# Patient Record
Sex: FEMALE | Race: WHITE | NOT HISPANIC OR LATINO | Employment: UNEMPLOYED | URBAN - METROPOLITAN AREA
[De-identification: names, ages, dates, MRNs, and addresses within clinical notes are randomized per-mention and may not be internally consistent; named-entity substitution may affect disease eponyms.]

---

## 2018-06-11 ENCOUNTER — HOSPITAL ENCOUNTER (INPATIENT)
Facility: MEDICAL CENTER | Age: 58
LOS: 22 days | DRG: 853 | End: 2018-07-03
Attending: EMERGENCY MEDICINE | Admitting: INTERNAL MEDICINE

## 2018-06-11 ENCOUNTER — HOSPITAL ENCOUNTER (OUTPATIENT)
Dept: RADIOLOGY | Facility: MEDICAL CENTER | Age: 58
End: 2018-06-11

## 2018-06-11 DIAGNOSIS — G47.34 NOCTURNAL HYPOXIA: ICD-10-CM

## 2018-06-11 DIAGNOSIS — R73.9 HYPERGLYCEMIA: ICD-10-CM

## 2018-06-11 DIAGNOSIS — M72.6 NECROTIZING FASCIITIS (HCC): ICD-10-CM

## 2018-06-11 PROBLEM — A41.9 SEPSIS (HCC): Status: ACTIVE | Noted: 2018-06-11

## 2018-06-11 LAB
ANION GAP SERPL CALC-SCNC: 12 MMOL/L (ref 0–11.9)
ANISOCYTOSIS BLD QL SMEAR: ABNORMAL
APTT PPP: 31.7 SEC (ref 24.7–36)
BASOPHILS # BLD AUTO: 0.9 % (ref 0–1.8)
BASOPHILS # BLD: 0.1 K/UL (ref 0–0.12)
BUN SERPL-MCNC: 12 MG/DL (ref 8–22)
CALCIUM SERPL-MCNC: 8.1 MG/DL (ref 8.5–10.5)
CHLORIDE SERPL-SCNC: 105 MMOL/L (ref 96–112)
CK SERPL-CCNC: 27 U/L (ref 0–154)
CO2 SERPL-SCNC: 18 MMOL/L (ref 20–33)
CREAT SERPL-MCNC: 0.63 MG/DL (ref 0.5–1.4)
EOSINOPHIL # BLD AUTO: 0 K/UL (ref 0–0.51)
EOSINOPHIL NFR BLD: 0 % (ref 0–6.9)
ERYTHROCYTE [DISTWIDTH] IN BLOOD BY AUTOMATED COUNT: 41.6 FL (ref 35.9–50)
GLUCOSE SERPL-MCNC: 324 MG/DL (ref 65–99)
HCT VFR BLD AUTO: 35.3 % (ref 37–47)
HGB BLD-MCNC: 11.9 G/DL (ref 12–16)
INR PPP: 1.11 (ref 0.87–1.13)
LACTATE BLD-SCNC: 1.1 MMOL/L (ref 0.5–2)
LYMPHOCYTES # BLD AUTO: 1.77 K/UL (ref 1–4.8)
LYMPHOCYTES NFR BLD: 15.8 % (ref 22–41)
MAGNESIUM SERPL-MCNC: 1.6 MG/DL (ref 1.5–2.5)
MANUAL DIFF BLD: NORMAL
MCH RBC QN AUTO: 31.3 PG (ref 27–33)
MCHC RBC AUTO-ENTMCNC: 33.7 G/DL (ref 33.6–35)
MCV RBC AUTO: 92.9 FL (ref 81.4–97.8)
MICROCYTES BLD QL SMEAR: ABNORMAL
MONOCYTES # BLD AUTO: 0.78 K/UL (ref 0–0.85)
MONOCYTES NFR BLD AUTO: 7 % (ref 0–13.4)
MORPHOLOGY BLD-IMP: NORMAL
NEUTROPHILS # BLD AUTO: 8.55 K/UL (ref 2–7.15)
NEUTROPHILS NFR BLD: 71.9 % (ref 44–72)
NEUTS BAND NFR BLD MANUAL: 4.4 % (ref 0–10)
NRBC # BLD AUTO: 0 K/UL
NRBC BLD-RTO: 0 /100 WBC
PLATELET # BLD AUTO: 181 K/UL (ref 164–446)
PLATELET BLD QL SMEAR: NORMAL
PMV BLD AUTO: 10.4 FL (ref 9–12.9)
POTASSIUM SERPL-SCNC: 3.3 MMOL/L (ref 3.6–5.5)
PROTHROMBIN TIME: 14 SEC (ref 12–14.6)
RBC # BLD AUTO: 3.8 M/UL (ref 4.2–5.4)
RBC BLD AUTO: PRESENT
SODIUM SERPL-SCNC: 135 MMOL/L (ref 135–145)
WBC # BLD AUTO: 11.2 K/UL (ref 4.8–10.8)

## 2018-06-11 PROCEDURE — 160002 HCHG RECOVERY MINUTES (STAT): Performed by: ORTHOPAEDIC SURGERY

## 2018-06-11 PROCEDURE — 85730 THROMBOPLASTIN TIME PARTIAL: CPT

## 2018-06-11 PROCEDURE — 700102 HCHG RX REV CODE 250 W/ 637 OVERRIDE(OP): Performed by: INTERNAL MEDICINE

## 2018-06-11 PROCEDURE — 700101 HCHG RX REV CODE 250: Performed by: INTERNAL MEDICINE

## 2018-06-11 PROCEDURE — A9270 NON-COVERED ITEM OR SERVICE: HCPCS

## 2018-06-11 PROCEDURE — 99291 CRITICAL CARE FIRST HOUR: CPT

## 2018-06-11 PROCEDURE — 87040 BLOOD CULTURE FOR BACTERIA: CPT | Mod: 91

## 2018-06-11 PROCEDURE — 160038 HCHG SURGERY MINUTES - EA ADDL 1 MIN LEVEL 2: Performed by: ORTHOPAEDIC SURGERY

## 2018-06-11 PROCEDURE — A9270 NON-COVERED ITEM OR SERVICE: HCPCS | Performed by: ORTHOPAEDIC SURGERY

## 2018-06-11 PROCEDURE — 500452 HCHG DRESSING, WOUND VAC MED.: Performed by: ORTHOPAEDIC SURGERY

## 2018-06-11 PROCEDURE — 160048 HCHG OR STATISTICAL LEVEL 1-5: Performed by: ORTHOPAEDIC SURGERY

## 2018-06-11 PROCEDURE — 85007 BL SMEAR W/DIFF WBC COUNT: CPT

## 2018-06-11 PROCEDURE — 700102 HCHG RX REV CODE 250 W/ 637 OVERRIDE(OP): Performed by: ORTHOPAEDIC SURGERY

## 2018-06-11 PROCEDURE — 700105 HCHG RX REV CODE 258

## 2018-06-11 PROCEDURE — 700105 HCHG RX REV CODE 258: Performed by: INTERNAL MEDICINE

## 2018-06-11 PROCEDURE — 99291 CRITICAL CARE FIRST HOUR: CPT | Performed by: INTERNAL MEDICINE

## 2018-06-11 PROCEDURE — 83036 HEMOGLOBIN GLYCOSYLATED A1C: CPT

## 2018-06-11 PROCEDURE — 700111 HCHG RX REV CODE 636 W/ 250 OVERRIDE (IP): Performed by: EMERGENCY MEDICINE

## 2018-06-11 PROCEDURE — 700102 HCHG RX REV CODE 250 W/ 637 OVERRIDE(OP)

## 2018-06-11 PROCEDURE — 85610 PROTHROMBIN TIME: CPT

## 2018-06-11 PROCEDURE — 80048 BASIC METABOLIC PNL TOTAL CA: CPT

## 2018-06-11 PROCEDURE — 96365 THER/PROPH/DIAG IV INF INIT: CPT

## 2018-06-11 PROCEDURE — 160027 HCHG SURGERY MINUTES - 1ST 30 MINS LEVEL 2: Performed by: ORTHOPAEDIC SURGERY

## 2018-06-11 PROCEDURE — 700111 HCHG RX REV CODE 636 W/ 250 OVERRIDE (IP): Performed by: ORTHOPAEDIC SURGERY

## 2018-06-11 PROCEDURE — 700111 HCHG RX REV CODE 636 W/ 250 OVERRIDE (IP)

## 2018-06-11 PROCEDURE — 87070 CULTURE OTHR SPECIMN AEROBIC: CPT

## 2018-06-11 PROCEDURE — 83735 ASSAY OF MAGNESIUM: CPT

## 2018-06-11 PROCEDURE — 82962 GLUCOSE BLOOD TEST: CPT

## 2018-06-11 PROCEDURE — 160009 HCHG ANES TIME/MIN: Performed by: ORTHOPAEDIC SURGERY

## 2018-06-11 PROCEDURE — 700111 HCHG RX REV CODE 636 W/ 250 OVERRIDE (IP): Performed by: INTERNAL MEDICINE

## 2018-06-11 PROCEDURE — 87205 SMEAR GRAM STAIN: CPT

## 2018-06-11 PROCEDURE — 96375 TX/PRO/DX INJ NEW DRUG ADDON: CPT

## 2018-06-11 PROCEDURE — 83605 ASSAY OF LACTIC ACID: CPT

## 2018-06-11 PROCEDURE — 87077 CULTURE AEROBIC IDENTIFY: CPT

## 2018-06-11 PROCEDURE — 500891 HCHG PACK, ORTHO MAJOR: Performed by: ORTHOPAEDIC SURGERY

## 2018-06-11 PROCEDURE — 700101 HCHG RX REV CODE 250: Performed by: EMERGENCY MEDICINE

## 2018-06-11 PROCEDURE — 87076 CULTURE ANAEROBE IDENT EACH: CPT

## 2018-06-11 PROCEDURE — 700101 HCHG RX REV CODE 250

## 2018-06-11 PROCEDURE — 160035 HCHG PACU - 1ST 60 MINS PHASE I: Performed by: ORTHOPAEDIC SURGERY

## 2018-06-11 PROCEDURE — 82550 ASSAY OF CK (CPK): CPT

## 2018-06-11 PROCEDURE — 87075 CULTR BACTERIA EXCEPT BLOOD: CPT

## 2018-06-11 PROCEDURE — 770022 HCHG ROOM/CARE - ICU (200)

## 2018-06-11 PROCEDURE — 0KBQ0ZZ EXCISION OF RIGHT UPPER LEG MUSCLE, OPEN APPROACH: ICD-10-PCS | Performed by: ORTHOPAEDIC SURGERY

## 2018-06-11 PROCEDURE — 85027 COMPLETE CBC AUTOMATED: CPT

## 2018-06-11 RX ORDER — SODIUM CHLORIDE AND POTASSIUM CHLORIDE 150; 900 MG/100ML; MG/100ML
INJECTION, SOLUTION INTRAVENOUS CONTINUOUS
Status: DISCONTINUED | OUTPATIENT
Start: 2018-06-11 | End: 2018-06-13

## 2018-06-11 RX ORDER — SODIUM CHLORIDE 9 MG/ML
30 INJECTION, SOLUTION INTRAVENOUS
Status: COMPLETED | OUTPATIENT
Start: 2018-06-11 | End: 2018-06-11

## 2018-06-11 RX ORDER — AMOXICILLIN 250 MG
2 CAPSULE ORAL 2 TIMES DAILY
Status: DISCONTINUED | OUTPATIENT
Start: 2018-06-11 | End: 2018-06-30

## 2018-06-11 RX ORDER — BISACODYL 10 MG
10 SUPPOSITORY, RECTAL RECTAL
Status: DISCONTINUED | OUTPATIENT
Start: 2018-06-11 | End: 2018-06-30

## 2018-06-11 RX ORDER — POLYETHYLENE GLYCOL 3350 17 G/17G
1 POWDER, FOR SOLUTION ORAL
Status: DISCONTINUED | OUTPATIENT
Start: 2018-06-11 | End: 2018-06-30

## 2018-06-11 RX ORDER — ENEMA 19; 7 G/133ML; G/133ML
1 ENEMA RECTAL
Status: DISCONTINUED | OUTPATIENT
Start: 2018-06-11 | End: 2018-06-30

## 2018-06-11 RX ORDER — HALOPERIDOL 5 MG/ML
1 INJECTION INTRAMUSCULAR EVERY 6 HOURS PRN
Status: DISCONTINUED | OUTPATIENT
Start: 2018-06-11 | End: 2018-06-18

## 2018-06-11 RX ORDER — MORPHINE SULFATE 4 MG/ML
4 INJECTION, SOLUTION INTRAMUSCULAR; INTRAVENOUS ONCE
Status: COMPLETED | OUTPATIENT
Start: 2018-06-11 | End: 2018-06-11

## 2018-06-11 RX ORDER — ACETAMINOPHEN 325 MG/1
650 TABLET ORAL EVERY 6 HOURS PRN
Status: DISCONTINUED | OUTPATIENT
Start: 2018-06-11 | End: 2018-07-03 | Stop reason: HOSPADM

## 2018-06-11 RX ORDER — DIPHENHYDRAMINE HYDROCHLORIDE 50 MG/ML
25 INJECTION INTRAMUSCULAR; INTRAVENOUS EVERY 6 HOURS PRN
Status: DISCONTINUED | OUTPATIENT
Start: 2018-06-11 | End: 2018-06-18

## 2018-06-11 RX ORDER — KETOROLAC TROMETHAMINE 30 MG/ML
30 INJECTION, SOLUTION INTRAMUSCULAR; INTRAVENOUS EVERY 6 HOURS
Status: COMPLETED | OUTPATIENT
Start: 2018-06-11 | End: 2018-06-14

## 2018-06-11 RX ORDER — OXYCODONE HYDROCHLORIDE 5 MG/1
5 TABLET ORAL
Status: DISCONTINUED | OUTPATIENT
Start: 2018-06-11 | End: 2018-06-28

## 2018-06-11 RX ORDER — ONDANSETRON 2 MG/ML
4 INJECTION INTRAMUSCULAR; INTRAVENOUS ONCE
Status: COMPLETED | OUTPATIENT
Start: 2018-06-11 | End: 2018-06-11

## 2018-06-11 RX ORDER — AMOXICILLIN 250 MG
1 CAPSULE ORAL
Status: DISCONTINUED | OUTPATIENT
Start: 2018-06-11 | End: 2018-06-30

## 2018-06-11 RX ORDER — POLYETHYLENE GLYCOL 3350 17 G/17G
1 POWDER, FOR SOLUTION ORAL 2 TIMES DAILY PRN
Status: DISCONTINUED | OUTPATIENT
Start: 2018-06-11 | End: 2018-06-11

## 2018-06-11 RX ORDER — HYDROMORPHONE HYDROCHLORIDE 2 MG/ML
0.5 INJECTION, SOLUTION INTRAMUSCULAR; INTRAVENOUS; SUBCUTANEOUS
Status: DISCONTINUED | OUTPATIENT
Start: 2018-06-11 | End: 2018-06-11

## 2018-06-11 RX ORDER — SODIUM CHLORIDE 9 MG/ML
INJECTION, SOLUTION INTRAVENOUS CONTINUOUS
Status: DISCONTINUED | OUTPATIENT
Start: 2018-06-11 | End: 2018-06-11

## 2018-06-11 RX ORDER — INSULIN GLARGINE 100 [IU]/ML
10 INJECTION, SOLUTION SUBCUTANEOUS EVERY EVENING
Status: DISCONTINUED | OUTPATIENT
Start: 2018-06-11 | End: 2018-06-13

## 2018-06-11 RX ORDER — ONDANSETRON 2 MG/ML
4 INJECTION INTRAMUSCULAR; INTRAVENOUS EVERY 4 HOURS PRN
Status: DISCONTINUED | OUTPATIENT
Start: 2018-06-11 | End: 2018-07-03 | Stop reason: HOSPADM

## 2018-06-11 RX ORDER — DOCUSATE SODIUM 100 MG/1
100 CAPSULE, LIQUID FILLED ORAL 2 TIMES DAILY
Status: DISCONTINUED | OUTPATIENT
Start: 2018-06-11 | End: 2018-06-11

## 2018-06-11 RX ORDER — DEXAMETHASONE SODIUM PHOSPHATE 4 MG/ML
4 INJECTION, SOLUTION INTRA-ARTICULAR; INTRALESIONAL; INTRAMUSCULAR; INTRAVENOUS; SOFT TISSUE
Status: DISCONTINUED | OUTPATIENT
Start: 2018-06-11 | End: 2018-06-18

## 2018-06-11 RX ORDER — AMOXICILLIN 250 MG
1 CAPSULE ORAL NIGHTLY
Status: DISCONTINUED | OUTPATIENT
Start: 2018-06-11 | End: 2018-06-11

## 2018-06-11 RX ORDER — OXYCODONE HCL 5 MG/5 ML
SOLUTION, ORAL ORAL
Status: COMPLETED
Start: 2018-06-11 | End: 2018-06-11

## 2018-06-11 RX ORDER — OXYCODONE HYDROCHLORIDE 10 MG/1
10 TABLET ORAL
Status: DISCONTINUED | OUTPATIENT
Start: 2018-06-11 | End: 2018-06-28

## 2018-06-11 RX ORDER — DEXTROSE MONOHYDRATE 25 G/50ML
25 INJECTION, SOLUTION INTRAVENOUS
Status: DISCONTINUED | OUTPATIENT
Start: 2018-06-11 | End: 2018-06-11

## 2018-06-11 RX ORDER — CLINDAMYCIN PHOSPHATE 900 MG/50ML
900 INJECTION, SOLUTION INTRAVENOUS EVERY 8 HOURS
Status: DISCONTINUED | OUTPATIENT
Start: 2018-06-12 | End: 2018-06-12

## 2018-06-11 RX ORDER — MAGNESIUM HYDROXIDE 1200 MG/15ML
LIQUID ORAL
Status: COMPLETED | OUTPATIENT
Start: 2018-06-11 | End: 2018-06-11

## 2018-06-11 RX ORDER — ACETAMINOPHEN 325 MG/1
650 TABLET ORAL EVERY 6 HOURS
Status: DISPENSED | OUTPATIENT
Start: 2018-06-11 | End: 2018-06-16

## 2018-06-11 RX ORDER — CLINDAMYCIN PHOSPHATE 900 MG/50ML
900 INJECTION, SOLUTION INTRAVENOUS ONCE
Status: COMPLETED | OUTPATIENT
Start: 2018-06-11 | End: 2018-06-11

## 2018-06-11 RX ORDER — DEXTROSE MONOHYDRATE 25 G/50ML
25 INJECTION, SOLUTION INTRAVENOUS
Status: DISCONTINUED | OUTPATIENT
Start: 2018-06-11 | End: 2018-06-12

## 2018-06-11 RX ORDER — INSULIN GLARGINE 100 [IU]/ML
0.2 INJECTION, SOLUTION SUBCUTANEOUS EVERY EVENING
Status: DISCONTINUED | OUTPATIENT
Start: 2018-06-11 | End: 2018-06-11

## 2018-06-11 RX ORDER — SCOLOPAMINE TRANSDERMAL SYSTEM 1 MG/1
1 PATCH, EXTENDED RELEASE TRANSDERMAL
Status: DISCONTINUED | OUTPATIENT
Start: 2018-06-11 | End: 2018-06-18

## 2018-06-11 RX ORDER — SODIUM CHLORIDE 9 MG/ML
INJECTION, SOLUTION INTRAVENOUS
Status: COMPLETED
Start: 2018-06-11 | End: 2018-06-11

## 2018-06-11 RX ORDER — SODIUM CHLORIDE 9 MG/ML
1000 INJECTION, SOLUTION INTRAVENOUS
Status: COMPLETED | OUTPATIENT
Start: 2018-06-11 | End: 2018-06-12

## 2018-06-11 RX ADMIN — ONDANSETRON 4 MG: 2 INJECTION INTRAMUSCULAR; INTRAVENOUS at 18:23

## 2018-06-11 RX ADMIN — MORPHINE SULFATE 4 MG: 4 INJECTION INTRAVENOUS at 18:23

## 2018-06-11 RX ADMIN — SODIUM CHLORIDE: 9 INJECTION, SOLUTION INTRAVENOUS at 23:45

## 2018-06-11 RX ADMIN — CLINDAMYCIN IN 5 PERCENT DEXTROSE 900 MG: 18 INJECTION, SOLUTION INTRAVENOUS at 18:23

## 2018-06-11 RX ADMIN — FENTANYL CITRATE 50 MCG: 50 INJECTION, SOLUTION INTRAMUSCULAR; INTRAVENOUS at 20:35

## 2018-06-11 RX ADMIN — SODIUM CHLORIDE 1905 ML: 9 INJECTION, SOLUTION INTRAVENOUS at 22:11

## 2018-06-11 RX ADMIN — PIPERACILLIN SODIUM AND TAZOBACTAM SODIUM 3.38 G: 3; .375 INJECTION, POWDER, FOR SOLUTION INTRAVENOUS at 19:20

## 2018-06-11 RX ADMIN — KETOROLAC TROMETHAMINE 30 MG: 30 INJECTION, SOLUTION INTRAMUSCULAR; INTRAVENOUS at 21:39

## 2018-06-11 RX ADMIN — ONDANSETRON 4 MG: 2 INJECTION INTRAMUSCULAR; INTRAVENOUS at 22:04

## 2018-06-11 RX ADMIN — ACETAMINOPHEN 650 MG: 325 TABLET, FILM COATED ORAL at 21:43

## 2018-06-11 RX ADMIN — INSULIN HUMAN 6 UNITS: 100 INJECTION, SOLUTION PARENTERAL at 22:58

## 2018-06-11 RX ADMIN — POTASSIUM CHLORIDE AND SODIUM CHLORIDE: 900; 150 INJECTION, SOLUTION INTRAVENOUS at 21:39

## 2018-06-11 RX ADMIN — OXYCODONE HYDROCHLORIDE 10 MG: 5 SOLUTION ORAL at 20:38

## 2018-06-11 RX ADMIN — INSULIN GLARGINE 10 UNITS: 100 INJECTION, SOLUTION SUBCUTANEOUS at 23:47

## 2018-06-11 RX ADMIN — VANCOMYCIN HYDROCHLORIDE 1000 MG: 100 INJECTION, POWDER, LYOPHILIZED, FOR SOLUTION INTRAVENOUS at 22:57

## 2018-06-11 ASSESSMENT — ENCOUNTER SYMPTOMS
DEPRESSION: 0
SEIZURES: 0
VOMITING: 1
SENSORY CHANGE: 0
CONSTIPATION: 0
COUGH: 0
FEVER: 1
EYE DISCHARGE: 0
FOCAL WEAKNESS: 0
SPUTUM PRODUCTION: 0
CLAUDICATION: 0
MYALGIAS: 0
WEAKNESS: 1
POLYDIPSIA: 0
LOSS OF CONSCIOUSNESS: 0
PHOTOPHOBIA: 0
HEADACHES: 0
BLOOD IN STOOL: 0
EYES NEGATIVE: 1
BRUISES/BLEEDS EASILY: 0
ROS SKIN COMMENTS: PER HPI
DIARRHEA: 0
HALLUCINATIONS: 0
PALPITATIONS: 0
CHILLS: 1
NECK PAIN: 0
POLYDIPSIA: 1
STRIDOR: 0
SPEECH CHANGE: 0
BACK PAIN: 0
DIAPHORESIS: 1
HEMOPTYSIS: 0
SHORTNESS OF BREATH: 0
DOUBLE VISION: 0
BLURRED VISION: 0
ORTHOPNEA: 0
EYE PAIN: 0
SINUS PAIN: 0
HEARTBURN: 0
NERVOUS/ANXIOUS: 0
NAUSEA: 1
ABDOMINAL PAIN: 0

## 2018-06-11 ASSESSMENT — PAIN SCALES - GENERAL
PAINLEVEL_OUTOF10: 4
PAINLEVEL_OUTOF10: 3
PAINLEVEL_OUTOF10: 2
PAINLEVEL_OUTOF10: 0
PAINLEVEL_OUTOF10: 7

## 2018-06-11 ASSESSMENT — LIFESTYLE VARIABLES
DO YOU DRINK ALCOHOL: NO
EVER_SMOKED: YES
SUBSTANCE_ABUSE: 0

## 2018-06-12 PROBLEM — E87.6 HYPOKALEMIA: Status: ACTIVE | Noted: 2018-06-12

## 2018-06-12 PROBLEM — E87.20 METABOLIC ACIDOSIS: Status: ACTIVE | Noted: 2018-06-12

## 2018-06-12 PROBLEM — E83.51 HYPOCALCEMIA: Status: ACTIVE | Noted: 2018-06-12

## 2018-06-12 PROBLEM — D64.9 ANEMIA: Status: ACTIVE | Noted: 2018-06-12

## 2018-06-12 PROBLEM — D61.818 PANCYTOPENIA (HCC): Status: ACTIVE | Noted: 2018-06-12

## 2018-06-12 PROBLEM — E11.9 TYPE 2 DIABETES MELLITUS (HCC): Status: ACTIVE | Noted: 2018-06-12

## 2018-06-12 LAB
ALBUMIN SERPL BCP-MCNC: 2.2 G/DL (ref 3.2–4.9)
ALBUMIN/GLOB SERPL: 1 G/DL
ALP SERPL-CCNC: 106 U/L (ref 30–99)
ALT SERPL-CCNC: 23 U/L (ref 2–50)
ANION GAP SERPL CALC-SCNC: 7 MMOL/L (ref 0–11.9)
APPEARANCE UR: ABNORMAL
AST SERPL-CCNC: 30 U/L (ref 12–45)
BACTERIA #/AREA URNS HPF: NEGATIVE /HPF
BASOPHILS # BLD AUTO: 0 % (ref 0–1.8)
BASOPHILS # BLD: 0 K/UL (ref 0–0.12)
BILIRUB SERPL-MCNC: 0.7 MG/DL (ref 0.1–1.5)
BILIRUB UR QL STRIP.AUTO: NEGATIVE
BUN SERPL-MCNC: 11 MG/DL (ref 8–22)
CALCIUM SERPL-MCNC: 6.9 MG/DL (ref 8.5–10.5)
CHLORIDE SERPL-SCNC: 107 MMOL/L (ref 96–112)
CK SERPL-CCNC: 21 U/L (ref 0–154)
CO2 SERPL-SCNC: 18 MMOL/L (ref 20–33)
COLOR UR: ABNORMAL
CREAT SERPL-MCNC: 0.43 MG/DL (ref 0.5–1.4)
CRP SERPL HS-MCNC: 36.86 MG/DL (ref 0–0.75)
EOSINOPHIL # BLD AUTO: 0 K/UL (ref 0–0.51)
EOSINOPHIL NFR BLD: 0 % (ref 0–6.9)
EPI CELLS #/AREA URNS HPF: ABNORMAL /HPF
ERYTHROCYTE [DISTWIDTH] IN BLOOD BY AUTOMATED COUNT: 43.5 FL (ref 35.9–50)
EST. AVERAGE GLUCOSE BLD GHB EST-MCNC: 321 MG/DL
GLOBULIN SER CALC-MCNC: 2.3 G/DL (ref 1.9–3.5)
GLUCOSE BLD-MCNC: 308 MG/DL (ref 65–99)
GLUCOSE BLD-MCNC: 309 MG/DL (ref 65–99)
GLUCOSE BLD-MCNC: 349 MG/DL (ref 65–99)
GLUCOSE SERPL-MCNC: 272 MG/DL (ref 65–99)
GLUCOSE UR STRIP.AUTO-MCNC: >=1000 MG/DL
GRAM STN SPEC: NORMAL
HBA1C MFR BLD: 12.8 % (ref 0–5.6)
HCT VFR BLD AUTO: 30.4 % (ref 37–47)
HGB BLD-MCNC: 10.1 G/DL (ref 12–16)
HIV 1+2 AB+HIV1 P24 AG SERPL QL IA: NON REACTIVE
HYALINE CASTS #/AREA URNS LPF: ABNORMAL /LPF
KETONES UR STRIP.AUTO-MCNC: 15 MG/DL
LACTATE BLD-SCNC: 1.4 MMOL/L (ref 0.5–2)
LACTATE BLD-SCNC: 3.6 MMOL/L (ref 0.5–2)
LEUKOCYTE ESTERASE UR QL STRIP.AUTO: NEGATIVE
LYMPHOCYTES # BLD AUTO: 2.24 K/UL (ref 1–4.8)
LYMPHOCYTES NFR BLD: 19.5 % (ref 22–41)
MAGNESIUM SERPL-MCNC: 1.4 MG/DL (ref 1.5–2.5)
MANUAL DIFF BLD: NORMAL
MCH RBC QN AUTO: 31.8 PG (ref 27–33)
MCHC RBC AUTO-ENTMCNC: 33.2 G/DL (ref 33.6–35)
MCV RBC AUTO: 95.6 FL (ref 81.4–97.8)
METAMYELOCYTES NFR BLD MANUAL: 0.9 %
MICRO URNS: ABNORMAL
MONOCYTES # BLD AUTO: 0.64 K/UL (ref 0–0.85)
MONOCYTES NFR BLD AUTO: 5.6 % (ref 0–13.4)
MORPHOLOGY BLD-IMP: NORMAL
MYELOCYTES NFR BLD MANUAL: 0.9 %
NEUTROPHILS # BLD AUTO: 8.41 K/UL (ref 2–7.15)
NEUTROPHILS NFR BLD: 64.8 % (ref 44–72)
NEUTS BAND NFR BLD MANUAL: 8.3 % (ref 0–10)
NITRITE UR QL STRIP.AUTO: NEGATIVE
NRBC # BLD AUTO: 0 K/UL
NRBC BLD-RTO: 0 /100 WBC
PH UR STRIP.AUTO: 5 [PH]
PLATELET # BLD AUTO: 152 K/UL (ref 164–446)
PLATELET BLD QL SMEAR: NORMAL
PMV BLD AUTO: 10.5 FL (ref 9–12.9)
POTASSIUM SERPL-SCNC: 3.8 MMOL/L (ref 3.6–5.5)
PROT SERPL-MCNC: 4.5 G/DL (ref 6–8.2)
PROT UR QL STRIP: 30 MG/DL
RBC # BLD AUTO: 3.18 M/UL (ref 4.2–5.4)
RBC # URNS HPF: ABNORMAL /HPF
RBC BLD AUTO: NORMAL
RBC UR QL AUTO: ABNORMAL
SIGNIFICANT IND 70042: NORMAL
SITE SITE: NORMAL
SODIUM SERPL-SCNC: 132 MMOL/L (ref 135–145)
SOURCE SOURCE: NORMAL
SP GR UR REFRACTOMETRY: >1.045
UROBILINOGEN UR STRIP.AUTO-MCNC: 1 MG/DL
WBC # BLD AUTO: 11.5 K/UL (ref 4.8–10.8)
WBC #/AREA URNS HPF: ABNORMAL /HPF
YEAST BUDDING URNS QL: PRESENT /HPF
YEAST HYPHAE #/AREA URNS HPF: PRESENT /HPF

## 2018-06-12 PROCEDURE — 700101 HCHG RX REV CODE 250: Performed by: INTERNAL MEDICINE

## 2018-06-12 PROCEDURE — 770022 HCHG ROOM/CARE - ICU (200)

## 2018-06-12 PROCEDURE — 700105 HCHG RX REV CODE 258: Performed by: INTERNAL MEDICINE

## 2018-06-12 PROCEDURE — 700102 HCHG RX REV CODE 250 W/ 637 OVERRIDE(OP): Performed by: ORTHOPAEDIC SURGERY

## 2018-06-12 PROCEDURE — A9270 NON-COVERED ITEM OR SERVICE: HCPCS | Performed by: ORTHOPAEDIC SURGERY

## 2018-06-12 PROCEDURE — 82962 GLUCOSE BLOOD TEST: CPT | Mod: 91

## 2018-06-12 PROCEDURE — 99255 IP/OBS CONSLTJ NEW/EST HI 80: CPT | Performed by: INTERNAL MEDICINE

## 2018-06-12 PROCEDURE — 83735 ASSAY OF MAGNESIUM: CPT

## 2018-06-12 PROCEDURE — G8978 MOBILITY CURRENT STATUS: HCPCS | Mod: CK

## 2018-06-12 PROCEDURE — 85027 COMPLETE CBC AUTOMATED: CPT

## 2018-06-12 PROCEDURE — 700111 HCHG RX REV CODE 636 W/ 250 OVERRIDE (IP): Performed by: INTERNAL MEDICINE

## 2018-06-12 PROCEDURE — 99291 CRITICAL CARE FIRST HOUR: CPT | Performed by: INTERNAL MEDICINE

## 2018-06-12 PROCEDURE — 97161 PT EVAL LOW COMPLEX 20 MIN: CPT

## 2018-06-12 PROCEDURE — A9270 NON-COVERED ITEM OR SERVICE: HCPCS | Performed by: INTERNAL MEDICINE

## 2018-06-12 PROCEDURE — 87106 FUNGI IDENTIFICATION YEAST: CPT

## 2018-06-12 PROCEDURE — 700105 HCHG RX REV CODE 258

## 2018-06-12 PROCEDURE — 87086 URINE CULTURE/COLONY COUNT: CPT

## 2018-06-12 PROCEDURE — 81001 URINALYSIS AUTO W/SCOPE: CPT

## 2018-06-12 PROCEDURE — 83605 ASSAY OF LACTIC ACID: CPT | Mod: 91

## 2018-06-12 PROCEDURE — 85007 BL SMEAR W/DIFF WBC COUNT: CPT

## 2018-06-12 PROCEDURE — 700111 HCHG RX REV CODE 636 W/ 250 OVERRIDE (IP): Performed by: ORTHOPAEDIC SURGERY

## 2018-06-12 PROCEDURE — 97166 OT EVAL MOD COMPLEX 45 MIN: CPT

## 2018-06-12 PROCEDURE — 51798 US URINE CAPACITY MEASURE: CPT

## 2018-06-12 PROCEDURE — 700105 HCHG RX REV CODE 258: Performed by: STUDENT IN AN ORGANIZED HEALTH CARE EDUCATION/TRAINING PROGRAM

## 2018-06-12 PROCEDURE — G8987 SELF CARE CURRENT STATUS: HCPCS | Mod: CL

## 2018-06-12 PROCEDURE — G8988 SELF CARE GOAL STATUS: HCPCS | Mod: CK

## 2018-06-12 PROCEDURE — G8979 MOBILITY GOAL STATUS: HCPCS | Mod: CI

## 2018-06-12 PROCEDURE — 700102 HCHG RX REV CODE 250 W/ 637 OVERRIDE(OP): Performed by: INTERNAL MEDICINE

## 2018-06-12 PROCEDURE — 82550 ASSAY OF CK (CPK): CPT

## 2018-06-12 PROCEDURE — 87389 HIV-1 AG W/HIV-1&-2 AB AG IA: CPT

## 2018-06-12 PROCEDURE — 80053 COMPREHEN METABOLIC PANEL: CPT

## 2018-06-12 PROCEDURE — 86140 C-REACTIVE PROTEIN: CPT

## 2018-06-12 RX ORDER — SODIUM CHLORIDE, SODIUM LACTATE, POTASSIUM CHLORIDE, CALCIUM CHLORIDE 600; 310; 30; 20 MG/100ML; MG/100ML; MG/100ML; MG/100ML
1000 INJECTION, SOLUTION INTRAVENOUS ONCE
Status: COMPLETED | OUTPATIENT
Start: 2018-06-12 | End: 2018-06-12

## 2018-06-12 RX ORDER — SODIUM CHLORIDE 9 MG/ML
INJECTION, SOLUTION INTRAVENOUS
Status: COMPLETED
Start: 2018-06-12 | End: 2018-06-12

## 2018-06-12 RX ORDER — CALCIUM CHLORIDE 100 MG/ML
1 INJECTION INTRAVENOUS; INTRAVENTRICULAR ONCE
Status: DISCONTINUED | OUTPATIENT
Start: 2018-06-12 | End: 2018-06-12

## 2018-06-12 RX ORDER — SODIUM CHLORIDE, SODIUM LACTATE, POTASSIUM CHLORIDE, CALCIUM CHLORIDE 600; 310; 30; 20 MG/100ML; MG/100ML; MG/100ML; MG/100ML
INJECTION, SOLUTION INTRAVENOUS
Status: ACTIVE
Start: 2018-06-12 | End: 2018-06-12

## 2018-06-12 RX ORDER — DEXTROSE MONOHYDRATE 25 G/50ML
25 INJECTION, SOLUTION INTRAVENOUS
Status: DISCONTINUED | OUTPATIENT
Start: 2018-06-12 | End: 2018-07-03 | Stop reason: HOSPADM

## 2018-06-12 RX ORDER — POTASSIUM CHLORIDE 20 MEQ/1
40 TABLET, EXTENDED RELEASE ORAL ONCE
Status: COMPLETED | OUTPATIENT
Start: 2018-06-12 | End: 2018-06-12

## 2018-06-12 RX ORDER — SODIUM CHLORIDE, SODIUM LACTATE, POTASSIUM CHLORIDE, CALCIUM CHLORIDE 600; 310; 30; 20 MG/100ML; MG/100ML; MG/100ML; MG/100ML
INJECTION, SOLUTION INTRAVENOUS CONTINUOUS
Status: DISCONTINUED | OUTPATIENT
Start: 2018-06-12 | End: 2018-06-12

## 2018-06-12 RX ORDER — MAGNESIUM SULFATE HEPTAHYDRATE 40 MG/ML
4 INJECTION, SOLUTION INTRAVENOUS ONCE
Status: COMPLETED | OUTPATIENT
Start: 2018-06-12 | End: 2018-06-12

## 2018-06-12 RX ADMIN — INSULIN GLARGINE 10 UNITS: 100 INJECTION, SOLUTION SUBCUTANEOUS at 20:21

## 2018-06-12 RX ADMIN — POTASSIUM CHLORIDE 40 MEQ: 1500 TABLET, EXTENDED RELEASE ORAL at 09:39

## 2018-06-12 RX ADMIN — SODIUM CHLORIDE 1000 ML: 9 INJECTION, SOLUTION INTRAVENOUS at 02:37

## 2018-06-12 RX ADMIN — MAGNESIUM SULFATE IN WATER 4 G: 40 INJECTION, SOLUTION INTRAVENOUS at 05:56

## 2018-06-12 RX ADMIN — POTASSIUM CHLORIDE AND SODIUM CHLORIDE: 900; 150 INJECTION, SOLUTION INTRAVENOUS at 16:14

## 2018-06-12 RX ADMIN — SODIUM CHLORIDE, POTASSIUM CHLORIDE, SODIUM LACTATE AND CALCIUM CHLORIDE: 600; 310; 30; 20 INJECTION, SOLUTION INTRAVENOUS at 04:00

## 2018-06-12 RX ADMIN — VANCOMYCIN HYDROCHLORIDE 1000 MG: 100 INJECTION, POWDER, LYOPHILIZED, FOR SOLUTION INTRAVENOUS at 11:01

## 2018-06-12 RX ADMIN — ONDANSETRON 4 MG: 2 INJECTION INTRAMUSCULAR; INTRAVENOUS at 08:20

## 2018-06-12 RX ADMIN — POTASSIUM CHLORIDE AND SODIUM CHLORIDE: 900; 150 INJECTION, SOLUTION INTRAVENOUS at 23:15

## 2018-06-12 RX ADMIN — KETOROLAC TROMETHAMINE 30 MG: 30 INJECTION, SOLUTION INTRAMUSCULAR; INTRAVENOUS at 17:10

## 2018-06-12 RX ADMIN — CLINDAMYCIN IN 5 PERCENT DEXTROSE 900 MG: 18 INJECTION, SOLUTION INTRAVENOUS at 09:40

## 2018-06-12 RX ADMIN — POTASSIUM CHLORIDE AND SODIUM CHLORIDE: 900; 150 INJECTION, SOLUTION INTRAVENOUS at 05:02

## 2018-06-12 RX ADMIN — SODIUM CHLORIDE, POTASSIUM CHLORIDE, SODIUM LACTATE AND CALCIUM CHLORIDE 1000 ML: 600; 310; 30; 20 INJECTION, SOLUTION INTRAVENOUS at 05:38

## 2018-06-12 RX ADMIN — PIPERACILLIN AND TAZOBACTAM 4.5 G: 4; .5 INJECTION, POWDER, LYOPHILIZED, FOR SOLUTION INTRAVENOUS; PARENTERAL at 05:00

## 2018-06-12 RX ADMIN — SODIUM CHLORIDE, POTASSIUM CHLORIDE, SODIUM LACTATE AND CALCIUM CHLORIDE 1000 ML: 600; 310; 30; 20 INJECTION, SOLUTION INTRAVENOUS at 14:19

## 2018-06-12 RX ADMIN — PIPERACILLIN AND TAZOBACTAM 4.5 G: 4; .5 INJECTION, POWDER, LYOPHILIZED, FOR SOLUTION INTRAVENOUS; PARENTERAL at 20:26

## 2018-06-12 RX ADMIN — CLINDAMYCIN IN 5 PERCENT DEXTROSE 900 MG: 18 INJECTION, SOLUTION INTRAVENOUS at 17:11

## 2018-06-12 RX ADMIN — ACETAMINOPHEN 650 MG: 325 TABLET, FILM COATED ORAL at 05:02

## 2018-06-12 RX ADMIN — KETOROLAC TROMETHAMINE 30 MG: 30 INJECTION, SOLUTION INTRAMUSCULAR; INTRAVENOUS at 02:00

## 2018-06-12 RX ADMIN — ACETAMINOPHEN 650 MG: 325 TABLET, FILM COATED ORAL at 23:12

## 2018-06-12 RX ADMIN — CALCIUM CHLORIDE 1 G: 100 INJECTION, SOLUTION INTRAVENOUS at 06:20

## 2018-06-12 RX ADMIN — KETOROLAC TROMETHAMINE 30 MG: 30 INJECTION, SOLUTION INTRAMUSCULAR; INTRAVENOUS at 05:02

## 2018-06-12 RX ADMIN — KETOROLAC TROMETHAMINE 30 MG: 30 INJECTION, SOLUTION INTRAMUSCULAR; INTRAVENOUS at 23:11

## 2018-06-12 RX ADMIN — ACETAMINOPHEN 650 MG: 325 TABLET, FILM COATED ORAL at 17:10

## 2018-06-12 RX ADMIN — KETOROLAC TROMETHAMINE 30 MG: 30 INJECTION, SOLUTION INTRAMUSCULAR; INTRAVENOUS at 11:00

## 2018-06-12 RX ADMIN — PIPERACILLIN AND TAZOBACTAM 4.5 G: 4; .5 INJECTION, POWDER, LYOPHILIZED, FOR SOLUTION INTRAVENOUS; PARENTERAL at 14:54

## 2018-06-12 RX ADMIN — ONDANSETRON 4 MG: 2 INJECTION INTRAMUSCULAR; INTRAVENOUS at 14:27

## 2018-06-12 RX ADMIN — ACETAMINOPHEN 650 MG: 325 TABLET, FILM COATED ORAL at 10:59

## 2018-06-12 RX ADMIN — PIPERACILLIN AND TAZOBACTAM 4.5 G: 4; .5 INJECTION, POWDER, LYOPHILIZED, FOR SOLUTION INTRAVENOUS; PARENTERAL at 00:11

## 2018-06-12 RX ADMIN — CLINDAMYCIN IN 5 PERCENT DEXTROSE 900 MG: 18 INJECTION, SOLUTION INTRAVENOUS at 01:58

## 2018-06-12 RX ADMIN — INSULIN HUMAN 5 UNITS: 100 INJECTION, SOLUTION PARENTERAL at 05:35

## 2018-06-12 RX ADMIN — SODIUM CHLORIDE: 9 INJECTION, SOLUTION INTRAVENOUS at 03:15

## 2018-06-12 RX ADMIN — SODIUM CHLORIDE, POTASSIUM CHLORIDE, SODIUM LACTATE AND CALCIUM CHLORIDE 1000 ML: 600; 310; 30; 20 INJECTION, SOLUTION INTRAVENOUS at 09:39

## 2018-06-12 ASSESSMENT — PAIN SCALES - GENERAL
PAINLEVEL_OUTOF10: 0

## 2018-06-12 ASSESSMENT — ENCOUNTER SYMPTOMS
BLURRED VISION: 0
DOUBLE VISION: 0
SHORTNESS OF BREATH: 0
PSYCHIATRIC NEGATIVE: 1
ABDOMINAL PAIN: 0
MUSCULOSKELETAL NEGATIVE: 1
SPUTUM PRODUCTION: 0
SORE THROAT: 0
CHILLS: 0
HEADACHES: 0
PALPITATIONS: 0
NAUSEA: 0
VOMITING: 0
DIZZINESS: 0
FEVER: 0

## 2018-06-12 ASSESSMENT — PATIENT HEALTH QUESTIONNAIRE - PHQ9
SUM OF ALL RESPONSES TO PHQ9 QUESTIONS 1 AND 2: 0
2. FEELING DOWN, DEPRESSED, IRRITABLE, OR HOPELESS: NOT AT ALL
1. LITTLE INTEREST OR PLEASURE IN DOING THINGS: NOT AT ALL

## 2018-06-12 ASSESSMENT — COGNITIVE AND FUNCTIONAL STATUS - GENERAL
MOBILITY SCORE: 17
TURNING FROM BACK TO SIDE WHILE IN FLAT BAD: UNABLE
EATING MEALS: A LITTLE
MOVING TO AND FROM BED TO CHAIR: UNABLE
SUGGESTED CMS G CODE MODIFIER MOBILITY: CK
DAILY ACTIVITIY SCORE: 16
SUGGESTED CMS G CODE MODIFIER DAILY ACTIVITY: CK
PERSONAL GROOMING: A LITTLE
DRESSING REGULAR UPPER BODY CLOTHING: A LITTLE
DRESSING REGULAR LOWER BODY CLOTHING: A LOT
TOILETING: A LITTLE
CLIMB 3 TO 5 STEPS WITH RAILING: A LITTLE
HELP NEEDED FOR BATHING: A LOT

## 2018-06-12 ASSESSMENT — GAIT ASSESSMENTS
DISTANCE (FEET): 3
ASSISTIVE DEVICE: FRONT WHEEL WALKER
GAIT LEVEL OF ASSIST: SUPERVISED

## 2018-06-12 ASSESSMENT — ACTIVITIES OF DAILY LIVING (ADL): TOILETING: INDEPENDENT

## 2018-06-12 NOTE — OP REPORT
DATE OF SERVICE:  06/11/2018    PREOPERATIVE DIAGNOSIS:  Right medial thigh necrotizing fasciitis.    POSTOPERATIVE DIAGNOSIS:  Right medial thigh necrotizing fasciitis.    PROCEDURE:  1.  Irrigation and debridement of right thigh necrotizing fasciitis, infection   of skin, subcutaneous tissue, and underlying muscle.  2.  Wound VAC placement, right thigh.    SURGEON:  Omega Chew MD    ASSISTANT:  None.    ESTIMATED BLOOD LOSS:  None.    INDICATIONS:  This is a 58-year-old female who has had a 4-history right leg   necrotizing infection.  A CT scan showed subcutaneous fluid and she had a   rising white count and was consented for urgent irrigation and debridement   ____.  Risks and benefits were discussed which include, but not limited to   bleeding, infection, neurovascular damage, pain, stiffness, DVT, PE, MI,   stroke, and death, and need for further surgery.  They understand all these   risks and wished to proceed.    DESCRIPTION OF PROCEDURE:  Patient was sedated with LMA anesthesia and   administered preoperative antibiotics.  Right thigh was prepped and draped in   usual sterile fashion.  A 10-cm incision was made over the thigh of the area   of maximal swelling and a large amount of dishwater pus poured out of her   subcutaneous tissues.  The fascial layer underneath this was completely black   and necrotic and this was removed with a knife and rongeur in an excisional   fashion down to a healthy bleeding tissue.  The wound was then irrigated with   6 liters of Kantrex solution and given its foul nature, smell, and disease, a   wound VAC was placed under sterile conditions.  Patient tolerated the   procedure well.    POSTOPERATIVE PLAN:  This patient to be admitted by the medicine service for   IV antibiotics and continued wound VAC therapy.  She will need most likely   several irrigation and debridements procedures prior to closure.       ____________________________________     OMEGA CHEW,  MD LOPEZ / SEVERIANO    DD:  06/11/2018 19:56:28  DT:  06/11/2018 20:21:30    D#:  [id number]  Job#:  [id number]

## 2018-06-12 NOTE — ED TRIAGE NOTES
Transferred by flight from [facility] for cellulitis vs necrotizing fascitis in R groin first noted by pt 4 days ago. A&O x 3. NAD. Reports pain controlled at this time.

## 2018-06-12 NOTE — CONSULTS
"6/11/2018    Emmanuelle Martin is a 58 y.o. female who presents with a 4 day history of right thigh infection and is here for operative management. Patient denies numbness, parasthesias, loss of concousness or other trauma    Past Medical History:   Diagnosis Date   • Cystocele 3/31/2015   • Rectocele 3/31/2015   • Tobacco abuse        History reviewed. No pertinent surgical history.    Medications  No current facility-administered medications on file prior to encounter.      No current outpatient prescriptions on file prior to encounter.       Allergies  Patient has no known allergies.    ROS  Right leg pain. All other systems were reviewed and found to be negative    Family History   Problem Relation Age of Onset   • Diabetes Father        Social History     Social History   • Marital status: Single     Spouse name: N/A   • Number of children: N/A   • Years of education: N/A     Social History Main Topics   • Smoking status: Current Every Day Smoker     Packs/day: 1.00     Years: 20.00     Types: Cigarettes   • Smokeless tobacco: Never Used   • Alcohol use No   • Drug use: No   • Sexual activity: Not on file     Other Topics Concern   • Not on file     Social History Narrative   • No narrative on file       Physical Exam  Vitals  Blood pressure 104/63, pulse 89, temperature 37.4 °C (99.3 °F), resp. rate (!) 33, height 1.499 m (4' 11\"), weight 63.5 kg (140 lb), SpO2 97 %.  General: Well Developed, Well Nourished, no acute distress  HEENT: Normocephalic, atraumatic  Eyes: Anicteric, PERRLA, EOMI  Neck: Supple, nontender, no masses  Lungs: CTA, no wheezes or crackles  Heart: RRR, no murmurs, rubs or gallops  Abdomen: Soft, NT, ND  Pelvis: Stable to AP and Lateral Compression  Skin: Intact, no open wounds  Extremities: Right medial thigh erythema and pain  Neuro: NVI  Vascular: 2+Rad/Uln, Capillary refill <2 seconds    Radiographs:  CT shows necrotizing fascitis right medial thigh  OUTSIDE IMAGES-CT ABDOMEN /PELVIS "   Final Result      OUTSIDE IMAGES-DX CHEST   Final Result          Laboratory Values  WBC 21      No results for input(s): SODIUM, POTASSIUM, CHLORIDE, CO2, GLUCOSE, BUN, CPKTOTAL in the last 72 hours.          Impression: Right thigh necrotizing fascitis    Plan:Operative intervention. Risks and benefits of surgery were discussed which include but are not limited to bleeding, infection, neurovascular damage, malunion, nonunion, instability, limb length discrepancy, DVT, PE, MI, Stroke and death. They understand these risks and wish to proceed.

## 2018-06-12 NOTE — PROGRESS NOTES
"Pharmacy Kinetics 58 y.o. female on vancomycin day # 2 2018    Currently on Vancomycin 1000 mg iv q12hr    Indication for Treatment: Necrotizing fascitis    Pertinent history per medical record: Admitted on 2018 for worsening leg wound.  Patient presented with a 4 day history of cellulitis on her R thigh with associated fever, chills, nausea and vomiting. She was found to have subcutaneous gas on her CT from outlying facility, indicative of necrotizing fascitis. Patient is now s/p I&D and wound VAC placement on . Empiric antibiotics initiated.    Other antibiotics: Clindamycin 900 mg IV q8h, Zosyn 4.5 g IV q8h    Allergies: Patient has no known allergies.     List concerns for renal function: BUN/SCr >20:1, SCr 1.1 at outside hospital, low albumin, BMI 31 kg/m2, hypotensive, uncontrolled DM    Pertinent cultures to date:   18 PBCx2 - NGTD  18 Right groin abscess - NGTD  18 Sputum - to be collected  18 Urine - to be collected    Recent Labs      18   2156  18   0450   WBC  11.2*  11.5*   NEUTSPOLYS  71.90  64.80   BANDSSTABS  4.40  8.30     Recent Labs      186  18   0450   BUN  12  11   CREATININE  0.63  0.43*   ALBUMIN   --   2.2*     Last data filed at 18 0600   Gross per 24 hour   Intake          7456.67 ml   Output              360 ml   Net          7096.67 ml      Blood pressure (!) 90/53, pulse 65, temperature 36.1 °C (97 °F), resp. rate 18, height 1.499 m (4' 11\"), weight 70.3 kg (154 lb 15.7 oz), SpO2 98 %. Temp (24hrs), Av.6 °C (97.9 °F), Min:35.8 °C (96.5 °F), Max:37.4 °C (99.3 °F)      A/P   1. Vancomycin dose change: continue vancomycin 1000 mg IV q12h (~15mg/kg)  2. Next vancomycin level:  @1030  3. Goal trough: 12-16 mcg/mL  4. Comments: Patient afebrile with leukocytosis. Plan for another debridement on 6/15. Blood cultures and abscess cultures in process with no growth to date. Will check vancomycin trough before morning " dose tomorrow, prior to the 5th dose (one dose received at outside hospital). Concerns for accumulation as mentioned above. Pharmacy will continue to monitor for dosing and de-escalation.    Nida Nelson, CrissyD

## 2018-06-12 NOTE — THERAPY
"Physical Therapy Evaluation completed.   Bed Mobility:  Supine to Sit: Minimal Assist  Transfers: Sit to Stand: Supervised  Gait: Level Of Assist: Supervised with Front-Wheel Walker       Plan of Care: Will benefit from Physical Therapy 3 times per week  Discharge Recommendations: Equipment: Will Continue to Assess for Equipment Needs.       See \"Rehab Therapy-Acute\" Patient Summary Report for complete documentation.     "

## 2018-06-12 NOTE — ASSESSMENT & PLAN NOTE
Resolved  Secondary to NS fluid resuscitation causing hyperchloremic acidosis and starvation ketosis

## 2018-06-12 NOTE — PROGRESS NOTES
Skin check completed upon admission to ICU. No issues besides right groin surgical site that's covered with wound vac.

## 2018-06-12 NOTE — PROGRESS NOTES
"Patient seen and examined  Still awaiting culture results from last night surgery    Blood pressure (!) 90/53, pulse 68, temperature 35.9 °C (96.6 °F), resp. rate 13, height 1.499 m (4' 11\"), weight 70.3 kg (154 lb 15.7 oz), SpO2 99 %.    Recent Labs      06/11/18   2156  06/12/18   0450   WBC  11.2*  11.5*   RBC  3.80*  3.18*   HEMOGLOBIN  11.9*  10.1*   HEMATOCRIT  35.3*  30.4*   MCV  92.9  95.6   MCH  31.3  31.8   MCHC  33.7  33.2*   RDW  41.6  43.5   PLATELETCT  181  152*   MPV  10.4  10.5       No acute distress  Dressing clean dry and intact  Neurovascularly intact    POD#1    Plan:  DVT Prophylaxis- TEDS/SCDs  Weight Bearing Status-WBAT  Wound Vac  Antibiotics: per ID  Repeat I&D Friday unless clinical condition worsens and requires surgery sooner          "

## 2018-06-12 NOTE — H&P
ICU Admitting History and Physical    Name Emmanuelle Martin     1960   Age/Sex 58 y.o. female   MRN [id number]   Code Status Full       Chief Complaint:  Thigh pain and redness    HPI:  58-year-old female with a history of rectocele and cystocele stress incontinence was transferred from [facility] for necrotizing fasciitis.   Patient was evaluated right before she was taken to the OR.  Patient developed right thigh pain and redness 4 days ago.  Her pain and erythema acutely worsened over the last 4 days.   She noted purulent drainage as well as bloody drainage and reports fever and fatigue.  She denies using any injections to the area.  She initially presented to [facility] today where was found to be septic with WBC 15 and .   CT pelvis revealed necrotizing fasciitis of the right medial thigh without involvement of the abdominal cavity or external genitalia.   Her BP was stable and no lactic acidosis.  She was received 2L of Crystalloid fluid and 1 dose of Vancomycin and Zosyn at [place].  She was also found to have hyperglycemia with .  Patient states that she has no history of diabetes.  However she reports that she has had increasing thirst lately.          Outside Record summarized as below:   /75. , O2 sat 97%.  RR, 18, Temp 37.7  WBC 15, Neutrophil 85% , hemoglobin 14.9 platelets 199 INR 1.1  Chemistry panel glucose 562 creatinine 1.1 BUN 15 GFR 51 sodium 131 potassium 3.6 chloride 97 CO2 22, anion gap 12  AST 8, ALT 17, , lactic acid 2.   CT pelvis: Extensive subcutaneous and deep subcutaneous emphysema in the medial right thigh below the region of the greater trochanter.  These does not extend up to or into the abdominal cavity are no involvement of the external genitalia.   no focal abscess  Chest x-ray no evidence of acute cardiopulmonary disease  Received 14 units of subQ regular insulin 1 L of lactated Ringer's 1 L of normal saline  Received 1 dose of  "vancomycin and Zosyn      Review of Systems   Constitutional: Positive for chills, fever and malaise/fatigue.   HENT: Negative for congestion.    Eyes: Negative.    Respiratory: Negative for cough, shortness of breath and stridor.    Cardiovascular: Negative for chest pain and palpitations.   Gastrointestinal: Negative for abdominal pain, diarrhea and heartburn.   Genitourinary: Negative for dysuria and urgency.   Musculoskeletal:        Per HPI   Skin:        Per HPI   Neurological: Negative for headaches.        Lightheaded   Endo/Heme/Allergies: Positive for polydipsia.             Past Medical History:   Past Medical History:   Diagnosis Date   • Cystocele 3/31/2015   • Rectocele 3/31/2015   • Tobacco abuse        Past Surgical History:   Colporrhaphy    Current Outpatient Medications:  Home Medications     Reviewed by Alysha Tijerina (Pharmacy Tech) on 06/11/18 at 1830  Med List Status: Complete   Medication Last Dose Status        Patient Barber Taking any Medications                       Medication Allergy/Sensitivities:  No Known Allergies      Family History:  Family History   Problem Relation Age of Onset   • Diabetes Father        Social History:  Social History     Social History   • Marital status: Single     Spouse name: N/A   • Number of children: N/A   • Years of education: N/A     Occupational History   • Not on file.     Social History Main Topics   • Smoking status: Current Every Day Smoker     Packs/day: 1.00     Years: 20.00     Types: Cigarettes   • Smokeless tobacco: Never Used   • Alcohol use No   • Drug use: No   • Sexual activity: Not on file     Other Topics Concern   • Not on file     Social History Narrative   • No narrative on file       Physical Exam     Vitals:    06/11/18 1758 06/11/18 1800 06/11/18 1830   BP:  104/63    Pulse:  (!) 106 89   Resp:  18 (!) 33   Temp:  37.4 °C (99.3 °F)    SpO2:  96% 97%   Weight: 63.5 kg (140 lb)     Height: 1.499 m (4' 11\")       Body mass index is " "28.28 kg/m².  /63   Pulse 89   Temp 37.4 °C (99.3 °F)   Resp (!) 33   Ht 1.499 m (4' 11\")   Wt 63.5 kg (140 lb)   SpO2 97%   BMI 28.28 kg/m²   O2 therapy: Pulse Oximetry: 97 %, O2 (LPM): 3, O2 Delivery: Nasal Cannula    Physical Exam   Constitutional: She is oriented to person, place, and time. She appears distressed.   HENT:   Head: Normocephalic and atraumatic.   Mouth/Throat: No oropharyngeal exudate.   Eyes: Conjunctivae and EOM are normal. Right eye exhibits no discharge. Left eye exhibits no discharge. No scleral icterus.   Neck: No tracheal deviation present.   Cardiovascular: Regular rhythm.    No murmur heard.  Tachycardia    Pulmonary/Chest: Effort normal and breath sounds normal. No stridor. No respiratory distress. She has no wheezes. She has no rales.   Abdominal: Soft. Bowel sounds are normal. She exhibits no distension. There is no tenderness. There is no rebound.   Musculoskeletal: Normal range of motion. She exhibits no edema, tenderness or deformity.   Neurological: She is alert and oriented to person, place, and time. No cranial nerve deficit. GCS score is 15.   Skin: She is not diaphoretic.   Right LE: Large erthematous area about 15 x 10 cm involving anteromedial thigh and groin.  There is indurated area in the upper thigh that is exquisitely tender to light touch.  +Warmth. + Malodorous Drainage.  No fluctuance.  No bullae or necrosis.     Psychiatric: Affect and judgment normal.         Data Review     Lab Data Review:  No results found for this or any previous visit (from the past 24 hour(s)).    Imaging/Procedures Review:    ndependant Imaging Review:  OUTSIDE IMAGES-CT ABDOMEN /PELVIS   Final Result      OUTSIDE IMAGES-DX CHEST   Final Result                 Assessment/Plan     Sepsis   Necrotizing Fasciitis  - Rapidly worsening R thigh pain and erythema and fever over 4 days  - Presented septic at [facility].   - CT evidence of Necrotizing fasciitis of the RLE without involvement " of abdominal wall or external genitalia  - Resuscitated with 2L of Crystalliod solution at OSH and treated with Vancomycin and Zosyn at [place]  - In ER, received Zosyn and Clindamycin  - Surgery consulted and patient was taken to OR for debridement and surgical exploration  Plan:   - Stat Blood Culture x 2  - Sepsis protocol.  IVF per sepsis protocol  - Empiric Abx with Vancomycin and Zosyn.  Clindamycin as antitoxin   - De-escalate abx pending intra-op microbio report  - No elevation of AST.  Will check CPK   - Likely need wound vac post op.  Defer to surgery    New onset of Diabetes  - No known history of DM   - 's at [place]  - No evidence of DKA  - Likely HHS.  - Received 2L Crystalloid Solution and 14 u regular insulin at OSH  Plan  - Will repeat BG  - Accu-check, Hypoglycemia protocol  - FLORENTINO, Long acting insulin.   - IVF bolus for HHS   - Serum OSM (though it may not be accurate as patient already rec'd IVF  - A1C  - Hypoglycemia protocol.       Anticipated Hospital stay:  >2 midnights        Quality Measures  Quality-Core Measures   Reviewed items::  Labs reviewed, Medications reviewed and Radiology images reviewed  Gaston catheter::  No Gaston  DVT: No pharmacological DVT prophylaxis due to surgery.  DVT prophylaxis - mechanical:  SCDs  Ulcer Prophylaxis::  No

## 2018-06-12 NOTE — CARE PLAN
Problem: Skin Integrity  Goal: Risk for impaired skin integrity will decrease    Intervention: Assess risk factors for impaired skin integrity and/or pressure ulcers  Pressure points protected. Continued skin assessments.

## 2018-06-12 NOTE — THERAPY
"Occupational Therapy Evaluation completed.   Functional Status:  Max A LB ADLs and Min A mobility with FWW, especially to low surfaces.  Plan of Care: Will benefit from Occupational Therapy 3 times per week  Discharge Recommendations:  Equipment: Will Continue to Assess for Equipment Needs. Post-acute therapy Discharge to home with outpatient or home health for additional skilled therapy services.    Patient presents from home with necrotizing fascitis complicated by sepsis. Patient with possible continued debridement sx 6/15 and now has wound vac near groin line on R thigh. Patient report I with ADLs, IADLs, and mobility, including driving PTA. Patient, upon eval, presents with decreased LB strength, balance, endurance, tolerance, ADLs, and mobility necessitating Max A ADLs, especially LB, and Min A mobility with FWW, especially to low surfaces. Patient would benefit from skilled OT in this setting followed by likely home with assist.     Patient motivated and eager to return to work. Consider lower body dressing kit needs next session.     See \"Rehab Therapy-Acute\" Patient Summary Report for complete documentation.    "

## 2018-06-12 NOTE — WOUND TEAM
Wound vac to R thigh placed in OR 6/11/18. Per wound consult pt vac changes are being followed by surgeon. Per surgical note pt will go back to surgery on Friday, 6/15/2018.  Wound consult completed. Wound team will continue to monitor for orders for wound team to change vac dressings.

## 2018-06-12 NOTE — ED NOTES
Assumed care of pt from EMS - see triage note by this RN for details. Changed to gown, hooked to monitor- VSS. Fall precautions in place. Call bell in reach. Ongoing monitoring.

## 2018-06-12 NOTE — CONSULTS
ADULT INFECTIOUS DISEASE CONSULT     Date of Service: 6/12/2028    Consult Requested By: José Antonio Frazier M.D.    Reason for Consultation: Necrotizing fasciitis    History of Present Illness:   Emmanuelle Martin is a 58 y.o. who has history of tobacco abuse, rectocele repair past presented to [facility] for right thigh pain and redness. She noted lump on her right thigh which subsequently started draining pus.  She  also had fevers and chills.  Outside hospital WBC count was 15.  She was tachycardic.  A CT scan showed necrotizing fasciitis hence she was transferred to [facility] for higher level of care.  She has been here she was taken to the OR 6/11/2018 and underwent I&D down to the muscle.  She has also been newly diagnosed with diabetes mellitus and her hemoglobin A1c is 12.8.  Or culture is showing group  B strep.  In view of all these issues infectious disease consult has been called    Review Of Systems:  Gen.-Complains of fevers chills  HEENT- denies any sore throat, headache or vision changes  Pulmonary- denies any cough, shortness of breath  Cardiovascular- denies any chest pain, leg swelling.    GI-complains of nausea.  Denies any vomiting or diarrhea  Musculoskeletal-pains of pain in the right thigh swelling of the right thigh  Neuro- denies any weakness or sensory change  Psych- denies any depression or suicidal ideation  Genitourinary- denies any frequency or dysuria        PMH:   Past Medical History:   Diagnosis Date   • Cystocele 3/31/2015   • Rectocele 3/31/2015   • Tobacco abuse          PSH:  Past Surgical History:   Procedure Laterality Date   • IRRIGATION & DEBRIDEMENT ORTHO Right 6/11/2018    Procedure: IRRIGATION & DEBRIDEMENT ORTHO;  Surgeon: Omega Martinez M.D.;  Location: SURGERY [facility];  Service: Orthopedics       FAMILY HX:  Family History   Problem Relation Age of Onset   • Diabetes Father        SOCIAL HX:  Social History     Social History   •  Marital status: Single     Spouse name: N/A   • Number of children: N/A   • Years of education: N/A     Occupational History   • Not on file.     Social History Main Topics   • Smoking status: Current Every Day Smoker     Packs/day: 1.00     Years: 20.00     Types: Cigarettes   • Smokeless tobacco: Never Used   • Alcohol use No   • Drug use: No   • Sexual activity: Not on file     Other Topics Concern   • Not on file     Social History Narrative   • No narrative on file     History   Smoking Status   • Current Every Day Smoker   • Packs/day: 1.00   • Years: 20.00   • Types: Cigarettes   Smokeless Tobacco   • Never Used     History   Alcohol Use No       Allergies/Intolerances:  No Known Allergies    History reviewed with the patient    Other Current Medications:    Current Facility-Administered Medications:   •  insulin regular (HUMULIN R) injection 3-14 Units, 3-14 Units, Subcutaneous, 4X/DAY ACHS, 10 Units at 06/12/18 1630 **AND** Accu-Chek ACHS, , , Q AC AND BEDTIME(S) **AND** NOTIFY MD and PharmD, , , Once **AND** glucose 4 g chewable tablet 16 g, 16 g, Oral, Q15 MIN PRN **AND** dextrose 50% (D50W) injection 25 mL, 25 mL, Intravenous, Q15 MIN PRN, José Antonio Frazier M.D.  •  senna-docusate (PERICOLACE or SENOKOT S) 8.6-50 MG per tablet 2 Tab, 2 Tab, Oral, BID, Stopped at 06/11/18 2100 **AND** polyethylene glycol/lytes (MIRALAX) PACKET 1 Packet, 1 Packet, Oral, QDAY PRN **AND** magnesium hydroxide (MILK OF MAGNESIA) suspension 30 mL, 30 mL, Oral, QDAY PRN **AND** bisacodyl (DULCOLAX) suppository 10 mg, 10 mg, Rectal, QDAY PRN, Jorge Sales M.D.  •  acetaminophen (TYLENOL) tablet 650 mg, 650 mg, Oral, Q6HRS PRN, Jorge Sales M.D.  •  MD ALERT... vancomycin per pharmacy protocol 1 Each, 1 Each, Other, pharmacy to dose, Jorge Sales M.D.  •  clindamycin (CLEOCIN) IVPB premix 900 mg, 900 mg, Intravenous, Q8HRS, Jorge Sales M.D., Stopped at 06/12/18 1040  •  Pharmacy Consult Request  ...Pain Management Review 1 Each, 1 Each, Other, PRN, Omega Martinez M.D.  •  ondansetron (ZOFRAN) syringe/vial injection 4 mg, 4 mg, Intravenous, Q4HRS PRN, Omega Martinez M.D., 4 mg at 06/12/18 1427  •  dexamethasone (DECADRON) injection 4 mg, 4 mg, Intravenous, Once PRN, Omega Martinez M.D.  •  diphenhydrAMINE (BENADRYL) injection 25 mg, 25 mg, Intravenous, Q6HRS PRN, Omega Martinez M.D.  •  haloperidol lactate (HALDOL) injection 1 mg, 1 mg, Intravenous, Q6HRS PRN, Omega Martinez M.D.  •  scopolamine (TRANSDERM-SCOP) patch 1 Patch, 1 Patch, Transdermal, Q72HRS PRN, Omega Martinez M.D.  •  senna-docusate (PERICOLACE or SENOKOT S) 8.6-50 MG per tablet 1 Tab, 1 Tab, Oral, Q24HRS PRN, Omega Martinez M.D.  •  bisacodyl (DULCOLAX) suppository 10 mg, 10 mg, Rectal, Q24HRS PRN, Omega Martinez M.D.  •  fleet enema 133 mL, 1 Each, Rectal, Once PRN, Omega Martinez M.D.  •  acetaminophen (TYLENOL) tablet 650 mg, 650 mg, Oral, Q6HRS, Omega Martinez M.D., 650 mg at 06/12/18 1059  •  ketorolac (TORADOL) injection 30 mg, 30 mg, Intravenous, Q6HRS, Omega Martinez M.D., 30 mg at 06/12/18 1100  •  oxyCODONE immediate release (ROXICODONE) tablet 5 mg, 5 mg, Oral, Q3HRS PRN, Omega Martinez M.D.  •  oxyCODONE immediate release (ROXICODONE) tablet 10 mg, 10 mg, Oral, Q3HRS PRN, Omega Martinez M.D.  •  [DISCONTINUED] piperacillin-tazobactam (ZOSYN) 4.5 g in  mL IVPB, 4.5 g, Intravenous, Once **AND** piperacillin-tazobactam (ZOSYN) 4.5 g in  mL IVPB, 4.5 g, Intravenous, Q8HRS, Jorge Sales M.D., Last Rate: 25 mL/hr at 06/12/18 1454, 4.5 g at 06/12/18 1454  •  0.9 % NaCl with KCl 20 mEq infusion, , Intravenous, Continuous, Rickey Tan M.D., Last Rate: 125 mL/hr at 06/12/18 1614  •  vancomycin 1,000 mg in  mL IVPB, 15 mg/kg, Intravenous, Q12HR, Rickey Tan M.D., Stopped at 06/12/18 1301  •  HYDROmorphone (DILAUDID) injection 0.5 mg, 0.5 mg,  "Intravenous, Q3HRS PRN, Omega Martinez M.D.  •  insulin glargine (LANTUS) injection 10 Units, 10 Units, Subcutaneous, Q EVENING, Jorge Sales M.D., 10 Units at 18 2347  [unfilled]    Most Recent Vital Signs:  BP (!) 90/53   Pulse 91   Temp 36.7 °C (98 °F)   Resp (!) 24   Ht 1.499 m (4' 11\")   Wt 70.3 kg (154 lb 15.7 oz)   SpO2 98%   Breastfeeding? No   BMI 31.30 kg/m²   Temp  Av.6 °C (97.9 °F)  Min: 35.8 °C (96.5 °F)  Max: 37.4 °C (99.3 °F)    Physical Exam:  General: looks Ill  HEENT: sclera anicteric, PERRL, EOMI, MMM, no oral lesions  Neck: supple, no lymphadenopathy  Chest: CTAB, no r/r/w, normal work of breathing.  Cardiac: Regular, no murmurs no gallops heard  Abdomen: + bowel sounds, soft, non-tender, non-distended, no HSM  Extremities: Right thigh erythema and induration and wound VAC present  Skin: Erythema present  Neuro: Alert and oriented times 3, non-focal exam    Pertinent Lab Results:  Recent Labs      18   0450   WBC  11.2*  11.5*      Recent Labs      18   0450   HEMOGLOBIN  11.9*  10.1*   HEMATOCRIT  35.3*  30.4*   MCV  92.9  95.6   MCH  31.3  31.8   ANISOCYTOSIS  1+   --    PLATELETCT  181  152*         Recent Labs      18   0450   SODIUM  135  132*   POTASSIUM  3.3*  3.8   CHLORIDE  105  107   CO2  18*  18*   CREATININE  0.63  0.43*        Recent Labs      18   0450   ALBUMIN  2.2*        Pertinent Micro:  Results     Procedure Component Value Units Date/Time    Urinalysis [[id number]]  (Abnormal) Collected:  18 0725    Order Status:  Completed Specimen:  Urine from Urine, Clean Catch Updated:  18 1433     Micro Urine Req Microscopic     Color DK Yellow     Character Cloudy (A)     Ph 5.0     Glucose >=1000 (A) mg/dL      Ketones 15 (A) mg/dL      Protein 30 (A) mg/dL      Bilirubin Negative     Urobilinogen, Urine 1.0     Nitrite Negative     Leukocyte Esterase Negative " "    Occult Blood Moderate (A)    Narrative:       Collected By:[id number] MARY BETH TREJO  If not done within the last 24 hours    CULTURE WOUND W/ GRAM STAIN [[id number]]  (Abnormal) Collected:  06/11/18 1945    Order Status:  Completed Specimen:  Wound Updated:  06/12/18 1338     Significant Indicator POS (POS)     Source WND     Site Right Groin Abscess     Culture Result Wound -- (A)     Gram Stain Result Many WBCs.  Many mixed bacteria, no predominant organism seen.       Culture Result Wound Streptococcus agalactiae (Group B)  Light growth   (A)    ANAEROBIC CULTURE [[id number]] Collected:  06/11/18 1945    Order Status:  Completed Specimen:  Wound Updated:  06/12/18 1338     Significant Indicator NEG     Source WND     Site Right Groin Abscess     Anaerobic Culture, Culture Res Culture in progress.    GRAM STAIN [[id number]] Collected:  06/11/18 1945    Order Status:  Completed Specimen:  Wound Updated:  06/12/18 0940     Significant Indicator .     Source WND     Site Right Groin Abscess     Gram Stain Result Many WBCs.  Many mixed bacteria, no predominant organism seen.      BLOOD CULTURE [[id number]] Collected:  06/11/18 2156    Order Status:  Completed Specimen:  Blood from Peripheral Updated:  06/12/18 0810     Significant Indicator NEG     Source BLD     Site PERIPHERAL     Blood Culture No Growth    Note: Blood cultures are incubated for 5 days and  are monitored continuously.Positive blood cultures  are called to the RN and reported as soon as  they are identified.      Narrative:       Per Hospital Policy: Only change Specimen Src: to \"Line\" if  specified by physician order.    BLOOD CULTURE [[id number]] Collected:  06/11/18 2156    Order Status:  Completed Specimen:  Blood from Peripheral Updated:  06/12/18 0810     Significant Indicator NEG     Source BLD     Site PERIPHERAL     Blood Culture No Growth    Note: Blood cultures are incubated for 5 days and  are monitored continuously.Positive blood " "cultures  are called to the RN and reported as soon as  they are identified.      Narrative:       Per Hospital Policy: Only change Specimen Src: to \"Line\" if  specified by physician order.    Urine Culture [[id number]] Collected:  06/12/18 0725    Order Status:  Completed Specimen:  Urine from Urine, Clean Catch Updated:  06/12/18 0730    Narrative:       Indication for culture:->Suspected Sepsis    Culture Respiratory W/ GRM STN [[id number]]     Order Status:  Completed Specimen:  Respirate from Sputum     BLOOD CULTURE [[id number]]     Order Status:  Canceled Specimen:  Blood from Peripheral     BLOOD CULTURE [[id number]]     Order Status:  Canceled Specimen:  Blood from Peripheral         Blood Culture   Date Value Ref Range Status   06/11/2018   Preliminary    No Growth    Note: Blood cultures are incubated for 5 days and  are monitored continuously.Positive blood cultures  are called to the RN and reported as soon as  they are identified.     06/11/2018   Preliminary    No Growth    Note: Blood cultures are incubated for 5 days and  are monitored continuously.Positive blood cultures  are called to the RN and reported as soon as  they are identified.          Studies:  No results found.  IMPRESSION:     Necrotizing fasciitis  Newly diagnosed diabetes mellitus  Triglyceridemia  Tobacco abuse      PLAN:   Emmanuelle Martin is a 58 y.o. with history of tobacco abuse admitted with uncontrolled diabetes previously undiagnosed and necrotizing fasciitis.  Underwent surgery on 6/11/2018  Discontinue  Vanco and clindamycin.  Continue Zosyn.  May need more surgery.  Control the blood sugars.  I have reviewed all the records      Discussed with IM. Will continue to follow    Brianna Mario M.D.   "

## 2018-06-12 NOTE — PROGRESS NOTES
"Pharmacy Kinetics 58 y.o. female on vancomycin day # 1 2018    Currently on Vancomycin new start    Indication for Treatment: necrotizing fascitis     Pertinent history per medical record: Admitted on 2018 for worsening leg wound.  Patient presented with a 4 day history of cellulitis on her R thigh with associated fever, chills, nausea and vomiting. She was found to have subcutaneous gas on her CT from [facility], indicative of necrotizing fascitis. Patient is now s/p I&D on . Empiric antibiotics initiated.     Other antibiotics: Zosyn 4.5 g IV q8h, clindamycin 900 mg IV q8h    Allergies: Patient has no known allergies.     List concerns for renal function: SCr 1.10 at [facility], SIRS, hypotension    Pertinent cultures to date:    wound: in process   PBC x 2: in process    Blood pressure (!) 90/53, pulse 85, temperature 37.2 °C (99 °F), resp. rate 16, height 1.499 m (4' 11\"), weight 63.5 kg (140 lb), SpO2 92 %. Temp (24hrs), Av.2 °C (99 °F), Min:37 °C (98.6 °F), Max:37.4 °C (99.3 °F)      A/P   1. Vancomycin dose change: initiate 15 mg/kg q12h  2. Next vancomycin level: prior to 4th total dose (not yet ordered)  3. Goal trough: 12-16 mcg/mL  4. Comments: new start vancomycin for severe SSTI. Patient is now s/p I&D, per op report wound was positive for black and necrotic tissue as well as dishwater purulence. Patient received 1 g (~15 mg/kg) of vancomycin at 1500 at [facility] facility. Will initiate maintenance dosing at ~8 hours from previous dose and plan for a level when closer to steady state. Wound and blood cultures in process. Will continue to follow.     Lety Masters, PharmD    "

## 2018-06-12 NOTE — ASSESSMENT & PLAN NOTE
- s/p I&D on 6/11, 6/15, 6/18, 6/20, 6/22  - Wound cx + for prevotella and group B Strep   -Unasyn started 6/13/18 , last dose 7/1/18 then started ertapenem 7/2/18 to be continued till 7/6/18 per ID recs  - wound looks clean and not cellulitic   - pt will return for daily IV daily ertapenem to continue till 7/6/18    Plan  - continue IV antibiotics per ID, till 7/6/18  - wound care per ortho -- pt will return for dressing changes and wound check  - continue ambulate

## 2018-06-12 NOTE — ASSESSMENT & PLAN NOTE
- A1c 12.8% on admission   - on Lantus 35 units pm, Lispro 5 units TID and SS  - good blood sugar control  - Diabetes education provided  - low carbohydrate diet  - Echo 6/26 : good EF 60% with normal LV and RV systolic function  Plan  - continue Lantus 35 and Lispro to 5 units TID  -pt has met with diabetes educator who is assisting pt with obtaining her diabetic supplies,  Orders faxed

## 2018-06-12 NOTE — CONSULTS
PULMONARY AND CRITICAL CARE MEDICINE CONSULTATION    Date of Consultation:  6/11/2018    Requesting Physician:   Daniel Cowart MD    Consulting Physician:  Rickey Tan MD    Reason for Consultation:  Critical care management in lady with sepsis, necrotizing fasciitis, hyperglycemia    Chief Complaint:  Right thigh pain    History of Present Illness:    I was kindly asked to see and evaluate Emmanuelle Martin, a 58 y.o. female for evaluation and management of the above problem.    This lady presented to an outside hospital earlier today with 4 days of right thigh pain.  She does not recall any injury.  She thinks that she may have suffered a spider bite.  She has had associated fevers and chills as well as nausea and vomiting.  She received vancomycin and Zosyn at the outside facility.  She denies chest pain, shortness of breath, cough or sputum production.  She denies abdominal pain.  She has been seen by Dr. Omega Martinez and he has taken her to the operating theater and performed irrigation and debridement of right thigh necrotizing fasciitis.  Additionally she has been found to have hyperglycemia.  She denies any history of diabetes.    Medications Prior to Admission:    No current facility-administered medications on file prior to encounter.      No current outpatient prescriptions on file prior to encounter.       Current Medications:      Current Facility-Administered Medications:   •  senna-docusate (PERICOLACE or SENOKOT S) 8.6-50 MG per tablet 2 Tab, 2 Tab, Oral, BID **AND** polyethylene glycol/lytes (MIRALAX) PACKET 1 Packet, 1 Packet, Oral, QDAY PRN **AND** magnesium hydroxide (MILK OF MAGNESIA) suspension 30 mL, 30 mL, Oral, QDAY PRN **AND** bisacodyl (DULCOLAX) suppository 10 mg, 10 mg, Rectal, QDAY PRN, Jorge Sales M.D.  •  acetaminophen (TYLENOL) tablet 650 mg, 650 mg, Oral, Q6HRS PRN, Jorge Sales M.D.  •  NS infusion 1,905 mL, 30 mL/kg, Intravenous, Once PRN,  Jorge Sales M.D.  •  NS (BOLUS) infusion 1,000 mL, 1,000 mL, Intravenous, Once PRN, Jorge Sales M.D.  •  MD ALERT... vancomycin per pharmacy protocol 1 Each, 1 Each, Other, pharmacy to dose, Jorge Sales M.D.  •  [START ON 6/12/2018] clindamycin (CLEOCIN) IVPB premix 900 mg, 900 mg, Intravenous, Q8HRS, Jorge Sales M.D.  •  Pharmacy Consult Request ...Pain Management Review 1 Each, 1 Each, Other, PRN, Omega Martinez M.D.  •  ondansetron (ZOFRAN) syringe/vial injection 4 mg, 4 mg, Intravenous, Q4HRS PRN, Omega Martinez M.D.  •  dexamethasone (DECADRON) injection 4 mg, 4 mg, Intravenous, Once PRN, Omega Martinez M.D.  •  diphenhydrAMINE (BENADRYL) injection 25 mg, 25 mg, Intravenous, Q6HRS PRN, Omega Martinez M.D.  •  haloperidol lactate (HALDOL) injection 1 mg, 1 mg, Intravenous, Q6HRS PRN, Omega Martinez M.D.  •  scopolamine (TRANSDERM-SCOP) patch 1 Patch, 1 Patch, Transdermal, Q72HRS PRN, Omega Martinez M.D.  •  senna-docusate (PERICOLACE or SENOKOT S) 8.6-50 MG per tablet 1 Tab, 1 Tab, Oral, Q24HRS PRN, Omega Martinez M.D.  •  bisacodyl (DULCOLAX) suppository 10 mg, 10 mg, Rectal, Q24HRS PRN, Omega Martinez M.D.  •  fleet enema 133 mL, 1 Each, Rectal, Once PRN, Omega Martinez M.D.  •  acetaminophen (TYLENOL) tablet 650 mg, 650 mg, Oral, Q6HRS, Omega Martinez M.D.  •  ketorolac (TORADOL) injection 30 mg, 30 mg, Intravenous, Q6HRS, Omega Martinez M.D.  •  oxyCODONE immediate release (ROXICODONE) tablet 5 mg, 5 mg, Oral, Q3HRS PRN, Omega Martinez M.D.  •  oxyCODONE immediate release (ROXICODONE) tablet 10 mg, 10 mg, Oral, Q3HRS PRN, Omega Martinez M.D.  •  HYDROmorphone (DILAUDID) injection 0.5 mg, 0.5 mg, Intravenous, Q3HRS PRN, Omega Martinez M.D.  •  [DISCONTINUED] piperacillin-tazobactam (ZOSYN) 4.5 g in  mL IVPB, 4.5 g, Intravenous, Once **AND** [START ON 6/12/2018] piperacillin-tazobactam (ZOSYN) 4.5 g in   mL IVPB, 4.5 g, Intravenous, Q8HRS, Jorge Sales M.D.  •  0.9 % NaCl with KCl 20 mEq infusion, , Intravenous, Continuous, Rickey Tan M.D.  •  insulin regular (HUMULIN R) injection 2-9 Units, 2-9 Units, Subcutaneous, 4X/DAY ACHS **AND** Accu-Chek ACHS, , , Q AC AND BEDTIME(S) **AND** NOTIFY MD and PharmD, , , Once **AND** glucose 4 g chewable tablet 16 g, 16 g, Oral, Q15 MIN PRN **AND** dextrose 50% (D50W) injection 25 mL, 25 mL, Intravenous, Q15 MIN PRN, Rickey Tan M.D.  •  sodium chloride for irrigation 0.9 % irrigant, , , Intra-Op Continuous, Omega Martinez M.D., 3,000 mL at 06/11/18 1946    Allergies:    Patient has no known allergies.    Past Surgical History:    History reviewed. No pertinent surgical history.    Past Medical History:    Past Medical History:   Diagnosis Date   • Cystocele 3/31/2015   • Rectocele 3/31/2015   • Tobacco abuse        Social History:    Social History     Social History   • Marital status: Single     Spouse name: N/A   • Number of children: N/A   • Years of education: N/A     Occupational History   • Not on file.     Social History Main Topics   • Smoking status: Current Every Day Smoker     Packs/day: 1.00     Years: 20.00     Types: Cigarettes   • Smokeless tobacco: Never Used   • Alcohol use No   • Drug use: No   • Sexual activity: Not on file     Other Topics Concern   • Not on file     Social History Narrative   • No narrative on file       Family History:    Family History   Problem Relation Age of Onset   • Diabetes Father        Review of System:    Review of Systems   Constitutional: Positive for chills, diaphoresis and fever.   HENT: Negative for congestion, ear discharge, ear pain, hearing loss, nosebleeds and sinus pain.    Eyes: Negative for blurred vision, double vision, photophobia, pain and discharge.   Respiratory: Negative for cough, hemoptysis, sputum production, shortness of breath and stridor.    Cardiovascular: Negative for  "chest pain, palpitations, orthopnea, claudication and leg swelling.   Gastrointestinal: Positive for nausea and vomiting. Negative for abdominal pain, blood in stool, constipation and diarrhea.   Genitourinary: Negative for dysuria, frequency, hematuria and urgency.   Musculoskeletal: Negative for back pain, joint pain, myalgias and neck pain.   Skin:        Right thigh redness and swelling with increased warmth   Neurological: Positive for weakness. Negative for sensory change, speech change, focal weakness, seizures, loss of consciousness and headaches.   Endo/Heme/Allergies: Negative for environmental allergies and polydipsia. Does not bruise/bleed easily.   Psychiatric/Behavioral: Negative for depression, hallucinations, substance abuse and suicidal ideas. The patient is not nervous/anxious.        Physical Examination:    BP (!) 90/53   Pulse 87   Temp 37.2 °C (99 °F)   Resp 17   Ht 1.499 m (4' 11\")   Wt 63.5 kg (140 lb)   SpO2 95%   BMI 28.28 kg/m²   Physical Exam   Constitutional: She is oriented to person, place, and time.   HENT:   Head: Normocephalic and atraumatic.   Right Ear: External ear normal.   Left Ear: External ear normal.   Nose: Nose normal.   Mouth/Throat: Oropharynx is clear and moist. No oropharyngeal exudate.   Eyes: Conjunctivae and EOM are normal. Pupils are equal, round, and reactive to light. Right eye exhibits no discharge. Left eye exhibits no discharge. No scleral icterus.   Neck: Normal range of motion. Neck supple. No JVD present. No tracheal deviation present.   Cardiovascular: Normal heart sounds and intact distal pulses.  Exam reveals no gallop and no friction rub.    No murmur heard.  Sinus rhythm   Pulmonary/Chest: No stridor. She has no wheezes. She has no rales. She exhibits no tenderness.   Abdominal: Soft. Bowel sounds are normal. She exhibits no distension. There is no tenderness. There is no rebound and no guarding.   Musculoskeletal: She exhibits no edema.   Right " medial thigh with erythema, increased warmth, edema and tenderness.  A wound VAC is in place following surgery.  There is no clubbing or cyanosis.  There is no peripheral edema.   Neurological: She is alert and oriented to person, place, and time. No cranial nerve deficit. Coordination normal. GCS score is 15.   No focal weakness.   Skin: She is not diaphoretic. There is erythema.   See above.       Laboratory Data:          Invalid input(s): [id number]                        Imaging:    I personally viewed the CXR and CT scan images as well as reviewed the radiology interpretation reports.    OUTSIDE IMAGES-CT ABDOMEN /PELVIS   Final Result      OUTSIDE IMAGES-DX CHEST   Final Result          Assessment and Plan:    Sepsis without associated acute organ failure   -Skin and soft tissue source   -Resuscitate with IV fluids   -Trend lactic acid   -Empiric antibiotics    Right thigh necrotizing fasciitis   -Status post irrigation and debridement on 6/11   -Wound VAC in place   -Start Zosyn, vancomycin and clindamycin    Hyperglycemia   -No prior history of diabetes mellitus   -Check hemoglobin A1c   -Start sliding scale insulin    Hypokalemia   -Replete potassium      I have assessed and reassessed her respiratory status, blood pressure, hemodynamics and cardiovascular status.  She presents with sepsis due to necrotizing fasciitis of the right thigh.  She will be admitted to the ICU.  She is critically ill.  She is at increased risk for worsening respiratory and cardiovascular system dysfunction.    High risk of deterioration and worsening vital organ dysfunction and death without the above critical care interventions.    Thank you for allowing me to participate in the care of this lady.  I will continue to follow her with great interest.    Critical Care Time:  33 minutes  [id number]  No time overlap  Time excludes procedures  Discussed with RN, residents    Rickey Tan MD  Pulmonary and Critical Care  Medicine

## 2018-06-12 NOTE — PROGRESS NOTES
[facility] Progress Note      Admit Date: 6/11/2018  ICU Day: 1      Resident(s): Ellen Fleming  Attending: KAYLA LAM/ Dr. Frazier    Date & Time:   6/12/2018   7:01 AM       Patient ID:    Name:             Emmanuelle Martin   YOB: 1960  Age:                 58 y.o.  female   MRN:               [id number]    HPI:  Emmanuelle Martin is a 58 year old female who presents with sepsis with multi organ failure secondary to necrotizing fasciitis of right thigh in the setting of hyperglycemia. She had surgical debridement on 6/10 and a wound vac was placed with plans to continue debridement on 6/15 unless clinically decompensates. There are no signs of ischemic limb at this time. She is on Zosy, Vancomycin and Clindamycin pending intraoperative cultures. Will work on controlling her blood glucose with lantus and sliding scale insulin.     Consultants:  General Surgery   PMA: Dr. Frazier     Procedures:  Surgical debridement 6/11  Surgical debridement 6/15    Imaging:  CT-FOREIGN FILM CAT SCAN   Final Result      OUTSIDE IMAGES-DX CHEST   Final Result      OUTSIDE IMAGES-CT ABDOMEN /PELVIS   Final Result      OUTSIDE IMAGES-DX CHEST   Final Result          Interval Events:  S/P surgical debridement on 6/11 with cultures pending.   Surgical debridement planned on 6/12.   Glucose improving, will increase insulin today with some permissive hyperglycemia.  Remains hypotensive but clinically not fluid resuscitated.   Anticipate need for central line and pressors.      Review of Systems   Constitutional: Negative for chills and fever.   HENT: Negative for congestion and sore throat.    Eyes: Negative for blurred vision and double vision.   Respiratory: Negative for sputum production and shortness of breath.    Cardiovascular: Negative for chest pain and palpitations.   Gastrointestinal: Negative for abdominal pain, nausea and vomiting.   Genitourinary: Negative for dysuria and urgency.   Musculoskeletal: Negative.     Skin: Negative.    Neurological: Negative for dizziness and headaches.   Endo/Heme/Allergies: Negative.    Psychiatric/Behavioral: Negative.        PHYSICAL EXAM  Gen: NAD  HEENT: NC/AT, no scleral icterus, no conjunctival injection, mucous membranes dry.  Neck: Supple, no lymphadenopathy.  Cardiac: S1S2, tachycardic, no JVD.  Respiratory: Normal effort, symmetrical, CTA b/l.  Abdomen: BS+, soft, NT/ND, no rebound/guarding, no palpable organomegaly.  Ext: No edema, 2+ DP pulses b/l.  Skin: Warm, dry. No rashes or erythema.   Neuro: AAOx4, CN II-XII grossly normal, no focal sensory or motor deficits.   Psych: Affect, mood, judgement normal.    Respiratory:   Respiration: 13, Pulse Oximetry: 99 %    Chest Tube Drains:  None    HemoDynamics:  Pulse: 68, Heart Rate (Monitored): 68 Blood Pressure: (!) 90/53, NIBP: (!) 80/46      Neuro:  None    Fluids:  Continue IVF maintenance + LR bolus      Intake/Output Summary (Last 24 hours) at 18 07  Last data filed at 18 0600   Gross per 24 hour   Intake          7256.67 ml   Output               60 ml   Net          7196.67 ml       Weight: 70.3 kg (154 lb 15.7 oz)  Body mass index is 31.3 kg/m².    Recent Labs      18   SODIUM  135  132*   POTASSIUM  3.3*  3.8   CHLORIDE  105  107   CO2  18*  18*   BUN  12  11   CREATININE  0.63  0.43*   MAGNESIUM  1.6  1.4*   CALCIUM  8.1*  6.9*       GI/Nutrition:  Recent Labs      180   ALTSGPT   --   23   ASTSGOT   --   30   ALKPHOSPHAT   --   106*   TBILIRUBIN   --   0.7   GLUCOSE  324*  272*       Heme:  Recent Labs      18   RBC  3.80*  3.18*   HEMOGLOBIN  11.9*  10.1*   HEMATOCRIT  35.3*  30.4*   PLATELETCT  181  152*   PROTHROMBTM  14.0   --    APTT  31.7   --    INR  1.11   --        Infectious Disease:  Temp  Av.7 °C (98 °F)  Min: 35.8 °C (96.5 °F)  Max: 37.4 °C (99.3 °F)  Recent Labs      18   2156  18   0450    WBC  11.2*  11.5*   NEUTSPOLYS  71.90  64.80   LYMPHOCYTES  15.80*  19.50*   MONOCYTES  7.00  5.60   EOSINOPHILS  0.00  0.00   BASOPHILS  0.90  0.00   ASTSGOT   --   30   ALTSGPT   --   23   ALKPHOSPHAT   --   106*   TBILIRUBIN   --   0.7       Meds:  • LR   999 mL/hr at 06/12/18 0400   • LR   Stopped (06/12/18 0400)   • magnesium sulfate  4 g 4 g (06/12/18 0556)   • senna-docusate  2 Tab      And   • polyethylene glycol/lytes  1 Packet      And   • magnesium hydroxide  30 mL      And   • bisacodyl  10 mg     • acetaminophen  650 mg     • MD ALERT... vancomycin  1 Each     • clindamycin (CLEOCIN) IV  900 mg Stopped (06/12/18 0258)   • Pharmacy Consult Request  1 Each     • ondansetron  4 mg     • dexamethasone  4 mg     • diphenhydrAMINE  25 mg     • haloperidol lactate  1 mg     • scopolamine  1 Patch     • senna-docusate  1 Tab     • bisacodyl  10 mg     • fleet  1 Each     • acetaminophen  650 mg     • ketorolac  30 mg     • oxyCODONE immediate-release  5 mg     • oxyCODONE immediate release  10 mg     • piperacillin-tazobactam  4.5 g 4.5 g (06/12/18 0500)   • 0.9 % NaCl with KCl 20 mEq 1,000 mL   125 mL/hr at 06/12/18 0502   • insulin regular  2-9 Units      And   • glucose 4 g  16 g      And   • dextrose 50%  25 mL     • vancomycin  15 mg/kg Stopped (06/12/18 0057)   • HYDROmorphone  0.5 mg     • insulin glargine  10 Units            Problem and Plan:    Sepsis with acute organ failure secondary to necrotizing fasciitis of R thigh in the setting of hyperglycemia   Persistently hypotensive   IVF maintenance with LR bolus as needed, appears hypovolemic  Anticipate Central Line placement for pressor support  Lactic acidosis resolved  Vancomycin, Zosyn, Clindamycin for necrotizing fasciitis pending intraoperative cultures   Wound vac placed 6/11  S/P surgical debridement on 6/11 and 6/15 unless clinically decompensates   Glucose improving with lantus/SSI, hemoglobin A1c pending   Replete electrolytes as  needed  Consider ID consult with cultures  Blood cultures NGTD       Non anion gap metabolic acidosis  Likely secondary to fluids  Consider addition of bicarbonate if Co2 <18      Type 2 diabetes mellitus (HCC)  A1c 12.8%   SSI, Lantus       Anemia/Thrombocytopenia  In the setting of sepsis  No bleeding at this time    Hypocalcemia  Ionized calcium  Replete calcium    Hypokalemia  Secondary to Hypomagnesemia   Replete daily         Quality Measures:  Lines: PIV, Wound Vac      Gaston Catheter: None     DVT Prophylaxis: SCDs    Ulcer Prophylaxis: None     Antibiotics: Zosyn, Clindamycin, Vancomycin        CODE STATUS: Full   DISPO: ICU

## 2018-06-13 PROBLEM — D64.9 ANEMIA: Status: RESOLVED | Noted: 2018-06-12 | Resolved: 2018-06-13

## 2018-06-13 PROBLEM — E83.51 HYPOCALCEMIA: Status: RESOLVED | Noted: 2018-06-12 | Resolved: 2018-06-13

## 2018-06-13 LAB
ALBUMIN SERPL BCP-MCNC: 2.6 G/DL (ref 3.2–4.9)
ALBUMIN/GLOB SERPL: 0.9 G/DL
ALP SERPL-CCNC: 169 U/L (ref 30–99)
ALT SERPL-CCNC: 29 U/L (ref 2–50)
ANION GAP SERPL CALC-SCNC: 8 MMOL/L (ref 0–11.9)
AST SERPL-CCNC: 18 U/L (ref 12–45)
B-OH-BUTYR SERPL-MCNC: 0.4 MMOL/L (ref 0.02–0.27)
BACTERIA WND AEROBE CULT: ABNORMAL
BACTERIA WND AEROBE CULT: ABNORMAL
BASOPHILS # BLD AUTO: 0.4 % (ref 0–1.8)
BASOPHILS # BLD: 0.05 K/UL (ref 0–0.12)
BILIRUB SERPL-MCNC: 0.5 MG/DL (ref 0.1–1.5)
BUN SERPL-MCNC: 11 MG/DL (ref 8–22)
CALCIUM SERPL-MCNC: 8.1 MG/DL (ref 8.5–10.5)
CHLORIDE SERPL-SCNC: 115 MMOL/L (ref 96–112)
CO2 SERPL-SCNC: 17 MMOL/L (ref 20–33)
CREAT SERPL-MCNC: 0.5 MG/DL (ref 0.5–1.4)
EOSINOPHIL # BLD AUTO: 0.12 K/UL (ref 0–0.51)
EOSINOPHIL NFR BLD: 1 % (ref 0–6.9)
ERYTHROCYTE [DISTWIDTH] IN BLOOD BY AUTOMATED COUNT: 45.7 FL (ref 35.9–50)
GLOBULIN SER CALC-MCNC: 2.9 G/DL (ref 1.9–3.5)
GLUCOSE BLD-MCNC: 148 MG/DL (ref 65–99)
GLUCOSE BLD-MCNC: 152 MG/DL (ref 65–99)
GLUCOSE BLD-MCNC: 165 MG/DL (ref 65–99)
GLUCOSE BLD-MCNC: 181 MG/DL (ref 65–99)
GLUCOSE BLD-MCNC: 317 MG/DL (ref 65–99)
GLUCOSE SERPL-MCNC: 192 MG/DL (ref 65–99)
GRAM STN SPEC: ABNORMAL
HCT VFR BLD AUTO: 35.9 % (ref 37–47)
HGB BLD-MCNC: 12.1 G/DL (ref 12–16)
IMM GRANULOCYTES # BLD AUTO: 0.13 K/UL (ref 0–0.11)
IMM GRANULOCYTES NFR BLD AUTO: 1.1 % (ref 0–0.9)
LYMPHOCYTES # BLD AUTO: 1.41 K/UL (ref 1–4.8)
LYMPHOCYTES NFR BLD: 11.9 % (ref 22–41)
MAGNESIUM SERPL-MCNC: 1.8 MG/DL (ref 1.5–2.5)
MCH RBC QN AUTO: 32.2 PG (ref 27–33)
MCHC RBC AUTO-ENTMCNC: 33.7 G/DL (ref 33.6–35)
MCV RBC AUTO: 95.5 FL (ref 81.4–97.8)
MONOCYTES # BLD AUTO: 0.74 K/UL (ref 0–0.85)
MONOCYTES NFR BLD AUTO: 6.2 % (ref 0–13.4)
NEUTROPHILS # BLD AUTO: 9.43 K/UL (ref 2–7.15)
NEUTROPHILS NFR BLD: 79.4 % (ref 44–72)
NRBC # BLD AUTO: 0 K/UL
NRBC BLD-RTO: 0 /100 WBC
PLATELET # BLD AUTO: 205 K/UL (ref 164–446)
PMV BLD AUTO: 10.7 FL (ref 9–12.9)
POTASSIUM SERPL-SCNC: 4.2 MMOL/L (ref 3.6–5.5)
PROT SERPL-MCNC: 5.5 G/DL (ref 6–8.2)
RBC # BLD AUTO: 3.76 M/UL (ref 4.2–5.4)
SIGNIFICANT IND 70042: ABNORMAL
SITE SITE: ABNORMAL
SODIUM SERPL-SCNC: 140 MMOL/L (ref 135–145)
SOURCE SOURCE: ABNORMAL
WBC # BLD AUTO: 11.9 K/UL (ref 4.8–10.8)

## 2018-06-13 PROCEDURE — 770020 HCHG ROOM/CARE - TELE (206)

## 2018-06-13 PROCEDURE — 700111 HCHG RX REV CODE 636 W/ 250 OVERRIDE (IP): Performed by: INTERNAL MEDICINE

## 2018-06-13 PROCEDURE — 700105 HCHG RX REV CODE 258: Performed by: HOSPITALIST

## 2018-06-13 PROCEDURE — 85025 COMPLETE CBC W/AUTO DIFF WBC: CPT

## 2018-06-13 PROCEDURE — 80053 COMPREHEN METABOLIC PANEL: CPT

## 2018-06-13 PROCEDURE — 700101 HCHG RX REV CODE 250: Performed by: INTERNAL MEDICINE

## 2018-06-13 PROCEDURE — 700102 HCHG RX REV CODE 250 W/ 637 OVERRIDE(OP): Performed by: STUDENT IN AN ORGANIZED HEALTH CARE EDUCATION/TRAINING PROGRAM

## 2018-06-13 PROCEDURE — 700102 HCHG RX REV CODE 250 W/ 637 OVERRIDE(OP): Performed by: ORTHOPAEDIC SURGERY

## 2018-06-13 PROCEDURE — 99233 SBSQ HOSP IP/OBS HIGH 50: CPT | Performed by: INTERNAL MEDICINE

## 2018-06-13 PROCEDURE — 82010 KETONE BODYS QUAN: CPT

## 2018-06-13 PROCEDURE — 700111 HCHG RX REV CODE 636 W/ 250 OVERRIDE (IP): Performed by: HOSPITALIST

## 2018-06-13 PROCEDURE — 700111 HCHG RX REV CODE 636 W/ 250 OVERRIDE (IP): Performed by: ORTHOPAEDIC SURGERY

## 2018-06-13 PROCEDURE — 84681 ASSAY OF C-PEPTIDE: CPT

## 2018-06-13 PROCEDURE — 82962 GLUCOSE BLOOD TEST: CPT | Mod: 91

## 2018-06-13 PROCEDURE — 700105 HCHG RX REV CODE 258: Performed by: STUDENT IN AN ORGANIZED HEALTH CARE EDUCATION/TRAINING PROGRAM

## 2018-06-13 PROCEDURE — A9270 NON-COVERED ITEM OR SERVICE: HCPCS | Performed by: ORTHOPAEDIC SURGERY

## 2018-06-13 PROCEDURE — 700105 HCHG RX REV CODE 258: Performed by: INTERNAL MEDICINE

## 2018-06-13 PROCEDURE — 700111 HCHG RX REV CODE 636 W/ 250 OVERRIDE (IP): Performed by: STUDENT IN AN ORGANIZED HEALTH CARE EDUCATION/TRAINING PROGRAM

## 2018-06-13 PROCEDURE — A9270 NON-COVERED ITEM OR SERVICE: HCPCS | Performed by: INTERNAL MEDICINE

## 2018-06-13 PROCEDURE — 700102 HCHG RX REV CODE 250 W/ 637 OVERRIDE(OP): Performed by: INTERNAL MEDICINE

## 2018-06-13 PROCEDURE — 83735 ASSAY OF MAGNESIUM: CPT

## 2018-06-13 RX ORDER — INSULIN GLARGINE 100 [IU]/ML
20 INJECTION, SOLUTION SUBCUTANEOUS EVERY EVENING
Status: DISCONTINUED | OUTPATIENT
Start: 2018-06-13 | End: 2018-06-13

## 2018-06-13 RX ORDER — INSULIN GLARGINE 100 [IU]/ML
15 INJECTION, SOLUTION SUBCUTANEOUS EVERY EVENING
Status: DISCONTINUED | OUTPATIENT
Start: 2018-06-13 | End: 2018-06-13

## 2018-06-13 RX ORDER — SODIUM CHLORIDE AND POTASSIUM CHLORIDE 150; 450 MG/100ML; MG/100ML
INJECTION, SOLUTION INTRAVENOUS CONTINUOUS
Status: DISCONTINUED | OUTPATIENT
Start: 2018-06-13 | End: 2018-06-13

## 2018-06-13 RX ORDER — INSULIN GLARGINE 100 [IU]/ML
15 INJECTION, SOLUTION SUBCUTANEOUS
Status: DISCONTINUED | OUTPATIENT
Start: 2018-06-13 | End: 2018-06-15

## 2018-06-13 RX ADMIN — KETOROLAC TROMETHAMINE 30 MG: 30 INJECTION, SOLUTION INTRAMUSCULAR; INTRAVENOUS at 11:10

## 2018-06-13 RX ADMIN — POTASSIUM CHLORIDE AND SODIUM CHLORIDE: 900; 150 INJECTION, SOLUTION INTRAVENOUS at 08:13

## 2018-06-13 RX ADMIN — PIPERACILLIN AND TAZOBACTAM 4.5 G: 4; .5 INJECTION, POWDER, LYOPHILIZED, FOR SOLUTION INTRAVENOUS; PARENTERAL at 13:27

## 2018-06-13 RX ADMIN — POTASSIUM CHLORIDE: 2 INJECTION, SOLUTION, CONCENTRATE INTRAVENOUS at 11:09

## 2018-06-13 RX ADMIN — PIPERACILLIN AND TAZOBACTAM 4.5 G: 4; .5 INJECTION, POWDER, LYOPHILIZED, FOR SOLUTION INTRAVENOUS; PARENTERAL at 05:41

## 2018-06-13 RX ADMIN — ENOXAPARIN SODIUM 40 MG: 100 INJECTION SUBCUTANEOUS at 17:10

## 2018-06-13 RX ADMIN — OXYCODONE HYDROCHLORIDE 5 MG: 5 TABLET ORAL at 22:27

## 2018-06-13 RX ADMIN — ACETAMINOPHEN 650 MG: 325 TABLET, FILM COATED ORAL at 23:34

## 2018-06-13 RX ADMIN — MAGNESIUM SULFATE HEPTAHYDRATE 2 G: 500 INJECTION, SOLUTION INTRAMUSCULAR; INTRAVENOUS at 08:13

## 2018-06-13 RX ADMIN — AMPICILLIN SODIUM AND SULBACTAM SODIUM 3 G: 2; 1 INJECTION, POWDER, FOR SOLUTION INTRAMUSCULAR; INTRAVENOUS at 23:34

## 2018-06-13 RX ADMIN — ONDANSETRON 4 MG: 2 INJECTION INTRAMUSCULAR; INTRAVENOUS at 17:58

## 2018-06-13 RX ADMIN — ACETAMINOPHEN 650 MG: 325 TABLET, FILM COATED ORAL at 17:19

## 2018-06-13 RX ADMIN — KETOROLAC TROMETHAMINE 30 MG: 30 INJECTION, SOLUTION INTRAMUSCULAR; INTRAVENOUS at 05:44

## 2018-06-13 RX ADMIN — ONDANSETRON 4 MG: 2 INJECTION INTRAMUSCULAR; INTRAVENOUS at 22:27

## 2018-06-13 RX ADMIN — AMPICILLIN SODIUM AND SULBACTAM SODIUM 3 G: 2; 1 INJECTION, POWDER, FOR SOLUTION INTRAMUSCULAR; INTRAVENOUS at 17:19

## 2018-06-13 RX ADMIN — ACETAMINOPHEN 650 MG: 325 TABLET, FILM COATED ORAL at 05:44

## 2018-06-13 RX ADMIN — KETOROLAC TROMETHAMINE 30 MG: 30 INJECTION, SOLUTION INTRAMUSCULAR; INTRAVENOUS at 23:34

## 2018-06-13 RX ADMIN — ACETAMINOPHEN 650 MG: 325 TABLET, FILM COATED ORAL at 11:10

## 2018-06-13 RX ADMIN — KETOROLAC TROMETHAMINE 30 MG: 30 INJECTION, SOLUTION INTRAMUSCULAR; INTRAVENOUS at 17:19

## 2018-06-13 RX ADMIN — SENNOSIDES AND DOCUSATE SODIUM 2 TABLET: 8.6; 5 TABLET ORAL at 08:14

## 2018-06-13 ASSESSMENT — ENCOUNTER SYMPTOMS
SPUTUM PRODUCTION: 0
SPEECH CHANGE: 0
PALPITATIONS: 0
FEVER: 0
NAUSEA: 0
SHORTNESS OF BREATH: 0
ABDOMINAL PAIN: 0
DOUBLE VISION: 0
VOMITING: 0
COUGH: 0
HEADACHES: 0
DIZZINESS: 0
PSYCHIATRIC NEGATIVE: 1
CHILLS: 0
MUSCULOSKELETAL NEGATIVE: 1
MYALGIAS: 1
BLURRED VISION: 0
SORE THROAT: 0
SENSORY CHANGE: 0
NAUSEA: 1

## 2018-06-13 ASSESSMENT — PAIN SCALES - GENERAL
PAINLEVEL_OUTOF10: 3
PAINLEVEL_OUTOF10: 4
PAINLEVEL_OUTOF10: 3
PAINLEVEL_OUTOF10: 0
PAINLEVEL_OUTOF10: 2
PAINLEVEL_OUTOF10: 4
PAINLEVEL_OUTOF10: 2
PAINLEVEL_OUTOF10: 3
PAINLEVEL_OUTOF10: 2
PAINLEVEL_OUTOF10: 0
PAINLEVEL_OUTOF10: 6

## 2018-06-13 NOTE — DISCHARGE PLANNING
Medical SW    Sw attended AM IDT Rounds.    Per face sheet, pt is resident of [facility], single, 57yo female, admitted 6/11 for necrotizing fascitis, and carries pending [place] Medicaid       RN reports, pt A/O x4, no pain, ambulating w/ FWW to bathroom, slight edema, 2 LPM nasal canula, last BM 11th, wound vac in place, from outside facility     Plan: Sw to assist w/ d/c planning as needed.

## 2018-06-13 NOTE — PROGRESS NOTES
[facility] Progress Note      Admit Date: 6/11/2018  ICU Day: 2      Resident(s): Ellen Fleming  Attending: KAYLA LAM/ Dr. Frazier    Date & Time:   6/13/2018   9:53 AM       Patient ID:    Name:             Emmanuelle Martin   YOB: 1960  Age:                 58 y.o.  female   MRN:               [id number]    HPI:  Emmanuelle Martin is a 58 year old female who presents with sepsis with multi organ failure secondary to necrotizing fasciitis of right thigh in the setting of new onset insulin dependent uncontrolled type II diabetes. She had surgical debridement on 6/10 and a wound vac was placed with plans to continue debridement on 6/15 unless clinically decompensates. Itraoperative cultures grew GBS and she was continued on Zosyn only.     Plan: Will continue to work on controlling her blood glucose with lantus and sliding scale insulin. Change fluids to LR due to worsening NAGMA secondary to NS, possible starvation ketosis as well. C peptide pending given new onset type II diabetes. Clinically stable at this time and will consider transition to telemetry.     Consultants:  General Surgery   PMA: Dr. Frazier     Procedures:  Surgical debridement 6/11  Surgical debridement 6/15    Imaging:  CT-FOREIGN FILM CAT SCAN   Final Result      OUTSIDE IMAGES-DX CHEST   Final Result      OUTSIDE IMAGES-CT ABDOMEN /PELVIS   Final Result      OUTSIDE IMAGES-DX CHEST   Final Result          Interval Events:  GBS intraoperative cultures, Zosyn only.   Blood pressure has been stable.   Edematous with bibasilar crackles today due to fluids, will reduce.     Review of Systems   Constitutional: Negative for chills and fever.   HENT: Negative for congestion and sore throat.    Eyes: Negative for blurred vision and double vision.   Respiratory: Negative for sputum production and shortness of breath.    Cardiovascular: Negative for chest pain and palpitations.   Gastrointestinal: Negative for abdominal pain, nausea and  vomiting.   Genitourinary: Negative for dysuria and urgency.   Musculoskeletal: Negative.    Skin: Negative.    Neurological: Negative for dizziness and headaches.   Endo/Heme/Allergies: Negative.    Psychiatric/Behavioral: Negative.        PHYSICAL EXAM  Gen: NAD  HEENT: NC/AT, moist mucous membranes.  Neck: No tracheal deviation.   Cardiac: S1S2, no JVD.  Respiratory: Normal effort, bibasilar crackles.   Abdomen: BS+, soft, NT/ND, no rebound/guarding.   Ext: No edema, 2+ DP pulses b/l.  Skin: Warm, dry. Improving right inner thigh erythema with wound vac in place.    Neuro: AAOx4, CN II-XII grossly normal, no focal sensory or motor deficits.   Psych: Affect, mood, judgement normal.    Respiratory:   Respiration: (!) 32, Pulse Oximetry: 96 %    Chest Tube Drains:  None    HemoDynamics:  Pulse: 86, Heart Rate (Monitored): 86 NIBP: 107/57      Neuro:  None    Fluids:  LR maintenance      Intake/Output Summary (Last 24 hours) at 06/12/18 0701  Last data filed at 06/12/18 0600   Gross per 24 hour   Intake          7256.67 ml   Output               60 ml   Net          7196.67 ml       Weight: 75.6 kg (166 lb 10.7 oz)  Body mass index is 33.66 kg/m².    Recent Labs      06/11/18 2156 06/12/18 0450 06/13/18   0555   SODIUM  135  132*  140   POTASSIUM  3.3*  3.8  4.2   CHLORIDE  105  107  115*   CO2  18*  18*  17*   BUN  12  11  11   CREATININE  0.63  0.43*  0.50   MAGNESIUM  1.6  1.4*  1.8   CALCIUM  8.1*  6.9*  8.1*       GI/Nutrition:  Recent Labs      06/11/18 2156 06/12/18 0450  06/13/18   0555   ALTSGPT   --   23  29   ASTSGOT   --   30  18   ALKPHOSPHAT   --   106*  169*   TBILIRUBIN   --   0.7  0.5   GLUCOSE  324*  272*  192*       Heme:  Recent Labs      06/11/18 2156 06/12/18 0450  06/13/18   0555   RBC  3.80*  3.18*  3.76*   HEMOGLOBIN  11.9*  10.1*  12.1   HEMATOCRIT  35.3*  30.4*  35.9*   PLATELETCT  181  152*  205   PROTHROMBTM  14.0   --    --    APTT  31.7   --    --    INR  1.11   --    --         Infectious Disease:  Temp  Av.7 °C (98.1 °F)  Min: 36.4 °C (97.6 °F)  Max: 37.1 °C (98.7 °F)  Recent Labs      18   2156  18   0450  18   0555   WBC  11.2*  11.5*  11.9*   NEUTSPOLYS  71.90  64.80  79.40*   LYMPHOCYTES  15.80*  19.50*  11.90*   MONOCYTES  7.00  5.60  6.20   EOSINOPHILS  0.00  0.00  1.00   BASOPHILS  0.90  0.00  0.40   ASTSGOT   --   30  18   ALTSGPT   --   23  29   ALKPHOSPHAT   --   106*  169*   TBILIRUBIN   --   0.7  0.5       Meds:  • magnesium sulfate  2 g 2 g (18 0813)   • insulin glargine  15 Units     • lactated ringers with KCL infusion       • enoxaparin (LOVENOX) injection  40 mg     • insulin regular  3-14 Units      And   • glucose 4 g  16 g      And   • dextrose 50%  25 mL     • senna-docusate  2 Tab      And   • polyethylene glycol/lytes  1 Packet      And   • magnesium hydroxide  30 mL      And   • bisacodyl  10 mg     • acetaminophen  650 mg     • Pharmacy Consult Request  1 Each     • ondansetron  4 mg     • dexamethasone  4 mg     • diphenhydrAMINE  25 mg     • haloperidol lactate  1 mg     • scopolamine  1 Patch     • senna-docusate  1 Tab     • bisacodyl  10 mg     • fleet  1 Each     • acetaminophen  650 mg     • ketorolac  30 mg     • oxyCODONE immediate-release  5 mg     • oxyCODONE immediate release  10 mg     • piperacillin-tazobactam  4.5 g 4.5 g (18 0541)   • HYDROmorphone  0.5 mg            Problem and Plan:    Sepsis with acute organ failure secondary to necrotizing fasciitis of R thigh in the setting of hyperglycemia   Improving   Erythema improving without tenderness to palpation   Intraoperative cultures grew GBS and continued on Zosyn only pending susceptibility   Wound vac placed   S/P surgical debridement on  and 6/15 unless clinically decompensates   Control BGs with Lantus and SSI   Replete electrolytes as needed  Infectious Disease   Blood cultures NGTD       Non anion gap metabolic acidosis  Likely secondary to  NS  Urine analysis notable for ketones, consider starvation ketosis contributing   LR maintenance     New onset type 2 diabetes mellitus (HCC)  A1c 12.8% on admission   C peptide  SSI, Alfredtus   Diabetes educator       Anemia/Thrombocytopenia  Resolved  In the setting of sepsis      Hypocalcemia  Resolved   Secondary to hypoalbuminemia in the setting of sepsis     Hypokalemia  Resolved  Secondary to Hypomagnesemia   Mg >2, K >4      Quality Measures:  Lines: PIV, Wound Vac      Gaston Catheter: None     DVT Prophylaxis: Lovenox     Ulcer Prophylaxis: None     Antibiotics: Zosyn     CODE STATUS: Full   DISPO: ICU

## 2018-06-13 NOTE — PROGRESS NOTES
Infectious Disease Progress Note    Author: Brianna Mario M.D. Date & Time of service: 2018  4:14 PM    Chief Complaint:  Right thigh infection    Interval History:  2018 MAXIMUM TEMPERATURE 98.7. WBCs 11.9 and platelets 205 and cr 0.5  Labs Reviewed, Medications Reviewed, Radiology Reviewed and Wound Reviewed.    Review of Systems:  Review of Systems   Constitutional: Positive for malaise/fatigue. Negative for fever.   Respiratory: Negative for cough and shortness of breath.    Cardiovascular: Negative for chest pain and leg swelling.   Gastrointestinal: Positive for nausea. Negative for abdominal pain and vomiting.   Genitourinary: Negative for dysuria.   Musculoskeletal: Positive for myalgias.   Neurological: Negative for sensory change and speech change.       Hemodynamics:  Temp (24hrs), Av.7 °C (98.1 °F), Min:36.4 °C (97.6 °F), Max:37.1 °C (98.7 °F)  Temperature: 36.6 °C (97.8 °F)  Pulse  Av.3  Min: 65  Max: 106Heart Rate (Monitored): 83  NIBP: 111/62       Physical Exam:  Physical Exam   Constitutional: She is oriented to person, place, and time. No distress.   Tired but non toxic   Eyes: No scleral icterus.   Neck: Neck supple.   Cardiovascular: Regular rhythm.    No murmur heard.  Pulmonary/Chest: She has no wheezes. She has no rales.   Abdominal: Soft. There is no tenderness. There is no rebound.   Musculoskeletal: She exhibits edema.   Right thigh and groin wound VAC and edema and erythema. Along with induration present   Neurological: She is alert and oriented to person, place, and time.   Vitals reviewed.      Meds:    Current Facility-Administered Medications:   •  insulin glargine  •  lactated ringers with KCL infusion  •  enoxaparin (LOVENOX) injection  •  insulin regular **AND** Accu-Chek ACHS **AND** NOTIFY MD and PharmD **AND** glucose 4 g **AND** dextrose 50%  •  senna-docusate **AND** polyethylene glycol/lytes **AND** magnesium hydroxide **AND** bisacodyl  •   acetaminophen  •  Pharmacy Consult Request  •  ondansetron  •  dexamethasone  •  diphenhydrAMINE  •  haloperidol lactate  •  scopolamine  •  senna-docusate  •  bisacodyl  •  fleet  •  acetaminophen  •  ketorolac  •  oxyCODONE immediate-release  •  oxyCODONE immediate release  •  [DISCONTINUED] piperacillin-tazobactam **AND** piperacillin-tazobactam  •  HYDROmorphone    Labs:  Recent Labs      06/11/18 2156 06/12/18 0450 06/13/18   0555   WBC  11.2*  11.5*  11.9*   RBC  3.80*  3.18*  3.76*   HEMOGLOBIN  11.9*  10.1*  12.1   HEMATOCRIT  35.3*  30.4*  35.9*   MCV  92.9  95.6  95.5   MCH  31.3  31.8  32.2   RDW  41.6  43.5  45.7   PLATELETCT  181  152*  205   MPV  10.4  10.5  10.7   NEUTSPOLYS  71.90  64.80  79.40*   LYMPHOCYTES  15.80*  19.50*  11.90*   MONOCYTES  7.00  5.60  6.20   EOSINOPHILS  0.00  0.00  1.00   BASOPHILS  0.90  0.00  0.40   RBCMORPHOLO  Present  Normal   --      Recent Labs      06/11/18 2156 06/12/18 0450 06/12/18   0848  06/13/18   0555   SODIUM  135  132*   --   140   POTASSIUM  3.3*  3.8   --   4.2   CHLORIDE  105  107   --   115*   CO2  18*  18*   --   17*   GLUCOSE  324*  272*   --   192*   BUN  12  11   --   11   CPKTOTAL  27   --   21   --      Recent Labs      06/11/18 2156 06/12/18   0450  06/13/18   0555   ALBUMIN   --   2.2*  2.6*   TBILIRUBIN   --   0.7  0.5   ALKPHOSPHAT   --   106*  169*   TOTPROTEIN   --   4.5*  5.5*   ALTSGPT   --   23  29   ASTSGOT   --   30  18   CREATININE  0.63  0.43*  0.50       Imaging:  No results found.    Micro:  Results     Procedure Component Value Units Date/Time    Urine Culture [[id number]]  (Abnormal) Collected:  06/12/18 0725    Order Status:  Completed Specimen:  Urine from Urine, Clean Catch Updated:  06/13/18 5485     Significant Indicator POS (POS)     Source UR     Site URINE, CLEAN CATCH     Urine Culture -- (A)      Yeast  ,000 cfu/mL   (A)    Narrative:       Indication for culture:->Suspected Sepsis    CULTURE WOUND W/  GRAM STAIN [[id number]]  (Abnormal) Collected:  06/11/18 1945    Order Status:  Completed Specimen:  Wound Updated:  06/13/18 0959     Significant Indicator POS (POS)     Source WND     Site Right Groin Abscess     Culture Result Wound Light growth mixed skin franko. (A)     Gram Stain Result Many WBCs.  Many mixed bacteria, no predominant organism seen.       Culture Result Wound Streptococcus agalactiae (Group B)  Moderate growth   (A)    ANAEROBIC CULTURE [[id number]] Collected:  06/11/18 1945    Order Status:  Completed Specimen:  Wound Updated:  06/13/18 0959     Significant Indicator NEG     Source WND     Site Right Groin Abscess     Anaerobic Culture, Culture Res Culture in progress.    Urinalysis [[id number]]  (Abnormal) Collected:  06/12/18 0725    Order Status:  Completed Specimen:  Urine from Urine, Clean Catch Updated:  06/12/18 1433     Micro Urine Req Microscopic     Color DK Yellow     Character Cloudy (A)     Ph 5.0     Glucose >=1000 (A) mg/dL      Ketones 15 (A) mg/dL      Protein 30 (A) mg/dL      Bilirubin Negative     Urobilinogen, Urine 1.0     Nitrite Negative     Leukocyte Esterase Negative     Occult Blood Moderate (A)    Narrative:       Collected By:[id number] MARY BETH TREJO  If not done within the last 24 hours    GRAM STAIN [[id number]] Collected:  06/11/18 1945    Order Status:  Completed Specimen:  Wound Updated:  06/12/18 0940     Significant Indicator .     Source WND     Site Right Groin Abscess     Gram Stain Result Many WBCs.  Many mixed bacteria, no predominant organism seen.      BLOOD CULTURE [[id number]] Collected:  06/11/18 2156    Order Status:  Completed Specimen:  Blood from Peripheral Updated:  06/12/18 0810     Significant Indicator NEG     Source BLD     Site PERIPHERAL     Blood Culture No Growth    Note: Blood cultures are incubated for 5 days and  are monitored continuously.Positive blood cultures  are called to the RN and reported as soon as  they are identified.       "Narrative:       Per Hospital Policy: Only change Specimen Src: to \"Line\" if  specified by physician order.    BLOOD CULTURE [[id number]] Collected:  06/11/18 2156    Order Status:  Completed Specimen:  Blood from Peripheral Updated:  06/12/18 0810     Significant Indicator NEG     Source BLD     Site PERIPHERAL     Blood Culture No Growth    Note: Blood cultures are incubated for 5 days and  are monitored continuously.Positive blood cultures  are called to the RN and reported as soon as  they are identified.      Narrative:       Per Hospital Policy: Only change Specimen Src: to \"Line\" if  specified by physician order.    Culture Respiratory W/ GRM STN [[id number]]     Order Status:  Completed Specimen:  Respirate from Sputum     BLOOD CULTURE [[id number]]     Order Status:  Canceled Specimen:  Blood from Peripheral     BLOOD CULTURE [[id number]]     Order Status:  Canceled Specimen:  Blood from Peripheral           Assessment:  Active Hospital Problems    Diagnosis   • Sepsis with acute organ failure secondary to necrotizing fasciitis of R thigh in the setting of hyperglycemia  [A41.9]   • New onset type 2 diabetes mellitus (HCC) [E11.9]   • Hypokalemia [E87.6]   • Non anion gap metabolic acidosis [E87.2]       Plan:  Necrotizing fasciitis-additional work  Underwent I&D on 6/11/2018   OR cultures are showing group B strep  Change Zosyn to Unasyn  For another I&D on Friday  continue with a wound care    New onset diabetes mellitus  Keep the blood sugars under control for wound healing    Hypotension  Resolved    Tobacco abuse  Patient counseled    Discussed with internal medicine.  "

## 2018-06-13 NOTE — PROGRESS NOTES
Pulmonary Critical Care Progress Note        Date of Admission:  6/11/2018    Chief Complaint: Nausea, vomiting, wound infection    History of Present Illness: 58 y.o. female  presented to an outside hospital earlier today with 4 days of right thigh pain.  She does not recall any injury.  She thinks that she may have suffered a spider bite.  She has had associated fevers and chills as well as nausea and vomiting.  She received vancomycin and Zosyn at the outside facility.  She denies chest pain, shortness of breath, cough or sputum production.  She denies abdominal pain.  She has been seen by Dr. Omega Martinez and he has taken her to the operating theater and performed irrigation and debridement of right thigh necrotizing fasciitis.  Additionally she has been found to have hyperglycemia.  She denies any history of diabetes.    ROS:  Respiratory: negative, Cardiac: negative, GI: negative.  All other systems negative.    Interval Events:  24 hour interval history reviewed    - wound vac with bloody o/p   - afebrile   - 2 lpm oxygen   - up and ambulatin   - ID following - zosyn, stopped clinda/vanco   - cx's neg so far   - lantus increased today   - change IVF to LR   -   Yesterday   - remains hypotensive; rec'd 5 L    - A/O   - + nausea   - 3 lpm    - lantus, increase SSI   - zosyn/clinda/vanco   - vac in place    PFSH:  No change.    Respiratory:     Pulse Oximetry: 96 %  Chest Tube Drains:          Exam: unlabored respirations, no intercostal retractions or accessory muscle use  ImagingAvailable data reviewed         Invalid input(s): [id number]    HemoDynamics:  Pulse: 86, Heart Rate (Monitored): 86  NIBP: 107/57       Exam: regular rate and rhythm  Imaging: Available data reviewed  Recent Labs      06/11/18   2156  06/12/18   0848   CPKTOTAL  27  21       Neuro:  GCS         Exam: no focal deficits noted mental status intact  Imaging: Available data reviewed    Fluids:  Intake/Output       06/11/18 0700 -  06/12/18 0659 06/12/18 0700 - 06/13/18 0659 06/13/18 0700 - 06/14/18 0659      0023-5772 [id number] Total 0700-1859 1900-0659 Total [id number] 1900-0659 Total       Intake    P.O.  --  -- --  460  240 700  --  -- --    P.O. -- -- -- 460 240 700 -- -- --    I.V.  --  7456.7 7456.7  1000  1598.3 2598.3  --  -- --    Crystalloid Intake -- 500 500 -- -- -- -- -- --    Magnesium Sulfate Volume -- 1.7 1.7 -- 98.3 98.3 -- -- --    IV Piggyback Volume (IV Piggyback) -- 800 800 -- -- -- -- -- --    IV Volume (NS) -- 4905 4905 1000 -- 1000 -- -- --    IV Volume (NS+20meqk) -- 1250 1250 -- 1500 1500 -- -- --    Total Intake -- 7456.7 7456.7 1460 1838.3 3298.3 -- -- --       Output    Urine  --  300 300  300  500 800  --  -- --    Number of Times Voided -- -- -- -- 1 x 1 x -- -- --    Urine Void (mL) (non-catheter) -- 300 300 300 500 800 -- -- --    Drains  --  50 50  50  50 100  --  -- --    Output (ml) (Surgical Drain Group Right;Groin Other (Comments)) -- 50 50 50 50 100 -- -- --    Blood  --  10 10  --  -- --  --  -- --    Est. Blood Loss (mL) -- 10 10 -- -- -- -- -- --    Total Output -- 360 360 350 550 900 -- -- --       Net I/O     -- 7096.7 7096.7 1110 1288.3 2398.3 -- -- --        Weight: 75.6 kg (166 lb 10.7 oz)  Recent Labs      06/11/18   2156  06/12/18 0450  06/13/18   0555   SODIUM  135  132*  140   POTASSIUM  3.3*  3.8  4.2   CHLORIDE  105  107  115*   CO2  18*  18*  17*   BUN  12  11  11   CREATININE  0.63  0.43*  0.50   MAGNESIUM  1.6  1.4*  1.8   CALCIUM  8.1*  6.9*  8.1*       GI/Nutrition:  Exam: abdomen is soft and non-tender  Imaging: Available data reviewed  taking PO  Liver Function  Recent Labs      06/11/18 2156 06/12/18 0450  06/13/18   0555   ALTSGPT   --   23  29   ASTSGOT   --   30  18   ALKPHOSPHAT   --   106*  169*   TBILIRUBIN   --   0.7  0.5   GLUCOSE  324*  272*  192*       Heme:  Recent Labs      06/11/18 2156 06/12/18 0450  06/13/18   0555   RBC  3.80*  3.18*  3.76*   HEMOGLOBIN   11.9*  10.1*  12.1   HEMATOCRIT  35.3*  30.4*  35.9*   PLATELETCT  181  152*  205   PROTHROMBTM  14.0   --    --    APTT  31.7   --    --    INR  1.11   --    --        Infectious Disease:  Temp  Av.7 °C (98.1 °F)  Min: 36.4 °C (97.6 °F)  Max: 37.1 °C (98.7 °F)  Micro: reviewed  Recent Labs      18   2156  18   0450  18   0555   WBC  11.2*  11.5*  11.9*   NEUTSPOLYS  71.90  64.80  79.40*   LYMPHOCYTES  15.80*  19.50*  11.90*   MONOCYTES  7.00  5.60  6.20   EOSINOPHILS  0.00  0.00  1.00   BASOPHILS  0.90  0.00  0.40   ASTSGOT   --   30  18   ALTSGPT   --   23  29   ALKPHOSPHAT   --   106*  169*   TBILIRUBIN   --   0.7  0.5     Current Facility-Administered Medications   Medication Dose Frequency Provider Last Rate Last Dose   • magnesium sulfate 2 g in NS 50 mL IVPB  2 g Once Manolo Medina M.D. 25 mL/hr at 18 0813 2 g at 18 0813   • insulin glargine (LANTUS) injection 15 Units  15 Units QAM INSULIN Ellen Fleming M.D.       • 0.45% NaCl with KCl 20 mEq infusion   Continuous Ellen Fleming M.D.       • insulin regular (HUMULIN R) injection 3-14 Units  3-14 Units 4X/DAY SCOTTY Frazier M.D.   3 Units at 18 0538    And   • glucose 4 g chewable tablet 16 g  16 g Q15 MIN PRN José Antonio Frazier M.D.        And   • dextrose 50% (D50W) injection 25 mL  25 mL Q15 MIN PRN José Antonio Frazier M.D.       • senna-docusate (PERICOLACE or SENOKOT S) 8.6-50 MG per tablet 2 Tab  2 Tab BID Jorge Sales M.D.   2 Tab at 18 0814    And   • polyethylene glycol/lytes (MIRALAX) PACKET 1 Packet  1 Packet QDAY PRN Jorge Sales M.D.        And   • magnesium hydroxide (MILK OF MAGNESIA) suspension 30 mL  30 mL QDAY PRN Jorge Sales M.D.        And   • bisacodyl (DULCOLAX) suppository 10 mg  10 mg QDAY PRN Jorge Sales M.D.       • acetaminophen (TYLENOL) tablet 650 mg  650 mg Q6HRS PRN Jorge Sales M.D.       • Pharmacy Consult Request ...Pain Management  Review 1 Each  1 Each PRN Omega Martinez M.D.       • ondansetron (ZOFRAN) syringe/vial injection 4 mg  4 mg Q4HRS PRN Omega Martinez M.D.   4 mg at 06/12/18 1427   • dexamethasone (DECADRON) injection 4 mg  4 mg Once PRN Omega Martinez M.D.       • diphenhydrAMINE (BENADRYL) injection 25 mg  25 mg Q6HRS PRN Omega Martinez M.D.       • haloperidol lactate (HALDOL) injection 1 mg  1 mg Q6HRS PRN Omega Martinez M.D.       • scopolamine (TRANSDERM-SCOP) patch 1 Patch  1 Patch Q72HRS PRN Omega Martinez M.D.       • senna-docusate (PERICOLACE or SENOKOT S) 8.6-50 MG per tablet 1 Tab  1 Tab Q24HRS PRN Omega Martinez M.D.       • bisacodyl (DULCOLAX) suppository 10 mg  10 mg Q24HRS PRN Omega Martinez M.D.       • fleet enema 133 mL  1 Each Once PRN Omega Martinez M.D.       • acetaminophen (TYLENOL) tablet 650 mg  650 mg Q6HRS Omega Martinez M.D.   650 mg at 06/13/18 0544   • ketorolac (TORADOL) injection 30 mg  30 mg Q6HRS Omega Martinez M.D.   30 mg at 06/13/18 0544   • oxyCODONE immediate release (ROXICODONE) tablet 5 mg  5 mg Q3HRS PRN Omega Martinez M.D.       • oxyCODONE immediate release (ROXICODONE) tablet 10 mg  10 mg Q3HRS PRN Omega Martinez M.D.       • piperacillin-tazobactam (ZOSYN) 4.5 g in  mL IVPB  4.5 g Q8HRS Jorge Sales M.D. 25 mL/hr at 06/13/18 0541 4.5 g at 06/13/18 0541   • HYDROmorphone (DILAUDID) injection 0.5 mg  0.5 mg Q3HRS PRN Omega Martinez M.D.         Last reviewed on 6/11/2018  6:30 PM by Alysha Tijerina    Quality  Measures:  Core Measures & Quality Metrics    Assessment and Plan:     Sepsis without associated acute organ failure              -Skin and soft tissue source              -Status post appropriate sepsis volume resuscitation     Right thigh necrotizing fasciitis              -Status post irrigation and debridement on 6/11              -Wound VAC in place, wound care following              -Zosyn, vancomycin  and clindamycin D escalated to Unasyn, appreciate ID recommendations    -Wound cultures growing group B streptococcus  Hyperglycemia              -No prior history of diabetes mellitus              -Follow hemoglobin A1c              -Continue sliding scale insulin     Hypokalemia              -Replete potassium     Appropriate prophylaxis, monitor in ICU with necrotizing fasciitis, further wound debridement as per orthopedic surgery    Discussed patient condition and risk of morbidity and/or mortality with RN, RT and QA team.

## 2018-06-13 NOTE — PROGRESS NOTES
"Patient seen and examined  Feeling better overall    Blood pressure (!) 90/53, pulse 80, temperature 36.4 °C (97.6 °F), resp. rate (!) 30, height 1.499 m (4' 11\"), weight 75.6 kg (166 lb 10.7 oz), SpO2 98 %, not currently breastfeeding.    Recent Labs      06/11/18   2156  06/12/18   0450  06/13/18   0555   WBC  11.2*  11.5*  11.9*   RBC  3.80*  3.18*  3.76*   HEMOGLOBIN  11.9*  10.1*  12.1   HEMATOCRIT  35.3*  30.4*  35.9*   MCV  92.9  95.6  95.5   MCH  31.3  31.8  32.2   MCHC  33.7  33.2*  33.7   RDW  41.6  43.5  45.7   PLATELETCT  181  152*  205   MPV  10.4  10.5  10.7       No acute distress  Ambulating in room   Dressing clean dry and intact  Neurovascularly intact  Erythema RLE within marked margins    POD# 2 I&D RLE    Plan:  DVT Prophylaxis- TEDS/SCDs, chem ppx p[er primary  Weight Bearing Status- wbat  PT/OT  Antibiotics: per ID  Case Coordination          "

## 2018-06-14 ENCOUNTER — APPOINTMENT (OUTPATIENT)
Dept: RADIOLOGY | Facility: MEDICAL CENTER | Age: 58
DRG: 853 | End: 2018-06-14
Attending: HOSPITALIST

## 2018-06-14 PROBLEM — E87.6 HYPOKALEMIA: Status: RESOLVED | Noted: 2018-06-12 | Resolved: 2018-06-14

## 2018-06-14 LAB
ALBUMIN SERPL BCP-MCNC: 2.6 G/DL (ref 3.2–4.9)
ALBUMIN/GLOB SERPL: 0.8 G/DL
ALP SERPL-CCNC: 239 U/L (ref 30–99)
ALT SERPL-CCNC: 30 U/L (ref 2–50)
ANION GAP SERPL CALC-SCNC: 15 MMOL/L (ref 0–11.9)
AST SERPL-CCNC: 28 U/L (ref 12–45)
BASOPHILS # BLD AUTO: 0.6 % (ref 0–1.8)
BASOPHILS # BLD: 0.07 K/UL (ref 0–0.12)
BILIRUB SERPL-MCNC: 0.6 MG/DL (ref 0.1–1.5)
BUN SERPL-MCNC: 9 MG/DL (ref 8–22)
C PEPTIDE SERPL-MCNC: 1.6 NG/ML (ref 0.8–3.5)
CALCIUM SERPL-MCNC: 7.7 MG/DL (ref 8.5–10.5)
CHLORIDE SERPL-SCNC: 115 MMOL/L (ref 96–112)
CO2 SERPL-SCNC: 12 MMOL/L (ref 20–33)
CREAT SERPL-MCNC: 0.38 MG/DL (ref 0.5–1.4)
EOSINOPHIL # BLD AUTO: 0.09 K/UL (ref 0–0.51)
EOSINOPHIL NFR BLD: 0.7 % (ref 0–6.9)
ERYTHROCYTE [DISTWIDTH] IN BLOOD BY AUTOMATED COUNT: 48.1 FL (ref 35.9–50)
GLOBULIN SER CALC-MCNC: 3.3 G/DL (ref 1.9–3.5)
GLUCOSE BLD-MCNC: 137 MG/DL (ref 65–99)
GLUCOSE BLD-MCNC: 150 MG/DL (ref 65–99)
GLUCOSE BLD-MCNC: 165 MG/DL (ref 65–99)
GLUCOSE SERPL-MCNC: 148 MG/DL (ref 65–99)
HCT VFR BLD AUTO: 37.3 % (ref 37–47)
HGB BLD-MCNC: 12.3 G/DL (ref 12–16)
IMM GRANULOCYTES # BLD AUTO: 0.13 K/UL (ref 0–0.11)
IMM GRANULOCYTES NFR BLD AUTO: 1.1 % (ref 0–0.9)
LYMPHOCYTES # BLD AUTO: 2 K/UL (ref 1–4.8)
LYMPHOCYTES NFR BLD: 16.4 % (ref 22–41)
MAGNESIUM SERPL-MCNC: 2 MG/DL (ref 1.5–2.5)
MCH RBC QN AUTO: 31.9 PG (ref 27–33)
MCHC RBC AUTO-ENTMCNC: 33 G/DL (ref 33.6–35)
MCV RBC AUTO: 96.9 FL (ref 81.4–97.8)
MONOCYTES # BLD AUTO: 0.92 K/UL (ref 0–0.85)
MONOCYTES NFR BLD AUTO: 7.5 % (ref 0–13.4)
NEUTROPHILS # BLD AUTO: 9.01 K/UL (ref 2–7.15)
NEUTROPHILS NFR BLD: 73.7 % (ref 44–72)
NRBC # BLD AUTO: 0 K/UL
NRBC BLD-RTO: 0 /100 WBC
PLATELET # BLD AUTO: 205 K/UL (ref 164–446)
PMV BLD AUTO: 10.2 FL (ref 9–12.9)
POTASSIUM SERPL-SCNC: 4.1 MMOL/L (ref 3.6–5.5)
PROT SERPL-MCNC: 5.9 G/DL (ref 6–8.2)
RBC # BLD AUTO: 3.85 M/UL (ref 4.2–5.4)
SODIUM SERPL-SCNC: 142 MMOL/L (ref 135–145)
WBC # BLD AUTO: 12.2 K/UL (ref 4.8–10.8)

## 2018-06-14 PROCEDURE — 700111 HCHG RX REV CODE 636 W/ 250 OVERRIDE (IP): Performed by: INTERNAL MEDICINE

## 2018-06-14 PROCEDURE — 97535 SELF CARE MNGMENT TRAINING: CPT

## 2018-06-14 PROCEDURE — 700105 HCHG RX REV CODE 258: Performed by: STUDENT IN AN ORGANIZED HEALTH CARE EDUCATION/TRAINING PROGRAM

## 2018-06-14 PROCEDURE — 83735 ASSAY OF MAGNESIUM: CPT

## 2018-06-14 PROCEDURE — 85025 COMPLETE CBC W/AUTO DIFF WBC: CPT

## 2018-06-14 PROCEDURE — 99233 SBSQ HOSP IP/OBS HIGH 50: CPT | Performed by: INTERNAL MEDICINE

## 2018-06-14 PROCEDURE — 71045 X-RAY EXAM CHEST 1 VIEW: CPT

## 2018-06-14 PROCEDURE — A9270 NON-COVERED ITEM OR SERVICE: HCPCS | Performed by: ORTHOPAEDIC SURGERY

## 2018-06-14 PROCEDURE — 770020 HCHG ROOM/CARE - TELE (206)

## 2018-06-14 PROCEDURE — 700102 HCHG RX REV CODE 250 W/ 637 OVERRIDE(OP): Performed by: ORTHOPAEDIC SURGERY

## 2018-06-14 PROCEDURE — 99232 SBSQ HOSP IP/OBS MODERATE 35: CPT | Performed by: INTERNAL MEDICINE

## 2018-06-14 PROCEDURE — 80053 COMPREHEN METABOLIC PANEL: CPT

## 2018-06-14 PROCEDURE — 700111 HCHG RX REV CODE 636 W/ 250 OVERRIDE (IP): Performed by: STUDENT IN AN ORGANIZED HEALTH CARE EDUCATION/TRAINING PROGRAM

## 2018-06-14 PROCEDURE — 82962 GLUCOSE BLOOD TEST: CPT | Mod: 91

## 2018-06-14 PROCEDURE — 700111 HCHG RX REV CODE 636 W/ 250 OVERRIDE (IP): Performed by: ORTHOPAEDIC SURGERY

## 2018-06-14 PROCEDURE — 700105 HCHG RX REV CODE 258: Performed by: INTERNAL MEDICINE

## 2018-06-14 RX ADMIN — OXYCODONE HYDROCHLORIDE 5 MG: 5 TABLET ORAL at 20:09

## 2018-06-14 RX ADMIN — AMPICILLIN SODIUM AND SULBACTAM SODIUM 3 G: 2; 1 INJECTION, POWDER, FOR SOLUTION INTRAMUSCULAR; INTRAVENOUS at 05:55

## 2018-06-14 RX ADMIN — POTASSIUM CHLORIDE: 2 INJECTION, SOLUTION, CONCENTRATE INTRAVENOUS at 05:55

## 2018-06-14 RX ADMIN — OXYCODONE HYDROCHLORIDE 5 MG: 5 TABLET ORAL at 04:22

## 2018-06-14 RX ADMIN — AMPICILLIN SODIUM AND SULBACTAM SODIUM 3 G: 2; 1 INJECTION, POWDER, FOR SOLUTION INTRAMUSCULAR; INTRAVENOUS at 17:25

## 2018-06-14 RX ADMIN — KETOROLAC TROMETHAMINE 30 MG: 30 INJECTION, SOLUTION INTRAMUSCULAR; INTRAVENOUS at 05:55

## 2018-06-14 RX ADMIN — ACETAMINOPHEN 650 MG: 325 TABLET, FILM COATED ORAL at 12:11

## 2018-06-14 RX ADMIN — ACETAMINOPHEN 650 MG: 325 TABLET, FILM COATED ORAL at 17:23

## 2018-06-14 RX ADMIN — OXYCODONE HYDROCHLORIDE 5 MG: 5 TABLET ORAL at 11:07

## 2018-06-14 RX ADMIN — AMPICILLIN SODIUM AND SULBACTAM SODIUM 3 G: 2; 1 INJECTION, POWDER, FOR SOLUTION INTRAMUSCULAR; INTRAVENOUS at 12:10

## 2018-06-14 RX ADMIN — ACETAMINOPHEN 650 MG: 325 TABLET, FILM COATED ORAL at 05:55

## 2018-06-14 RX ADMIN — KETOROLAC TROMETHAMINE 30 MG: 30 INJECTION, SOLUTION INTRAMUSCULAR; INTRAVENOUS at 12:10

## 2018-06-14 RX ADMIN — OXYCODONE HYDROCHLORIDE 5 MG: 5 TABLET ORAL at 16:03

## 2018-06-14 ASSESSMENT — COGNITIVE AND FUNCTIONAL STATUS - GENERAL
HELP NEEDED FOR BATHING: A LOT
SUGGESTED CMS G CODE MODIFIER DAILY ACTIVITY: CK
TOILETING: A LITTLE
DRESSING REGULAR LOWER BODY CLOTHING: A LOT
DAILY ACTIVITIY SCORE: 19

## 2018-06-14 ASSESSMENT — ENCOUNTER SYMPTOMS
DOUBLE VISION: 0
ABDOMINAL PAIN: 0
COUGH: 0
SPEECH CHANGE: 0
SENSORY CHANGE: 0
HEADACHES: 0
BLURRED VISION: 0
SORE THROAT: 0
CHILLS: 0
FEVER: 0
DIZZINESS: 0
SPUTUM PRODUCTION: 0
PSYCHIATRIC NEGATIVE: 1
VOMITING: 0
PALPITATIONS: 0
SHORTNESS OF BREATH: 0
MUSCULOSKELETAL NEGATIVE: 1
NAUSEA: 0
MYALGIAS: 0
MYALGIAS: 1

## 2018-06-14 ASSESSMENT — PAIN SCALES - GENERAL
PAINLEVEL_OUTOF10: 4
PAINLEVEL_OUTOF10: 3
PAINLEVEL_OUTOF10: 3
PAINLEVEL_OUTOF10: 4
PAINLEVEL_OUTOF10: 5
PAINLEVEL_OUTOF10: 7
PAINLEVEL_OUTOF10: 6
PAINLEVEL_OUTOF10: 5
PAINLEVEL_OUTOF10: 5
PAINLEVEL_OUTOF10: 3
PAINLEVEL_OUTOF10: 4
PAINLEVEL_OUTOF10: 0
PAINLEVEL_OUTOF10: 8
PAINLEVEL_OUTOF10: 3
PAINLEVEL_OUTOF10: 1
PAINLEVEL_OUTOF10: 6
PAINLEVEL_OUTOF10: 3

## 2018-06-14 NOTE — PROGRESS NOTES
"   Orthopaedic Progress Note    Interval changes:  Vac in place with no leak  Return to OR Friday with Dr Martinez  Erythema demarcation improving in groin  Patient complaining of gross edema     ROS - Patient denies any new issues.  Pain well controlled.    Blood pressure (!) 90/53, pulse (!) 102, temperature 37.1 °C (98.7 °F), resp. rate (!) 32, height 1.499 m (4' 11\"), weight 75.6 kg (166 lb 10.7 oz), SpO2 94 %, not currently breastfeeding.      Patient seen and examined  No acute distress  Breathing non labored  RRR  Right groin vac in place with no leak, erythema on inner thigh with two separate demarcation tracings, improvement form most recent tracing noted.  Some questionable splotchy erythema on lateral leg. BLE DNVI, moves all toes, cap refill < 2 sec.    Recent Labs      06/11/18   2156  06/12/18   0450  06/13/18   0555   WBC  11.2*  11.5*  11.9*   RBC  3.80*  3.18*  3.76*   HEMOGLOBIN  11.9*  10.1*  12.1   HEMATOCRIT  35.3*  30.4*  35.9*   MCV  92.9  95.6  95.5   MCH  31.3  31.8  32.2   MCHC  33.7  33.2*  33.7   RDW  41.6  43.5  45.7   PLATELETCT  181  152*  205   MPV  10.4  10.5  10.7       Active Hospital Problems    Diagnosis   • Sepsis with acute organ failure secondary to necrotizing fasciitis of R thigh in the setting of hyperglycemia  [A41.9]     Priority: High   • New onset type 2 diabetes mellitus (HCC) [E11.9]     Priority: Medium   • Hypokalemia [E87.6]     Priority: Medium   • Non anion gap metabolic acidosis [E87.2]     Priority: Low       Assessment/Plan:  Return to OR Friday   NPO after midnight tomorrow  POD#2 S/P:  1.  Irrigation and debridement of right thigh necrotizing fasciitis, infection   of skin, subcutaneous tissue, and underlying muscle.  2.  Wound VAC placement, right thigh.  Wt bearing status - WBAT  Wound care/Drains - vac left in place  Future Procedures - friday  Lovenox: Start 6/13, Duration-until ambulatory > 150'  Sutures/Staples out- 10-14 days post " operatively  PT/OT-initiated  Antibiotics: unasyn 3g IV Q6  DVT Prophylaxis- TEDS/SCDs/Foot pumps  [place]-none  Case Coordination for Discharge Planning - Disposition home

## 2018-06-14 NOTE — PROGRESS NOTES
Dr. Jeremie Wilkinson at bedside to assess patient. Informed patient has not been febrile but erythema on right lateral leg has increased throughout previous shift. Verbal order received to make patient NPO at midnight for possible I&D tomorrow. Nursing staff to notify surgical orthopedic group if patient becomes febrile.

## 2018-06-14 NOTE — PROGRESS NOTES
"Patient seen and examined  Rn called me to evaluate patient as on shift change it was felt there was increased erythema (Ortho PA's note previously left documents overall decreased erythema); patient has remained afebrile all day.     Blood pressure (!) 90/53, pulse 94, temperature 37.1 °C (98.7 °F), resp. rate (!) 22, height 1.499 m (4' 11\"), weight 75.6 kg (166 lb 10.7 oz), SpO2 94 %, not currently breastfeeding.    Recent Labs      06/11/18   2156  06/12/18   0450  06/13/18   0555   WBC  11.2*  11.5*  11.9*   RBC  3.80*  3.18*  3.76*   HEMOGLOBIN  11.9*  10.1*  12.1   HEMATOCRIT  35.3*  30.4*  35.9*   MCV  92.9  95.6  95.5   MCH  31.3  31.8  32.2   MCHC  33.7  33.2*  33.7   RDW  41.6  43.5  45.7   PLATELETCT  181  152*  205   MPV  10.4  10.5  10.7       No acute distress  WOund vac sealed; this is my first time examining right groin, but overall looks fairly benign; mild cellulitis expanded just slightly by previously drawn lines; no fluctuance; minimal tenderness; no skin motling    POD#2 right groin necrotizing fasciitis I&D    Plan:  Continue abx per ID  Hospitalist to call ortho if patient becomes febrile or hemodynamically unstable  Npo after midnight for small chance of I&D tomorrow which would only be for evidence of worsening sepsis; if not then the patient may eat in the am after rounded on by Dr. Martinez or his cross-cover physician or PA. The patient understands this plan and wishes to proceed.             "

## 2018-06-14 NOTE — PROGRESS NOTES
Seen & examined  Had increased swelling and thigh pain overnight  Was seen by overnight on call ortho, made NPO p MN pending exam today    Afebrile past 24 hours  WBC 12.2    RLE:  Wound vac intact to suction  Erythema improved from previous demarcated lines  Mild swelling present anteromedial thigh  No definite fluctuance    POD#3 s/p I&D R thigh necrotizing fasciitis infection with wound vac placement    - no urgent need to return to the OR today.  Would plan for repeat debridement, wound vac change tomorrow  - NPO p MN  - Antibiotics per ID recommendations  - continue wound vac at current settings  - WBAT RLE

## 2018-06-14 NOTE — CARE PLAN
Problem: Safety  Goal: Will remain free from injury  Outcome: PROGRESSING AS EXPECTED    Goal: Will remain free from falls  Outcome: PROGRESSING AS EXPECTED  Use appropriate assistive device to enable mobilization    Problem: Infection  Goal: Will remain free from infection  Outcome: PROGRESSING AS EXPECTED  Monitor infection boarder and wound vac integrity    Problem: Pain Management  Goal: Pain level will decrease to patient's comfort goal  Outcome: PROGRESSING AS EXPECTED  Provide pain relief medication as needed

## 2018-06-14 NOTE — PROGRESS NOTES
Infectious Disease Progress Note    Author: Brianna Mario M.D. Date & Time of service: 2018  1:09 PM    Chief Complaint:  Right thigh infection    Interval History:  2018 MAXIMUM TEMPERATURE 98.7. WBCs 11.9 and platelets 205 and cr 0.5  2018 MAXIMUM TEMPERATURE 98.7 leg continues to hurt. Mucousy 12.2 platelets 205 and cr 0.38  Labs Reviewed, Medications Reviewed, Radiology Reviewed and Wound Reviewed.    Review of Systems:  Review of Systems   Constitutional: Positive for malaise/fatigue. Negative for fever.   Respiratory: Negative for cough and shortness of breath.    Cardiovascular: Negative for chest pain and leg swelling.   Gastrointestinal: Negative for abdominal pain, nausea and vomiting.   Genitourinary: Negative for dysuria.   Musculoskeletal: Positive for myalgias.   Neurological: Negative for sensory change and speech change.       Hemodynamics:  Temp (24hrs), Av.7 °C (98 °F), Min:36.4 °C (97.5 °F), Max:37.1 °C (98.7 °F)  Temperature: 36.7 °C (98 °F)  Pulse  Av.2  Min: 65  Max: 106Heart Rate (Monitored): 90  NIBP: 129/74       Physical Exam:  Physical Exam   Constitutional: She is oriented to person, place, and time. No distress.   Tired but non toxic   Eyes: No scleral icterus.   Neck: Neck supple.   Cardiovascular: Regular rhythm.    No murmur heard.  Pulmonary/Chest: She has no wheezes. She has no rales.   Abdominal: Soft. There is no tenderness. There is no rebound.   Musculoskeletal: She exhibits edema.   Right thigh and groin wound VAC and edema and erythema. Along with induration present   Neurological: She is alert and oriented to person, place, and time.   Vitals reviewed.      Meds:    Current Facility-Administered Medications:   •  insulin glargine  •  lactated ringers with KCL infusion  •  ampicillin-sulbactam (UNASYN) IV  •  insulin regular **AND** Accu-Chek ACHS **AND** NOTIFY MD and PharmD **AND** glucose 4 g **AND** dextrose 50%  •  senna-docusate **AND**  polyethylene glycol/lytes **AND** magnesium hydroxide **AND** bisacodyl  •  acetaminophen  •  Pharmacy Consult Request  •  ondansetron  •  dexamethasone  •  diphenhydrAMINE  •  haloperidol lactate  •  scopolamine  •  senna-docusate  •  bisacodyl  •  fleet  •  acetaminophen  •  oxyCODONE immediate-release  •  oxyCODONE immediate release  •  HYDROmorphone    Labs:  Recent Labs      06/11/18   2156  06/12/18   0450  06/13/18   0555  06/14/18   0439   WBC  11.2*  11.5*  11.9*  12.2*   RBC  3.80*  3.18*  3.76*  3.85*   HEMOGLOBIN  11.9*  10.1*  12.1  12.3   HEMATOCRIT  35.3*  30.4*  35.9*  37.3   MCV  92.9  95.6  95.5  96.9   MCH  31.3  31.8  32.2  31.9   RDW  41.6  43.5  45.7  48.1   PLATELETCT  181  152*  205  205   MPV  10.4  10.5  10.7  10.2   NEUTSPOLYS  71.90  64.80  79.40*  73.70*   LYMPHOCYTES  15.80*  19.50*  11.90*  16.40*   MONOCYTES  7.00  5.60  6.20  7.50   EOSINOPHILS  0.00  0.00  1.00  0.70   BASOPHILS  0.90  0.00  0.40  0.60   RBCMORPHOLO  Present  Normal   --    --      Recent Labs      06/11/18   2156  06/12/18 0450  06/12/18   0848  06/13/18   0555  06/14/18   0439   SODIUM  135  132*   --   140  142   POTASSIUM  3.3*  3.8   --   4.2  4.1   CHLORIDE  105  107   --   115*  115*   CO2  18*  18*   --   17*  12*   GLUCOSE  324*  272*   --   192*  148*   BUN  12  11   --   11  9   CPKTOTAL  27   --   21   --    --      Recent Labs      06/12/18   0450  06/13/18   0555  06/14/18   0439   ALBUMIN  2.2*  2.6*  2.6*   TBILIRUBIN  0.7  0.5  0.6   ALKPHOSPHAT  106*  169*  239*   TOTPROTEIN  4.5*  5.5*  5.9*   ALTSGPT  23  29  30   ASTSGOT  30  18  28   CREATININE  0.43*  0.50  0.38*       Imaging:  No results found.    Micro:  Results     Procedure Component Value Units Date/Time    Urine Culture [[id number]]  (Abnormal) Collected:  06/12/18 0725    Order Status:  Completed Specimen:  Urine from Urine, Clean Catch Updated:  06/13/18 1603     Significant Indicator POS (POS)     Source UR     Site URINE, CLEAN  CATCH     Urine Culture -- (A)      Yeast  ,000 cfu/mL   (A)    Narrative:       Indication for culture:->Suspected Sepsis    CULTURE WOUND W/ GRAM STAIN [[id number]]  (Abnormal) Collected:  06/11/18 1945    Order Status:  Completed Specimen:  Wound Updated:  06/13/18 0959     Significant Indicator POS (POS)     Source WND     Site Right Groin Abscess     Culture Result Wound Light growth mixed skin franko. (A)     Gram Stain Result Many WBCs.  Many mixed bacteria, no predominant organism seen.       Culture Result Wound Streptococcus agalactiae (Group B)  Moderate growth   (A)    ANAEROBIC CULTURE [[id number]] Collected:  06/11/18 1945    Order Status:  Completed Specimen:  Wound Updated:  06/13/18 0959     Significant Indicator NEG     Source WND     Site Right Groin Abscess     Anaerobic Culture, Culture Res Culture in progress.    Urinalysis [[id number]]  (Abnormal) Collected:  06/12/18 0725    Order Status:  Completed Specimen:  Urine from Urine, Clean Catch Updated:  06/12/18 1433     Micro Urine Req Microscopic     Color DK Yellow     Character Cloudy (A)     Ph 5.0     Glucose >=1000 (A) mg/dL      Ketones 15 (A) mg/dL      Protein 30 (A) mg/dL      Bilirubin Negative     Urobilinogen, Urine 1.0     Nitrite Negative     Leukocyte Esterase Negative     Occult Blood Moderate (A)    Narrative:       Collected By:[id number] MARY BETH TREJO  If not done within the last 24 hours    GRAM STAIN [[id number]] Collected:  06/11/18 1945    Order Status:  Completed Specimen:  Wound Updated:  06/12/18 0940     Significant Indicator .     Source WND     Site Right Groin Abscess     Gram Stain Result Many WBCs.  Many mixed bacteria, no predominant organism seen.      BLOOD CULTURE [[id number]] Collected:  06/11/18 2156    Order Status:  Completed Specimen:  Blood from Peripheral Updated:  06/12/18 0810     Significant Indicator NEG     Source BLD     Site PERIPHERAL     Blood Culture No Growth    Note: Blood cultures are  "incubated for 5 days and  are monitored continuously.Positive blood cultures  are called to the RN and reported as soon as  they are identified.      Narrative:       Per Hospital Policy: Only change Specimen Src: to \"Line\" if  specified by physician order.    BLOOD CULTURE [[id number]] Collected:  06/11/18 2156    Order Status:  Completed Specimen:  Blood from Peripheral Updated:  06/12/18 0810     Significant Indicator NEG     Source BLD     Site PERIPHERAL     Blood Culture No Growth    Note: Blood cultures are incubated for 5 days and  are monitored continuously.Positive blood cultures  are called to the RN and reported as soon as  they are identified.      Narrative:       Per Hospital Policy: Only change Specimen Src: to \"Line\" if  specified by physician order.    Culture Respiratory W/ GRM STN [[id number]]     Order Status:  Completed Specimen:  Respirate from Sputum     BLOOD CULTURE [[id number]]     Order Status:  Canceled Specimen:  Blood from Peripheral     BLOOD CULTURE [[id number]]     Order Status:  Canceled Specimen:  Blood from Peripheral           Assessment:  Active Hospital Problems    Diagnosis   • Sepsis with acute organ failure secondary to necrotizing fasciitis of R thigh in the setting of hyperglycemia  [A41.9]   • New onset type 2 diabetes mellitus (HCC) [E11.9]   • Hypokalemia [E87.6]   • Non anion gap metabolic acidosis [E87.2]       Plan:  Necrotizing fasciitis-additional work  Underwent I&D on 6/11/2018   OR cultures are showing group B strep  Continue Unasyn  For another I&D possibly today  continue with a wound care    New onset diabetes mellitus  Keep the blood sugars under control for wound healing    Asymptomatic fungiuria  Monitor    Hypotension  Resolved    Tobacco abuse  Patient counseled    Discussed with internal medicine.  "

## 2018-06-14 NOTE — PROGRESS NOTES
[facility] Progress Note      Admit Date: 6/11/2018  ICU Day: 3      Resident(s): Manolo Medina   Attending: KAYLA LAM/ Dr. Frzaier    Date & Time:   6/14/2018   3:08 PM       Patient ID:    Name:             Emmanuelle Martin   YOB: 1960  Age:                 58 y.o.  female   MRN:               [id number]    HPI:  Emmanuelle Martin is a 58 year old female who presents with sepsis with multi organ failure secondary to necrotizing fasciitis of right thigh in the setting of new onset insulin dependent uncontrolled type II diabetes. She had surgical debridement on 6/10 and a wound vac was placed with plans to continue debridement on 6/15 unless clinically decompensates. Itraoperative cultures grew Group B Streptococcus. She is currently on ampicillin-sulbactam.     Consultants:  General Surgery   PMA: Dr. Frazier     Procedures:  Surgical debridement 6/11  Planned surgical debridement 6/15    Imaging:  DX-CHEST-LIMITED (1 VIEW)   Final Result      Evaluation limited due to overlying soft tissues. Evaluation of the left lung base is particularly limited. Left basilar airspace opacities are not excluded. Further evaluation can be performed as PA and lateral plain films of the chest.         CT-FOREIGN FILM CAT SCAN   Final Result      OUTSIDE IMAGES-DX CHEST   Final Result      OUTSIDE IMAGES-CT ABDOMEN /PELVIS   Final Result      OUTSIDE IMAGES-DX CHEST   Final Result          Interval Events:  - GBS intraoperative cultures, ampicillin-sulbactam now. Overnight, she developed increased erythema and swelling of her right groin / right thigh such that she is not able to bear weight on that side. Orthopedic surgery following, no plan for urgent I&D since they do not note definite fluctuance. Repeat I&D planned tomorrow   Blood glucose control improved. Hold lantus tomorrow AM in anticipation of surgery.   - DVT ppx held this morning in anticipation of emergent I&D; ortho recommend no emergent I&D this  afternoon   - on 2 lpm nc likely due to pulmonary edema from aggressive fluid resuscitation in setting of necrotizing fasciitis   - Blood pressure has been stable.   - Afebrile although with increased leukocytosis   - Transfer to floor cancelled     Review of Systems   Constitutional: Negative for chills and fever.   HENT: Negative for congestion and sore throat.    Eyes: Negative for blurred vision and double vision.   Respiratory: Negative for sputum production and shortness of breath.    Cardiovascular: Negative for chest pain and palpitations.   Gastrointestinal: Negative for abdominal pain, nausea and vomiting.   Genitourinary: Negative for dysuria and urgency.   Musculoskeletal: Negative.  Negative for joint pain and myalgias.        Worsened soft tissue swelling of groin, right thigh    Skin: Negative.    Neurological: Negative for dizziness and headaches.   Endo/Heme/Allergies: Negative.    Psychiatric/Behavioral: Negative.        PHYSICAL EXAM  Gen: pleasant and conversant, in NAD   HEENT: NC/AT, moist mucous membranes.  Neck: No tracheal deviation. No stridor.   Cardiac: RRR without m/g/r. S1S2, no JVD.  Respiratory: Normal effort, no tachypnea. CTAB but decreased breath sounds over right lower lung fields   Abdomen: BS+, soft, NT/ND, no rebound/guarding.   Ext: No edema, 2+ DP pulses b/l.  Skin: Warm, dry. Erythema marked over right medial thigh. Wound vac to right groin. Limited ROM of right thigh due to pain   Neuro: AAOx4, CN II-XII grossly normal, no focal sensory or motor deficits.   Psych: Affect, mood, judgement normal.    Respiratory:   Respiration: 19, Pulse Oximetry: 92 %    Chest Tube Drains:  None    HemoDynamics:  Pulse: 89, Heart Rate (Monitored): 95 NIBP: 110/81      Neuro:  None    Fluids:  LR maintenance      Intake/Output Summary (Last 24 hours) at 06/12/18 0701  Last data filed at 06/12/18 0600   Gross per 24 hour   Intake          7256.67 ml   Output               60 ml   Net           7196.67 ml          Body mass index is 33.66 kg/m².    Recent Labs      18   0518   SODIUM  132*  140  142   POTASSIUM  3.8  4.2  4.1   CHLORIDE  107  115*  115*   CO2  18*  17*  12*   BUN  11  11  9   CREATININE  0.43*  0.50  0.38*   MAGNESIUM  1.4*  1.8  2.0   CALCIUM  6.9*  8.1*  7.7*       GI/Nutrition:  Recent Labs      18   0518   ALTSGPT  23  29  30   ASTSGOT  30    28   ALKPHOSPHAT  106*  169*  239*   TBILIRUBIN  0.7  0.5  0.6   GLUCOSE  272*  192*  148*       Heme:  Recent Labs      18   RBC  3.80*  3.18*  3.76*  3.85*   HEMOGLOBIN  11.9*  10.1*  12.1  12.3   HEMATOCRIT  35.3*  30.4*  35.9*  37.3   PLATELETCT  181  152*  205  205   PROTHROMBTM  14.0   --    --    --    APTT  31.7   --    --    --    INR  1.11   --    --    --        Infectious Disease:  Temp  Av.7 °C (98 °F)  Min: 36.4 °C (97.5 °F)  Max: 37.1 °C (98.7 °F)  Recent Labs      18   WBC  11.5*  11.9*  12.2*   NEUTSPOLYS  64.80  79.40*  73.70*   LYMPHOCYTES  19.50*  11.90*  16.40*   MONOCYTES  5.60  6.20  7.50   EOSINOPHILS  0.00  1.00  0.70   BASOPHILS  0.00  0.40  0.60   ASTSGOT  30    28   ALTSGPT  23  29  30   ALKPHOSPHAT  106*  169*  239*   TBILIRUBIN  0.7  0.5  0.6       Meds:  • insulin glargine  15 Units     • lactated ringers with KCL infusion   50 mL/hr at 18   • ampicillin-sulbactam (UNASYN) IV  3 g Stopped (18 1240)   • insulin regular  3-14 Units      And   • glucose 4 g  16 g      And   • dextrose 50%  25 mL     • senna-docusate  2 Tab      And   • polyethylene glycol/lytes  1 Packet      And   • magnesium hydroxide  30 mL      And   • bisacodyl  10 mg     • acetaminophen  650 mg     • Pharmacy Consult Request  1 Each     • ondansetron  4 mg     • dexamethasone  4 mg     • diphenhydrAMINE  25 mg     • haloperidol  lactate  1 mg     • scopolamine  1 Patch     • senna-docusate  1 Tab     • bisacodyl  10 mg     • fleet  1 Each     • acetaminophen  650 mg     • oxyCODONE immediate-release  5 mg     • oxyCODONE immediate release  10 mg     • HYDROmorphone  0.5 mg            Problem and Plan:    Sepsis due to necrotizing fasciitis of R thigh  Increased erythema and swelling of right medial thigh; ortho surgery following, no plan for I&D today. Plan for I&D on 6/15   Hemodynamically stable.   Wound cx +group B Strep now on ampicillin-sulbactam    Wound vac placed 6/11  S/P surgical debridement on 6/11 and 6/15 unless clinically decompensates   Control BGs with Lantus and SSI   Infectious Disease consulted   Blood cultures NGTD       Non anion gap metabolic acidosis  Likely secondary to NS fluid resuscitation and hyperchloremic acidosis       New onset type 2 diabetes mellitus (HCC)  A1c 12.8% on admission   C peptide  SSI, Lantus 15 u (hold tomorrow in setting of surgery)   Diabetes educator consult       Anemia/Thrombocytopenia  Resolved  In the setting of sepsis      Hypocalcemia  Resolved   Secondary to hypoalbuminemia in the setting of sepsis     Hypokalemia  Resolved  Secondary to Hypomagnesemia   Mg >2, K >4      Quality Measures:  Lines: PIV, Wound Vac      Gaston Catheter: None     DVT Prophylaxis: Lovenox     Ulcer Prophylaxis: None     Antibiotics: Unasyn      CODE STATUS: Full   DISPO: ICU pending further I&D

## 2018-06-14 NOTE — CARE PLAN
Problem: Communication  Goal: The ability to communicate needs accurately and effectively will improve  Outcome: PROGRESSING AS EXPECTED  Pt able to communicate effectively and make needs known. Pt able to verbalize understanding of instructions, medications, and education.    Problem: Bowel/Gastric:  Goal: Normal bowel function is maintained or improved  Outcome: PROGRESSING AS EXPECTED  Pt having regular BM. Refuses bowel protocol at this time.

## 2018-06-14 NOTE — THERAPY
"Occupational Therapy Treatment completed with focus on ADLs, ADL transfers and patient education.  Functional Status:  Mod A supine>sit; min A sit>supine; CGA for sit>stand and toilet transfer; SPV toileting; SPV standing grooming  Plan of Care: Will benefit from Occupational Therapy 3 times per week  Discharge Recommendations:  Equipment Will Continue to Assess for Equipment Needs. Post-acute therapy Discharge to home with outpatient or home health for additional therapy services.    See \"Rehab Therapy-Acute\" Patient Summary Report for complete documentation.     Pt participated in OT tx session. Pt reported increased swelling at RLE however cooperative and motivated for OOB mobilization. Pt progressing with functional mobility and standing activity tolerance however continues to be limited due to pain. Pt with possible I&D tomorrow. Will continue to follow for acute OT services. Pt will likely to d/c home with assist once medically cleared.   "

## 2018-06-14 NOTE — PROGRESS NOTES
Pulmonary Critical Care Progress Note        Date of Admission:  6/11/2018    Chief Complaint: Chills, nausea, vomiting    History of Present Illness: 58 y.o. female for  presented to an outside hospital earlier today with 4 days of right thigh pain.  She does not recall any injury.  She thinks that she may have suffered a spider bite.  She has had associated fevers and chills as well as nausea and vomiting.  She received vancomycin and Zosyn at the outside facility.  She denies chest pain, shortness of breath, cough or sputum production.  She denies abdominal pain.  She has been seen by Dr. Omega Martinez and he has taken her to the operating theater and performed irrigation and debridement of right thigh necrotizing fasciitis.  Additionally she has been found to have hyperglycemia.  She denies any history of diabetes.    ROS:  Respiratory: negative, Cardiac: negative, GI: negative.  All other systems negative.    Interval Events:  24 hour interval history reviewed     - more pain, erythema wound today              - toradol/oxy              - SR/PAC              - 2+ edema              - afebrile              - NPO for possible OR today              - LR K 50/hr              - 2 lpm               - ID following              - Unasyn day 2 (abx day 4), GBS strep + cx              - lytes OK              - Lantus 15, SSI, FSBS in range; keep in ICU, ? OR today  Yesterday   - wound vac with bloody o/p   - afebrile   - 2 lpm oxygen   - up and ambulatin   - ID following - zosyn, stopped clinda/vanco   - cx's neg so far   - lantus increased today   - change IVF to LR     PFSH:  No change.    Respiratory:     Pulse Oximetry: 94 %  Chest Tube Drains:          Exam: unlabored respirations, no intercostal retractions or accessory muscle use and diminished breath sounds mild  ImagingAvailable data reviewed         Invalid input(s): [id number]    HemoDynamics:  Pulse: 82, Heart Rate (Monitored): 81  NIBP: 119/65        Exam: regular rate and rhythm  Imaging: Available data reviewed  Recent Labs      06/11/18   2156  06/12/18   0848   CPKTOTAL  27  21       Neuro:  GCS         Exam: no focal deficits noted  Imaging: Available data reviewed    Fluids:  Intake/Output       06/12/18 0700 - 06/13/18 0659 06/13/18 0700 - 06/14/18 0659 06/14/18 0700 - 06/15/18 0659      6165-2060 0228-5439 Total 2992-0776 5451-2468 Total 4735-7943 3281-2040 Total       Intake    P.O.  460  240 700  500  180 680  --  -- --    P.O. 460 240 700 500 180 680 -- -- --    I.V.  1000  1598.3 2598.3  925  800 1725  120  -- 120    Magnesium Sulfate Volume -- 98.3 98.3 -- -- -- -- -- --    IV Piggyback Volume (IV Piggyback) -- -- -- -- 200 200 -- -- --    IV Volume (NS) 1000 -- 1000 -- -- -- 20 -- 20    IV Volume (NS+20meqk) -- 1500 1500 -- -- -- -- -- --    IV Volume (LR with 20K) -- -- --  100 -- 100    Total Intake 1460 1838.3 3298.3 5739 731 4457 120 -- 120       Output    Urine  300  500 800  200  250 450  --  -- --    Number of Times Voided -- 1 x 1 x 1 x 1 x 2 x -- -- --    Urine Void (mL) (non-catheter) 300 500 800 200 250 450 -- -- --    Drains  50  50 100  --  50 50  --  -- --    Output (ml) (Surgical Drain Group Right;Groin Other (Comments)) 50 50 100 -- 50 50 -- -- --    Stool  --  -- --  --  -- --  --  -- --    Number of Times Stooled -- -- -- 1 x 1 x 2 x -- -- --    Total Output 350 550 900 200 300 500 -- -- --       Net I/O     1110 1288.3 2398.3 4845 485 4227 120 -- 120           Recent Labs      06/12/18 0450  06/13/18   0555  06/14/18   0439   SODIUM  132*  140  142   POTASSIUM  3.8  4.2  4.1   CHLORIDE  107  115*  115*   CO2  18*  17*  12*   BUN  11  11  9   CREATININE  0.43*  0.50  0.38*   MAGNESIUM  1.4*  1.8  2.0   CALCIUM  6.9*  8.1*  7.7*       GI/Nutrition:  Exam: abdomen is soft and non-tender  Imaging: Available data reviewed  NPO  Liver Function  Recent Labs      06/12/18 0450  06/13/18   0555  06/14/18   0439    ALTSGPT  23  29  30   ASTSGOT  30  18  28   ALKPHOSPHAT  106*  169*  239*   TBILIRUBIN  0.7  0.5  0.6   GLUCOSE  272*  192*  148*       Heme:  Recent Labs      18   2156  180  18   0555  18   0439   RBC  3.80*  3.18*  3.76*  3.85*   HEMOGLOBIN  11.9*  10.1*  12.1  12.3   HEMATOCRIT  35.3*  30.4*  35.9*  37.3   PLATELETCT  181  152*  205  205   PROTHROMBTM  14.0   --    --    --    APTT  31.7   --    --    --    INR  1.11   --    --    --        Infectious Disease:  Temp  Av.7 °C (98 °F)  Min: 36.4 °C (97.5 °F)  Max: 37.1 °C (98.7 °F)  Micro: reviewed  Recent Labs      18   0555  18   043   WBC  11.5*  11.9*  12.2*   NEUTSPOLYS  64.80  79.40*  73.70*   LYMPHOCYTES  19.50*  11.90*  16.40*   MONOCYTES  5.60  6.20  7.50   EOSINOPHILS  0.00  1.00  0.70   BASOPHILS  0.00  0.40  0.60   ASTSGOT  30  18  28   ALTSGPT  23  29  30   ALKPHOSPHAT  106*  169*  239*   TBILIRUBIN  0.7  0.5  0.6     Current Facility-Administered Medications   Medication Dose Frequency Provider Last Rate Last Dose   • insulin glargine (LANTUS) injection 15 Units  15 Units QAM INSULIN Ellen Fleming M.D.   Stopped at 18 1110   • potassium chloride 20 mEq in LR 1,000 mL infusion   Continuous Ellen Fleming M.D. 50 mL/hr at 18 0555     • ampicillin/sulbactam (UNASYN) 3 g in  mL IVPB  3 g Q6HRS Brianna Mario M.D.   Stopped at 18 0625   • insulin regular (HUMULIN R) injection 3-14 Units  3-14 Units 4X/DAY SCOTTY Frazier M.D.   Stopped at 18 0700    And   • glucose 4 g chewable tablet 16 g  16 g Q15 MIN PRN José Antonio Frazier M.D.        And   • dextrose 50% (D50W) injection 25 mL  25 mL Q15 MIN PRN José Antonio Frazier M.D.       • senna-docusate (PERICOLACE or SENOKOT S) 8.6-50 MG per tablet 2 Tab  2 Tab BID Jorge Sales M.D.   2 Tab at 18 0814    And   • polyethylene glycol/lytes (MIRALAX) PACKET 1 Packet  1 Packet QDAY PRN Jorge Sales,  M.D.        And   • magnesium hydroxide (MILK OF MAGNESIA) suspension 30 mL  30 mL QDAY PRN Jorge Sales M.D.        And   • bisacodyl (DULCOLAX) suppository 10 mg  10 mg QDAY PRN Jorge Sales M.D.       • acetaminophen (TYLENOL) tablet 650 mg  650 mg Q6HRS PRN Jorge Sales M.D.       • Pharmacy Consult Request ...Pain Management Review 1 Each  1 Each PRN Omega Martinez M.D.       • ondansetron (ZOFRAN) syringe/vial injection 4 mg  4 mg Q4HRS PRN Omega Martinez M.D.   4 mg at 06/13/18 2227   • dexamethasone (DECADRON) injection 4 mg  4 mg Once PRN Omega Martinez M.D.       • diphenhydrAMINE (BENADRYL) injection 25 mg  25 mg Q6HRS PRN Omega Martinez M.D.       • haloperidol lactate (HALDOL) injection 1 mg  1 mg Q6HRS PRN Omega Martinez M.D.       • scopolamine (TRANSDERM-SCOP) patch 1 Patch  1 Patch Q72HRS PRN Omega Martinez M.D.       • senna-docusate (PERICOLACE or SENOKOT S) 8.6-50 MG per tablet 1 Tab  1 Tab Q24HRS PRN Omega Martinez M.D.       • bisacodyl (DULCOLAX) suppository 10 mg  10 mg Q24HRS PRN Omega Martinez M.D.       • fleet enema 133 mL  1 Each Once PRN Omega Martinez M.D.       • acetaminophen (TYLENOL) tablet 650 mg  650 mg Q6HRS Omega Martinez M.D.   650 mg at 06/14/18 0555   • ketorolac (TORADOL) injection 30 mg  30 mg Q6HRS Omega Martinez M.D.   30 mg at 06/14/18 0555   • oxyCODONE immediate release (ROXICODONE) tablet 5 mg  5 mg Q3HRS PRN Omega Martinez M.D.   5 mg at 06/14/18 0422   • oxyCODONE immediate release (ROXICODONE) tablet 10 mg  10 mg Q3HRS PRN Omega Martinez M.D.       • HYDROmorphone (DILAUDID) injection 0.5 mg  0.5 mg Q3HRS PRN Omega Martinez M.D.         Last reviewed on 6/11/2018  6:30 PM by Alysha Tijerina    Quality  Measures:  Radiology images reviewed, Medications reviewed and Labs reviewed                      Assessment and Plan:     Sepsis without associated acute organ failure               -Skin and soft tissue source              -Status post appropriate sepsis resuscitation   -Ongoing source control evaluation, orthopedic surgery follow     Right thigh necrotizing fasciitis              -Status post irrigation and debridement on 6/11              -Wound VAC in place              -Continue antibiotics per ID recommendations, group B strep identified from culture   -Ongoing wound care, n.p.o. for possible further debridement, orthopedics following     Hyperglycemia              -No prior history of diabetes mellitus              -Continue glucose control with insulin     Hypokalemia              -Repleted, continue to follow  Elevated alkaline phosphatase, abdomen benign, follow        I  Discussed patient condition and risk of morbidity and/or mortality with RN, RT and QA team.

## 2018-06-14 NOTE — DISCHARGE PLANNING
Medical SW    Sw attended AM IDT Rounds.    RN reports, pt possible surgery today, feels heaviness in leg and not able to get up to bathroom, A/O x4, BM last night, wound vac placed, 2 LPM O2, nasal canula     Plan: Sw to assist w/ d/c planning as needed.

## 2018-06-15 PROBLEM — R74.8 ELEVATED ALKALINE PHOSPHATASE LEVEL: Status: ACTIVE | Noted: 2018-06-15

## 2018-06-15 PROBLEM — E87.20 METABOLIC ACIDOSIS: Status: RESOLVED | Noted: 2018-06-12 | Resolved: 2018-06-15

## 2018-06-15 LAB
ALBUMIN SERPL BCP-MCNC: 2.4 G/DL (ref 3.2–4.9)
ALBUMIN/GLOB SERPL: 0.7 G/DL
ALP SERPL-CCNC: 271 U/L (ref 30–99)
ALT SERPL-CCNC: 26 U/L (ref 2–50)
ANION GAP SERPL CALC-SCNC: 10 MMOL/L (ref 0–11.9)
AST SERPL-CCNC: 12 U/L (ref 12–45)
BACTERIA SPEC ANAEROBE CULT: ABNORMAL
BACTERIA SPEC ANAEROBE CULT: ABNORMAL
BACTERIA UR CULT: ABNORMAL
BACTERIA UR CULT: ABNORMAL
BASOPHILS # BLD AUTO: 0 % (ref 0–1.8)
BASOPHILS # BLD: 0 K/UL (ref 0–0.12)
BILIRUB SERPL-MCNC: 0.4 MG/DL (ref 0.1–1.5)
BUN SERPL-MCNC: 8 MG/DL (ref 8–22)
CALCIUM SERPL-MCNC: 8.3 MG/DL (ref 8.5–10.5)
CHLORIDE SERPL-SCNC: 110 MMOL/L (ref 96–112)
CO2 SERPL-SCNC: 24 MMOL/L (ref 20–33)
CREAT SERPL-MCNC: 0.35 MG/DL (ref 0.5–1.4)
EOSINOPHIL # BLD AUTO: 0.08 K/UL (ref 0–0.51)
EOSINOPHIL NFR BLD: 0.9 % (ref 0–6.9)
ERYTHROCYTE [DISTWIDTH] IN BLOOD BY AUTOMATED COUNT: 48.6 FL (ref 35.9–50)
GLOBULIN SER CALC-MCNC: 3.5 G/DL (ref 1.9–3.5)
GLUCOSE BLD-MCNC: 116 MG/DL (ref 65–99)
GLUCOSE BLD-MCNC: 132 MG/DL (ref 65–99)
GLUCOSE BLD-MCNC: 134 MG/DL (ref 65–99)
GLUCOSE BLD-MCNC: 152 MG/DL (ref 65–99)
GLUCOSE BLD-MCNC: 159 MG/DL (ref 65–99)
GLUCOSE SERPL-MCNC: 129 MG/DL (ref 65–99)
HCT VFR BLD AUTO: 34 % (ref 37–47)
HGB BLD-MCNC: 11.2 G/DL (ref 12–16)
LYMPHOCYTES # BLD AUTO: 2.53 K/UL (ref 1–4.8)
LYMPHOCYTES NFR BLD: 27.2 % (ref 22–41)
MAGNESIUM SERPL-MCNC: 1.7 MG/DL (ref 1.5–2.5)
MANUAL DIFF BLD: NORMAL
MCH RBC QN AUTO: 31.5 PG (ref 27–33)
MCHC RBC AUTO-ENTMCNC: 32.9 G/DL (ref 33.6–35)
MCV RBC AUTO: 95.5 FL (ref 81.4–97.8)
METAMYELOCYTES NFR BLD MANUAL: 0.9 %
MONOCYTES # BLD AUTO: 0.57 K/UL (ref 0–0.85)
MONOCYTES NFR BLD AUTO: 6.1 % (ref 0–13.4)
MORPHOLOGY BLD-IMP: NORMAL
MYELOCYTES NFR BLD MANUAL: 1.7 %
NEUTROPHILS # BLD AUTO: 5.88 K/UL (ref 2–7.15)
NEUTROPHILS NFR BLD: 62.3 % (ref 44–72)
NEUTS BAND NFR BLD MANUAL: 0.9 % (ref 0–10)
NRBC # BLD AUTO: 0 K/UL
NRBC BLD-RTO: 0 /100 WBC
PLATELET # BLD AUTO: 250 K/UL (ref 164–446)
PLATELET BLD QL SMEAR: NORMAL
PMV BLD AUTO: 9.7 FL (ref 9–12.9)
POTASSIUM SERPL-SCNC: 3.4 MMOL/L (ref 3.6–5.5)
PROT SERPL-MCNC: 5.9 G/DL (ref 6–8.2)
RBC # BLD AUTO: 3.56 M/UL (ref 4.2–5.4)
RBC BLD AUTO: NORMAL
SIGNIFICANT IND 70042: ABNORMAL
SIGNIFICANT IND 70042: ABNORMAL
SITE SITE: ABNORMAL
SITE SITE: ABNORMAL
SODIUM SERPL-SCNC: 144 MMOL/L (ref 135–145)
SOURCE SOURCE: ABNORMAL
SOURCE SOURCE: ABNORMAL
WBC # BLD AUTO: 9.3 K/UL (ref 4.8–10.8)

## 2018-06-15 PROCEDURE — 700102 HCHG RX REV CODE 250 W/ 637 OVERRIDE(OP): Performed by: ORTHOPAEDIC SURGERY

## 2018-06-15 PROCEDURE — 700111 HCHG RX REV CODE 636 W/ 250 OVERRIDE (IP)

## 2018-06-15 PROCEDURE — 700111 HCHG RX REV CODE 636 W/ 250 OVERRIDE (IP): Performed by: STUDENT IN AN ORGANIZED HEALTH CARE EDUCATION/TRAINING PROGRAM

## 2018-06-15 PROCEDURE — 700105 HCHG RX REV CODE 258

## 2018-06-15 PROCEDURE — 700102 HCHG RX REV CODE 250 W/ 637 OVERRIDE(OP)

## 2018-06-15 PROCEDURE — 82962 GLUCOSE BLOOD TEST: CPT | Mod: 91

## 2018-06-15 PROCEDURE — 160002 HCHG RECOVERY MINUTES (STAT): Performed by: ORTHOPAEDIC SURGERY

## 2018-06-15 PROCEDURE — 770020 HCHG ROOM/CARE - TELE (206)

## 2018-06-15 PROCEDURE — 500891 HCHG PACK, ORTHO MAJOR: Performed by: ORTHOPAEDIC SURGERY

## 2018-06-15 PROCEDURE — 160048 HCHG OR STATISTICAL LEVEL 1-5: Performed by: ORTHOPAEDIC SURGERY

## 2018-06-15 PROCEDURE — 700111 HCHG RX REV CODE 636 W/ 250 OVERRIDE (IP): Performed by: INTERNAL MEDICINE

## 2018-06-15 PROCEDURE — 700101 HCHG RX REV CODE 250

## 2018-06-15 PROCEDURE — 700105 HCHG RX REV CODE 258: Performed by: INTERNAL MEDICINE

## 2018-06-15 PROCEDURE — 83735 ASSAY OF MAGNESIUM: CPT

## 2018-06-15 PROCEDURE — 99233 SBSQ HOSP IP/OBS HIGH 50: CPT | Performed by: INTERNAL MEDICINE

## 2018-06-15 PROCEDURE — 80053 COMPREHEN METABOLIC PANEL: CPT

## 2018-06-15 PROCEDURE — 160036 HCHG PACU - EA ADDL 30 MINS PHASE I: Performed by: ORTHOPAEDIC SURGERY

## 2018-06-15 PROCEDURE — 700105 HCHG RX REV CODE 258: Performed by: STUDENT IN AN ORGANIZED HEALTH CARE EDUCATION/TRAINING PROGRAM

## 2018-06-15 PROCEDURE — A6550 NEG PRES WOUND THER DRSG SET: HCPCS | Performed by: ORTHOPAEDIC SURGERY

## 2018-06-15 PROCEDURE — 85007 BL SMEAR W/DIFF WBC COUNT: CPT

## 2018-06-15 PROCEDURE — 160035 HCHG PACU - 1ST 60 MINS PHASE I: Performed by: ORTHOPAEDIC SURGERY

## 2018-06-15 PROCEDURE — A9270 NON-COVERED ITEM OR SERVICE: HCPCS | Performed by: ORTHOPAEDIC SURGERY

## 2018-06-15 PROCEDURE — 306263 VAC CANNISTER W/GEL 500ML: Performed by: ORTHOPAEDIC SURGERY

## 2018-06-15 PROCEDURE — 85027 COMPLETE CBC AUTOMATED: CPT

## 2018-06-15 PROCEDURE — 700111 HCHG RX REV CODE 636 W/ 250 OVERRIDE (IP): Performed by: ORTHOPAEDIC SURGERY

## 2018-06-15 PROCEDURE — 99232 SBSQ HOSP IP/OBS MODERATE 35: CPT | Performed by: INTERNAL MEDICINE

## 2018-06-15 PROCEDURE — 160009 HCHG ANES TIME/MIN: Performed by: ORTHOPAEDIC SURGERY

## 2018-06-15 PROCEDURE — 0KBQ0ZZ EXCISION OF RIGHT UPPER LEG MUSCLE, OPEN APPROACH: ICD-10-PCS | Performed by: ORTHOPAEDIC SURGERY

## 2018-06-15 PROCEDURE — 160027 HCHG SURGERY MINUTES - 1ST 30 MINS LEVEL 2: Performed by: ORTHOPAEDIC SURGERY

## 2018-06-15 PROCEDURE — 501445 HCHG STAPLER, SKIN DISP: Performed by: ORTHOPAEDIC SURGERY

## 2018-06-15 PROCEDURE — A9270 NON-COVERED ITEM OR SERVICE: HCPCS

## 2018-06-15 RX ORDER — INSULIN GLARGINE 100 [IU]/ML
10 INJECTION, SOLUTION SUBCUTANEOUS
Status: DISCONTINUED | OUTPATIENT
Start: 2018-06-16 | End: 2018-06-17

## 2018-06-15 RX ORDER — POTASSIUM CHLORIDE 7.45 MG/ML
10 INJECTION INTRAVENOUS
Status: COMPLETED | OUTPATIENT
Start: 2018-06-15 | End: 2018-06-15

## 2018-06-15 RX ORDER — OXYCODONE HCL 5 MG/5 ML
SOLUTION, ORAL ORAL
Status: COMPLETED
Start: 2018-06-15 | End: 2018-06-15

## 2018-06-15 RX ORDER — SODIUM CHLORIDE 9 MG/ML
INJECTION, SOLUTION INTRAVENOUS
Status: COMPLETED
Start: 2018-06-15 | End: 2018-06-15

## 2018-06-15 RX ADMIN — HYDROMORPHONE HYDROCHLORIDE 0.25 MG: 10 INJECTION, SOLUTION INTRAMUSCULAR; INTRAVENOUS; SUBCUTANEOUS at 11:40

## 2018-06-15 RX ADMIN — OXYCODONE HYDROCHLORIDE 10 MG: 5 SOLUTION ORAL at 11:15

## 2018-06-15 RX ADMIN — POTASSIUM CHLORIDE 10 MEQ: 10 INJECTION, SOLUTION INTRAVENOUS at 13:42

## 2018-06-15 RX ADMIN — ALBUTEROL SULFATE 2.5 MG: 2.5 SOLUTION RESPIRATORY (INHALATION) at 11:05

## 2018-06-15 RX ADMIN — HYDROMORPHONE HYDROCHLORIDE 0.25 MG: 10 INJECTION, SOLUTION INTRAMUSCULAR; INTRAVENOUS; SUBCUTANEOUS at 11:20

## 2018-06-15 RX ADMIN — AMPICILLIN SODIUM AND SULBACTAM SODIUM 3 G: 2; 1 INJECTION, POWDER, FOR SOLUTION INTRAMUSCULAR; INTRAVENOUS at 12:42

## 2018-06-15 RX ADMIN — POTASSIUM CHLORIDE: 2 INJECTION, SOLUTION, CONCENTRATE INTRAVENOUS at 00:11

## 2018-06-15 RX ADMIN — AMPICILLIN SODIUM AND SULBACTAM SODIUM 3 G: 2; 1 INJECTION, POWDER, FOR SOLUTION INTRAMUSCULAR; INTRAVENOUS at 17:31

## 2018-06-15 RX ADMIN — OXYCODONE HYDROCHLORIDE 10 MG: 10 TABLET ORAL at 15:04

## 2018-06-15 RX ADMIN — POTASSIUM CHLORIDE: 2 INJECTION, SOLUTION, CONCENTRATE INTRAVENOUS at 15:00

## 2018-06-15 RX ADMIN — AMPICILLIN SODIUM AND SULBACTAM SODIUM 3 G: 2; 1 INJECTION, POWDER, FOR SOLUTION INTRAMUSCULAR; INTRAVENOUS at 05:16

## 2018-06-15 RX ADMIN — AMPICILLIN SODIUM AND SULBACTAM SODIUM 3 G: 2; 1 INJECTION, POWDER, FOR SOLUTION INTRAMUSCULAR; INTRAVENOUS at 00:11

## 2018-06-15 RX ADMIN — HYDROMORPHONE HYDROCHLORIDE 0.5 MG: 10 INJECTION, SOLUTION INTRAMUSCULAR; INTRAVENOUS; SUBCUTANEOUS at 16:17

## 2018-06-15 RX ADMIN — MAGNESIUM SULFATE HEPTAHYDRATE 2 G: 500 INJECTION, SOLUTION INTRAMUSCULAR; INTRAVENOUS at 12:42

## 2018-06-15 RX ADMIN — POTASSIUM CHLORIDE 10 MEQ: 10 INJECTION, SOLUTION INTRAVENOUS at 09:29

## 2018-06-15 RX ADMIN — SODIUM CHLORIDE 500 ML: 9 INJECTION, SOLUTION INTRAVENOUS at 12:43

## 2018-06-15 RX ADMIN — OXYCODONE HYDROCHLORIDE 10 MG: 10 TABLET ORAL at 05:16

## 2018-06-15 RX ADMIN — FENTANYL CITRATE 50 MCG: 50 INJECTION, SOLUTION INTRAMUSCULAR; INTRAVENOUS at 11:06

## 2018-06-15 RX ADMIN — ACETAMINOPHEN 650 MG: 325 TABLET, FILM COATED ORAL at 17:32

## 2018-06-15 RX ADMIN — HYDROMORPHONE HYDROCHLORIDE 0.5 MG: 10 INJECTION, SOLUTION INTRAMUSCULAR; INTRAVENOUS; SUBCUTANEOUS at 00:11

## 2018-06-15 RX ADMIN — OXYCODONE HYDROCHLORIDE 10 MG: 10 TABLET ORAL at 21:25

## 2018-06-15 RX ADMIN — ACETAMINOPHEN 650 MG: 325 TABLET, FILM COATED ORAL at 12:41

## 2018-06-15 RX ADMIN — ACETAMINOPHEN 650 MG: 325 TABLET, FILM COATED ORAL at 00:11

## 2018-06-15 RX ADMIN — ACETAMINOPHEN 650 MG: 325 TABLET, FILM COATED ORAL at 05:15

## 2018-06-15 RX ADMIN — OXYCODONE HYDROCHLORIDE 10 MG: 10 TABLET ORAL at 00:11

## 2018-06-15 RX ADMIN — FENTANYL CITRATE 50 MCG: 50 INJECTION, SOLUTION INTRAMUSCULAR; INTRAVENOUS at 11:25

## 2018-06-15 ASSESSMENT — ENCOUNTER SYMPTOMS
PALPITATIONS: 0
SPUTUM PRODUCTION: 0
COUGH: 0
BLURRED VISION: 0
HEADACHES: 0
SORE THROAT: 0
VOMITING: 0
ABDOMINAL PAIN: 0
PSYCHIATRIC NEGATIVE: 1
MUSCULOSKELETAL NEGATIVE: 1
CHILLS: 0
MYALGIAS: 0
FEVER: 0
SHORTNESS OF BREATH: 0
MYALGIAS: 1
SENSORY CHANGE: 0
DIZZINESS: 0
DOUBLE VISION: 0
SPEECH CHANGE: 0
NAUSEA: 0

## 2018-06-15 ASSESSMENT — PAIN SCALES - GENERAL
PAINLEVEL_OUTOF10: 3
PAINLEVEL_OUTOF10: 7
PAINLEVEL_OUTOF10: 4
PAINLEVEL_OUTOF10: 5
PAINLEVEL_OUTOF10: 0
PAINLEVEL_OUTOF10: 8
PAINLEVEL_OUTOF10: 3
PAINLEVEL_OUTOF10: 0
PAINLEVEL_OUTOF10: 6
PAINLEVEL_OUTOF10: 5
PAINLEVEL_OUTOF10: 4
PAINLEVEL_OUTOF10: 5
PAINLEVEL_OUTOF10: 6
PAINLEVEL_OUTOF10: 8
PAINLEVEL_OUTOF10: 7
PAINLEVEL_OUTOF10: 6
PAINLEVEL_OUTOF10: 6
PAINLEVEL_OUTOF10: 7
PAINLEVEL_OUTOF10: 8

## 2018-06-15 ASSESSMENT — PAIN SCALES - WONG BAKER
WONGBAKER_NUMERICALRESPONSE: HURTS A LITTLE MORE
WONGBAKER_NUMERICALRESPONSE: HURTS EVEN MORE

## 2018-06-15 NOTE — PROGRESS NOTES
[facility] ICU Progress Note      Admit Date: 6/11/2018  ICU Day: 4      Resident(s): Manolo Medina   Attending: KAYLA LAM/ Dr. Frazier    Date & Time:   6/15/2018   10:00 AM       Patient ID:    Name:             Emmnauelle Martin   YOB: 1960  Age:                 58 y.o.  female   MRN:               [id number]    HPI:  Emmanuelle Martin is a 58 year old female who presents with sepsis with multi organ failure secondary to necrotizing fasciitis of right thigh in the setting of new onset insulin dependent uncontrolled type II diabetes. She had surgical debridement on 6/10 and a wound vac was placed with plans for I&D on 6/15. Itraoperative cultures grew Group B Streptococcus. She is currently on ampicillin-sulbactam.     Consultants:  General Surgery   PMA: Dr. Frazier     Procedures:  Surgical debridement 6/11, 6/15    Imaging:  DX-CHEST-LIMITED (1 VIEW)   Final Result      Evaluation limited due to overlying soft tissues. Evaluation of the left lung base is particularly limited. Left basilar airspace opacities are not excluded. Further evaluation can be performed as PA and lateral plain films of the chest.         CT-FOREIGN FILM CAT SCAN   Final Result      OUTSIDE IMAGES-DX CHEST   Final Result      OUTSIDE IMAGES-CT ABDOMEN /PELVIS   Final Result      OUTSIDE IMAGES-DX CHEST   Final Result          Interval Events:  - Repeat I&D today   - DVT ppx held this morning in anticipation of emergent I&D  - 2 lpm nc likely due to pulmonary edema from aggressive fluid resuscitation in setting of necrotizing fasciitis; will dc fluids after S/P surgery when diet is resumed  - Wound vac residuals 50-100cc  - Elevated alkaline phosphatase, consider RUQ US/GGT    Review of Systems   Constitutional: Negative for chills and fever.   HENT: Negative for congestion and sore throat.    Eyes: Negative for blurred vision and double vision.   Respiratory: Negative for sputum production and shortness of breath.     Cardiovascular: Negative for chest pain and palpitations.   Gastrointestinal: Negative for abdominal pain, nausea and vomiting.   Genitourinary: Negative for dysuria and urgency.   Musculoskeletal: Negative.  Negative for joint pain and myalgias.        Improved pain over right groin    Skin: Negative.    Neurological: Negative for dizziness and headaches.   Endo/Heme/Allergies: Negative.    Psychiatric/Behavioral: Negative.        PHYSICAL EXAM  Gen: pleasant and conversant, in NAD.    HEENT: NC/AT, moist mucous membranes.  Neck: No tracheal deviation. No stridor.   Cardiac: RRR without m/g/r. S1S2, no JVD.  Respiratory: Normal effort, no tachypnea. CTAB but decreased breath sounds over bilateral lateral lobes.   Abdomen: BS+, soft, NT/ND, no rebound/guarding.   Ext: No edema, 2+ DP pulses b/l.  Skin: Warm, dry. erythema improving over right medial thigh but has ulcer with mild fluctuance. Wound vac to right groin. Limited ROM of right thigh due to pain   Neuro: AAOx4, CN II-XII grossly normal, no focal sensory or motor deficits.   Psych: Affect, mood, judgement normal.    Respiratory:   Respiration: 17, Pulse Oximetry: 96 %    Chest Tube Drains:  None    HemoDynamics:  Pulse: 79, Heart Rate (Monitored): 79 Blood Pressure: 116/66, NIBP: 109/61      Neuro:  None    Fluids:  LR maintenance      Intake/Output Summary (Last 24 hours) at 06/12/18 0701  Last data filed at 06/12/18 0600   Gross per 24 hour   Intake          7256.67 ml   Output               60 ml   Net          7196.67 ml       Weight: 82.7 kg (182 lb 5.1 oz)  Body mass index is 36.8 kg/m².    Recent Labs      06/13/18   0555  06/14/18   0439  06/15/18   0544   SODIUM  140  142  144   POTASSIUM  4.2  4.1  3.4*   CHLORIDE  115*  115*  110   CO2  17*  12*  24   BUN  11  9  8   CREATININE  0.50  0.38*  0.35*   MAGNESIUM  1.8  2.0  1.7   CALCIUM  8.1*  7.7*  8.3*       GI/Nutrition:  Recent Labs      06/13/18   0555  06/14/18   0439  06/15/18   0544    ALTSGPT  29  30  26   ASTSGOT    12   ALKPHOSPHAT  169*  239*  271*   TBILIRUBIN  0.5  0.6  0.4   GLUCOSE  192*  148*  129*       Heme:  Recent Labs      18   0555  06/14/18   0439  06/15/18   0544   RBC  3.76*  3.85*  3.56*   HEMOGLOBIN  12.1  12.3  11.2*   HEMATOCRIT  35.9*  37.3  34.0*   PLATELETCT  205  205  250       Infectious Disease:  Temp  Av.6 °C (97.9 °F)  Min: 36.4 °C (97.5 °F)  Max: 36.9 °C (98.5 °F)  Recent Labs      18   0555  06/14/18   0439  06/15/18   05   WBC  11.9*  12.2*  9.3   NEUTSPOLYS  79.40*  73.70*  62.30   LYMPHOCYTES  11.90*  16.40*  27.20   MONOCYTES  6.20  7.50  6.10   EOSINOPHILS  1.00  0.70  0.90   BASOPHILS  0.40  0.60  0.00   ASTSGOT  18    12   ALTSGPT  29  30  26   ALKPHOSPHAT  169*  239*  271*   TBILIRUBIN  0.5  0.6  0.4       Meds:  • PEDS potassium chloride (KCL-PERIPHERAL) IV  10 mEq 10 mEq (06/15/18 0929)   • magnesium sulfate  2 g     • insulin glargine  15 Units     • lactated ringers with KCL infusion   50 mL/hr at 06/15/18 0011   • ampicillin-sulbactam (UNASYN) IV  3 g Stopped (06/15/18 0546)   • insulin regular  3-14 Units      And   • glucose 4 g  16 g      And   • dextrose 50%  25 mL     • senna-docusate  2 Tab      And   • polyethylene glycol/lytes  1 Packet      And   • magnesium hydroxide  30 mL      And   • bisacodyl  10 mg     • acetaminophen  650 mg     • Pharmacy Consult Request  1 Each     • ondansetron  4 mg     • dexamethasone  4 mg     • diphenhydrAMINE  25 mg     • haloperidol lactate  1 mg     • scopolamine  1 Patch     • senna-docusate  1 Tab     • bisacodyl  10 mg     • fleet  1 Each     • acetaminophen  650 mg     • oxyCODONE immediate-release  5 mg     • oxyCODONE immediate release  10 mg     • HYDROmorphone  0.5 mg            Problem and Plan:    Sepsis due to necrotizing fasciitis of R thigh  S/P I&D on  and 6/15  Hemodynamically stable  Wound cx +group B Strep now on ampicillin-sulbactam    Wound vac placed  6/11  Control BGs with Lantus and SSI   Infectious Disease following   Blood cultures NGTD       Non anion gap metabolic acidosis  Resolved  Secondary to NS fluid resuscitation causing hyperchloremic acidosis and starvation ketosis    New onset type 2 diabetes mellitus (HCC)  A1c 12.8% on admission   SSI, Lantus 15 u   Diabetes educator consult       Anemia/Thrombocytopenia  Resolved  In the setting of sepsis      Hypocalcemia  Resolved   Secondary to hypoalbuminemia in the setting of sepsis     Hypokalemia  Resolved  Secondary to Hypomagnesemia   Mg >2, K >4    Elevated alkaline phosphatase level  RUQ ultrasound S/P I&D      Quality Measures:  Lines: PIV, Wound Vac      Gaston Catheter: None     DVT Prophylaxis: SCDs    Ulcer Prophylaxis: None     Antibiotics: Unasyn      CODE STATUS: Full   DISPO: ICU pending further I&D

## 2018-06-15 NOTE — CARE PLAN
Problem: Safety  Goal: Will remain free from falls  Outcome: PROGRESSING AS EXPECTED  Bed alarm set, pt call bell and belongings in reach, pt demonstrates how to call for help.     Problem: Skin Integrity  Goal: Risk for impaired skin integrity will decrease  Outcome: PROGRESSING AS EXPECTED  Waffle mattress inflated, turn and reposition q2, barrier cream applied.

## 2018-06-15 NOTE — DISCHARGE PLANNING
Medical SW    Sw attended AM IDT Rounds.    RN reports, pt NPO for planned surgery, ID following,      Plan: Sw to assist w/ d/c planning as needed.

## 2018-06-15 NOTE — CARE PLAN
Problem: Communication  Goal: The ability to communicate needs accurately and effectively will improve  Outcome: PROGRESSING AS EXPECTED  Pt able to communicate effectively and make needs known. Pt able to verbalize understanding of instructions, education, and medications.    Problem: Venous Thromboembolism (VTW)/Deep Vein Thrombosis (DVT) Prevention:  Goal: Patient will participate in Venous Thrombosis (VTE)/Deep Vein Thrombosis (DVT)Prevention Measures  Outcome: PROGRESSING AS EXPECTED      Problem: Bowel/Gastric:  Goal: Normal bowel function is maintained or improved  Outcome: PROGRESSING AS EXPECTED  Pt had BM 6/13 and refuses bowel protocol at this time. Pt states normal BM for her.

## 2018-06-15 NOTE — OP REPORT
DATE OF SERVICE:  06/15/2018    PREOPERATIVE DIAGNOSIS:  Necrotizing fasciitis, right leg and thigh.    POSTOPERATIVE DIAGNOSIS:  Necrotizing fasciitis, right leg and thigh.    PROCEDURES:  1.  Irrigation and debridement of skin, subcutaneous tissue, and underlying   muscle.  2.  Wound VAC placement, right thigh.    SURGEON:  Omega Martinez MD    ASSISTANT:  Gabino Medrano PA-C    ESTIMATED BLOOD LOSS:  Minimal.    INDICATIONS:  This is a 58-year-old who has had previous irrigation and   debridement of a necrotizing thigh infection who has had persistent elevated   white count and new blistering and extension of her fasciitis despite IV   antibiotic therapy and wound VAC treatment.  Risks and benefits of repeat   irrigation and debridement were discussed, which include, but not limited to   bleeding, infection, neurovascular damage, pain, stiffness, need for further   surgery, DVT, PE, MI, stroke, and death.  She understands all these risks and   wishes to proceed.    DESCRIPTION OF PROCEDURE:  Patient was sedated with LMA anesthesia and right   hip and pelvis were prepped and draped in the usual sterile fashion.  Her   previous open wound was extended distally to approximately 10 cm and a large   amount of purulent fluid was expressed from her fascia.  This fascia was   debrided of all necrotic fat and irrigated in an excisional fashion with a   knife and rongeur.  The fascia was debrided that was infected and was debrided   as well down to muscle in an excisional fashion with a knife, rongeur and   curettes.  The wounds were then irrigated with copious amounts of pulsatile   lavage with the wound now being 10x20 cm long.  There was then a deep wound   VAC was placed.  Sterile dressings under sterile conditions.  Patient   tolerated the procedure well.    POSTOPERATIVE PLAN:  The patient to be admitted for IV antibiotic therapy per   the infectious disease service with repeat irrigation and debridement on    Monday.       ____________________________________     OG CHEW MD PLA / SEVERIANO    DD:  06/15/2018 10:56:36  DT:  06/15/2018 11:18:13    D#:  [id number]  Job#:  [id number]

## 2018-06-15 NOTE — PROGRESS NOTES
PA with Dr. Martinez at bedside to assess wound vac and drainage. He states output is not concerning and he added tegaderm to the wound vac dressing to prevent further blood from leaking. PT tolerated well. Will continue to monitor.

## 2018-06-15 NOTE — PROGRESS NOTES
Infectious Disease Progress Note    Author: Brianna Mario M.D. Date & Time of service: 6/15/2018  3:53 PM    Chief Complaint:  Right thigh infection    Interval History:  2018 MAXIMUM TEMPERATURE 98.7. WBCs 11.9 and platelets 205 and cr 0.5  2018 MAXIMUM TEMPERATURE 98.7 leg continues to hurt. WBC 12.2 platelets 205 and cr 0.38   6/15/2018 and MAXIMUM TEMPERATURE 99.1 WBC 9.3 had another surgery yesterday.  Labs Reviewed, Medications Reviewed, Radiology Reviewed and Wound Reviewed.    Review of Systems:  Review of Systems   Constitutional: Positive for malaise/fatigue. Negative for fever.   Respiratory: Negative for cough and shortness of breath.    Cardiovascular: Negative for chest pain and leg swelling.   Gastrointestinal: Negative for abdominal pain, nausea and vomiting.   Genitourinary: Negative for dysuria.   Musculoskeletal: Positive for myalgias.   Neurological: Negative for sensory change and speech change.       Hemodynamics:  Temp (24hrs), Av.8 °C (98.2 °F), Min:36.4 °C (97.5 °F), Max:37.3 °C (99.1 °F)  Temperature: 37.3 °C (99.1 °F)  Pulse  Av  Min: 65  Max: 106Heart Rate (Monitored): 90  Blood Pressure: 117/71, NIBP: 134/79       Physical Exam:  Physical Exam   Constitutional: She is oriented to person, place, and time. No distress.   Tired but non toxic   Eyes: No scleral icterus.   Neck: Neck supple.   Cardiovascular: Regular rhythm.    No murmur heard.  Pulmonary/Chest: She has no wheezes. She has no rales.   Abdominal: Soft. There is no tenderness. There is no rebound.   Musculoskeletal: She exhibits edema.   Right thigh and groin wound VAC . The wound is larger than it was prior to previous surgery. There is still slight induration and erythema around the vac.   Neurological: She is alert and oriented to person, place, and time.   Vitals reviewed.      Meds:    Current Facility-Administered Medications:   •  insulin glargine  •  lactated ringers with KCL infusion  •   ampicillin-sulbactam (UNASYN) IV  •  insulin regular **AND** Accu-Chek ACHS **AND** NOTIFY MD and PharmD **AND** glucose 4 g **AND** dextrose 50%  •  senna-docusate **AND** polyethylene glycol/lytes **AND** magnesium hydroxide **AND** bisacodyl  •  acetaminophen  •  Pharmacy Consult Request  •  ondansetron  •  dexamethasone  •  diphenhydrAMINE  •  haloperidol lactate  •  scopolamine  •  senna-docusate  •  bisacodyl  •  fleet  •  acetaminophen  •  oxyCODONE immediate-release  •  oxyCODONE immediate release  •  HYDROmorphone    Labs:  Recent Labs      06/13/18   0555  06/14/18   0439  06/15/18   0544   WBC  11.9*  12.2*  9.3   RBC  3.76*  3.85*  3.56*   HEMOGLOBIN  12.1  12.3  11.2*   HEMATOCRIT  35.9*  37.3  34.0*   MCV  95.5  96.9  95.5   MCH  32.2  31.9  31.5   RDW  45.7  48.1  48.6   PLATELETCT  205  205  250   MPV  10.7  10.2  9.7   NEUTSPOLYS  79.40*  73.70*  62.30   LYMPHOCYTES  11.90*  16.40*  27.20   MONOCYTES  6.20  7.50  6.10   EOSINOPHILS  1.00  0.70  0.90   BASOPHILS  0.40  0.60  0.00   RBCMORPHOLO   --    --   Normal     Recent Labs      06/13/18   0555  06/14/18   0439  06/15/18   0544   SODIUM  140  142  144   POTASSIUM  4.2  4.1  3.4*   CHLORIDE  115*  115*  110   CO2  17*  12*  24   GLUCOSE  192*  148*  129*   BUN  11  9  8     Recent Labs      06/13/18   0555  06/14/18   0439  06/15/18   0544   ALBUMIN  2.6*  2.6*  2.4*   TBILIRUBIN  0.5  0.6  0.4   ALKPHOSPHAT  169*  239*  271*   TOTPROTEIN  5.5*  5.9*  5.9*   ALTSGPT  29  30  26   ASTSGOT  18  28  12   CREATININE  0.50  0.38*  0.35*       Imaging:  No results found.    Micro:  Results     Procedure Component Value Units Date/Time    CULTURE WOUND W/ GRAM STAIN [[id number]]  (Abnormal) Collected:  06/11/18 1945    Order Status:  Completed Specimen:  Wound Updated:  06/15/18 1524     Significant Indicator POS (POS)     Source WND     Site Right Groin Abscess     Culture Result Wound Light growth mixed skin franko. (A)     Gram Stain Result Many  WBCs.  Many mixed bacteria, no predominant organism seen.       Culture Result Wound Streptococcus agalactiae (Group B)  Moderate growth   (A)    ANAEROBIC CULTURE [[id number]]  (Abnormal) Collected:  06/11/18 1945    Order Status:  Completed Specimen:  Wound Updated:  06/15/18 1524     Significant Indicator POS (POS)     Source WND     Site Right Groin Abscess     Anaerobic Culture, Culture Res Growth noted after further incubation, see below for  organism identification.   (A)      Prevotella (B.) bivia  Heavy growth   (A)    Urine Culture [[id number]]  (Abnormal) Collected:  06/12/18 0725    Order Status:  Completed Specimen:  Urine from Urine, Clean Catch Updated:  06/15/18 0938     Significant Indicator POS (POS)     Source UR     Site URINE, CLEAN CATCH     Urine Culture Mixed skin franko <10,000 cfu/mL (A)      Candida krusei  ,000 cfu/mL   (A)    Narrative:       Indication for culture:->Suspected Sepsis    Urinalysis [[id number]]  (Abnormal) Collected:  06/12/18 0725    Order Status:  Completed Specimen:  Urine from Urine, Clean Catch Updated:  06/12/18 1433     Micro Urine Req Microscopic     Color DK Yellow     Character Cloudy (A)     Ph 5.0     Glucose >=1000 (A) mg/dL      Ketones 15 (A) mg/dL      Protein 30 (A) mg/dL      Bilirubin Negative     Urobilinogen, Urine 1.0     Nitrite Negative     Leukocyte Esterase Negative     Occult Blood Moderate (A)    Narrative:       Collected By:[id number] MARY BETH TREJO  If not done within the last 24 hours    GRAM STAIN [[id number]] Collected:  06/11/18 1945    Order Status:  Completed Specimen:  Wound Updated:  06/12/18 0940     Significant Indicator .     Source WND     Site Right Groin Abscess     Gram Stain Result Many WBCs.  Many mixed bacteria, no predominant organism seen.      BLOOD CULTURE [[id number]] Collected:  06/11/18 2156    Order Status:  Completed Specimen:  Blood from Peripheral Updated:  06/12/18 0810     Significant Indicator NEG     Source  "BLD     Site PERIPHERAL     Blood Culture No Growth    Note: Blood cultures are incubated for 5 days and  are monitored continuously.Positive blood cultures  are called to the RN and reported as soon as  they are identified.      Narrative:       Per Hospital Policy: Only change Specimen Src: to \"Line\" if  specified by physician order.    BLOOD CULTURE [[id number]] Collected:  06/11/18 2156    Order Status:  Completed Specimen:  Blood from Peripheral Updated:  06/12/18 0810     Significant Indicator NEG     Source BLD     Site PERIPHERAL     Blood Culture No Growth    Note: Blood cultures are incubated for 5 days and  are monitored continuously.Positive blood cultures  are called to the RN and reported as soon as  they are identified.      Narrative:       Per Hospital Policy: Only change Specimen Src: to \"Line\" if  specified by physician order.    Culture Respiratory W/ GRM STN [[id number]]     Order Status:  Completed Specimen:  Respirate from Sputum     BLOOD CULTURE [[id number]]     Order Status:  Canceled Specimen:  Blood from Peripheral     BLOOD CULTURE [[id number]]     Order Status:  Canceled Specimen:  Blood from Peripheral           Assessment:  Active Hospital Problems    Diagnosis   • Sepsis with acute organ failure secondary to necrotizing fasciitis of R thigh in the setting of hyperglycemia  [A41.9]   • New onset type 2 diabetes mellitus (HCC) [E11.9]   • Hypokalemia [E87.6]   • Non anion gap metabolic acidosis [E87.2]       Plan:  Necrotizing fasciitis-additional work  Underwent I&D on 6/11/2018 and 6/13/2018   OR cultures are showing group B strep and Prevotella  Continue Unasyn  May need another surgery      New onset diabetes mellitus  Keep the blood sugars under control for wound healing    Asymptomatic fungiuria  Monitor    Hypotension  Resolved    Leukocytosis  Resolved    Tobacco abuse  Patient counseled    Discussed with internal medicine.  "

## 2018-06-15 NOTE — PROGRESS NOTES
Pt arrived from PACU about 1210. Pt placed on ICU monitors. Pt still weepy, but states pain is under control. VSS. Pt placed on O2/4L. Upon examination of wound vac it is noted to be leaking blood from area that is pooled underneath dressing, vac output is at 350 of bright red blood. Placed call to PACU RN and asked if it had been leaking for her. She stated that it had not. She gave phone numbers to try to reach Dr. Martinez since he was not in the area where she could speak with him.

## 2018-06-15 NOTE — PROGRESS NOTES
Dr. Frazier at bedside. Spoke with Dr. Frazier and patient about surgery outcomes and drainage, IVF and intake, edema, UOP and wound maintenance, VS and pain comtrol. He stated to place a sadler and maybe diuresis today or tomorrow. He also stated to d/c IVF and just run TKO with ABX if needed.

## 2018-06-15 NOTE — DOCUMENTATION QUERY
"DOCUMENTATION QUERY    PROVIDERS: Please select “Cosign w/ note”to reply to query.    Dr. Frazier,    To better represent the severity of illness of your patient, please review the following information and exercise your independent professional judgment in responding to this query.     Per the medical record, there is conflicting information. Per your progress notes, \"Sepsis without organ failure\" is documented. Per the Residents [facility] PA Progress notes, \"Sepsis with multi organ failure\" is documented. Based upon the clinical findings, risk factors, and treatment, can you please further specify these diagnoses?     • \"Sepsis with multi organ failure\" has been ruled in  • \"Sepsis without organ failure\" has been ruled in  • Other explanation of clinical findings  • Unable to determine    The medical record reflects the following:   Clinical Findings · Per your progress notes, \"Sepsis without organ failure\" documented.  · Per the Res[facility] PA Progress notes, \"Sepsis with multi organ failure\" is documented.   · Source used for multi organ failure is Necrotizing Fasciitis of Rt thigh   Treatment  IV antibiotics per ID.    Risk Factors  Sepsis, Necrotizing Fasciitis Rt thigh, New onset Diabetes Mellitus type 2 uncontrolled.    Location within medical record  Progress Notes     Thank you,   Clarice Bergeron RN  Clinical   [phone]      "

## 2018-06-15 NOTE — PROGRESS NOTES
0990- transport arrived to take pt to pre-op. Pt placed on transport monitor, portable O2/3L, and wound vac unplugged and placed on end of bed. KCL replacement started before transport and next bag taken with patient chart. Pt to be consented in pre-op. No narcotics give on this shift.   1015- pre-op RN and anesthesiologist given report at bedside. Pt left in care of pre-op RNNoemi.

## 2018-06-15 NOTE — PROGRESS NOTES
Pulmonary Critical Care Progress Note        Date of Admission:  6/11/2018    Chief Complaint: Chills, nausea, vomiting    History of Present Illness: 58 y.o. female for  presented to an outside hospital earlier today with 4 days of right thigh pain.  She does not recall any injury.  She thinks that she may have suffered a spider bite.  She has had associated fevers and chills as well as nausea and vomiting.  She received vancomycin and Zosyn at the outside facility.  She denies chest pain, shortness of breath, cough or sputum production.  She denies abdominal pain.  She has been seen by Dr. Omega Martinez and he has taken her to the operating theater and performed irrigation and debridement of right thigh necrotizing fasciitis.  Additionally she has been found to have hyperglycemia.  She denies any history of diabetes.    ROS:  Respiratory: negative, Cardiac: negative, GI: negative.  All other systems negative.    Interval Events:  24 hour interval history reviewed    - ongoing pain, erythema at wound   - a/o x 4   - sr with PVCs   - normotensive   - 3+edema/anasarca   - afebrile   - LR 20 KCL 50   - 3 lpm    - ID following, abx day 5, Unasyn day 3   - replace K and Mg   - lantus, SSI   -   Yesterday    - more pain, erythema wound today              - toradol/oxy              - SR/PAC              - 2+ edema              - afebrile              - NPO for possible OR today              - LR K 50/hr              - 2 lpm               - ID following              - Unasyn day 2 (abx day 4), GBS strep + cx              - lytes OK              - Lantus 15, SSI, FSBS in range; keep in ICU, ? OR today         PFSH:  No change.    Respiratory:     Pulse Oximetry: 95 %  Chest Tube Drains:          Exam: unlabored respirations, no intercostal retractions or accessory muscle use and diminished breath sounds mild  ImagingAvailable data reviewed         Invalid input(s): [id number]    HemoDynamics:  Pulse: 98, Heart Rate  (Monitored): 100  NIBP: 143/69       Exam: regular rate and rhythm  Imaging: Available data reviewed  Recent Labs      06/12/18   0848   CPKTOTAL  21       Neuro:  GCS         Exam: no focal deficits noted  Imaging: Available data reviewed    Fluids:  Intake/Output       06/13/18 0700 - 06/14/18 0659 06/14/18 0700 - 06/15/18 0659 06/15/18 0700 - 06/16/18 0659      [id number] 5911-8833 Total 0608-3484 6065-7765 Total 5920-3337 1298-3613 Total       Intake    P.O.  500  180 680  350  -- 350  --  -- --    P.O. 500 180 680 350 -- 350 -- -- --    I.V.  925  800 1725  900  600 1500  --  -- --    IV Piggyback Volume (IV Piggyback) -- 200 200 200 -- 200 -- -- --    IV Volume (NS) -- -- -- 100 -- 100 -- -- --    IV Volume (LR with 20K)   -- -- --    Other  --  -- --  100  -- 100  --  -- --    Medications (P.O./ Enteral Liquids) -- -- -- 100 -- 100 -- -- --    Total Intake 8494 432 3248 8342 203 8774 -- -- --       Output    Urine  200  250 450  1125  600 1725  --  -- --    Number of Times Voided 1 x 1 x 2 x -- -- -- -- -- --    Urine Void (mL) (non-catheter) 200  600 1725 -- -- --    Drains  --  50 50  50  50 100  --  -- --    Output (ml) (Surgical Drain Group Right;Groin Other (Comments)) -- 50 50 50 50 100 -- -- --    Stool  --  -- --  --  -- --  --  -- --    Number of Times Stooled 1 x 1 x 2 x -- 0 x 0 x -- -- --    Total Output 200  650 1825 -- -- --       Net I/O     6115 947 0168 175 -50 125 -- -- --           Recent Labs      06/13/18   0555  06/14/18   0439  06/15/18   0544   SODIUM  140  142  144   POTASSIUM  4.2  4.1  3.4*   CHLORIDE  115*  115*  110   CO2  17*  12*  24   BUN  11  9  8   CREATININE  0.50  0.38*  0.35*   MAGNESIUM  1.8  2.0  1.7   CALCIUM  8.1*  7.7*  8.3*       GI/Nutrition:  Exam: abdomen is soft and non-tender  Imaging: Available data reviewed  NPO  Liver Function  Recent Labs      06/13/18   0555  06/14/18   0439  06/15/18   0544   ALTSGPT  29  30   26   ASTSGOT  18  28  12   ALKPHOSPHAT  169*  239*  271*   TBILIRUBIN  0.5  0.6  0.4   GLUCOSE  192*  148*  129*       Heme:  Recent Labs      18   0555  06/14/18   0439  06/15/18   0544   RBC  3.76*  3.85*  3.56*   HEMOGLOBIN  12.1  12.3  11.2*   HEMATOCRIT  35.9*  37.3  34.0*   PLATELETCT  205  205  250       Infectious Disease:  Temp  Av.7 °C (98 °F)  Min: 36.4 °C (97.5 °F)  Max: 36.9 °C (98.5 °F)  Micro: reviewed   Right medial thigh wound with VAC in place, reviewed after debridement today    Recent Labs      18   0555  06/14/18   0439  06/15/18   0544   WBC  11.9*  12.2*  9.3   NEUTSPOLYS  79.40*  73.70*  62.30   LYMPHOCYTES  11.90*  16.40*  27.20   MONOCYTES  6.20  7.50  6.10   EOSINOPHILS  1.00  0.70  0.90   BASOPHILS  0.40  0.60  0.00   ASTSGOT  18  28  12   ALTSGPT  29  30  26   ALKPHOSPHAT  169*  239*  271*   TBILIRUBIN  0.5  0.6  0.4     Current Facility-Administered Medications   Medication Dose Frequency Provider Last Rate Last Dose   • insulin glargine (LANTUS) injection 15 Units  15 Units QAM INSULIN Ellen Fleming M.D.   Stopped at 18 1110   • potassium chloride 20 mEq in LR 1,000 mL infusion   Continuous Ellen Fleming M.D. 50 mL/hr at 06/15/18 0011     • ampicillin/sulbactam (UNASYN) 3 g in  mL IVPB  3 g Q6HRS Brianna Mario M.D.   Stopped at 06/15/18 0546   • insulin regular (HUMULIN R) injection 3-14 Units  3-14 Units 4X/DAY SCOTTY Frazier M.D.   Stopped at 06/15/18 0700    And   • glucose 4 g chewable tablet 16 g  16 g Q15 MIN PRN José Antonio Frazier M.D.        And   • dextrose 50% (D50W) injection 25 mL  25 mL Q15 MIN PRN José Antonio Frazier M.D.       • senna-docusate (PERICOLACE or SENOKOT S) 8.6-50 MG per tablet 2 Tab  2 Tab BID Jorge Sales M.D.   2 Tab at 18 0814    And   • polyethylene glycol/lytes (MIRALAX) PACKET 1 Packet  1 Packet QDAY PRN Jorge Sales M.D.        And   • magnesium hydroxide (MILK OF MAGNESIA) suspension 30 mL  30 mL  QDAY PRN Jorge Sales M.D.        And   • bisacodyl (DULCOLAX) suppository 10 mg  10 mg QDAY PRN Jorge Sales M.D.       • acetaminophen (TYLENOL) tablet 650 mg  650 mg Q6HRS PRN Jorge aSles M.D.       • Pharmacy Consult Request ...Pain Management Review 1 Each  1 Each PRN Omega Martinez M.D.       • ondansetron (ZOFRAN) syringe/vial injection 4 mg  4 mg Q4HRS PRN Omega Martinez M.D.   4 mg at 06/13/18 2227   • dexamethasone (DECADRON) injection 4 mg  4 mg Once PRN Omega Martinez M.D.       • diphenhydrAMINE (BENADRYL) injection 25 mg  25 mg Q6HRS PRN Omega Martinez M.D.       • haloperidol lactate (HALDOL) injection 1 mg  1 mg Q6HRS PRN Omega Martinez M.D.       • scopolamine (TRANSDERM-SCOP) patch 1 Patch  1 Patch Q72HRS PRN Omega Martinez M.D.       • senna-docusate (PERICOLACE or SENOKOT S) 8.6-50 MG per tablet 1 Tab  1 Tab Q24HRS PRN Omega Martinez M.D.       • bisacodyl (DULCOLAX) suppository 10 mg  10 mg Q24HRS PRN Omega Martinez M.D.       • fleet enema 133 mL  1 Each Once PRN Omega Martinez M.D.       • acetaminophen (TYLENOL) tablet 650 mg  650 mg Q6HRS Omega Martinez M.D.   650 mg at 06/15/18 0515   • oxyCODONE immediate release (ROXICODONE) tablet 5 mg  5 mg Q3HRS PRN Omega Martinez M.D.   5 mg at 06/14/18 2009   • oxyCODONE immediate release (ROXICODONE) tablet 10 mg  10 mg Q3HRS PRN Omega Martinez M.D.   10 mg at 06/15/18 0516   • HYDROmorphone (DILAUDID) injection 0.5 mg  0.5 mg Q3HRS PRN Omega Martinez M.D.   0.5 mg at 06/15/18 0011     Last reviewed on 6/11/2018  6:30 PM by Alysha Tijerina    Quality  Measures:   Radiology images reviewed, Medications reviewed and Labs reviewed                      Assessment and Plan:     Sepsis without associated acute organ failure              -Skin and soft tissue source              -Status post appropriate sepsis resuscitation   -Ongoing source control evaluation, orthopedic  surgery follow    -TKO IV fluid now, volume overloaded, consider forced diuresis in the future  Right thigh necrotizing fasciitis              -Status post irrigation and debridement on 6/11, again today 6/1 5 with ray pus noted on IND              -Wound VAC in place              -Continue antibiotics per ID recommendations, group B strep identified from culture   -Ongoing wound care, n.p.o. for possible further debridement, orthopedics following    -Place Gaston catheter, strict I/O's and sepsis and keep the wound clean  Hyperglycemia              -No prior history of diabetes mellitus              -Continue glucose control with insulin     Hypokalemia              -Repleted, continue to follow  Elevated alkaline phosphatase, abdomen benign, follow      Monitor in ICU today.  Will likely need further debridement  Discussed patient condition and risk of morbidity and/or mortality with RN, RT and QA team.

## 2018-06-16 LAB
ALBUMIN SERPL BCP-MCNC: 2.5 G/DL (ref 3.2–4.9)
ALBUMIN/GLOB SERPL: 0.7 G/DL
ALP SERPL-CCNC: 228 U/L (ref 30–99)
ALT SERPL-CCNC: 17 U/L (ref 2–50)
ANION GAP SERPL CALC-SCNC: 12 MMOL/L (ref 0–11.9)
AST SERPL-CCNC: 8 U/L (ref 12–45)
BACTERIA BLD CULT: NORMAL
BACTERIA BLD CULT: NORMAL
BASOPHILS # BLD AUTO: 0 % (ref 0–1.8)
BASOPHILS # BLD: 0 K/UL (ref 0–0.12)
BILIRUB SERPL-MCNC: 0.4 MG/DL (ref 0.1–1.5)
BUN SERPL-MCNC: 4 MG/DL (ref 8–22)
CALCIUM SERPL-MCNC: 7.9 MG/DL (ref 8.5–10.5)
CHLORIDE SERPL-SCNC: 107 MMOL/L (ref 96–112)
CO2 SERPL-SCNC: 22 MMOL/L (ref 20–33)
CREAT SERPL-MCNC: 0.32 MG/DL (ref 0.5–1.4)
EOSINOPHIL # BLD AUTO: 0.09 K/UL (ref 0–0.51)
EOSINOPHIL NFR BLD: 0.9 % (ref 0–6.9)
ERYTHROCYTE [DISTWIDTH] IN BLOOD BY AUTOMATED COUNT: 47.9 FL (ref 35.9–50)
GLOBULIN SER CALC-MCNC: 3.4 G/DL (ref 1.9–3.5)
GLUCOSE BLD-MCNC: 141 MG/DL (ref 65–99)
GLUCOSE BLD-MCNC: 156 MG/DL (ref 65–99)
GLUCOSE BLD-MCNC: 172 MG/DL (ref 65–99)
GLUCOSE BLD-MCNC: 194 MG/DL (ref 65–99)
GLUCOSE SERPL-MCNC: 158 MG/DL (ref 65–99)
HCT VFR BLD AUTO: 37.9 % (ref 37–47)
HGB BLD-MCNC: 12.2 G/DL (ref 12–16)
LYMPHOCYTES # BLD AUTO: 1.77 K/UL (ref 1–4.8)
LYMPHOCYTES NFR BLD: 18.6 % (ref 22–41)
MAGNESIUM SERPL-MCNC: 1.7 MG/DL (ref 1.5–2.5)
MANUAL DIFF BLD: NORMAL
MCH RBC QN AUTO: 31 PG (ref 27–33)
MCHC RBC AUTO-ENTMCNC: 32.2 G/DL (ref 33.6–35)
MCV RBC AUTO: 96.4 FL (ref 81.4–97.8)
METAMYELOCYTES NFR BLD MANUAL: 0.9 %
MONOCYTES # BLD AUTO: 0.75 K/UL (ref 0–0.85)
MONOCYTES NFR BLD AUTO: 7.9 % (ref 0–13.4)
MORPHOLOGY BLD-IMP: NORMAL
MYELOCYTES NFR BLD MANUAL: 1.8 %
NEUTROPHILS # BLD AUTO: 6.64 K/UL (ref 2–7.15)
NEUTROPHILS NFR BLD: 69.9 % (ref 44–72)
NRBC # BLD AUTO: 0 K/UL
NRBC BLD-RTO: 0 /100 WBC
PLATELET # BLD AUTO: 305 K/UL (ref 164–446)
PLATELET BLD QL SMEAR: NORMAL
PMV BLD AUTO: 9.5 FL (ref 9–12.9)
POTASSIUM SERPL-SCNC: 3.3 MMOL/L (ref 3.6–5.5)
PROT SERPL-MCNC: 5.9 G/DL (ref 6–8.2)
RBC # BLD AUTO: 3.93 M/UL (ref 4.2–5.4)
RBC BLD AUTO: NORMAL
SIGNIFICANT IND 70042: NORMAL
SIGNIFICANT IND 70042: NORMAL
SITE SITE: NORMAL
SITE SITE: NORMAL
SODIUM SERPL-SCNC: 141 MMOL/L (ref 135–145)
SOURCE SOURCE: NORMAL
SOURCE SOURCE: NORMAL
WBC # BLD AUTO: 9.5 K/UL (ref 4.8–10.8)

## 2018-06-16 PROCEDURE — 307736 CANISTER GO RENASYS 300ML: Performed by: INTERNAL MEDICINE

## 2018-06-16 PROCEDURE — 770006 HCHG ROOM/CARE - MED/SURG/GYN SEMI*

## 2018-06-16 PROCEDURE — 700111 HCHG RX REV CODE 636 W/ 250 OVERRIDE (IP): Performed by: ORTHOPAEDIC SURGERY

## 2018-06-16 PROCEDURE — A9270 NON-COVERED ITEM OR SERVICE: HCPCS | Performed by: INTERNAL MEDICINE

## 2018-06-16 PROCEDURE — 85027 COMPLETE CBC AUTOMATED: CPT

## 2018-06-16 PROCEDURE — 700111 HCHG RX REV CODE 636 W/ 250 OVERRIDE (IP): Performed by: INTERNAL MEDICINE

## 2018-06-16 PROCEDURE — 83735 ASSAY OF MAGNESIUM: CPT

## 2018-06-16 PROCEDURE — 700105 HCHG RX REV CODE 258: Performed by: INTERNAL MEDICINE

## 2018-06-16 PROCEDURE — 700102 HCHG RX REV CODE 250 W/ 637 OVERRIDE(OP): Performed by: HOSPITALIST

## 2018-06-16 PROCEDURE — 700105 HCHG RX REV CODE 258

## 2018-06-16 PROCEDURE — 85007 BL SMEAR W/DIFF WBC COUNT: CPT

## 2018-06-16 PROCEDURE — 99233 SBSQ HOSP IP/OBS HIGH 50: CPT | Performed by: INTERNAL MEDICINE

## 2018-06-16 PROCEDURE — A9270 NON-COVERED ITEM OR SERVICE: HCPCS | Performed by: ORTHOPAEDIC SURGERY

## 2018-06-16 PROCEDURE — 80053 COMPREHEN METABOLIC PANEL: CPT

## 2018-06-16 PROCEDURE — 700102 HCHG RX REV CODE 250 W/ 637 OVERRIDE(OP): Performed by: ORTHOPAEDIC SURGERY

## 2018-06-16 PROCEDURE — 700102 HCHG RX REV CODE 250 W/ 637 OVERRIDE(OP): Performed by: INTERNAL MEDICINE

## 2018-06-16 PROCEDURE — A9270 NON-COVERED ITEM OR SERVICE: HCPCS | Performed by: HOSPITALIST

## 2018-06-16 PROCEDURE — 700102 HCHG RX REV CODE 250 W/ 637 OVERRIDE(OP): Performed by: STUDENT IN AN ORGANIZED HEALTH CARE EDUCATION/TRAINING PROGRAM

## 2018-06-16 PROCEDURE — A9270 NON-COVERED ITEM OR SERVICE: HCPCS | Performed by: STUDENT IN AN ORGANIZED HEALTH CARE EDUCATION/TRAINING PROGRAM

## 2018-06-16 PROCEDURE — 82962 GLUCOSE BLOOD TEST: CPT

## 2018-06-16 PROCEDURE — 700111 HCHG RX REV CODE 636 W/ 250 OVERRIDE (IP): Performed by: HOSPITALIST

## 2018-06-16 RX ORDER — MAGNESIUM SULFATE HEPTAHYDRATE 40 MG/ML
4 INJECTION, SOLUTION INTRAVENOUS ONCE
Status: COMPLETED | OUTPATIENT
Start: 2018-06-16 | End: 2018-06-16

## 2018-06-16 RX ORDER — LOPERAMIDE HYDROCHLORIDE 2 MG/1
2 CAPSULE ORAL 2 TIMES DAILY PRN
Status: DISCONTINUED | OUTPATIENT
Start: 2018-06-16 | End: 2018-06-30

## 2018-06-16 RX ORDER — SODIUM CHLORIDE 9 MG/ML
INJECTION, SOLUTION INTRAVENOUS
Status: COMPLETED
Start: 2018-06-16 | End: 2018-06-16

## 2018-06-16 RX ORDER — POTASSIUM CHLORIDE 20 MEQ/1
40 TABLET, EXTENDED RELEASE ORAL 2 TIMES DAILY
Status: COMPLETED | OUTPATIENT
Start: 2018-06-16 | End: 2018-06-16

## 2018-06-16 RX ADMIN — SODIUM CHLORIDE: 9 INJECTION, SOLUTION INTRAVENOUS at 17:15

## 2018-06-16 RX ADMIN — HYDROMORPHONE HYDROCHLORIDE 0.5 MG: 10 INJECTION, SOLUTION INTRAMUSCULAR; INTRAVENOUS; SUBCUTANEOUS at 00:13

## 2018-06-16 RX ADMIN — HYDROMORPHONE HYDROCHLORIDE 0.5 MG: 10 INJECTION, SOLUTION INTRAMUSCULAR; INTRAVENOUS; SUBCUTANEOUS at 11:04

## 2018-06-16 RX ADMIN — ACETAMINOPHEN 650 MG: 325 TABLET, FILM COATED ORAL at 12:11

## 2018-06-16 RX ADMIN — LOPERAMIDE HYDROCHLORIDE 2 MG: 2 CAPSULE ORAL at 17:18

## 2018-06-16 RX ADMIN — ACETAMINOPHEN 650 MG: 325 TABLET, FILM COATED ORAL at 21:30

## 2018-06-16 RX ADMIN — AMPICILLIN SODIUM AND SULBACTAM SODIUM 3 G: 2; 1 INJECTION, POWDER, FOR SOLUTION INTRAMUSCULAR; INTRAVENOUS at 12:11

## 2018-06-16 RX ADMIN — HYDROMORPHONE HYDROCHLORIDE 0.5 MG: 10 INJECTION, SOLUTION INTRAMUSCULAR; INTRAVENOUS; SUBCUTANEOUS at 14:52

## 2018-06-16 RX ADMIN — HYDROMORPHONE HYDROCHLORIDE 0.5 MG: 10 INJECTION, SOLUTION INTRAMUSCULAR; INTRAVENOUS; SUBCUTANEOUS at 03:23

## 2018-06-16 RX ADMIN — LOPERAMIDE HYDROCHLORIDE 2 MG: 2 CAPSULE ORAL at 12:11

## 2018-06-16 RX ADMIN — AMPICILLIN SODIUM AND SULBACTAM SODIUM 3 G: 2; 1 INJECTION, POWDER, FOR SOLUTION INTRAMUSCULAR; INTRAVENOUS at 17:19

## 2018-06-16 RX ADMIN — MAGNESIUM SULFATE IN WATER 4 G: 40 INJECTION, SOLUTION INTRAVENOUS at 08:00

## 2018-06-16 RX ADMIN — OXYCODONE HYDROCHLORIDE 10 MG: 10 TABLET ORAL at 21:25

## 2018-06-16 RX ADMIN — ACETAMINOPHEN 650 MG: 325 TABLET, FILM COATED ORAL at 05:33

## 2018-06-16 RX ADMIN — ACETAMINOPHEN 650 MG: 325 TABLET, FILM COATED ORAL at 00:10

## 2018-06-16 RX ADMIN — AMPICILLIN SODIUM AND SULBACTAM SODIUM 3 G: 2; 1 INJECTION, POWDER, FOR SOLUTION INTRAMUSCULAR; INTRAVENOUS at 05:35

## 2018-06-16 RX ADMIN — INSULIN GLARGINE 10 UNITS: 100 INJECTION, SOLUTION SUBCUTANEOUS at 07:59

## 2018-06-16 RX ADMIN — OXYCODONE HYDROCHLORIDE 10 MG: 10 TABLET ORAL at 05:33

## 2018-06-16 RX ADMIN — POTASSIUM CHLORIDE 40 MEQ: 1500 TABLET, EXTENDED RELEASE ORAL at 07:59

## 2018-06-16 RX ADMIN — HYDROMORPHONE HYDROCHLORIDE 0.5 MG: 10 INJECTION, SOLUTION INTRAMUSCULAR; INTRAVENOUS; SUBCUTANEOUS at 08:02

## 2018-06-16 RX ADMIN — AMPICILLIN SODIUM AND SULBACTAM SODIUM 3 G: 2; 1 INJECTION, POWDER, FOR SOLUTION INTRAMUSCULAR; INTRAVENOUS at 00:10

## 2018-06-16 RX ADMIN — POTASSIUM CHLORIDE 40 MEQ: 1500 TABLET, EXTENDED RELEASE ORAL at 21:26

## 2018-06-16 RX ADMIN — ENOXAPARIN SODIUM 40 MG: 100 INJECTION SUBCUTANEOUS at 12:14

## 2018-06-16 ASSESSMENT — PAIN SCALES - GENERAL
PAINLEVEL_OUTOF10: 7
PAINLEVEL_OUTOF10: 0
PAINLEVEL_OUTOF10: 5
PAINLEVEL_OUTOF10: 8
PAINLEVEL_OUTOF10: 3
PAINLEVEL_OUTOF10: 0
PAINLEVEL_OUTOF10: 8
PAINLEVEL_OUTOF10: 6
PAINLEVEL_OUTOF10: 5
PAINLEVEL_OUTOF10: 3
PAINLEVEL_OUTOF10: 9
PAINLEVEL_OUTOF10: 3
PAINLEVEL_OUTOF10: 3
PAINLEVEL_OUTOF10: 8
PAINLEVEL_OUTOF10: 0

## 2018-06-16 ASSESSMENT — ENCOUNTER SYMPTOMS
VOMITING: 0
DIZZINESS: 0
CHILLS: 0
MYALGIAS: 0
SPUTUM PRODUCTION: 0
DOUBLE VISION: 0
PALPITATIONS: 0
SORE THROAT: 0
BLURRED VISION: 0
PSYCHIATRIC NEGATIVE: 1
FEVER: 0
ABDOMINAL PAIN: 0
NAUSEA: 0
MUSCULOSKELETAL NEGATIVE: 1
HEADACHES: 0
SHORTNESS OF BREATH: 0

## 2018-06-16 NOTE — CARE PLAN
Problem: Safety  Goal: Will remain free from injury  Outcome: PROGRESSING AS EXPECTED  Bed alarm on, bed brakes locked, patient educated    Problem: Pain Management  Goal: Pain level will decrease to patient's comfort goal  Outcome: PROGRESSING AS EXPECTED  Pain control adequate with prescribed PRNs

## 2018-06-16 NOTE — PROGRESS NOTES
[facility] Progress Note      Admit Date: 6/11/2018  ICU Day: 5      Resident(s): Manolo Medina   Attending: KAYLA LAM/ Dr. Frazier    Date & Time:   6/16/2018   9:15 AM       Patient ID:    Name:             Emmanuelle Martin   YOB: 1960  Age:                 58 y.o.  female   MRN:               [id number]    HPI:  Emmanuelle Martin is a 58 year old female who presents with sepsis with multi organ failure secondary to necrotizing fasciitis of right thigh in the setting of new onset insulin dependent uncontrolled type II diabetes. She had surgical debridement on 6/10 and a wound vac was placed at that time. Has had repeated I&D on 6/15 with plans to repeat I&D on 6/18. Intraoperative cultures grew Group B Streptococcus and she is currently on ampicillin-sulbactam.     Consultants:  General Surgery   PMA: Dr. Frazier     Procedures:  Surgical debridement 6/11, 6/15  Plans for I&D 6/18    Imaging:  DX-CHEST-LIMITED (1 VIEW)   Final Result      Evaluation limited due to overlying soft tissues. Evaluation of the left lung base is particularly limited. Left basilar airspace opacities are not excluded. Further evaluation can be performed as PA and lateral plain films of the chest.         CT-FOREIGN FILM CAT SCAN   Final Result      OUTSIDE IMAGES-DX CHEST   Final Result      OUTSIDE IMAGES-CT ABDOMEN /PELVIS   Final Result      OUTSIDE IMAGES-DX CHEST   Final Result          Interval Events:  - ID: Repeat I&D 6/18; hold DVT the night prior, Unasyn.  - Pulm:1 lpm nc likely due to pulmonary edema; hold fluids at this time with PRN lasix  - Elevated alkaline phosphatase, consider RUQ US/GGT    Review of Systems   Constitutional: Negative for chills and fever.   HENT: Negative for congestion and sore throat.    Eyes: Negative for blurred vision and double vision.   Respiratory: Negative for sputum production and shortness of breath.    Cardiovascular: Negative for chest pain and palpitations.    Gastrointestinal: Negative for abdominal pain, nausea and vomiting.   Genitourinary: Negative for dysuria and urgency.   Musculoskeletal: Negative.  Negative for joint pain and myalgias.        Improved pain over right groin    Skin: Negative.    Neurological: Negative for dizziness and headaches.   Endo/Heme/Allergies: Negative.    Psychiatric/Behavioral: Negative.        PHYSICAL EXAM  Gen: pleasant and conversant, in NAD.    HEENT: NC/AT, moist mucous membranes.  Neck: No tracheal deviation. No stridor.   Cardiac: RRR without m/g/r. S1S2, no JVD.  Respiratory: Normal effort, no tachypnea. Decreased breath sounds over bilateral lateral lobes.   Abdomen: BS+, soft, NT/ND, no rebound/guarding.   Ext: No edema, 2+ DP pulses b/l.  Skin: Warm, dry. Erythema improving over right medial thigh but has mild edema S/P I&D. Wound vac to right groin. Limited ROM of right thigh due to pain.   Neuro: AAOx4, CN II-XII grossly normal, no focal sensory or motor deficits.   Psych: Affect, mood, judgement normal.    Respiratory:   Respiration: (!) 25, Pulse Oximetry: 95 %    Chest Tube Drains:  None    HemoDynamics:  Pulse: 79, Heart Rate (Monitored): 81 Blood Pressure: 117/71, NIBP: 135/67      Neuro:  None    Fluids:  LR maintenance      Intake/Output Summary (Last 24 hours) at 06/12/18 0701  Last data filed at 06/12/18 0600   Gross per 24 hour   Intake          7256.67 ml   Output               60 ml   Net          7196.67 ml          Body mass index is 36.8 kg/m².    Recent Labs      06/14/18   0439  06/15/18   0544  06/16/18   0540   SODIUM  142  144  141   POTASSIUM  4.1  3.4*  3.3*   CHLORIDE  115*  110  107   CO2  12*  24  22   BUN  9  8  4*   CREATININE  0.38*  0.35*  0.32*   MAGNESIUM  2.0  1.7  1.7   CALCIUM  7.7*  8.3*  7.9*       GI/Nutrition:  Recent Labs      06/14/18   0439  06/15/18   0544  06/16/18   0540   ALTSGPT  30  26  17   ASTSGOT  28  12  8*   ALKPHOSPHAT  239*  271*  228*   TBILIRUBIN  0.6  0.4  0.4    GLUCOSE  148*  129*  158*       Heme:  Recent Labs      06/14/18   0439  06/15/18   0544  18   0540   RBC  3.85*  3.56*  3.93*   HEMOGLOBIN  12.3  11.2*  12.2   HEMATOCRIT  37.3  34.0*  37.9   PLATELETCT  205  250  305       Infectious Disease:  Monitored Temp 2  Av.3 °C (99.2 °F)  Min: 37 °C (98.6 °F)  Max: 37.7 °C (99.9 °F)  Temp  Av °C (98.6 °F)  Min: 36.8 °C (98.2 °F)  Max: 37.3 °C (99.1 °F)  Recent Labs      06/14/18   0439  06/15/18   0544  18   0540   WBC  12.2*  9.3  9.5   NEUTSPOLYS  73.70*  62.30  69.90   LYMPHOCYTES  16.40*  27.20  18.60*   MONOCYTES  7.50  6.10  7.90   EOSINOPHILS  0.70  0.90  0.90   BASOPHILS  0.60  0.00  0.00   ASTSGOT  28  12  8*   ALTSGPT  30  26  17   ALKPHOSPHAT  239*  271*  228*   TBILIRUBIN  0.6  0.4  0.4       Meds:  • MD ALERT...Adult ICU Electrolyte Replacement per Pharmacy Protocol       • potassium chloride SA  40 mEq     • magnesium sulfate  4 g 4 g (18 0800)   • enoxaparin (LOVENOX) injection  40 mg     • insulin glargine  10 Units     • ampicillin-sulbactam (UNASYN) IV  3 g Stopped (18 0605)   • insulin regular  3-14 Units      And   • glucose 4 g  16 g      And   • dextrose 50%  25 mL     • senna-docusate  2 Tab      And   • polyethylene glycol/lytes  1 Packet      And   • magnesium hydroxide  30 mL      And   • bisacodyl  10 mg     • acetaminophen  650 mg     • Pharmacy Consult Request  1 Each     • ondansetron  4 mg     • dexamethasone  4 mg     • diphenhydrAMINE  25 mg     • haloperidol lactate  1 mg     • scopolamine  1 Patch     • senna-docusate  1 Tab     • bisacodyl  10 mg     • fleet  1 Each     • acetaminophen  650 mg     • oxyCODONE immediate-release  5 mg     • oxyCODONE immediate release  10 mg     • HYDROmorphone  0.5 mg            Problem and Plan:    Sepsis due to necrotizing fasciitis of R thigh  S/P I&D on  and 6/15, plans to repeat on   Hemodynamically stable  Wound cx +group B Strep now on  ampicillin-sulbactam    Wound vac placed 6/11  Control BGs with Lantus and SSI   Infectious Disease following   Blood cultures NGTD       Non anion gap metabolic acidosis  Resolved  Secondary to NS fluid resuscitation causing hyperchloremic acidosis and starvation ketosis    New onset type 2 diabetes mellitus (HCC)  A1c 12.8% on admission   SSI, Lantus 15 u   Diabetes educator consult       Anemia/Thrombocytopenia  Resolved  In the setting of sepsis      Hypocalcemia  Resolved   Secondary to hypoalbuminemia in the setting of sepsis     Hypokalemia  Resolved  Secondary to Hypomagnesemia/Poor diet  Mg >2, K >4    Elevated alkaline phosphatase level  RUQ ultrasound         Quality Measures:  Lines: PIV, Wound Vac      Gaston Catheter: None     DVT Prophylaxis: SCDs    Ulcer Prophylaxis: None     Antibiotics: Unasyn      CODE STATUS: Full   DISPO: ICU pending further I&D

## 2018-06-16 NOTE — PROGRESS NOTES
Pulmonary Critical Care Progress Note        Date of Admission:  6/11/2018    Chief Complaint: Chills, nausea, vomiting    History of Present Illness: 58 y.o. female for  presented to an outside hospital earlier today with 4 days of right thigh pain.  She does not recall any injury.  She thinks that she may have suffered a spider bite.  She has had associated fevers and chills as well as nausea and vomiting.  She received vancomycin and Zosyn at the outside facility.  She denies chest pain, shortness of breath, cough or sputum production.  She denies abdominal pain.  She has been seen by Dr. Omega Martinez and he has taken her to the operating theater and performed irrigation and debridement of right thigh necrotizing fasciitis.  Additionally she has been found to have hyperglycemia.  She denies any history of diabetes.    ROS:  Respiratory: negative, Cardiac: negative, GI: negative.  All other systems negative.    Interval Events:  24 hour interval history reviewed    - afebrile   - vac in place   - mary PO diet   - 1 lpm oxygen   - start DVT prophylaxis with lovenox; hold before I&D   - replace K and Mg   - OK to [facility]  Yesterday   - ongoing pain, erythema at wound   - a/o x 4   - sr with PVCs   - normotensive   - 3+edema/anasarca   - afebrile   - LR 20 KCL 50   - 3 lpm    - ID following, abx day 5, Unasyn day 3   - replace K and Mg   - lantus, SSI          PFSH:  No change.    Respiratory:     Pulse Oximetry: 95 %  Chest Tube Drains:          Exam: unlabored respirations, no intercostal retractions or accessory muscle use and diminished breath sounds mild  ImagingAvailable data reviewed         Invalid input(s): [id number]    HemoDynamics:  Pulse: 79, Heart Rate (Monitored): 81  Blood Pressure: 117/71, NIBP: 135/67       Exam: regular rate and rhythm  Imaging: Available data reviewed        Neuro:  GCS         Exam: no focal deficits noted  Imaging: Available data reviewed    Fluids:  Intake/Output        06/14/18 0700 - 06/15/18 0659 06/15/18 0700 - 06/16/18 0659 06/16/18 0700 - 06/17/18 0659      0700-1859 1900-0659 Total 0700-1859 1900-0659 Total 0700-1859 1900-0659 Total       Intake    P.O.  350  -- 350  150  50 200  200  -- 200    P.O. 350 -- 350 150 50 200 200 -- 200    I.V.  900  600 1500  1100  220 1320  120  -- 120    Crystalloid Intake -- -- -- 300 -- 300 -- -- --    IV Piggyback Volume (IV Piggyback) 200 -- 200 500 100 600 100 -- 100    IV Volume (NS) 100 -- 100 100 120 220 20 -- 20    IV Volume (LR with 20K)  200 -- 200 -- -- --    Other  100  -- 100  --  -- --  --  -- --    Medications (P.O./ Enteral Liquids) 100 -- 100 -- -- -- -- -- --    Total Intake 3308 732 4209 4335 577 1619 320 -- 320       Output    Urine  1125  600 1725  1300  1350 2650  --  -- --    Urine Void (mL) (non-catheter) 0095 912 0415 400 -- 400 -- -- --    Output (mL) (Urinary Catheter Indwelling Catheter 16) -- -- -- 900 1350 2250 -- -- --    Drains  50  50 100  875  650 1525  50  -- 50    Output (ml) (Surgical Drain Group Right;Groin Other (Comments)) 50 50 100  50 -- 50    Stool  --  -- --  --  -- --  --  -- --    Number of Times Stooled -- 0 x 0 x -- 0 x 0 x -- -- --    Blood  --  -- --  5  -- 5  --  -- --    Est. Blood Loss (mL) -- -- -- 5 -- 5 -- -- --    Total Output 6312 510 4838 2180 2000 4180 50 -- 50       Net I/O     175 -50 125 -930 -1730 -2660 270 -- 270           Recent Labs      06/14/18   0439  06/15/18   0544  06/16/18   0540   SODIUM  142  144  141   POTASSIUM  4.1  3.4*  3.3*   CHLORIDE  115*  110  107   CO2  12*  24  22   BUN  9  8  4*   CREATININE  0.38*  0.35*  0.32*   MAGNESIUM  2.0  1.7  1.7   CALCIUM  7.7*  8.3*  7.9*       GI/Nutrition:  Exam: abdomen is soft and non-tender  Imaging: Available data reviewed  NPO  Liver Function  Recent Labs      06/14/18   0439  06/15/18   0544  06/16/18   0540   ALTSGPT  30  26  17   ASTSGOT  28  12  8*   ALKPHOSPHAT  239*  271*  228*   TBILIRUBIN   0.6  0.4  0.4   GLUCOSE  148*  129*  158*       Heme:  Recent Labs      18   0439  06/15/18   0544  18   0540   RBC  3.85*  3.56*  3.93*   HEMOGLOBIN  12.3  11.2*  12.2   HEMATOCRIT  37.3  34.0*  37.9   PLATELETCT  205  250  305       Infectious Disease:  Monitored Temp 2  Av.3 °C (99.2 °F)  Min: 37 °C (98.6 °F)  Max: 37.7 °C (99.9 °F)  Temp  Av °C (98.6 °F)  Min: 36.8 °C (98.2 °F)  Max: 37.3 °C (99.1 °F)  Micro: reviewed   Right medial thigh wound with VAC in place, reviewed after debridement today    Recent Labs      06/14/18   0439  06/15/18   0544  18   0540   WBC  12.2*  9.3  9.5   NEUTSPOLYS  73.70*  62.30  69.90   LYMPHOCYTES  16.40*  27.20  18.60*   MONOCYTES  7.50  6.10  7.90   EOSINOPHILS  0.70  0.90  0.90   BASOPHILS  0.60  0.00  0.00   ASTSGOT  28  12  8*   ALTSGPT  30  26  17   ALKPHOSPHAT  239*  271*  228*   TBILIRUBIN  0.6  0.4  0.4     Current Facility-Administered Medications   Medication Dose Frequency Provider Last Rate Last Dose   • MD ALERT...Adult ICU Electrolyte Replacement per Pharmacy Protocol   PRN Vasquez Donahue M.D.       • potassium chloride SA (Kdur) tablet 40 mEq  40 mEq BID Vasquez Donahue M.D.   40 mEq at 18 0759   • magnesium sulfate IVPB premix 4 g  4 g Once Vasquez Donahue M.D. 25 mL/hr at 18 0800 4 g at 18 0800   • insulin glargine (LANTUS) injection 10 Units  10 Units QA INSULIN Ellen Fleming M.D.   10 Units at 18 0759   • ampicillin/sulbactam (UNASYN) 3 g in  mL IVPB  3 g Q6HRS Brianna Mario M.D.   Stopped at 18 0605   • insulin regular (HUMULIN R) injection 3-14 Units  3-14 Units 4X/DAY SCOTTY Frazier M.D.   3 Units at 18 0800    And   • glucose 4 g chewable tablet 16 g  16 g Q15 MIN PRN José Antonio Frazier M.D.        And   • dextrose 50% (D50W) injection 25 mL  25 mL Q15 MIN PRN José Antonio Frazier M.D.       • senna-docusate (PERICOLACE or SENOKOT S) 8.6-50 MG per tablet 2 Tab  2 Tab BID Jorge Sales,  M.D.   2 Tab at 06/13/18 0814    And   • polyethylene glycol/lytes (MIRALAX) PACKET 1 Packet  1 Packet QDAY PRN Jorge Sales M.D.        And   • magnesium hydroxide (MILK OF MAGNESIA) suspension 30 mL  30 mL QDAY PRN Jorge Sales M.D.        And   • bisacodyl (DULCOLAX) suppository 10 mg  10 mg QDAY PRN Jorge Sales M.D.       • acetaminophen (TYLENOL) tablet 650 mg  650 mg Q6HRS PRN Jorge Sales M.D.       • Pharmacy Consult Request ...Pain Management Review 1 Each  1 Each PRN Omega Martinez M.D.       • ondansetron (ZOFRAN) syringe/vial injection 4 mg  4 mg Q4HRS PRN Omega Martinez M.D.   4 mg at 06/13/18 2227   • dexamethasone (DECADRON) injection 4 mg  4 mg Once PRN Omega Martinez M.D.       • diphenhydrAMINE (BENADRYL) injection 25 mg  25 mg Q6HRS PRN Omega Martinez M.D.       • haloperidol lactate (HALDOL) injection 1 mg  1 mg Q6HRS PRN Omega Martinez M.D.       • scopolamine (TRANSDERM-SCOP) patch 1 Patch  1 Patch Q72HRS PRN Omega Martinez M.D.       • senna-docusate (PERICOLACE or SENOKOT S) 8.6-50 MG per tablet 1 Tab  1 Tab Q24HRS PRN Omega Martinez M.D.       • bisacodyl (DULCOLAX) suppository 10 mg  10 mg Q24HRS PRN Omega Martinez M.D.       • fleet enema 133 mL  1 Each Once PRN Omega Martinez M.D.       • acetaminophen (TYLENOL) tablet 650 mg  650 mg Q6HRS Omega Martinez M.D.   650 mg at 06/16/18 0533   • oxyCODONE immediate release (ROXICODONE) tablet 5 mg  5 mg Q3HRS PRN Omega Martinez M.D.   5 mg at 06/14/18 2009   • oxyCODONE immediate release (ROXICODONE) tablet 10 mg  10 mg Q3HRS PRN Omega Martinez M.D.   10 mg at 06/16/18 0533   • HYDROmorphone (DILAUDID) injection 0.5 mg  0.5 mg Q3HRS PRN Omega Martinez M.D.   0.5 mg at 06/16/18 0802     Last reviewed on 6/15/2018 11:07 AM by Tanya Fonseca R.N.    Quality  Measures:  Radiology images reviewed, Medications reviewed and Labs  reviewed                      Assessment and Plan:     Sepsis without associated acute organ failure              -Skin and soft tissue source              -Status post appropriate sepsis resuscitation   -Ongoing source control evaluation, orthopedic surgery following    -TKO IV fluid now, volume overloaded, consider forced diuresis in the future  Right thigh necrotizing fasciitis              -Status post irrigation and debridement on 6/11, again 6/15 with ray pus noted at I&D   - planning on back to OR Monday              -Wound VAC in place              -Continue antibiotics per ID recommendations, group B strep identified from culture   -Ongoing wound care, n.p.o. for possible further debridement, orthopedics following    -Place Gaston catheter, strict I/O's and sepsis and keep the wound clean  Hyperglycemia              -No prior history of diabetes mellitus              -Continue glucose control with insulin    -   Hypokalemia              -Repleted, continue to follow  Elevated alkaline phosphatase, abdomen benign, follow  Start prophylactic dose Lovenox, hold prior to surgery  Okay to move to [facility] today.  Pulmonary service will sign off.  Please call if needed.  Discussed patient condition and risk of morbidity and/or mortality with RN, RT and QA team.

## 2018-06-16 NOTE — CARE PLAN
Problem: Safety  Goal: Will remain free from injury  Outcome: PROGRESSING AS EXPECTED  Patient wearing non skid footwear at all times. Patient has call bell within reach and is using appropriately.

## 2018-06-16 NOTE — PROGRESS NOTES
[facility] ICU Transfer Note                                                                                       ICU Admit Date: 6/11/2018    ICU Discharge Date:  6/16/2018      Primary Diagnosis:   Sepsis with multi organ failure secondary to necrotizing fasciitis of right thigh with Group B strep in the setting of new onset insulin dependent uncontrolled type II diabetes      ICU Course Summary (Brief Narrative):  Emmanuelle Martin is a 58 year old female who presented with right thigh pain without acute trauma and found to have sepsis with multi organ failure secondary to necrotizing fasciitis of right groin without Tracie's gangrene per CT. She was taken to the operating room emergently and underwent fasciotomy and placed on Zosyn, Vancomycin and Clindamycin pending intraoperative cultures. She was subsequently narrowed to Unasyn after intraoperative cultures grew Group B Strep. She had I&D on 6/10 and 6/15 with plans to repeat I&D on 6/18. She was found to have an A1c of 12.8% and started on lantus 10u plus SSI. Given she has had poor oral intake she has not requiring significant SSI and lantus has been held prior to each I&D. Her sepsis resolved with fluid resuscitation of 7L NS/LR without requiring pressors. She consequently developed mild pulmonary edema with increasing her oxygen requirements from baseline room air to 1-2 lpm. She received lasix with improvement of her oxygen requirements to 1 lpm. She remained hemodynamically stable. Infectious Disease is following her ABX but she will likely remain on IV unasyn until at least 48 hours after her last debridement.      Important Events in the ICU:   - Central Line: None  - Intubation: None   - Pressors: None   - Gaston catheter: 6/15; can remove when ambulating; no significant urinary retention   - Tube feeding: None   - Antibiotics: Zosyn/Vanc/Clindamyin 6/11-6/13; Unasyn 6/13-current   - I&D 6/10, 6/15 and plans to repeat on 6/18      Labs and imaging  studies to be continued with their indications:  - CBC: None   - CMP or BMP: Hypokalemia due to poor oral intake   - Magnesium: Yes, as above   - Phosphorus: None   - Chest Xray: None       Things to follow:   - None; will need lovenox and lantus held before I&D on 6/18  - No acute PT/OT needs but will work on ambulating as able after her last I&D  - Duration of ABX per Infectious Disease

## 2018-06-16 NOTE — PROGRESS NOTES
"Patient seen and examined  Cultures positive for Prevotella, Strep Agalactae    Blood pressure 117/71, pulse 86, temperature 36.9 °C (98.4 °F), resp. rate (!) 25, height 1.499 m (4' 11.02\"), weight 82.7 kg (182 lb 5.1 oz), SpO2 95 %, not currently breastfeeding.    Recent Labs      06/14/18   0439  06/15/18   0544  06/16/18   0540   WBC  12.2*  9.3  9.5   RBC  3.85*  3.56*  3.93*   HEMOGLOBIN  12.3  11.2*  12.2   HEMATOCRIT  37.3  34.0*  37.9   MCV  96.9  95.5  96.4   MCH  31.9  31.5  31.0   MCHC  33.0*  32.9*  32.2*   RDW  48.1  48.6  47.9   PLATELETCT  205  250  305   MPV  10.2  9.7  9.5       No acute distress  Dressing clean dry and intact  Neurovascularly intact    POD# 1, 5    Plan:  DVT Prophylaxis- TEDS/SCDs  Weight Bearing Status-WBAT  PT/OT  Antibiotics: Per ID  Will need repeat I&D monday          "

## 2018-06-17 LAB
ALBUMIN SERPL BCP-MCNC: 2.3 G/DL (ref 3.2–4.9)
ALBUMIN/GLOB SERPL: 0.7 G/DL
ALP SERPL-CCNC: 150 U/L (ref 30–99)
ALT SERPL-CCNC: 11 U/L (ref 2–50)
ANION GAP SERPL CALC-SCNC: 9 MMOL/L (ref 0–11.9)
AST SERPL-CCNC: 7 U/L (ref 12–45)
BASOPHILS # BLD AUTO: 0 % (ref 0–1.8)
BASOPHILS # BLD: 0 K/UL (ref 0–0.12)
BILIRUB SERPL-MCNC: 0.4 MG/DL (ref 0.1–1.5)
BUN SERPL-MCNC: <3 MG/DL (ref 8–22)
CALCIUM SERPL-MCNC: 7.4 MG/DL (ref 8.5–10.5)
CHLORIDE SERPL-SCNC: 103 MMOL/L (ref 96–112)
CO2 SERPL-SCNC: 25 MMOL/L (ref 20–33)
CREAT SERPL-MCNC: 0.27 MG/DL (ref 0.5–1.4)
EOSINOPHIL # BLD AUTO: 0 K/UL (ref 0–0.51)
EOSINOPHIL NFR BLD: 0 % (ref 0–6.9)
ERYTHROCYTE [DISTWIDTH] IN BLOOD BY AUTOMATED COUNT: 44.1 FL (ref 35.9–50)
GLOBULIN SER CALC-MCNC: 3.4 G/DL (ref 1.9–3.5)
GLUCOSE BLD-MCNC: 127 MG/DL (ref 65–99)
GLUCOSE BLD-MCNC: 151 MG/DL (ref 65–99)
GLUCOSE BLD-MCNC: 157 MG/DL (ref 65–99)
GLUCOSE BLD-MCNC: 167 MG/DL (ref 65–99)
GLUCOSE SERPL-MCNC: 169 MG/DL (ref 65–99)
HCT VFR BLD AUTO: 33.4 % (ref 37–47)
HGB BLD-MCNC: 11.3 G/DL (ref 12–16)
LG PLATELETS BLD QL SMEAR: NORMAL
LYMPHOCYTES # BLD AUTO: 2.14 K/UL (ref 1–4.8)
LYMPHOCYTES NFR BLD: 24.3 % (ref 22–41)
MAGNESIUM SERPL-MCNC: 1.6 MG/DL (ref 1.5–2.5)
MANUAL DIFF BLD: NORMAL
MCH RBC QN AUTO: 31.5 PG (ref 27–33)
MCHC RBC AUTO-ENTMCNC: 33.8 G/DL (ref 33.6–35)
MCV RBC AUTO: 93 FL (ref 81.4–97.8)
METAMYELOCYTES NFR BLD MANUAL: 0.9 %
MONOCYTES # BLD AUTO: 0.58 K/UL (ref 0–0.85)
MONOCYTES NFR BLD AUTO: 6.6 % (ref 0–13.4)
MORPHOLOGY BLD-IMP: NORMAL
NEUTROPHILS # BLD AUTO: 6 K/UL (ref 2–7.15)
NEUTROPHILS NFR BLD: 68.2 % (ref 44–72)
NRBC # BLD AUTO: 0 K/UL
NRBC BLD-RTO: 0 /100 WBC
PLATELET # BLD AUTO: 289 K/UL (ref 164–446)
PLATELET BLD QL SMEAR: NORMAL
PMV BLD AUTO: 9.4 FL (ref 9–12.9)
POTASSIUM SERPL-SCNC: 3.2 MMOL/L (ref 3.6–5.5)
PROT SERPL-MCNC: 5.7 G/DL (ref 6–8.2)
RBC # BLD AUTO: 3.59 M/UL (ref 4.2–5.4)
RBC BLD AUTO: PRESENT
SODIUM SERPL-SCNC: 137 MMOL/L (ref 135–145)
TOXIC GRANULES BLD QL SMEAR: SLIGHT
WBC # BLD AUTO: 8.8 K/UL (ref 4.8–10.8)

## 2018-06-17 PROCEDURE — 82962 GLUCOSE BLOOD TEST: CPT | Mod: 91

## 2018-06-17 PROCEDURE — 83735 ASSAY OF MAGNESIUM: CPT

## 2018-06-17 PROCEDURE — 80053 COMPREHEN METABOLIC PANEL: CPT

## 2018-06-17 PROCEDURE — 700105 HCHG RX REV CODE 258: Performed by: STUDENT IN AN ORGANIZED HEALTH CARE EDUCATION/TRAINING PROGRAM

## 2018-06-17 PROCEDURE — 700111 HCHG RX REV CODE 636 W/ 250 OVERRIDE (IP): Performed by: STUDENT IN AN ORGANIZED HEALTH CARE EDUCATION/TRAINING PROGRAM

## 2018-06-17 PROCEDURE — 36415 COLL VENOUS BLD VENIPUNCTURE: CPT

## 2018-06-17 PROCEDURE — 99233 SBSQ HOSP IP/OBS HIGH 50: CPT | Mod: GC | Performed by: HOSPITALIST

## 2018-06-17 PROCEDURE — 85007 BL SMEAR W/DIFF WBC COUNT: CPT

## 2018-06-17 PROCEDURE — A9270 NON-COVERED ITEM OR SERVICE: HCPCS | Performed by: ORTHOPAEDIC SURGERY

## 2018-06-17 PROCEDURE — 700111 HCHG RX REV CODE 636 W/ 250 OVERRIDE (IP): Performed by: INTERNAL MEDICINE

## 2018-06-17 PROCEDURE — 700102 HCHG RX REV CODE 250 W/ 637 OVERRIDE(OP): Performed by: ORTHOPAEDIC SURGERY

## 2018-06-17 PROCEDURE — 700105 HCHG RX REV CODE 258: Performed by: INTERNAL MEDICINE

## 2018-06-17 PROCEDURE — A9270 NON-COVERED ITEM OR SERVICE: HCPCS | Performed by: STUDENT IN AN ORGANIZED HEALTH CARE EDUCATION/TRAINING PROGRAM

## 2018-06-17 PROCEDURE — 700102 HCHG RX REV CODE 250 W/ 637 OVERRIDE(OP): Performed by: STUDENT IN AN ORGANIZED HEALTH CARE EDUCATION/TRAINING PROGRAM

## 2018-06-17 PROCEDURE — 770006 HCHG ROOM/CARE - MED/SURG/GYN SEMI*

## 2018-06-17 PROCEDURE — 85027 COMPLETE CBC AUTOMATED: CPT

## 2018-06-17 PROCEDURE — 99232 SBSQ HOSP IP/OBS MODERATE 35: CPT | Performed by: INTERNAL MEDICINE

## 2018-06-17 RX ORDER — POTASSIUM CHLORIDE 20 MEQ/1
40 TABLET, EXTENDED RELEASE ORAL ONCE
Status: COMPLETED | OUTPATIENT
Start: 2018-06-17 | End: 2018-06-17

## 2018-06-17 RX ADMIN — AMPICILLIN SODIUM AND SULBACTAM SODIUM 3 G: 2; 1 INJECTION, POWDER, FOR SOLUTION INTRAMUSCULAR; INTRAVENOUS at 01:05

## 2018-06-17 RX ADMIN — OXYCODONE HYDROCHLORIDE 10 MG: 10 TABLET ORAL at 23:24

## 2018-06-17 RX ADMIN — OXYCODONE HYDROCHLORIDE 10 MG: 10 TABLET ORAL at 08:45

## 2018-06-17 RX ADMIN — AMPICILLIN SODIUM AND SULBACTAM SODIUM 3 G: 2; 1 INJECTION, POWDER, FOR SOLUTION INTRAMUSCULAR; INTRAVENOUS at 06:39

## 2018-06-17 RX ADMIN — MAGNESIUM SULFATE HEPTAHYDRATE 2 G: 500 INJECTION, SOLUTION INTRAMUSCULAR; INTRAVENOUS at 22:14

## 2018-06-17 RX ADMIN — POTASSIUM CHLORIDE 40 MEQ: 1500 TABLET, EXTENDED RELEASE ORAL at 22:14

## 2018-06-17 RX ADMIN — OXYCODONE HYDROCHLORIDE 10 MG: 10 TABLET ORAL at 20:13

## 2018-06-17 RX ADMIN — AMPICILLIN SODIUM AND SULBACTAM SODIUM 3 G: 2; 1 INJECTION, POWDER, FOR SOLUTION INTRAMUSCULAR; INTRAVENOUS at 11:40

## 2018-06-17 RX ADMIN — OXYCODONE HYDROCHLORIDE 10 MG: 10 TABLET ORAL at 16:14

## 2018-06-17 RX ADMIN — INSULIN GLARGINE 10 UNITS: 100 INJECTION, SOLUTION SUBCUTANEOUS at 06:39

## 2018-06-17 RX ADMIN — AMPICILLIN SODIUM AND SULBACTAM SODIUM 3 G: 2; 1 INJECTION, POWDER, FOR SOLUTION INTRAMUSCULAR; INTRAVENOUS at 17:24

## 2018-06-17 ASSESSMENT — PAIN SCALES - GENERAL
PAINLEVEL_OUTOF10: 7
PAINLEVEL_OUTOF10: 7
PAINLEVEL_OUTOF10: 6
PAINLEVEL_OUTOF10: 6
PAINLEVEL_OUTOF10: 7
PAINLEVEL_OUTOF10: 5
PAINLEVEL_OUTOF10: 7
PAINLEVEL_OUTOF10: 7
PAINLEVEL_OUTOF10: 5

## 2018-06-17 ASSESSMENT — ENCOUNTER SYMPTOMS
COUGH: 0
NAUSEA: 0
MYALGIAS: 1
SPEECH CHANGE: 0
SHORTNESS OF BREATH: 0
VOMITING: 0
ABDOMINAL PAIN: 0
FEVER: 0
SENSORY CHANGE: 0

## 2018-06-17 ASSESSMENT — PATIENT HEALTH QUESTIONNAIRE - PHQ9
SUM OF ALL RESPONSES TO PHQ9 QUESTIONS 1 AND 2: 0
1. LITTLE INTEREST OR PLEASURE IN DOING THINGS: NOT AT ALL

## 2018-06-17 NOTE — PROGRESS NOTES
Infectious Disease Progress Note    Author: Brianna Mario M.D. Date & Time of service: 2018  1:32 PM    Chief Complaint:  Right thigh infection    Interval History:  2018 MAXIMUM TEMPERATURE 98.7. WBCs 11.9 and platelets 205 and cr 0.5  2018 MAXIMUM TEMPERATURE 98.7 leg continues to hurt. WBC 12.2 platelets 205 and cr 0.38   6/15/2018 and MAXIMUM TEMPERATURE 99.1 WBC 9.3 had another surgery yesterday.  2018 MAXIMUM TEMPERATURE 100 the groin is more swollen today. WBC 9  Labs Reviewed, Medications Reviewed, Radiology Reviewed and Wound Reviewed.    Review of Systems:  Review of Systems   Constitutional: Positive for malaise/fatigue. Negative for fever.   Respiratory: Negative for cough and shortness of breath.    Cardiovascular: Negative for chest pain and leg swelling.   Gastrointestinal: Negative for abdominal pain, nausea and vomiting.   Genitourinary: Negative for dysuria.   Musculoskeletal: Positive for myalgias.   Neurological: Negative for sensory change and speech change.       Hemodynamics:  Temp (24hrs), Av.3 °C (99.1 °F), Min:36.5 °C (97.7 °F), Max:37.8 °C (100 °F)  Temperature: 36.9 °C (98.4 °F)  Pulse  Av.3  Min: 65  Max: 106   Blood Pressure: (!) 90/55       Physical Exam:  Physical Exam   Constitutional: She is oriented to person, place, and time. No distress.   Tired but non toxic   Eyes: No scleral icterus.   Neck: Neck supple.   Cardiovascular: Regular rhythm.    No murmur heard.  Pulmonary/Chest: She has no wheezes. She has no rales.   Abdominal: Soft. There is no tenderness. There is no rebound.   Musculoskeletal: She exhibits edema.   Right thigh and groin wound VAC . Significant swelling and induration around the wound VAC worse than before   Neurological: She is alert and oriented to person, place, and time.   Vitals reviewed.      Meds:    Current Facility-Administered Medications:   •  loperamide  •  [START ON 2018] enoxaparin (LOVENOX) injection  •   insulin glargine  •  ampicillin-sulbactam (UNASYN) IV  •  insulin regular **AND** Accu-Chek ACHS **AND** NOTIFY MD and PharmD **AND** glucose 4 g **AND** dextrose 50%  •  senna-docusate **AND** polyethylene glycol/lytes **AND** magnesium hydroxide **AND** bisacodyl  •  acetaminophen  •  Pharmacy Consult Request  •  ondansetron  •  dexamethasone  •  diphenhydrAMINE  •  haloperidol lactate  •  scopolamine  •  senna-docusate  •  bisacodyl  •  fleet  •  oxyCODONE immediate-release  •  oxyCODONE immediate release  •  HYDROmorphone    Labs:  Recent Labs      06/15/18   0544  06/16/18   0540  06/17/18   0530   WBC  9.3  9.5  8.8   RBC  3.56*  3.93*  3.59*   HEMOGLOBIN  11.2*  12.2  11.3*   HEMATOCRIT  34.0*  37.9  33.4*   MCV  95.5  96.4  93.0   MCH  31.5  31.0  31.5   RDW  48.6  47.9  44.1   PLATELETCT  250  305  289   MPV  9.7  9.5  9.4   NEUTSPOLYS  62.30  69.90  68.20   LYMPHOCYTES  27.20  18.60*  24.30   MONOCYTES  6.10  7.90  6.60   EOSINOPHILS  0.90  0.90  0.00   BASOPHILS  0.00  0.00  0.00   RBCMORPHOLO  Normal  Normal  Present     Recent Labs      06/15/18   0544  06/16/18   0540  06/17/18   0530   SODIUM  144  141  137   POTASSIUM  3.4*  3.3*  3.2*   CHLORIDE  110  107  103   CO2  24  22  25   GLUCOSE  129*  158*  169*   BUN  8  4*  <3*     Recent Labs      06/15/18   0544  06/16/18   0540  06/17/18   0530   ALBUMIN  2.4*  2.5*  2.3*   TBILIRUBIN  0.4  0.4  0.4   ALKPHOSPHAT  271*  228*  150*   TOTPROTEIN  5.9*  5.9*  5.7*   ALTSGPT  26  17  11   ASTSGOT  12  8*  7*   CREATININE  0.35*  0.32*  0.27*       Imaging:  No results found.    Micro:  Results     Procedure Component Value Units Date/Time    BLOOD CULTURE [[id number]] Collected:  06/11/18 2156    Order Status:  Completed Specimen:  Blood from Peripheral Updated:  06/16/18 2300     Significant Indicator NEG     Source BLD     Site PERIPHERAL     Blood Culture No growth after 5 days of incubation.    Narrative:       Per Hospital Policy: Only change Specimen  "Src: to \"Line\" if  specified by physician order.    BLOOD CULTURE [[id number]] Collected:  06/11/18 2156    Order Status:  Completed Specimen:  Blood from Peripheral Updated:  06/16/18 2300     Significant Indicator NEG     Source BLD     Site PERIPHERAL     Blood Culture No growth after 5 days of incubation.    Narrative:       Per Hospital Policy: Only change Specimen Src: to \"Line\" if  specified by physician order.    CULTURE WOUND W/ GRAM STAIN [[id number]]  (Abnormal) Collected:  06/11/18 1945    Order Status:  Completed Specimen:  Wound Updated:  06/15/18 1524     Significant Indicator POS (POS)     Source WND     Site Right Groin Abscess     Culture Result Wound Light growth mixed skin franko. (A)     Gram Stain Result Many WBCs.  Many mixed bacteria, no predominant organism seen.       Culture Result Wound Streptococcus agalactiae (Group B)  Moderate growth   (A)    ANAEROBIC CULTURE [[id number]]  (Abnormal) Collected:  06/11/18 1945    Order Status:  Completed Specimen:  Wound Updated:  06/15/18 1524     Significant Indicator POS (POS)     Source WND     Site Right Groin Abscess     Anaerobic Culture, Culture Res Growth noted after further incubation, see below for  organism identification.   (A)      Prevotella (B.) bivia  Heavy growth   (A)    Urine Culture [[id number]]  (Abnormal) Collected:  06/12/18 0725    Order Status:  Completed Specimen:  Urine from Urine, Clean Catch Updated:  06/15/18 0938     Significant Indicator POS (POS)     Source UR     Site URINE, CLEAN CATCH     Urine Culture Mixed skin franko <10,000 cfu/mL (A)      Candida krusei  ,000 cfu/mL   (A)    Narrative:       Indication for culture:->Suspected Sepsis    Urinalysis [[id number]]  (Abnormal) Collected:  06/12/18 0725    Order Status:  Completed Specimen:  Urine from Urine, Clean Catch Updated:  06/12/18 1433     Micro Urine Req Microscopic     Color DK Yellow     Character Cloudy (A)     Ph 5.0     Glucose >=1000 (A) mg/dL      " Ketones 15 (A) mg/dL      Protein 30 (A) mg/dL      Bilirubin Negative     Urobilinogen, Urine 1.0     Nitrite Negative     Leukocyte Esterase Negative     Occult Blood Moderate (A)    Narrative:       Collected By:[id number] MARY BETH TREJO  If not done within the last 24 hours    GRAM STAIN [[id number]] Collected:  06/11/18 1945    Order Status:  Completed Specimen:  Wound Updated:  06/12/18 0940     Significant Indicator .     Source WND     Site Right Groin Abscess     Gram Stain Result Many WBCs.  Many mixed bacteria, no predominant organism seen.      Culture Respiratory W/ GRM STN [[id number]]     Order Status:  Completed Specimen:  Respirate from Sputum     BLOOD CULTURE [[id number]]     Order Status:  Canceled Specimen:  Blood from Peripheral     BLOOD CULTURE [[id number]]     Order Status:  Canceled Specimen:  Blood from Peripheral           Assessment:  Active Hospital Problems    Diagnosis   • Sepsis with acute organ failure secondary to necrotizing fasciitis of R thigh in the setting of hyperglycemia  [A41.9]   • New onset type 2 diabetes mellitus (HCC) [E11.9]   • Hypokalemia [E87.6]   • Non anion gap metabolic acidosis [E87.2]       Plan:  Necrotizing fasciitis-additional work  Underwent I&D on 6/11/2018 and 6/13/2018   OR cultures are showing group B strep and Prevotella  Continue Unasyn  for another surgery tomorrow  For this infection she needs surgical control and antibiotics are being an adjunctive role      New onset diabetes mellitus  Keep the blood sugars under control for wound healing    Asymptomatic fungiuria  Monitor    Hypotension  Resolved    Leukocytosis  Resolved    Tobacco abuse  Patient counseled    Discussed with internal medicine.

## 2018-06-17 NOTE — CARE PLAN
Problem: Safety  Goal: Will remain free from injury  Outcome: PROGRESSING AS EXPECTED    Goal: Will remain free from falls  Outcome: PROGRESSING AS EXPECTED      Problem: Infection  Goal: Will remain free from infection  Outcome: PROGRESSING AS EXPECTED      Problem: Pain Management  Goal: Pain level will decrease to patient's comfort goal  Outcome: PROGRESSING AS EXPECTED

## 2018-06-17 NOTE — CARE PLAN
rec'd report from PERRY Mcguire and assumed care of pt. Pt is awake/A&O x4 w/no ss of distress noted at this time. Pt reports pain as 7/10, will admin pain med when avail. Pt denies needs/complaints. Safety measures in place, call light w/in reach.

## 2018-06-17 NOTE — PROGRESS NOTES
"   Orthopaedic Progress Note    Interval changes:  Vac in place with no leak  To OR tomorrow  NPO after midnight     ROS - Patient denies any new issues.  Pain well controlled.    Blood pressure (!) 90/55, pulse 92, temperature 36.9 °C (98.4 °F), resp. rate 16, height 1.499 m (4' 11.02\"), weight 84.2 kg (185 lb 10 oz), SpO2 95 %, not currently breastfeeding.      Patient seen and examined  No acute distress  Breathing non labored  RRR  Right groin vac in place with no leak, continued inner thigh erythema, BLE DNVI, moves all toes, cap refill < 2 sec.    Recent Labs      06/15/18   0544  06/16/18   0540  06/17/18   0530   WBC  9.3  9.5  8.8   RBC  3.56*  3.93*  3.59*   HEMOGLOBIN  11.2*  12.2  11.3*   HEMATOCRIT  34.0*  37.9  33.4*   MCV  95.5  96.4  93.0   MCH  31.5  31.0  31.5   MCHC  32.9*  32.2*  33.8   RDW  48.6  47.9  44.1   PLATELETCT  250  305  289   MPV  9.7  9.5  9.4       Active Hospital Problems    Diagnosis   • Sepsis due to necrotizing fasciitis of R thigh [A41.9]     Priority: High   • New onset type 2 diabetes mellitus (HCC) [E11.9]     Priority: Medium   • Elevated alkaline phosphatase level [R74.8]       Assessment/Plan:  Return to OR tomorrow  NPO after midnight   POD#2 S/P:  1.  Irrigation and debridement of skin, subcutaneous tissue, and underlying   muscle.  2.  Wound VAC placement, right thigh.  POD#6 S/P:  1.  Irrigation and debridement of right thigh necrotizing fasciitis, infection   of skin, subcutaneous tissue, and underlying muscle.  2.  Wound VAC placement, right thigh.  Wt bearing status - WBAT  Wound care/Drains - vac left in place  Future Procedures - tomorrow  Lovenox: Start 6/13, Duration-until ambulatory > 150'  Sutures/Staples out- 10-14 days post operatively  PT/OT-initiated  Antibiotics: unasyn 3g IV Q6  DVT Prophylaxis- TEDS/SCDs/Foot pumps  Gaston-none  Case Coordination for Discharge Planning - Disposition home   "

## 2018-06-17 NOTE — PROGRESS NOTES
Internal Medicine Interval Note  Note Author: Harini Ritter M.D.     Name Emmanuelle Martin     1960   Age/Sex 58 y.o. female   MRN [id number]   Code Status Full      After 5PM or if no immediate response to page, please call for cross-coverage  Attending/Team:  / Mason See Patient List for primary contact information  Call [phone] to page    1st Call - Day Intern (R1):   Dr. Ritter 2nd Call - Day Sr. Resident (R2/R3):   Dr. Cohen        Reason for interval visit  (Principal Problem)   Sepsis with multi organ failure secondary to necrotizing fasciitis of right thigh with Group B Strep   New onset insulin dependent uncontrolled type II DM.     Interval Problem Daily Status Update  (24 hours)   - Pt was transferred from ICU on   - Per Ortho: Repeat I&D tomorrow  - Per ID: Unasyn started on   - Will hold Lantus & DVT Px for tonight & tomorrow AM since the pt id going to Sx & oral intake is poor ( to avoid hypoglycemia , recommended by ICU ), NPO midnight for I & D on       ROS   Review of Systems   Constitutional: Negative for chills and fever.   HENT: Negative for congestion and sore throat.    Eyes: Negative for blurred vision and double vision.   Respiratory: Negative for sputum production and shortness of breath.    Cardiovascular: Negative for chest pain and palpitations.   Gastrointestinal: Negative for abdominal pain, nausea and vomiting.   Genitourinary: Negative for dysuria and urgency.   Musculoskeletal: Negative.  Negative for joint pain and myalgias.       Right groin pain       No L/E pain , swelling, redness.   Skin: Negative.    Neurological: Negative for dizziness and headaches.   Endo/Heme/Allergies: Negative.    Psychiatric/Behavioral: Negative.      Consultants/Specialty  ID   Ortho Surgery   PCP: GARCIA NAVA    Disposition  Inpatient     Quality Measures  Quality-Core Measures   Reviewed items::  Labs reviewed and Medications reviewed  Gaston catheter::   No Gaston  DVT prophylaxis pharmacological::  Enoxaparin (Lovenox)  Antibiotics:  Treating active infection/contamination beyond 24 hours perioperative coverage          Physical Exam       Vitals:    06/16/18 1914 06/16/18 2315 06/17/18 0248 06/17/18 0800   BP: 138/81 141/81 140/72 138/76   Pulse: 97 99 98 82   Resp: 18 18 18 16   Temp: 37.8 °C (100 °F) 37.7 °C (99.8 °F) 37.7 °C (99.8 °F) 37.1 °C (98.8 °F)   SpO2: 94% 95% 95% 95%   Weight:       Height:         Body mass index is 37.47 kg/m².    Oxygen Therapy:  Pulse Oximetry: 95 %, O2 (LPM): 0, O2 Delivery: None (Room Air)    Physical Exam  Gen: Not in acute distress.    HEENT: NC/AT, moist mucous membranes.  Neck: No tracheal deviation. No stridor.   Cardiac: RRR without m/g/r. S1S2, no JVD.  Respiratory: Normal effort, no tachypnea. Decreased breath sounds over bilateral lateral lobes.   Abdomen: BS+, soft, NT/ND, no rebound/guarding.   Ext: No edema, 2+ DP pulses b/l.  Skin: Warm, dry. Dressing over right medial thigh. Showed mild edema S/P I&D x 2. Wound vac to right groin. Limited ROM of right thigh due to pain.   Neuro: AAOx4, CN II-XII grossly normal, no focal sensory or motor deficits.   Psych: Affect, mood, judgement normal.    Lab Data Review:         6/17/2018  9:30 AM    Recent Labs      06/15/18   0544  06/16/18   0540  06/17/18   0530   SODIUM  144  141  137   POTASSIUM  3.4*  3.3*  3.2*   CHLORIDE  110  107  103   CO2  24  22  25   BUN  8  4*  <3*   CREATININE  0.35*  0.32*  0.27*   MAGNESIUM  1.7  1.7  1.6   CALCIUM  8.3*  7.9*  7.4*       Recent Labs      06/15/18   0544  06/16/18   0540  06/17/18   0530   ALTSGPT  26  17  11   ASTSGOT  12  8*  7*   ALKPHOSPHAT  271*  228*  150*   TBILIRUBIN  0.4  0.4  0.4   GLUCOSE  129*  158*  169*       Recent Labs      06/15/18   0544  06/16/18   0540  06/17/18   0530   RBC  3.56*  3.93*  3.59*   HEMOGLOBIN  11.2*  12.2  11.3*   HEMATOCRIT  34.0*  37.9  33.4*   PLATELETCT  250  305  289       Recent Labs       06/15/18   0544  06/16/18   0540  06/17/18   0530   WBC  9.3  9.5  8.8   NEUTSPOLYS  62.30  69.90  68.20   LYMPHOCYTES  27.20  18.60*  24.30   MONOCYTES  6.10  7.90  6.60   EOSINOPHILS  0.90  0.90  0.00   BASOPHILS  0.00  0.00  0.00   ASTSGOT  12  8*  7*   ALTSGPT  26  17  11   ALKPHOSPHAT  271*  228*  150*   TBILIRUBIN  0.4  0.4  0.4           Assessment/Plan     Sepsis due to necrotizing fasciitis of R thigh  S/P I&D on 6/11 and 6/15,   Hemodynamically stable  Wound cx +group B Strep now on ampicillin-sulbactam    Wound vac placed 6/11  Control BGs with Lantus and SSI   Infectious Disease following   Blood cultures NGTD   Repeat I & D tomorrow         Non anion gap metabolic acidosis  Resolved  Secondary to NS fluid resuscitation causing hyperchloremic acidosis and starvation ketosis     New onset type 2 diabetes mellitus (HCC)  A1c 12.8% on admission   SSI, Lantus 15 u   Diabetes educator consult         Anemia/Thrombocytopenia  Resolved  In the setting of sepsis        Hypocalcemia  Resolved   Secondary to hypoalbuminemia in the setting of sepsis      Hypokalemia  Resolved  Secondary to Hypomagnesemia/Poor diet  Mg >2, K >4     Elevated alkaline phosphatase level  Consider RUQ ultrasound      Angelt Tami Ritter MD     The patient was seen by me face to face. I personally had a discussion with the patient. The case was discussed with our resident team, I examined the patient and confirmed the essential components of the history, physical examination, diagnosis and treatment plan as needed. I agree with the patient care as documented by the resident and edited as above by me. See resident's note above for complete details of service. The overall treatment regimen will be carried out as described above.     Thank you:  Pilo Luna MD, FACP  [facility]  Pager: Use [employer] Text (use resident info under treatment team for day to day floor issues)  Office: [phone] Ext. [phone]  Fax: [phone] (non PHI  only)

## 2018-06-17 NOTE — PROGRESS NOTES
Assumed care of pt. Via BSSR. Pt A&Ox4. On 2LNC. RUE PICC C/D/I. RLE drsg C/D/I connected to wound vac. No requests at this time. Safety measures in place. Call light within reach. Will continue to monitor.

## 2018-06-17 NOTE — PROGRESS NOTES
Emmanuelle Martin arrived on [place] [facility] around 1430. Patient brought to room in bed by transport. Patient's VSS. Patient given 0.5mg DiLaudid per MAR for pain. Patient on 1L NC O2 at this time. Wound vac to suction on R groin fasciotomy site. Gaston catheter in place.     Skin assessment completed with PERRY Pepe. Assessment findings: R groin swollen and reddened. Wound vac in place. Small blanchable red spot behind R ear. No other findings.

## 2018-06-18 LAB
ALBUMIN SERPL BCP-MCNC: 2.3 G/DL (ref 3.2–4.9)
ALBUMIN/GLOB SERPL: 0.6 G/DL
ALP SERPL-CCNC: 147 U/L (ref 30–99)
ALT SERPL-CCNC: 11 U/L (ref 2–50)
ANION GAP SERPL CALC-SCNC: 10 MMOL/L (ref 0–11.9)
AST SERPL-CCNC: 10 U/L (ref 12–45)
BASOPHILS # BLD AUTO: 0 % (ref 0–1.8)
BASOPHILS # BLD: 0 K/UL (ref 0–0.12)
BILIRUB SERPL-MCNC: 0.4 MG/DL (ref 0.1–1.5)
BUN SERPL-MCNC: 3 MG/DL (ref 8–22)
CALCIUM SERPL-MCNC: 8 MG/DL (ref 8.5–10.5)
CHLORIDE SERPL-SCNC: 100 MMOL/L (ref 96–112)
CO2 SERPL-SCNC: 28 MMOL/L (ref 20–33)
CREAT SERPL-MCNC: 0.31 MG/DL (ref 0.5–1.4)
EOSINOPHIL # BLD AUTO: 0.09 K/UL (ref 0–0.51)
EOSINOPHIL NFR BLD: 0.9 % (ref 0–6.9)
ERYTHROCYTE [DISTWIDTH] IN BLOOD BY AUTOMATED COUNT: 43.9 FL (ref 35.9–50)
GLOBULIN SER CALC-MCNC: 3.9 G/DL (ref 1.9–3.5)
GLUCOSE BLD-MCNC: 116 MG/DL (ref 65–99)
GLUCOSE BLD-MCNC: 153 MG/DL (ref 65–99)
GLUCOSE BLD-MCNC: 269 MG/DL (ref 65–99)
GLUCOSE BLD-MCNC: 353 MG/DL (ref 65–99)
GLUCOSE SERPL-MCNC: 121 MG/DL (ref 65–99)
HCT VFR BLD AUTO: 36.5 % (ref 37–47)
HGB BLD-MCNC: 12 G/DL (ref 12–16)
LYMPHOCYTES # BLD AUTO: 2.15 K/UL (ref 1–4.8)
LYMPHOCYTES NFR BLD: 21.9 % (ref 22–41)
MAGNESIUM SERPL-MCNC: 1.9 MG/DL (ref 1.5–2.5)
MANUAL DIFF BLD: NORMAL
MCH RBC QN AUTO: 31.1 PG (ref 27–33)
MCHC RBC AUTO-ENTMCNC: 32.9 G/DL (ref 33.6–35)
MCV RBC AUTO: 94.6 FL (ref 81.4–97.8)
MONOCYTES # BLD AUTO: 0.77 K/UL (ref 0–0.85)
MONOCYTES NFR BLD AUTO: 7.9 % (ref 0–13.4)
MORPHOLOGY BLD-IMP: NORMAL
MYELOCYTES NFR BLD MANUAL: 1.8 %
NEUTROPHILS # BLD AUTO: 6.62 K/UL (ref 2–7.15)
NEUTROPHILS NFR BLD: 67.5 % (ref 44–72)
NRBC # BLD AUTO: 0 K/UL
NRBC BLD-RTO: 0 /100 WBC
PLATELET # BLD AUTO: 290 K/UL (ref 164–446)
PLATELET BLD QL SMEAR: NORMAL
PMV BLD AUTO: 9.3 FL (ref 9–12.9)
POTASSIUM SERPL-SCNC: 3.4 MMOL/L (ref 3.6–5.5)
PROT SERPL-MCNC: 6.2 G/DL (ref 6–8.2)
RBC # BLD AUTO: 3.86 M/UL (ref 4.2–5.4)
RBC BLD AUTO: PRESENT
SODIUM SERPL-SCNC: 138 MMOL/L (ref 135–145)
VARIANT LYMPHS BLD QL SMEAR: NORMAL
WBC # BLD AUTO: 9.8 K/UL (ref 4.8–10.8)

## 2018-06-18 PROCEDURE — 83735 ASSAY OF MAGNESIUM: CPT

## 2018-06-18 PROCEDURE — 700102 HCHG RX REV CODE 250 W/ 637 OVERRIDE(OP)

## 2018-06-18 PROCEDURE — 500891 HCHG PACK, ORTHO MAJOR: Performed by: ORTHOPAEDIC SURGERY

## 2018-06-18 PROCEDURE — 160036 HCHG PACU - EA ADDL 30 MINS PHASE I: Performed by: ORTHOPAEDIC SURGERY

## 2018-06-18 PROCEDURE — 99233 SBSQ HOSP IP/OBS HIGH 50: CPT | Mod: GC | Performed by: HOSPITALIST

## 2018-06-18 PROCEDURE — 700105 HCHG RX REV CODE 258: Performed by: INTERNAL MEDICINE

## 2018-06-18 PROCEDURE — 501838 HCHG SUTURE GENERAL: Performed by: ORTHOPAEDIC SURGERY

## 2018-06-18 PROCEDURE — 700111 HCHG RX REV CODE 636 W/ 250 OVERRIDE (IP)

## 2018-06-18 PROCEDURE — 160009 HCHG ANES TIME/MIN: Performed by: ORTHOPAEDIC SURGERY

## 2018-06-18 PROCEDURE — 160027 HCHG SURGERY MINUTES - 1ST 30 MINS LEVEL 2: Performed by: ORTHOPAEDIC SURGERY

## 2018-06-18 PROCEDURE — A6550 NEG PRES WOUND THER DRSG SET: HCPCS | Performed by: ORTHOPAEDIC SURGERY

## 2018-06-18 PROCEDURE — 700111 HCHG RX REV CODE 636 W/ 250 OVERRIDE (IP): Performed by: ORTHOPAEDIC SURGERY

## 2018-06-18 PROCEDURE — 160035 HCHG PACU - 1ST 60 MINS PHASE I: Performed by: ORTHOPAEDIC SURGERY

## 2018-06-18 PROCEDURE — 700102 HCHG RX REV CODE 250 W/ 637 OVERRIDE(OP): Performed by: STUDENT IN AN ORGANIZED HEALTH CARE EDUCATION/TRAINING PROGRAM

## 2018-06-18 PROCEDURE — 770006 HCHG ROOM/CARE - MED/SURG/GYN SEMI*

## 2018-06-18 PROCEDURE — 99233 SBSQ HOSP IP/OBS HIGH 50: CPT | Performed by: INTERNAL MEDICINE

## 2018-06-18 PROCEDURE — 80053 COMPREHEN METABOLIC PANEL: CPT

## 2018-06-18 PROCEDURE — 85007 BL SMEAR W/DIFF WBC COUNT: CPT

## 2018-06-18 PROCEDURE — 160002 HCHG RECOVERY MINUTES (STAT): Performed by: ORTHOPAEDIC SURGERY

## 2018-06-18 PROCEDURE — 160038 HCHG SURGERY MINUTES - EA ADDL 1 MIN LEVEL 2: Performed by: ORTHOPAEDIC SURGERY

## 2018-06-18 PROCEDURE — A9270 NON-COVERED ITEM OR SERVICE: HCPCS | Performed by: STUDENT IN AN ORGANIZED HEALTH CARE EDUCATION/TRAINING PROGRAM

## 2018-06-18 PROCEDURE — 700111 HCHG RX REV CODE 636 W/ 250 OVERRIDE (IP): Performed by: INTERNAL MEDICINE

## 2018-06-18 PROCEDURE — 82962 GLUCOSE BLOOD TEST: CPT

## 2018-06-18 PROCEDURE — 0KBQ0ZZ EXCISION OF RIGHT UPPER LEG MUSCLE, OPEN APPROACH: ICD-10-PCS | Performed by: ORTHOPAEDIC SURGERY

## 2018-06-18 PROCEDURE — A9270 NON-COVERED ITEM OR SERVICE: HCPCS

## 2018-06-18 PROCEDURE — 36415 COLL VENOUS BLD VENIPUNCTURE: CPT

## 2018-06-18 PROCEDURE — 85027 COMPLETE CBC AUTOMATED: CPT

## 2018-06-18 PROCEDURE — 501445 HCHG STAPLER, SKIN DISP: Performed by: ORTHOPAEDIC SURGERY

## 2018-06-18 PROCEDURE — 700111 HCHG RX REV CODE 636 W/ 250 OVERRIDE (IP): Performed by: STUDENT IN AN ORGANIZED HEALTH CARE EDUCATION/TRAINING PROGRAM

## 2018-06-18 PROCEDURE — 160048 HCHG OR STATISTICAL LEVEL 1-5: Performed by: ORTHOPAEDIC SURGERY

## 2018-06-18 RX ORDER — POTASSIUM CHLORIDE 20 MEQ/1
40 TABLET, EXTENDED RELEASE ORAL 2 TIMES DAILY
Status: DISCONTINUED | OUTPATIENT
Start: 2018-06-18 | End: 2018-06-18

## 2018-06-18 RX ORDER — MAGNESIUM SULFATE 1 G/100ML
1 INJECTION INTRAVENOUS ONCE
Status: DISCONTINUED | OUTPATIENT
Start: 2018-06-18 | End: 2018-06-18

## 2018-06-18 RX ORDER — SODIUM CHLORIDE 9 MG/ML
INJECTION, SOLUTION INTRAVENOUS
Status: ACTIVE
Start: 2018-06-18 | End: 2018-06-18

## 2018-06-18 RX ORDER — MIDAZOLAM HYDROCHLORIDE 1 MG/ML
INJECTION INTRAMUSCULAR; INTRAVENOUS
Status: DISPENSED
Start: 2018-06-18 | End: 2018-06-18

## 2018-06-18 RX ORDER — POTASSIUM CHLORIDE 7.45 MG/ML
10 INJECTION INTRAVENOUS
Status: DISCONTINUED | OUTPATIENT
Start: 2018-06-18 | End: 2018-06-18

## 2018-06-18 RX ORDER — POTASSIUM CHLORIDE 7.45 MG/ML
10 INJECTION INTRAVENOUS
Status: COMPLETED | OUTPATIENT
Start: 2018-06-18 | End: 2018-06-18

## 2018-06-18 RX ORDER — POTASSIUM CHLORIDE 20 MEQ/1
40 TABLET, EXTENDED RELEASE ORAL ONCE
Status: COMPLETED | OUTPATIENT
Start: 2018-06-18 | End: 2018-06-18

## 2018-06-18 RX ORDER — INSULIN GLARGINE 100 [IU]/ML
10 INJECTION, SOLUTION SUBCUTANEOUS EVERY EVENING
Status: DISCONTINUED | OUTPATIENT
Start: 2018-06-18 | End: 2018-06-19

## 2018-06-18 RX ORDER — KETOROLAC TROMETHAMINE 30 MG/ML
INJECTION, SOLUTION INTRAMUSCULAR; INTRAVENOUS
Status: COMPLETED
Start: 2018-06-18 | End: 2018-06-18

## 2018-06-18 RX ORDER — OXYCODONE HCL 5 MG/5 ML
SOLUTION, ORAL ORAL
Status: COMPLETED
Start: 2018-06-18 | End: 2018-06-18

## 2018-06-18 RX ADMIN — OXYCODONE HYDROCHLORIDE 10 MG: 5 SOLUTION ORAL at 09:52

## 2018-06-18 RX ADMIN — AMPICILLIN SODIUM AND SULBACTAM SODIUM 3 G: 2; 1 INJECTION, POWDER, FOR SOLUTION INTRAMUSCULAR; INTRAVENOUS at 00:44

## 2018-06-18 RX ADMIN — POTASSIUM CHLORIDE 10 MEQ: 7.46 INJECTION, SOLUTION INTRAVENOUS at 06:25

## 2018-06-18 RX ADMIN — AMPICILLIN SODIUM AND SULBACTAM SODIUM 3 G: 2; 1 INJECTION, POWDER, FOR SOLUTION INTRAMUSCULAR; INTRAVENOUS at 17:02

## 2018-06-18 RX ADMIN — AMPICILLIN SODIUM AND SULBACTAM SODIUM 3 G: 2; 1 INJECTION, POWDER, FOR SOLUTION INTRAMUSCULAR; INTRAVENOUS at 05:38

## 2018-06-18 RX ADMIN — HYDROMORPHONE HYDROCHLORIDE 0.5 MG: 10 INJECTION, SOLUTION INTRAMUSCULAR; INTRAVENOUS; SUBCUTANEOUS at 10:05

## 2018-06-18 RX ADMIN — HYDROMORPHONE HYDROCHLORIDE 0.5 MG: 10 INJECTION, SOLUTION INTRAMUSCULAR; INTRAVENOUS; SUBCUTANEOUS at 06:25

## 2018-06-18 RX ADMIN — FENTANYL CITRATE 50 MCG: 50 INJECTION, SOLUTION INTRAMUSCULAR; INTRAVENOUS at 09:40

## 2018-06-18 RX ADMIN — AMPICILLIN SODIUM AND SULBACTAM SODIUM 3 G: 2; 1 INJECTION, POWDER, FOR SOLUTION INTRAMUSCULAR; INTRAVENOUS at 11:15

## 2018-06-18 RX ADMIN — DIPHENHYDRAMINE HYDROCHLORIDE 25 MG: 50 INJECTION, SOLUTION INTRAMUSCULAR; INTRAVENOUS at 00:45

## 2018-06-18 RX ADMIN — FENTANYL CITRATE 50 MCG: 50 INJECTION, SOLUTION INTRAMUSCULAR; INTRAVENOUS at 09:32

## 2018-06-18 RX ADMIN — POTASSIUM CHLORIDE 40 MEQ: 1500 TABLET, EXTENDED RELEASE ORAL at 15:29

## 2018-06-18 RX ADMIN — KETOROLAC TROMETHAMINE 30 MG: 30 INJECTION, SOLUTION INTRAMUSCULAR at 09:51

## 2018-06-18 RX ADMIN — ENOXAPARIN SODIUM 40 MG: 100 INJECTION SUBCUTANEOUS at 21:31

## 2018-06-18 RX ADMIN — INSULIN GLARGINE 10 UNITS: 100 INJECTION, SOLUTION SUBCUTANEOUS at 21:30

## 2018-06-18 RX ADMIN — HYDROMORPHONE HYDROCHLORIDE 0.5 MG: 10 INJECTION, SOLUTION INTRAMUSCULAR; INTRAVENOUS; SUBCUTANEOUS at 10:00

## 2018-06-18 RX ADMIN — POTASSIUM CHLORIDE 10 MEQ: 7.46 INJECTION, SOLUTION INTRAVENOUS at 11:00

## 2018-06-18 ASSESSMENT — ENCOUNTER SYMPTOMS
COUGH: 0
NAUSEA: 0
SHORTNESS OF BREATH: 0
CHILLS: 0
FEVER: 0
MYALGIAS: 1
ABDOMINAL PAIN: 0
SPEECH CHANGE: 0
VOMITING: 0
DIARRHEA: 0
SENSORY CHANGE: 0

## 2018-06-18 ASSESSMENT — PAIN SCALES - GENERAL
PAINLEVEL_OUTOF10: 4
PAINLEVEL_OUTOF10: 6
PAINLEVEL_OUTOF10: 4
PAINLEVEL_OUTOF10: 8
PAINLEVEL_OUTOF10: 4
PAINLEVEL_OUTOF10: 0
PAINLEVEL_OUTOF10: 5
PAINLEVEL_OUTOF10: 4
PAINLEVEL_OUTOF10: 8
PAINLEVEL_OUTOF10: 5

## 2018-06-18 ASSESSMENT — PAIN SCALES - WONG BAKER: WONGBAKER_NUMERICALRESPONSE: HURTS A LITTLE MORE

## 2018-06-18 NOTE — DISCHARGE SUMMARY
Internal Medicine Discharge Summary  Note Author: Kathe Veliz M.D.       Admit Date:  6/11/2018       Discharge Date:  7/3/2018    Service:   R Internal Medicine Red Team  Attending Physician(s):   Dr Luna / Dr Willard        Senior Resident(s):   Dr Carpenter/Dr. Parson  Rodolfo Resident(s):   Dr Ritter/ Dr. Miller      Primary Diagnosis:   Sepsis due to necrotizing fasciitis of Right thigh    Newly diagnosed diabetes mellitus    Secondary Diagnoses:                Active Problems:    Necrotizing fasciitis of R thigh POA: Yes    New onset type 2 diabetes mellitus (HCC) POA: Yes    Elevated alkaline phosphatase level POA: Yes  Resolved Problems:    Hypokalemia POA: Yes    Non anion gap metabolic acidosis POA: Yes    Anemia/Thrombocytopenia POA: Yes    Hypocalcemia POA: Yes    Sepsis       History of Present Illness (as written by Dr. Sales on Brenda 10, 2018):       58-year-old female with a history of rectocele and cystocele stress incontinence was transferred from [facility] for necrotizing fasciitis.   Patient was evaluated right before she was taken to the OR.  Patient developed right thigh pain and redness 4 days ago.  Her pain and erythema acutely worsened over the last 4 days.   She noted purulent drainage as well as bloody drainage and reports fever and fatigue.  She denies using any injections to the area.  She initially presented to [facility] today where was found to be septic with WBC 15 and .  CT pelvis revealed necrotizing fasciitis of the right medial thigh without involvement of the abdominal cavity or external genitalia.   Her BP was stable and no lactic acidosis.  She was received 2L of Crystalloid fluid and 1 dose of Vancomycin and Zosyn at [facility].  She was also found to have hyperglycemia with .  Patient states that she has no history of diabetes.  However she reports that she has had increasing thirst lately.         Hospital Summary (Brief Narrative):       This lady  was admitted to the medical floor for necrotizing fascitis. Her HbA1c was 12.8 on admission. She was treated with fluid resuscitation and intravenous antibiotics for her sepsis causing multiorgan failure at presentation. She was treated initially with Zosyn, Vancomycin and Clindamycin, and was subsequently switched to Unasyn for group B streptococci in her wound cultures. She had incision and drainage, debridement of the wound performed by Dr. Martinez, orthopedician on five different occasions - i.e. on 6/10, 6/15, 6/18, 6/20 and 6/22 with final wound closure performed on 6/22/2018. An incisional wound drain and wound vac were placed, which were removed on .... prior to discharge once the drain output was less than 30 mls per day. She developed mild pulmonary edema with increasing her oxygen requirements from baseline room air to 1-2 lpm ini the initial stages of fluid resuscitation. She was given lasix with improvement of her oxygen requirements to 1 lpm. She remained hemodynamically stable. Given that she does not have insurance, and lives far away in [place], [facility] has arranged for her to have daily intravenous Meropenem until the 6th July 2018 administered at home, to complete the antibiotic course recommended by ID.     Given the high blood sugars, and high HbA1c of 12.8, she was started on Insulin. She was discharged home on Lantus 35 units in the evening daily, and 3 units of Lispro thrice daily before meal. With this regime, her blood sugars are currently under good control. She was seen by the diabetes educator and was given supplies - glucometer, lancets, strips and insulin.     She also had nocturnal oximetry performed while in-patient due to nocturnal hypoxia. The results suggested that she had 8 desaturation episodes during the night, down to mid-80s and came back up to 90s.. She will need to have a sleep study organized by her primary care physician for investigation of possible sleep apnea  and for CPAP.      Patient /Hospital Summary (Details -- Problem Oriented) :          Sepsis due to necrotizing fasciitis of R thigh   Assessment & Plan    - s/p I&D on 6/11, 6/15, 6/18, 6/20, 6/22  - Wound cx + for prevotella and group B Strep   - on Unasyn started 6/13/18 ,changed to ertapenem to be continued till 7/6/18 per ID recs  - wound looks clean on discharge  - continue IV antibiotics per ID, till 7/6/18             Nocturnal hypoxia   Assessment & Plan    - nocturnal oximetry performed overnight  - not on oxygen  - 8 brief episodes of desaturations each lasting 1-3 mins  - patient comfortable, asymptomatic  -  Will need outpatient sleep study and referral for CPAP.        New onset type 2 diabetes mellitus (HCC)   Assessment & Plan    - A1c 12.8% on admission   - on Lantus 35 units pm, and Lispro 5 units TID  - good blood sugar control  - Diabetes education provided  - low carbohydrate diet  - Echo 6/26 : good EF 60% with normal LV and RV systolic function  Plan  - continue Lantus 35 and Lispro to 5 units TID        Hypokalemia-resolved as of 6/14/2018   Assessment & Plan    Resolved  Secondary to Hypomagnesemia/Poor diet  Mg >2, K >4        Hypoalbuminemia   Assessment & Plan    - pre-albumin 23  - s/b dietary on 6/25 --> no additional needs, hence signed off        Elevated alkaline phosphatase level   Assessment & Plan    - resolved        Hypocalcemia-resolved as of 6/13/2018   Assessment & Plan    Resolved   Secondary to hypoalbuminemia in the setting of sepsis         Anemia/Thrombocytopenia-resolved as of 6/13/2018   Assessment & Plan    Resolved  In the setting of sepsis          Non anion gap metabolic acidosis-resolved as of 6/15/2018   Assessment & Plan    Resolved  Secondary to NS fluid resuscitation causing hyperchloremic acidosis and starvation ketosis            Consultants:     Ortho - Dr. Martinez  ID - Dr. Garza     Procedures:        I & D on 6/10, 6/15, 6/18, 6/20, 6/22  PICC line  6/20    Imaging/ Testing:      ECHOCARDIOGRAM COMP W/O CONT   Final Result      IR-PICC LINE PLACEMENT > AGE 5   Final Result                  Ultrasound-guided PICC placement performed by qualified nursing staff as    above.          DX-CHEST-LIMITED (1 VIEW)   Final Result      Evaluation limited due to overlying soft tissues. Evaluation of the left lung base is particularly limited. Left basilar airspace opacities are not excluded. Further evaluation can be performed as PA and lateral plain films of the chest.         CT-FOREIGN FILM CAT SCAN   Final Result      OUTSIDE IMAGES-DX CHEST   Final Result      OUTSIDE IMAGES-CT ABDOMEN /PELVIS   Final Result      OUTSIDE IMAGES-DX CHEST   Final Result          Discharge Medications:         Medication Reconciliation: Deferred       Medication List      START taking these medications      Instructions   albuterol 108 (90 Base) MCG/ACT Aers inhalation aerosol   Inhale 2 Puffs by mouth every 6 hours as needed for Shortness of Breath.  Dose:  2 Puff     Blood Glucose Monitoring Suppl Elke   Meter: Dispense Device of Insurance Preference. Use as directed for blood sugar monitoring.     glucose blood strip  Commonly known as:  ACCU-CHEK SMARTVIEW   1 Strip by Other route as needed.  Dose:  1 Each     insulin glargine 100 UNIT/ML Soln  Commonly known as:  LANTUS   Inject 35 Units as instructed every evening.  Dose:  35 Units     insulin lispro 100 UNIT/ML Soln  Commonly known as:  HUMALOG   Inject 5 Units as instructed 3 times a day before meals.  Dose:  5 Units     Lancets Misc   Lancets order: Lancets for meter. Use daily and as needed in times of high or low blood sugars.                  Disposition:   Home    Diet:   Diabetic    Activity:   As tolerated    Instructions:      Advised medication and follow-up compliance  Advised to follow-up with primary care in 2 weeks for diabetes management and post-op follow-up  To complete intravenous meropenem home infusions till  July 6th, 2018  To see primary care physician regarding sleep study, and ongoing need for home oxygen    The patient was instructed to return to the ER in the event of worsening symptoms. I have counseled the patient on the importance of compliance and the patient has agreed to proceed with all medical recommendations and follow up plan indicated above.   The patient understands that all medications come with benefits and risks. Risks may include permanent injury or death and these risks can be minimized with close reassessment and monitoring.        Primary Care Provider:    IRINEO NVAA  Discharge summary faxed to primary care provider:  Completed  Copy of discharge summary given to the patient: Deferred      Follow up appointment details :      Patient advised to see primary care physician in 2 weeks    Pending Studies:        None    Time spent on discharge day patient visit, preparing discharge paperwork and arranging for patient follow up.    Summary of follow up issues:   Sleep study to be organized by PCP  Ongoing diabetes management by PCP    Discharge Time (Minutes) :    60 mins  Hospital Course Type:  Inpatient Stay >2 midnights      Condition on Discharge    ______________________________________________________________________    Interval history/exam for day of discharge:     Patient reports to be doing fine, denies any complaints.    Vitals:    06/28/18 1600 06/28/18 2000 06/29/18 0400 06/29/18 0800   BP: 115/74 112/79 102/69 109/71   Pulse: 76 73 68 66   Resp: 17 16 15 17   Temp: 36.4 °C (97.5 °F) 36.7 °C (98.1 °F) 36.4 °C (97.5 °F) 36.9 °C (98.4 °F)   SpO2: 95% 97% 95% 94%   Weight:       Height:         Weight/BMI: Body mass index is 37.47 kg/m².  Pulse Oximetry: 94 %, O2 (LPM): 0, O2 Delivery: None (Room Air)    General: Appears comfortable  CVS: Regular rate and rhythm, no murmurs, rubs or gallops  PULM: Clear to auscultation      Most Recent Labs:    Lab Results   Component Value  Date/Time    WBC 7.7 06/28/2018 11:50 PM    RBC 3.99 (L) 06/28/2018 11:50 PM    HEMOGLOBIN 12.4 06/28/2018 11:50 PM    HEMATOCRIT 37.1 06/28/2018 11:50 PM    MCV 93.0 06/28/2018 11:50 PM    MCH 31.1 06/28/2018 11:50 PM    MCHC 33.4 (L) 06/28/2018 11:50 PM    MPV 9.3 06/28/2018 11:50 PM    NEUTSPOLYS 38.80 (L) 06/28/2018 11:50 PM    LYMPHOCYTES 50.60 (H) 06/28/2018 11:50 PM    MONOCYTES 7.80 06/28/2018 11:50 PM    EOSINOPHILS 1.20 06/28/2018 11:50 PM    BASOPHILS 0.80 06/28/2018 11:50 PM    ANISOCYTOSIS 1+ 06/11/2018 09:56 PM      Lab Results   Component Value Date/Time    SODIUM 141 06/28/2018 11:50 PM    POTASSIUM 3.9 06/28/2018 11:50 PM    CHLORIDE 106 06/28/2018 11:50 PM    CO2 25 06/28/2018 11:50 PM    GLUCOSE 135 (H) 06/28/2018 11:50 PM    BUN 13 06/28/2018 11:50 PM    CREATININE 0.48 (L) 06/28/2018 11:50 PM      Lab Results   Component Value Date/Time    ALTSGPT 15 06/27/2018 02:10 AM    ASTSGOT 13 06/27/2018 02:10 AM    ALKPHOSPHAT 75 06/27/2018 02:10 AM    TBILIRUBIN 0.4 06/27/2018 02:10 AM    ALBUMIN 2.9 (L) 06/27/2018 02:10 AM    GLOBULIN 3.8 (H) 06/27/2018 02:10 AM    PREALBUMIN 23.0 06/26/2018 03:15 AM    INR 1.11 06/11/2018 09:56 PM     Lab Results   Component Value Date/Time    PROTHROMBTM 14.0 06/11/2018 09:56 PM    INR 1.11 06/11/2018 09:56 PM

## 2018-06-18 NOTE — PROGRESS NOTES
NS=[id number], SSI held per nursing judgment until diet orders are in, call placed to physician for diet orders

## 2018-06-18 NOTE — OP REPORT
DATE OF SERVICE:  06/18/2018    PREOPERATIVE DIAGNOSIS:  Right thigh necrotizing infection.    POSTOPERATIVE DIAGNOSIS:  Right thigh necrotizing infection.    PROCEDURES PERFORMED:  1.  Irrigation and debridement of skin, subcutaneous tissue and underlying   muscle, right thigh.  2.  Wound VAC placement, right thigh.    SURGEON:  Omega Chew MD    ASSISTANT:  Gabino Medrano PA-C    ESTIMATED BLOOD LOSS:  None.    INDICATIONS:  This is a 58-year-old with a very severe necrotizing infection   to her right thigh and inguinal crease, which required multiple irrigation and   debridement procedures.  She has had progressive skin necrosis and now   requires additional debridement.  Risks and benefits were discussed, which   include but not limited to bleeding, infection, neurovascular damage, pain,   stiffness, and need for further surgery.  She understands all these risks and   wishes to proceed.    DESCRIPTION OF PROCEDURE:  The patient was sedated with LMA anesthesia and   administered preoperative antibiotics.  The right lower extremity was prepped   and draped in the usual sterile fashion.  She had about a 2x10 cm area of   necrotic skin, which required excision and medially in her medial thigh.    There was additional infection in the fascial tissues.  Fasciitis was   expressed and debrided of skin, subcutaneous tissue, and underlying muscle in   an excisional fashion with a knife and rongeur and then irrigated with copious   amounts of normal saline solution via pulsatile lavage.  A wound VAC was   placed under sterile conditions.  The patient tolerated the procedure well.    POSTOPERATIVE PLAN:  The patient to be readmitted for care for infectious   disease service, antibiotics, wound VAC treatment and return to the OR on   Wednesday.       ____________________________________     OMEGA CHEW MD    JESSICA / NTS    DD:  06/18/2018 09:21:05  DT:  06/18/2018 09:32:18    D#:  [id number]  Job#:  [id number]

## 2018-06-18 NOTE — PROGRESS NOTES
Patient returned to room from surgery, right groin wound vac dressing in place, pedal pulses present, vital signs stable

## 2018-06-18 NOTE — CARE PLAN
Problem: Pain Management  Goal: Pain level will decrease to patient's comfort goal  Outcome: PROGRESSING AS EXPECTED  Administered pain medications per MAR, teach non pharmacological techniques such as relaxation, imagery    Problem: Skin Integrity  Goal: Risk for impaired skin integrity will decrease  Outcome: PROGRESSING AS EXPECTED  q2 aleyda, marion risk assessment, applied barrier cream

## 2018-06-18 NOTE — PROGRESS NOTES
Infectious Disease Progress Note    Author: Patricia Escamilla M.D. Date & Time of service: 2018  11:24 AM    Chief Complaint:  FU Right thigh necrotizing fasciitis    Interval History:  2018 MAXIMUM TEMPERATURE 98.7. WBCs 11.9 and platelets 205 and cr 0.5  2018 MAXIMUM TEMPERATURE 98.7 leg continues to hurt. WBC 12.2 platelets 205 and cr 0.38   6/15/2018 and MAXIMUM TEMPERATURE 99.1 WBC 9.3 had another surgery yesterday.  2018 MAXIMUM TEMPERATURE 100 the groin is more swollen today. WBC 9   Tmax 99.8 WBC 9.8 s/p I&D this morning, R thigh soreness  Labs Reviewed, Medications Reviewed, Radiology Reviewed and Wound Reviewed.    Review of Systems:  Review of Systems   Constitutional: Positive for malaise/fatigue. Negative for chills and fever.   Respiratory: Negative for cough and shortness of breath.    Cardiovascular: Negative for chest pain and leg swelling.   Gastrointestinal: Negative for abdominal pain, diarrhea, nausea and vomiting.   Genitourinary: Negative for dysuria.   Musculoskeletal: Positive for myalgias.   Neurological: Negative for sensory change and speech change.       Hemodynamics:  Temp (24hrs), Av.1 °C (98.7 °F), Min:36.6 °C (97.9 °F), Max:37.7 °C (99.8 °F)  Temperature: 36.6 °C (97.9 °F)  Pulse  Av.3  Min: 65  Max: 106Heart Rate (Monitored): 77  Blood Pressure: 134/84, NIBP: 119/68       Physical Exam:  Physical Exam   Constitutional: She is oriented to person, place, and time. She appears well-developed. No distress.   non toxic  Older than stated age   HENT:   Head: Normocephalic and atraumatic.   Eyes: EOM are normal. Pupils are equal, round, and reactive to light. No scleral icterus.   Neck: Neck supple.   Cardiovascular: Normal rate and regular rhythm.    No murmur heard.  Pulmonary/Chest: Effort normal. She has no wheezes. She has no rales.   Abdominal: Soft. There is no tenderness. There is no rebound.   Musculoskeletal: She exhibits edema.   Right thigh and  groin wound VAC . Significant swelling and induration around the wound VAC    Neurological: She is alert and oriented to person, place, and time.   Vitals reviewed.      Meds:    Current Facility-Administered Medications:   •  fentaNYL  •  midazolam  •  [COMPLETED] potassium chloride **FOLLOWED BY** potassium chloride **FOLLOWED BY** [DISCONTINUED] potassium chloride  •  NS  •  potassium chloride SA  •  loperamide  •  ampicillin-sulbactam (UNASYN) IV  •  insulin regular **AND** Accu-Chek ACHS **AND** NOTIFY MD and PharmD **AND** glucose 4 g **AND** dextrose 50%  •  senna-docusate **AND** polyethylene glycol/lytes **AND** magnesium hydroxide **AND** bisacodyl  •  acetaminophen  •  Pharmacy Consult Request  •  ondansetron  •  dexamethasone  •  diphenhydrAMINE  •  haloperidol lactate  •  scopolamine  •  senna-docusate  •  bisacodyl  •  fleet  •  oxyCODONE immediate-release  •  oxyCODONE immediate release  •  HYDROmorphone    Labs:  Recent Labs      06/16/18 0540  06/17/18   0530  06/18/18   0450   WBC  9.5  8.8  9.8   RBC  3.93*  3.59*  3.86*   HEMOGLOBIN  12.2  11.3*  12.0   HEMATOCRIT  37.9  33.4*  36.5*   MCV  96.4  93.0  94.6   MCH  31.0  31.5  31.1   RDW  47.9  44.1  43.9   PLATELETCT  305  289  290   MPV  9.5  9.4  9.3   NEUTSPOLYS  69.90  68.20  67.50   LYMPHOCYTES  18.60*  24.30  21.90*   MONOCYTES  7.90  6.60  7.90   EOSINOPHILS  0.90  0.00  0.90   BASOPHILS  0.00  0.00  0.00   RBCMORPHOLO  Normal  Present  Present     Recent Labs      06/16/18   0540  06/17/18   0530  06/18/18   0450   SODIUM  141  137  138   POTASSIUM  3.3*  3.2*  3.4*   CHLORIDE  107  103  100   CO2  22  25  28   GLUCOSE  158*  169*  121*   BUN  4*  <3*  3*     Recent Labs      06/16/18   0540  06/17/18   0530  06/18/18   0450   ALBUMIN  2.5*  2.3*  2.3*   TBILIRUBIN  0.4  0.4  0.4   ALKPHOSPHAT  228*  150*  147*   TOTPROTEIN  5.9*  5.7*  6.2   ALTSGPT  17  11  11   ASTSGOT  8*  7*  10*   CREATININE  0.32*  0.27*  0.31*       Imaging:  No  "results found.    Micro:  Results     Procedure Component Value Units Date/Time    BLOOD CULTURE [[id number]] Collected:  06/11/18 2156    Order Status:  Completed Specimen:  Blood from Peripheral Updated:  06/16/18 2300     Significant Indicator NEG     Source BLD     Site PERIPHERAL     Blood Culture No growth after 5 days of incubation.    Narrative:       Per Hospital Policy: Only change Specimen Src: to \"Line\" if  specified by physician order.    BLOOD CULTURE [[id number]] Collected:  06/11/18 2156    Order Status:  Completed Specimen:  Blood from Peripheral Updated:  06/16/18 2300     Significant Indicator NEG     Source BLD     Site PERIPHERAL     Blood Culture No growth after 5 days of incubation.    Narrative:       Per Hospital Policy: Only change Specimen Src: to \"Line\" if  specified by physician order.    CULTURE WOUND W/ GRAM STAIN [[id number]]  (Abnormal) Collected:  06/11/18 1945    Order Status:  Completed Specimen:  Wound Updated:  06/15/18 1524     Significant Indicator POS (POS)     Source WND     Site Right Groin Abscess     Culture Result Wound Light growth mixed skin franko. (A)     Gram Stain Result Many WBCs.  Many mixed bacteria, no predominant organism seen.       Culture Result Wound Streptococcus agalactiae (Group B)  Moderate growth   (A)    ANAEROBIC CULTURE [[id number]]  (Abnormal) Collected:  06/11/18 1945    Order Status:  Completed Specimen:  Wound Updated:  06/15/18 1524     Significant Indicator POS (POS)     Source WND     Site Right Groin Abscess     Anaerobic Culture, Culture Res Growth noted after further incubation, see below for  organism identification.   (A)      Prevotella (B.) bivia  Heavy growth   (A)    Urine Culture [[id number]]  (Abnormal) Collected:  06/12/18 0725    Order Status:  Completed Specimen:  Urine from Urine, Clean Catch Updated:  06/15/18 0938     Significant Indicator POS (POS)     Source UR     Site URINE, CLEAN CATCH     Urine Culture Mixed skin franko " <10,000 cfu/mL (A)      Candida krusei  ,000 cfu/mL   (A)    Narrative:       Indication for culture:->Suspected Sepsis    Urinalysis [[id number]]  (Abnormal) Collected:  06/12/18 0725    Order Status:  Completed Specimen:  Urine from Urine, Clean Catch Updated:  06/12/18 1433     Micro Urine Req Microscopic     Color DK Yellow     Character Cloudy (A)     Ph 5.0     Glucose >=1000 (A) mg/dL      Ketones 15 (A) mg/dL      Protein 30 (A) mg/dL      Bilirubin Negative     Urobilinogen, Urine 1.0     Nitrite Negative     Leukocyte Esterase Negative     Occult Blood Moderate (A)    Narrative:       Collected By:[id number] MARY BETH TREJO  If not done within the last 24 hours    GRAM STAIN [[id number]] Collected:  06/11/18 1945    Order Status:  Completed Specimen:  Wound Updated:  06/12/18 0940     Significant Indicator .     Source WND     Site Right Groin Abscess     Gram Stain Result Many WBCs.  Many mixed bacteria, no predominant organism seen.      Culture Respiratory W/ GRM STN [[id number]]     Order Status:  Completed Specimen:  Respirate from Sputum     BLOOD CULTURE [[id number]]     Order Status:  Canceled Specimen:  Blood from Peripheral     BLOOD CULTURE [[id number]]     Order Status:  Canceled Specimen:  Blood from Peripheral           Assessment:  Active Hospital Problems    Diagnosis   • Sepsis with acute organ failure secondary to necrotizing fasciitis of R thigh in the setting of hyperglycemia  [A41.9]   • New onset type 2 diabetes mellitus (HCC) [E11.9]   • Hypokalemia [E87.6]   • Non anion gap metabolic acidosis [E87.2]       Plan:  Right thigh necrotizing fasciitis - additional work  Low grade temps  Leukocytosis resolved  S/p I&D on 6/11/2018, 6/13/2018, 6/18  OR cultures + group B strep and Prevotella  Continue Unasyn  Plan for 2 weeks of IV abx from date of last surgery  Plan for repeat I&D on 6/20  Wound vac/care    New onset diabetes mellitus  HgA1c 12.8  Keep the blood sugars under 150 to control  infection and wound healing    Asymptomatic fungiuria  Monitor    Tobacco abuse  Patient counseled    Discussed with internal medicine.

## 2018-06-18 NOTE — PROGRESS NOTES
Patient resting in bed, k run infusing, patient states right groin pain is tolerable at this time, explained to patient next time she can have dilaudid is 0925, lungs clear, last bm 2 days ago per patient report

## 2018-06-19 LAB
ALBUMIN SERPL BCP-MCNC: 2.4 G/DL (ref 3.2–4.9)
ALBUMIN/GLOB SERPL: 0.7 G/DL
ALP SERPL-CCNC: 120 U/L (ref 30–99)
ALT SERPL-CCNC: 9 U/L (ref 2–50)
ANION GAP SERPL CALC-SCNC: 7 MMOL/L (ref 0–11.9)
AST SERPL-CCNC: 8 U/L (ref 12–45)
BASOPHILS # BLD AUTO: 0.9 % (ref 0–1.8)
BASOPHILS # BLD: 0.1 K/UL (ref 0–0.12)
BILIRUB SERPL-MCNC: 0.3 MG/DL (ref 0.1–1.5)
BUN SERPL-MCNC: 11 MG/DL (ref 8–22)
CALCIUM SERPL-MCNC: 8.4 MG/DL (ref 8.5–10.5)
CHLORIDE SERPL-SCNC: 101 MMOL/L (ref 96–112)
CO2 SERPL-SCNC: 27 MMOL/L (ref 20–33)
CREAT SERPL-MCNC: 0.39 MG/DL (ref 0.5–1.4)
EOSINOPHIL # BLD AUTO: 0 K/UL (ref 0–0.51)
EOSINOPHIL NFR BLD: 0 % (ref 0–6.9)
ERYTHROCYTE [DISTWIDTH] IN BLOOD BY AUTOMATED COUNT: 42.8 FL (ref 35.9–50)
GLOBULIN SER CALC-MCNC: 3.4 G/DL (ref 1.9–3.5)
GLUCOSE BLD-MCNC: 214 MG/DL (ref 65–99)
GLUCOSE BLD-MCNC: 240 MG/DL (ref 65–99)
GLUCOSE BLD-MCNC: 258 MG/DL (ref 65–99)
GLUCOSE BLD-MCNC: 281 MG/DL (ref 65–99)
GLUCOSE BLD-MCNC: 313 MG/DL (ref 65–99)
GLUCOSE SERPL-MCNC: 286 MG/DL (ref 65–99)
HCT VFR BLD AUTO: 36.1 % (ref 37–47)
HGB BLD-MCNC: 11.7 G/DL (ref 12–16)
LYMPHOCYTES # BLD AUTO: 1.16 K/UL (ref 1–4.8)
LYMPHOCYTES NFR BLD: 10.4 % (ref 22–41)
MAGNESIUM SERPL-MCNC: 2 MG/DL (ref 1.5–2.5)
MANUAL DIFF BLD: NORMAL
MCH RBC QN AUTO: 30.8 PG (ref 27–33)
MCHC RBC AUTO-ENTMCNC: 32.4 G/DL (ref 33.6–35)
MCV RBC AUTO: 95 FL (ref 81.4–97.8)
MONOCYTES # BLD AUTO: 0.19 K/UL (ref 0–0.85)
MONOCYTES NFR BLD AUTO: 1.7 % (ref 0–13.4)
MORPHOLOGY BLD-IMP: NORMAL
NEUTROPHILS # BLD AUTO: 9.74 K/UL (ref 2–7.15)
NEUTROPHILS NFR BLD: 87 % (ref 44–72)
NRBC # BLD AUTO: 0 K/UL
NRBC BLD-RTO: 0 /100 WBC
PLATELET # BLD AUTO: 327 K/UL (ref 164–446)
PLATELET BLD QL SMEAR: NORMAL
PMV BLD AUTO: 9.5 FL (ref 9–12.9)
POLYCHROMASIA BLD QL SMEAR: NORMAL
POTASSIUM SERPL-SCNC: 4.2 MMOL/L (ref 3.6–5.5)
PROT SERPL-MCNC: 5.8 G/DL (ref 6–8.2)
RBC # BLD AUTO: 3.8 M/UL (ref 4.2–5.4)
RBC BLD AUTO: PRESENT
SODIUM SERPL-SCNC: 135 MMOL/L (ref 135–145)
TOXIC GRANULES BLD QL SMEAR: SLIGHT
WBC # BLD AUTO: 11.2 K/UL (ref 4.8–10.8)

## 2018-06-19 PROCEDURE — 700105 HCHG RX REV CODE 258: Performed by: INTERNAL MEDICINE

## 2018-06-19 PROCEDURE — 80053 COMPREHEN METABOLIC PANEL: CPT

## 2018-06-19 PROCEDURE — 97535 SELF CARE MNGMENT TRAINING: CPT

## 2018-06-19 PROCEDURE — A9270 NON-COVERED ITEM OR SERVICE: HCPCS | Performed by: ORTHOPAEDIC SURGERY

## 2018-06-19 PROCEDURE — 700102 HCHG RX REV CODE 250 W/ 637 OVERRIDE(OP): Performed by: ORTHOPAEDIC SURGERY

## 2018-06-19 PROCEDURE — 99232 SBSQ HOSP IP/OBS MODERATE 35: CPT | Mod: GC | Performed by: HOSPITALIST

## 2018-06-19 PROCEDURE — 85027 COMPLETE CBC AUTOMATED: CPT

## 2018-06-19 PROCEDURE — 700102 HCHG RX REV CODE 250 W/ 637 OVERRIDE(OP): Performed by: STUDENT IN AN ORGANIZED HEALTH CARE EDUCATION/TRAINING PROGRAM

## 2018-06-19 PROCEDURE — 36415 COLL VENOUS BLD VENIPUNCTURE: CPT

## 2018-06-19 PROCEDURE — A9270 NON-COVERED ITEM OR SERVICE: HCPCS | Performed by: INTERNAL MEDICINE

## 2018-06-19 PROCEDURE — 82962 GLUCOSE BLOOD TEST: CPT | Mod: 91

## 2018-06-19 PROCEDURE — 700105 HCHG RX REV CODE 258

## 2018-06-19 PROCEDURE — 700111 HCHG RX REV CODE 636 W/ 250 OVERRIDE (IP): Performed by: INTERNAL MEDICINE

## 2018-06-19 PROCEDURE — 700102 HCHG RX REV CODE 250 W/ 637 OVERRIDE(OP): Performed by: INTERNAL MEDICINE

## 2018-06-19 PROCEDURE — 83735 ASSAY OF MAGNESIUM: CPT

## 2018-06-19 PROCEDURE — 770006 HCHG ROOM/CARE - MED/SURG/GYN SEMI*

## 2018-06-19 PROCEDURE — 85007 BL SMEAR W/DIFF WBC COUNT: CPT

## 2018-06-19 PROCEDURE — 700111 HCHG RX REV CODE 636 W/ 250 OVERRIDE (IP): Performed by: STUDENT IN AN ORGANIZED HEALTH CARE EDUCATION/TRAINING PROGRAM

## 2018-06-19 PROCEDURE — 99233 SBSQ HOSP IP/OBS HIGH 50: CPT | Performed by: INTERNAL MEDICINE

## 2018-06-19 RX ORDER — SODIUM CHLORIDE 9 MG/ML
INJECTION, SOLUTION INTRAVENOUS
Status: COMPLETED
Start: 2018-06-19 | End: 2018-06-19

## 2018-06-19 RX ORDER — INSULIN GLARGINE 100 [IU]/ML
13 INJECTION, SOLUTION SUBCUTANEOUS EVERY EVENING
Status: DISCONTINUED | OUTPATIENT
Start: 2018-06-19 | End: 2018-06-19

## 2018-06-19 RX ORDER — INSULIN GLARGINE 100 [IU]/ML
15 INJECTION, SOLUTION SUBCUTANEOUS EVERY EVENING
Status: DISCONTINUED | OUTPATIENT
Start: 2018-06-19 | End: 2018-06-19

## 2018-06-19 RX ORDER — INSULIN GLARGINE 100 [IU]/ML
10 INJECTION, SOLUTION SUBCUTANEOUS EVERY EVENING
Status: DISCONTINUED | OUTPATIENT
Start: 2018-06-19 | End: 2018-06-20

## 2018-06-19 RX ADMIN — AMPICILLIN SODIUM AND SULBACTAM SODIUM 3 G: 2; 1 INJECTION, POWDER, FOR SOLUTION INTRAMUSCULAR; INTRAVENOUS at 18:01

## 2018-06-19 RX ADMIN — INSULIN LISPRO 4 UNITS: 100 INJECTION, SOLUTION INTRAVENOUS; SUBCUTANEOUS at 16:41

## 2018-06-19 RX ADMIN — OXYCODONE HYDROCHLORIDE 10 MG: 10 TABLET ORAL at 18:01

## 2018-06-19 RX ADMIN — AMPICILLIN SODIUM AND SULBACTAM SODIUM 3 G: 2; 1 INJECTION, POWDER, FOR SOLUTION INTRAMUSCULAR; INTRAVENOUS at 00:34

## 2018-06-19 RX ADMIN — SENNOSIDES AND DOCUSATE SODIUM 2 TABLET: 8.6; 5 TABLET ORAL at 20:39

## 2018-06-19 RX ADMIN — OXYCODONE HYDROCHLORIDE 10 MG: 10 TABLET ORAL at 21:52

## 2018-06-19 RX ADMIN — INSULIN LISPRO 5 UNITS: 100 INJECTION, SOLUTION INTRAVENOUS; SUBCUTANEOUS at 10:39

## 2018-06-19 RX ADMIN — SODIUM CHLORIDE 500 ML: 9 INJECTION, SOLUTION INTRAVENOUS at 00:34

## 2018-06-19 RX ADMIN — INSULIN GLARGINE 10 UNITS: 100 INJECTION, SOLUTION SUBCUTANEOUS at 21:49

## 2018-06-19 RX ADMIN — INSULIN LISPRO 7 UNITS: 100 INJECTION, SOLUTION INTRAVENOUS; SUBCUTANEOUS at 11:37

## 2018-06-19 RX ADMIN — AMPICILLIN SODIUM AND SULBACTAM SODIUM 3 G: 2; 1 INJECTION, POWDER, FOR SOLUTION INTRAMUSCULAR; INTRAVENOUS at 05:26

## 2018-06-19 RX ADMIN — AMPICILLIN SODIUM AND SULBACTAM SODIUM 3 G: 2; 1 INJECTION, POWDER, FOR SOLUTION INTRAMUSCULAR; INTRAVENOUS at 11:31

## 2018-06-19 RX ADMIN — ENOXAPARIN SODIUM 40 MG: 100 INJECTION SUBCUTANEOUS at 08:10

## 2018-06-19 RX ADMIN — SENNOSIDES AND DOCUSATE SODIUM 2 TABLET: 8.6; 5 TABLET ORAL at 08:10

## 2018-06-19 RX ADMIN — AMPICILLIN SODIUM AND SULBACTAM SODIUM 3 G: 2; 1 INJECTION, POWDER, FOR SOLUTION INTRAMUSCULAR; INTRAVENOUS at 23:40

## 2018-06-19 RX ADMIN — POLYETHYLENE GLYCOL 3350 1 PACKET: 17 POWDER, FOR SOLUTION ORAL at 20:40

## 2018-06-19 RX ADMIN — OXYCODONE HYDROCHLORIDE 10 MG: 10 TABLET ORAL at 10:34

## 2018-06-19 ASSESSMENT — PATIENT HEALTH QUESTIONNAIRE - PHQ9
9. THOUGHTS THAT YOU WOULD BE BETTER OFF DEAD, OR OF HURTING YOURSELF: NOT AT ALL
3. TROUBLE FALLING OR STAYING ASLEEP OR SLEEPING TOO MUCH: NOT AT ALL
5. POOR APPETITE OR OVEREATING: NOT AT ALL
6. FEELING BAD ABOUT YOURSELF - OR THAT YOU ARE A FAILURE OR HAVE LET YOURSELF OR YOUR FAMILY DOWN: NOT AL ALL
4. FEELING TIRED OR HAVING LITTLE ENERGY: NOT AT ALL
2. FEELING DOWN, DEPRESSED, IRRITABLE, OR HOPELESS: NOT AT ALL
SUM OF ALL RESPONSES TO PHQ QUESTIONS 1-9: 0
8. MOVING OR SPEAKING SO SLOWLY THAT OTHER PEOPLE COULD HAVE NOTICED. OR THE OPPOSITE, BEING SO FIGETY OR RESTLESS THAT YOU HAVE BEEN MOVING AROUND A LOT MORE THAN USUAL: NOT AT ALL
SUM OF ALL RESPONSES TO PHQ9 QUESTIONS 1 AND 2: 0
7. TROUBLE CONCENTRATING ON THINGS, SUCH AS READING THE NEWSPAPER OR WATCHING TELEVISION: NOT AT ALL
1. LITTLE INTEREST OR PLEASURE IN DOING THINGS: NOT AT ALL

## 2018-06-19 ASSESSMENT — COGNITIVE AND FUNCTIONAL STATUS - GENERAL
SUGGESTED CMS G CODE MODIFIER DAILY ACTIVITY: CK
DAILY ACTIVITIY SCORE: 19
HELP NEEDED FOR BATHING: A LOT
DRESSING REGULAR LOWER BODY CLOTHING: A LOT
TOILETING: A LITTLE

## 2018-06-19 ASSESSMENT — ENCOUNTER SYMPTOMS
VOMITING: 0
CHILLS: 0
ABDOMINAL PAIN: 0
FEVER: 0
SHORTNESS OF BREATH: 0
MYALGIAS: 1
NAUSEA: 0
HEADACHES: 0
NERVOUS/ANXIOUS: 1
DIARRHEA: 0
CONSTIPATION: 1

## 2018-06-19 ASSESSMENT — PAIN SCALES - GENERAL
PAINLEVEL_OUTOF10: 6
PAINLEVEL_OUTOF10: 5
PAINLEVEL_OUTOF10: 7

## 2018-06-19 NOTE — PROGRESS NOTES
"   Orthopaedic PA Progress Note    Interval changes:Did well overnight, understands need for repeat operation tomorrow, NPO p MN    ROS - Patient denies any new issues. No chest pain, dyspnea, or fever.  Pain well controlled.    Blood pressure 117/77, pulse 75, temperature 36.7 °C (98.1 °F), resp. rate 16, height 1.499 m (4' 11.02\"), weight 84.2 kg (185 lb 10 oz), SpO2 96 %, not currently breastfeeding.    Patient seen and examined  No acute distress  Breathing non labored  RRR  Surgical dressing is clean, dry, and intact. Skin with erythematous borders. 50 mL in vac reservoir.   Patient clearly fires tibialis anterior, EHL, and gastrocnemius/soleus. Sensation is intact to light touch throughout superficial peroneal, deep peroneal, tibial, saphenous, and sural nerve distributions. Strong and palpable 2+ dorsalis pedis and posterior tibial pulses with capillary refill less than 2 seconds. No lower leg tenderness or discomfort.      Recent Labs      06/17/18   0530  06/18/18   0450  06/19/18   0050   WBC  8.8  9.8  11.2*   RBC  3.59*  3.86*  3.80*   HEMOGLOBIN  11.3*  12.0  11.7*   HEMATOCRIT  33.4*  36.5*  36.1*   MCV  93.0  94.6  95.0   MCH  31.5  31.1  30.8   MCHC  33.8  32.9*  32.4*   RDW  44.1  43.9  42.8   PLATELETCT  289  290  327   MPV  9.4  9.3  9.5       Active Hospital Problems    Diagnosis   • Sepsis due to necrotizing fasciitis of R thigh [A41.9]     Priority: High   • New onset type 2 diabetes mellitus (HCC) [E11.9]     Priority: Medium   • Elevated alkaline phosphatase level [R74.8]       Assessment/Plan:  POD#1 / 4/  7 I+D prox right medial thigh  Wt bearing status - AT  PT/OT-initiated  Wound care:dressing left in place  Drains - Wound vac  Gaston-no  Sutures/Staples out- 10-14 days post operatively  Antibiotics: per ID  DVT Prophylaxis- TEDS/SCDs/Foot pumps.   Lovenox: 40 mg today - hold AM dose tomorrow  Future Procedures - I+D tomorrow  Case Coordination for Discharge Planning - Disposition pending, " likely SNF, although pt states she wants to RTW next week.

## 2018-06-19 NOTE — PROGRESS NOTES
Assumed care of pt. Via BSSR at 1900. Pt. A&Ox4. Denies pain and SOB at this time. Pt. On 3LNC with  monitoring. SPO2 96% at 1950. Pt. Educated on Incentive Spirometry usage. Verbalizes understanding. Drsg to Right groin C/D/I with wound vac drainingg per orders. Safety measures in place. Call light within reach. Will continue to monitor.

## 2018-06-19 NOTE — PROGRESS NOTES
POD#1, 4, 7  S/P I&D  Infection still present in subcutaneous tissue yesterday  Continue Vac  IV ABX per ID  Repeat surgery tomorrow

## 2018-06-19 NOTE — PROGRESS NOTES
Infectious Disease Progress Note    Author: IRINEO Roy Date & Time of service: 2018  11:37 AM    Chief Complaint:  FU Right thigh necrotizing fasciitis    Interval History:  2018 MAXIMUM TEMPERATURE 98.7. WBCs 11.9 and platelets 205 and cr 0.5  2018 MAXIMUM TEMPERATURE 98.7 leg continues to hurt. WBC 12.2 platelets 205 and cr 0.38   6/15/2018 and MAXIMUM TEMPERATURE 99.1 WBC 9.3 had another surgery yesterday.  2018 MAXIMUM TEMPERATURE 100 the groin is more swollen today. WBC 9   Tmax 99.8 WBC 9.8 s/p I&D this morning, R thigh soreness  - AF, WBC 11.2, tolerating abx, R thigh/ groin pain 5.5/10- slightly improved with pain meds, anxious to discharge home to get back to work by .  Labs Reviewed, Medications Reviewed, Radiology Reviewed and Wound Reviewed.    Review of Systems:  Review of Systems   Constitutional: Positive for malaise/fatigue. Negative for chills and fever.   Respiratory: Negative for shortness of breath.    Cardiovascular: Positive for leg swelling. Negative for chest pain.        RLE swelling- improving.   Gastrointestinal: Positive for constipation. Negative for abdominal pain, diarrhea, nausea and vomiting.   Musculoskeletal: Positive for joint pain and myalgias.        R thigh/ groin   Skin: Negative for rash.   Neurological: Negative for headaches.   Psychiatric/Behavioral: The patient is nervous/anxious.        Hemodynamics:  Temp (24hrs), Av.3 °C (97.4 °F), Min:35.8 °C (96.5 °F), Max:36.9 °C (98.5 °F)  Temperature: 36.9 °C (98.5 °F)  Pulse  Av.8  Min: 65  Max: 106   Blood Pressure: 128/75       Physical Exam:  Physical Exam   Constitutional: She is oriented to person, place, and time. She appears well-developed and well-nourished. No distress.   Appears older than stated age.  Disheveled.   HENT:   Head: Normocephalic and atraumatic.   Eyes: Conjunctivae and EOM are normal. Pupils are equal, round, and reactive to light.   Neck: Normal  range of motion.   Cardiovascular: Normal rate, regular rhythm and normal heart sounds.    No murmur heard.  Pulmonary/Chest: Effort normal and breath sounds normal. No respiratory distress. She has no wheezes.   Abdominal: Soft. Bowel sounds are normal. She exhibits no distension. There is no tenderness.   Musculoskeletal: She exhibits edema and tenderness.   Right thigh and groin- WV in place, minimal ss drainage in canister, swelling and induration to surrounding tissue with mild erythema.   Neurological: She is alert and oriented to person, place, and time.   Skin: Skin is warm. No rash noted.   Psychiatric:   Anxious.  Poor insight.   Nursing note and vitals reviewed.      Meds:    Current Facility-Administered Medications:   •  HYDROmorphone  •  insulin glargine  •  insulin lispro  •  enoxaparin (LOVENOX) injection  •  loperamide  •  ampicillin-sulbactam (UNASYN) IV  •  [DISCONTINUED] insulin regular **AND** Accu-Chek ACHS **AND** NOTIFY MD and PharmD **AND** glucose 4 g **AND** dextrose 50%  •  senna-docusate **AND** polyethylene glycol/lytes **AND** magnesium hydroxide **AND** bisacodyl  •  acetaminophen  •  Pharmacy Consult Request  •  ondansetron  •  senna-docusate  •  bisacodyl  •  fleet  •  oxyCODONE immediate-release  •  oxyCODONE immediate release    Labs:  Recent Labs      06/17/18   0530  06/18/18   0450  06/19/18   0050   WBC  8.8  9.8  11.2*   RBC  3.59*  3.86*  3.80*   HEMOGLOBIN  11.3*  12.0  11.7*   HEMATOCRIT  33.4*  36.5*  36.1*   MCV  93.0  94.6  95.0   MCH  31.5  31.1  30.8   RDW  44.1  43.9  42.8   PLATELETCT  289  290  327   MPV  9.4  9.3  9.5   NEUTSPOLYS  68.20  67.50  87.00*   LYMPHOCYTES  24.30  21.90*  10.40*   MONOCYTES  6.60  7.90  1.70   EOSINOPHILS  0.00  0.90  0.00   BASOPHILS  0.00  0.00  0.90   RBCMORPHOLO  Present  Present  Present     Recent Labs      06/17/18   0530  06/18/18   0450  06/19/18   0050   SODIUM  137  138  135   POTASSIUM  3.2*  3.4*  4.2   CHLORIDE  103  100   "101   CO2  25  28  27   GLUCOSE  169*  121*  286*   BUN  <3*  3*  11     Recent Labs      06/17/18   0530  06/18/18   0450  06/19/18   0050   ALBUMIN  2.3*  2.3*  2.4*   TBILIRUBIN  0.4  0.4  0.3   ALKPHOSPHAT  150*  147*  120*   TOTPROTEIN  5.7*  6.2  5.8*   ALTSGPT  11  11  9   ASTSGOT  7*  10*  8*   CREATININE  0.27*  0.31*  0.39*       Imaging:  No results found.    Micro:  Results     Procedure Component Value Units Date/Time    BLOOD CULTURE [[id number]] Collected:  06/11/18 2156    Order Status:  Completed Specimen:  Blood from Peripheral Updated:  06/16/18 2300     Significant Indicator NEG     Source BLD     Site PERIPHERAL     Blood Culture No growth after 5 days of incubation.    Narrative:       Per Hospital Policy: Only change Specimen Src: to \"Line\" if  specified by physician order.    BLOOD CULTURE [[id number]] Collected:  06/11/18 2156    Order Status:  Completed Specimen:  Blood from Peripheral Updated:  06/16/18 2300     Significant Indicator NEG     Source BLD     Site PERIPHERAL     Blood Culture No growth after 5 days of incubation.    Narrative:       Per Hospital Policy: Only change Specimen Src: to \"Line\" if  specified by physician order.    CULTURE WOUND W/ GRAM STAIN [[id number]]  (Abnormal) Collected:  06/11/18 1945    Order Status:  Completed Specimen:  Wound Updated:  06/15/18 1524     Significant Indicator POS (POS)     Source WND     Site Right Groin Abscess     Culture Result Wound Light growth mixed skin franko. (A)     Gram Stain Result Many WBCs.  Many mixed bacteria, no predominant organism seen.       Culture Result Wound Streptococcus agalactiae (Group B)  Moderate growth   (A)    ANAEROBIC CULTURE [[id number]]  (Abnormal) Collected:  06/11/18 1945    Order Status:  Completed Specimen:  Wound Updated:  06/15/18 1524     Significant Indicator POS (POS)     Source WND     Site Right Groin Abscess     Anaerobic Culture, Culture Res Growth noted after further incubation, see below " for  organism identification.   (A)      Prevotella (B.) bivia  Heavy growth   (A)    Urine Culture [[id number]]  (Abnormal) Collected:  06/12/18 0725    Order Status:  Completed Specimen:  Urine from Urine, Clean Catch Updated:  06/15/18 0938     Significant Indicator POS (POS)     Source UR     Site URINE, CLEAN CATCH     Urine Culture Mixed skin franko <10,000 cfu/mL (A)      Candida krusei  ,000 cfu/mL   (A)    Narrative:       Indication for culture:->Suspected Sepsis    Urinalysis [[id number]]  (Abnormal) Collected:  06/12/18 0725    Order Status:  Completed Specimen:  Urine from Urine, Clean Catch Updated:  06/12/18 1433     Micro Urine Req Microscopic     Color DK Yellow     Character Cloudy (A)     Ph 5.0     Glucose >=1000 (A) mg/dL      Ketones 15 (A) mg/dL      Protein 30 (A) mg/dL      Bilirubin Negative     Urobilinogen, Urine 1.0     Nitrite Negative     Leukocyte Esterase Negative     Occult Blood Moderate (A)    Narrative:       Collected By:[id number] MARY BETH TREJO  If not done within the last 24 hours          Assessment:  Active Hospital Problems    Diagnosis   • Sepsis with acute organ failure secondary to necrotizing fasciitis of R thigh in the setting of hyperglycemia  [A41.9]   • New onset type 2 diabetes mellitus (HCC) [E11.9]   • Hypokalemia [E87.6]   • Non anion gap metabolic acidosis [E87.2]       Plan:  Right thigh necrotizing fasciitis - additional work  Afebrile  Leukocytosis- up to 11.2 today  S/p I&D on 6/11/2018, 6/13/2018, 6/18  OR cultures + group B strep and Prevotella  Continue IV Unasyn  Plan for 2 weeks of IV abx from date of last surgery  Plan for repeat I&D on 6/20  Wound vac/care  PICC ordered  Poor insight- stating she needs to get back to work by 6/25- works in a [employer], 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    New onset diabetes mellitus  HgA1c 12.8  Keep the blood sugars under 150 to control infection and wound healing    Asymptomatic fungiuria  Monitor    Tobacco  abuse  Patient counseled    Discussed with ANG Kang.  Pending Medicaid, will likely need to finish treatment Inpt.

## 2018-06-19 NOTE — PROGRESS NOTES
Internal Medicine Interval Note  Note Author: Lakeshia Carpenter M.D.     Name Emmanuelle Martin     1960   Age/Sex 58 y.o. female   MRN [id number]   Code Status Full      After 5PM or if no immediate response to page, please call for cross-coverage  Attending/Team:  / Mason See Patient List for primary contact information  Call [phone] to page    1st Call - Day Intern (R1):   Dr. Ritter 2nd Call - Day Sr. Resident (R2/R3):   Dr. Cohen        Reason for interval visit  (Principal Problem)   Sepsis with multi organ failure secondary to necrotizing fasciitis of right thigh with Group B Strep   New onset insulin dependent uncontrolled type II DM.     Interval Problem Daily Status Update  (24 hours)  Feeling better, pain controlled. Interested in going home by Monday, discussed the need for repeat surgery tomorrow and IV antibiotics.   Frequency of dilaudid reduced to q6hrs, continue to taper opioids based on pain control.   No bowel movement last 3 days. Bowel protocol in place.   NPO at midnight, hold lovenox tomorrow AM for I& D tomorrow.     ROS     Constitutional: Negative for chills and fever.   HENT: Negative for congestion and sore throat.    Eyes: Negative for blurred vision and double vision.   Respiratory: Negative for sputum production and shortness of breath.    Cardiovascular: Negative for chest pain and palpitations.   Gastrointestinal: Negative for abdominal pain, nausea and vomiting.   Genitourinary: Negative for dysuria and urgency.   Musculoskeletal: Negative.  Negative for joint pain and myalgias.       Right groin pain       No L/E pain , swelling, redness.   Skin: Negative.    Neurological: Negative for dizziness and headaches.   Endo/Heme/Allergies: Negative.    Psychiatric/Behavioral: Negative.      Consultants/Specialty  ID   Ortho Surgery   PCP: GARCIA NAVA    Disposition  Inpatient     Quality Measures  Quality-Core Measures   Reviewed items::  Labs  reviewed and Medications reviewed  Gaston catheter::  One or Two Days Post Surgery (Day of Surgery being Day 0)  DVT prophylaxis pharmacological::  Enoxaparin (Lovenox)  Antibiotics:  Treating active infection/contamination beyond 24 hours perioperative coverage          Physical Exam       Vitals:    06/18/18 2305 06/19/18 0405 06/19/18 0900 06/19/18 1100   BP: 122/75 115/72 117/77 128/75   Pulse: 77 76 75 73   Resp: 16 16 16 17   Temp: 36.2 °C (97.1 °F) 36.1 °C (97 °F) 36.7 °C (98.1 °F) 36.9 °C (98.5 °F)   SpO2: 94% 95% 96% 97%   Weight:       Height:         Body mass index is 37.47 kg/m².    Oxygen Therapy:  Pulse Oximetry: 97 %, O2 (LPM): 2.5, O2 Delivery: Nasal Cannula    Physical Exam  Gen: Not in acute distress.    HEENT: NC/AT, moist mucous membranes.  Neck: No tracheal deviation. No stridor.   Cardiac: RRR without m/g/r. S1S2, no JVD.  Respiratory: Normal effort, no tachypnea. Decreased breath sounds over bilateral lateral lobes.   Abdomen: BS+, soft, NT/ND, no rebound/guarding.   Ext: No edema, 2+ DP pulses b/l.  Skin: Warm, dry. Dressing over right medial thigh. Showed mild edema S/P I&D x 2. Wound vac to right groin. Limited ROM of right thigh due to pain.   Neuro: AAOx4,  no focal sensory or motor deficits.   Psych: Affect, mood, judgement normal.    Lab Data Review:         6/17/2018  9:30 AM    Recent Labs      06/17/18   0530  06/18/18   0450  06/19/18   0050   SODIUM  137  138  135   POTASSIUM  3.2*  3.4*  4.2   CHLORIDE  103  100  101   CO2  25  28  27   BUN  <3*  3*  11   CREATININE  0.27*  0.31*  0.39*   MAGNESIUM  1.6  1.9  2.0   CALCIUM  7.4*  8.0*  8.4*       Recent Labs      06/17/18   0530  06/18/18   0450  06/19/18   0050   ALTSGPT  11  11  9   ASTSGOT  7*  10*  8*   ALKPHOSPHAT  150*  147*  120*   TBILIRUBIN  0.4  0.4  0.3   GLUCOSE  169*  121*  286*       Recent Labs      06/17/18   0530  06/18/18   0450  06/19/18   0050   RBC  3.59*  3.86*  3.80*   HEMOGLOBIN  11.3*  12.0  11.7*    HEMATOCRIT  33.4*  36.5*  36.1*   PLATELETCT  289  290  327       Recent Labs      06/17/18   0530  06/18/18   0450  06/19/18   0050   WBC  8.8  9.8  11.2*   NEUTSPOLYS  68.20  67.50  87.00*   LYMPHOCYTES  24.30  21.90*  10.40*   MONOCYTES  6.60  7.90  1.70   EOSINOPHILS  0.00  0.90  0.00   BASOPHILS  0.00  0.00  0.90   ASTSGOT  7*  10*  8*   ALTSGPT  11  11  9   ALKPHOSPHAT  150*  147*  120*   TBILIRUBIN  0.4  0.4  0.3           Assessment/Plan     Sepsis due to necrotizing fasciitis of R thigh  S/P I&D on 6/11 and 6/15, 6/17  Hemodynamically stable  Wound cx +group B Strep now on ampicillin-sulbactam    Wound vac placed 6/11  Control BGs with Lantus and SSI   Infectious Disease following, plan is 2 weeks antibiotics from last surgery.   Blood cultures NGTD   Pending Repeat I & D tomorrow         Non anion gap metabolic acidosis  Resolved  Secondary to NS fluid resuscitation causing hyperchloremic acidosis and starvation ketosis     New onset type 2 diabetes mellitus (HCC)  A1c 12.8% on admission   SSI, Lantus increased to 15 u         Anemia/Thrombocytopenia  Resolved        Hypocalcemia  Resolved   Secondary to hypoalbuminemia in the setting of sepsis      Hypokalemia  Resolved  Secondary to Hypomagnesemia/Poor diet  Mg >2, K >4     Elevated alkaline phosphatase level  Resolving.       Lakeshia Carpenter MD     The patient was seen by me face to face. I personally had a discussion with the patient. The case was discussed with our resident team, I examined the patient and confirmed the essential components of the history, physical examination, diagnosis and treatment plan as needed. I agree with the patient care as documented by the resident and edited as above by me. See resident's note above for complete details of service. The overall treatment regimen will be carried out as described above.     Thank you:  Pilo Luna MD, FACP  [facility] Internal Medicine  Pager: Use Tiger Text (use resident info under  treatment team for day to day floor issues)  Office: [phone] Ext. [phone]  Fax: [phone] (non PHI only)

## 2018-06-19 NOTE — THERAPY
"Occupational Therapy Treatment completed with focus on ADLs, ADL transfers and patient education.  Functional Status:  Pt seen for OT tx today.  Pt was pleasant and cooperative during the session.  Continues to be limited by endurance, pain, transfers, and self care.  Pt completed toileting with CGA.  Needing no cues to initiate, follow through, and problem solve during ADL tasks.  Pt completed sit to stand and stand pivot with CGA using FWW.  Pt would benefit from continued inpt OT as they are continuing to need assistance with self care and IADLs.    Plan of Care: Will benefit from Occupational Therapy 3 times per week  Discharge Recommendations:  Equipment Will Continue to Assess for Equipment Needs.  See \"Rehab Therapy-Acute\" Patient Summary Report for complete documentation.   "

## 2018-06-20 ENCOUNTER — APPOINTMENT (OUTPATIENT)
Dept: RADIOLOGY | Facility: MEDICAL CENTER | Age: 58
DRG: 853 | End: 2018-06-20
Attending: NURSE PRACTITIONER

## 2018-06-20 LAB
ALBUMIN SERPL BCP-MCNC: 2.3 G/DL (ref 3.2–4.9)
ALBUMIN/GLOB SERPL: 0.7 G/DL
ALP SERPL-CCNC: 105 U/L (ref 30–99)
ALT SERPL-CCNC: 10 U/L (ref 2–50)
ANION GAP SERPL CALC-SCNC: 8 MMOL/L (ref 0–11.9)
AST SERPL-CCNC: 10 U/L (ref 12–45)
BASOPHILS # BLD AUTO: 0.6 % (ref 0–1.8)
BASOPHILS # BLD: 0.06 K/UL (ref 0–0.12)
BILIRUB SERPL-MCNC: 0.3 MG/DL (ref 0.1–1.5)
BUN SERPL-MCNC: 10 MG/DL (ref 8–22)
CALCIUM SERPL-MCNC: 8 MG/DL (ref 8.5–10.5)
CHLORIDE SERPL-SCNC: 102 MMOL/L (ref 96–112)
CO2 SERPL-SCNC: 28 MMOL/L (ref 20–33)
CREAT SERPL-MCNC: 0.4 MG/DL (ref 0.5–1.4)
EOSINOPHIL # BLD AUTO: 0.12 K/UL (ref 0–0.51)
EOSINOPHIL NFR BLD: 1.3 % (ref 0–6.9)
ERYTHROCYTE [DISTWIDTH] IN BLOOD BY AUTOMATED COUNT: 42.3 FL (ref 35.9–50)
GLOBULIN SER CALC-MCNC: 3.2 G/DL (ref 1.9–3.5)
GLUCOSE BLD-MCNC: 174 MG/DL (ref 65–99)
GLUCOSE BLD-MCNC: 223 MG/DL (ref 65–99)
GLUCOSE BLD-MCNC: 235 MG/DL (ref 65–99)
GLUCOSE BLD-MCNC: 293 MG/DL (ref 65–99)
GLUCOSE SERPL-MCNC: 287 MG/DL (ref 65–99)
HCT VFR BLD AUTO: 33.7 % (ref 37–47)
HGB BLD-MCNC: 11.3 G/DL (ref 12–16)
IMM GRANULOCYTES # BLD AUTO: 0.43 K/UL (ref 0–0.11)
IMM GRANULOCYTES NFR BLD AUTO: 4.6 % (ref 0–0.9)
LYMPHOCYTES # BLD AUTO: 2.93 K/UL (ref 1–4.8)
LYMPHOCYTES NFR BLD: 31.2 % (ref 22–41)
MCH RBC QN AUTO: 31.5 PG (ref 27–33)
MCHC RBC AUTO-ENTMCNC: 33.5 G/DL (ref 33.6–35)
MCV RBC AUTO: 93.9 FL (ref 81.4–97.8)
MONOCYTES # BLD AUTO: 0.61 K/UL (ref 0–0.85)
MONOCYTES NFR BLD AUTO: 6.5 % (ref 0–13.4)
NEUTROPHILS # BLD AUTO: 5.24 K/UL (ref 2–7.15)
NEUTROPHILS NFR BLD: 55.8 % (ref 44–72)
NRBC # BLD AUTO: 0 K/UL
NRBC BLD-RTO: 0 /100 WBC
PLATELET # BLD AUTO: 324 K/UL (ref 164–446)
PMV BLD AUTO: 9.4 FL (ref 9–12.9)
POTASSIUM SERPL-SCNC: 3.6 MMOL/L (ref 3.6–5.5)
PROT SERPL-MCNC: 5.5 G/DL (ref 6–8.2)
RBC # BLD AUTO: 3.59 M/UL (ref 4.2–5.4)
SODIUM SERPL-SCNC: 138 MMOL/L (ref 135–145)
WBC # BLD AUTO: 9.4 K/UL (ref 4.8–10.8)

## 2018-06-20 PROCEDURE — 700102 HCHG RX REV CODE 250 W/ 637 OVERRIDE(OP): Performed by: INTERNAL MEDICINE

## 2018-06-20 PROCEDURE — 770006 HCHG ROOM/CARE - MED/SURG/GYN SEMI*

## 2018-06-20 PROCEDURE — A9270 NON-COVERED ITEM OR SERVICE: HCPCS | Performed by: INTERNAL MEDICINE

## 2018-06-20 PROCEDURE — 501488 HCHG SUCTION CANN, WOUNDVAC TRAC: Performed by: ORTHOPAEDIC SURGERY

## 2018-06-20 PROCEDURE — A9270 NON-COVERED ITEM OR SERVICE: HCPCS | Performed by: STUDENT IN AN ORGANIZED HEALTH CARE EDUCATION/TRAINING PROGRAM

## 2018-06-20 PROCEDURE — 85025 COMPLETE CBC W/AUTO DIFF WBC: CPT

## 2018-06-20 PROCEDURE — 160048 HCHG OR STATISTICAL LEVEL 1-5: Performed by: ORTHOPAEDIC SURGERY

## 2018-06-20 PROCEDURE — 700105 HCHG RX REV CODE 258: Performed by: INTERNAL MEDICINE

## 2018-06-20 PROCEDURE — 501838 HCHG SUTURE GENERAL: Performed by: ORTHOPAEDIC SURGERY

## 2018-06-20 PROCEDURE — A9270 NON-COVERED ITEM OR SERVICE: HCPCS | Performed by: ORTHOPAEDIC SURGERY

## 2018-06-20 PROCEDURE — 36569 INSJ PICC 5 YR+ W/O IMAGING: CPT

## 2018-06-20 PROCEDURE — 160027 HCHG SURGERY MINUTES - 1ST 30 MINS LEVEL 2: Performed by: ORTHOPAEDIC SURGERY

## 2018-06-20 PROCEDURE — 700102 HCHG RX REV CODE 250 W/ 637 OVERRIDE(OP): Performed by: STUDENT IN AN ORGANIZED HEALTH CARE EDUCATION/TRAINING PROGRAM

## 2018-06-20 PROCEDURE — 36415 COLL VENOUS BLD VENIPUNCTURE: CPT

## 2018-06-20 PROCEDURE — 700102 HCHG RX REV CODE 250 W/ 637 OVERRIDE(OP)

## 2018-06-20 PROCEDURE — 500891 HCHG PACK, ORTHO MAJOR: Performed by: ORTHOPAEDIC SURGERY

## 2018-06-20 PROCEDURE — 160038 HCHG SURGERY MINUTES - EA ADDL 1 MIN LEVEL 2: Performed by: ORTHOPAEDIC SURGERY

## 2018-06-20 PROCEDURE — 05HY33Z INSERTION OF INFUSION DEVICE INTO UPPER VEIN, PERCUTANEOUS APPROACH: ICD-10-PCS | Performed by: INTERNAL MEDICINE

## 2018-06-20 PROCEDURE — 80053 COMPREHEN METABOLIC PANEL: CPT

## 2018-06-20 PROCEDURE — A9270 NON-COVERED ITEM OR SERVICE: HCPCS

## 2018-06-20 PROCEDURE — 160036 HCHG PACU - EA ADDL 30 MINS PHASE I: Performed by: ORTHOPAEDIC SURGERY

## 2018-06-20 PROCEDURE — 160009 HCHG ANES TIME/MIN: Performed by: ORTHOPAEDIC SURGERY

## 2018-06-20 PROCEDURE — 99232 SBSQ HOSP IP/OBS MODERATE 35: CPT | Mod: GC | Performed by: HOSPITALIST

## 2018-06-20 PROCEDURE — 0KBQ0ZZ EXCISION OF RIGHT UPPER LEG MUSCLE, OPEN APPROACH: ICD-10-PCS | Performed by: ORTHOPAEDIC SURGERY

## 2018-06-20 PROCEDURE — 700111 HCHG RX REV CODE 636 W/ 250 OVERRIDE (IP): Performed by: INTERNAL MEDICINE

## 2018-06-20 PROCEDURE — 160035 HCHG PACU - 1ST 60 MINS PHASE I: Performed by: ORTHOPAEDIC SURGERY

## 2018-06-20 PROCEDURE — 82962 GLUCOSE BLOOD TEST: CPT

## 2018-06-20 PROCEDURE — 700111 HCHG RX REV CODE 636 W/ 250 OVERRIDE (IP): Performed by: STUDENT IN AN ORGANIZED HEALTH CARE EDUCATION/TRAINING PROGRAM

## 2018-06-20 PROCEDURE — 700111 HCHG RX REV CODE 636 W/ 250 OVERRIDE (IP)

## 2018-06-20 PROCEDURE — 700102 HCHG RX REV CODE 250 W/ 637 OVERRIDE(OP): Performed by: ORTHOPAEDIC SURGERY

## 2018-06-20 PROCEDURE — A6550 NEG PRES WOUND THER DRSG SET: HCPCS | Performed by: ORTHOPAEDIC SURGERY

## 2018-06-20 PROCEDURE — 160002 HCHG RECOVERY MINUTES (STAT): Performed by: ORTHOPAEDIC SURGERY

## 2018-06-20 PROCEDURE — 99233 SBSQ HOSP IP/OBS HIGH 50: CPT | Performed by: INTERNAL MEDICINE

## 2018-06-20 RX ORDER — POTASSIUM CHLORIDE 7.45 MG/ML
10 INJECTION INTRAVENOUS ONCE
Status: COMPLETED | OUTPATIENT
Start: 2018-06-20 | End: 2018-06-20

## 2018-06-20 RX ORDER — POTASSIUM CHLORIDE 20 MEQ/1
40 TABLET, EXTENDED RELEASE ORAL ONCE
Status: COMPLETED | OUTPATIENT
Start: 2018-06-20 | End: 2018-06-20

## 2018-06-20 RX ORDER — INSULIN GLARGINE 100 [IU]/ML
20 INJECTION, SOLUTION SUBCUTANEOUS EVERY EVENING
Status: DISCONTINUED | OUTPATIENT
Start: 2018-06-20 | End: 2018-06-21

## 2018-06-20 RX ORDER — OXYCODONE HYDROCHLORIDE 5 MG/1
TABLET ORAL
Status: COMPLETED
Start: 2018-06-20 | End: 2018-06-20

## 2018-06-20 RX ORDER — POTASSIUM CHLORIDE 20 MEQ/1
40 TABLET, EXTENDED RELEASE ORAL ONCE
Status: DISCONTINUED | OUTPATIENT
Start: 2018-06-20 | End: 2018-06-20

## 2018-06-20 RX ADMIN — AMPICILLIN SODIUM AND SULBACTAM SODIUM 3 G: 2; 1 INJECTION, POWDER, FOR SOLUTION INTRAMUSCULAR; INTRAVENOUS at 17:13

## 2018-06-20 RX ADMIN — POTASSIUM CHLORIDE 10 MEQ: 7.46 INJECTION, SOLUTION INTRAVENOUS at 06:35

## 2018-06-20 RX ADMIN — OXYCODONE HYDROCHLORIDE 10 MG: 10 TABLET ORAL at 08:29

## 2018-06-20 RX ADMIN — INSULIN LISPRO 4 UNITS: 100 INJECTION, SOLUTION INTRAVENOUS; SUBCUTANEOUS at 05:53

## 2018-06-20 RX ADMIN — INSULIN GLARGINE 20 UNITS: 100 INJECTION, SOLUTION SUBCUTANEOUS at 20:49

## 2018-06-20 RX ADMIN — OXYCODONE HYDROCHLORIDE 10 MG: 10 TABLET ORAL at 13:24

## 2018-06-20 RX ADMIN — FENTANYL CITRATE 50 MCG: 50 INJECTION, SOLUTION INTRAMUSCULAR; INTRAVENOUS at 13:45

## 2018-06-20 RX ADMIN — POTASSIUM CHLORIDE 40 MEQ: 1500 TABLET, EXTENDED RELEASE ORAL at 17:14

## 2018-06-20 RX ADMIN — AMPICILLIN SODIUM AND SULBACTAM SODIUM 3 G: 2; 1 INJECTION, POWDER, FOR SOLUTION INTRAMUSCULAR; INTRAVENOUS at 05:39

## 2018-06-20 RX ADMIN — INSULIN LISPRO 4 UNITS: 100 INJECTION, SOLUTION INTRAVENOUS; SUBCUTANEOUS at 17:09

## 2018-06-20 RX ADMIN — OXYCODONE HYDROCHLORIDE 10 MG: 5 TABLET ORAL at 13:24

## 2018-06-20 RX ADMIN — AMPICILLIN SODIUM AND SULBACTAM SODIUM 3 G: 2; 1 INJECTION, POWDER, FOR SOLUTION INTRAMUSCULAR; INTRAVENOUS at 11:21

## 2018-06-20 RX ADMIN — OXYCODONE HYDROCHLORIDE 10 MG: 10 TABLET ORAL at 17:14

## 2018-06-20 RX ADMIN — OXYCODONE HYDROCHLORIDE 10 MG: 10 TABLET ORAL at 01:56

## 2018-06-20 RX ADMIN — SENNOSIDES AND DOCUSATE SODIUM 2 TABLET: 8.6; 5 TABLET ORAL at 20:49

## 2018-06-20 RX ADMIN — AMPICILLIN SODIUM AND SULBACTAM SODIUM 3 G: 2; 1 INJECTION, POWDER, FOR SOLUTION INTRAMUSCULAR; INTRAVENOUS at 23:25

## 2018-06-20 ASSESSMENT — ENCOUNTER SYMPTOMS
CONSTIPATION: 1
DIARRHEA: 0
FEVER: 0
HEADACHES: 0
SHORTNESS OF BREATH: 0
ABDOMINAL PAIN: 0
MYALGIAS: 1
CHILLS: 0
NAUSEA: 0
VOMITING: 0

## 2018-06-20 ASSESSMENT — PAIN SCALES - GENERAL
PAINLEVEL_OUTOF10: 8
PAINLEVEL_OUTOF10: 5
PAINLEVEL_OUTOF10: 7
PAINLEVEL_OUTOF10: 0
PAINLEVEL_OUTOF10: 7
PAINLEVEL_OUTOF10: 6
PAINLEVEL_OUTOF10: 0

## 2018-06-20 NOTE — PROGRESS NOTES
Patient alert and oriented, calling appropriately for assistance. Medicated per MAR for pain. IV abx given via PIV. NPO at midnight. Wound vac to right upper thigh. Gaston in place. Updated on POC, verbalizes understanding. Call light in reach, room near nurses station, rounding implemented.

## 2018-06-20 NOTE — CARE PLAN
Problem: Knowledge Deficit  Goal: Knowledge of disease process/condition, treatment plan, diagnostic tests, and medications will improve  Outcome: PROGRESSING AS EXPECTED  Patient NPO at midnight for scheduled surgery. IV abx and wound vac to right thigh. Monitoring of blood sugars. Patient aware of POC.    Problem: Skin Integrity  Goal: Risk for impaired skin integrity will decrease  Wound vac in place to right upper leg wound. I&D scheduled for tomorrow. Patient able to turn self from side to side in bed.

## 2018-06-20 NOTE — CARE PLAN
Problem: Communication  Goal: The ability to communicate needs accurately and effectively will improve  Outcome: PROGRESSING AS EXPECTED  Pt. Aware of plan of care at this time. Able to communicate needs affectively as needed. Questions answered and understanding/learning verified by teach back method. Pt. Is now resting comfortably in bed and understands to use bell to voice concerns/needs as they arise. Hourly rounding in place.    Problem: Pain Management  Goal: Pain level will decrease to patient's comfort goal  Outcome: PROGRESSING AS EXPECTED  Pain controlled with PRN pain medicine. See MAR.

## 2018-06-20 NOTE — OP REPORT
DATE OF SERVICE:  06/20/2018    PREOPERATIVE DIAGNOSES:  1.  A 25 cm right thigh wound.  2.  Necrotizing fascitis.    POSTOPERATIVE DIAGNOSES:  1.  A 25 cm right thigh wound.  2.  Necrotizing fascitis.    PROCEDURES PERFORMED:  1.  Secondary closure of surgical wound, 10 cm.  2.  Irrigation and debridement of skin, subcutaneous tissue, underlying   muscle.  3.  Wound VAC placement, right thigh.    SURGEON:  Omega Chew MD    ASSISTANT:  Gabino Medrano PA-C    ESTIMATED BLOOD LOSS:  Minimal.    INDICATIONS:  This is a 58-year-old with a necrotizing of her thigh and   inguinal crease and has undergone several irrigation and debridement   procedures.  She is now ready for repeat irrigation, debridement and partial   wound closure.  Risks and benefits were discussed, which include but not   limited to bleeding, infection, neurovascular damage, pain, stiffness, and   need for further surgery.  She understands all these risks and wished to   proceed.    DESCRIPTION OF PROCEDURE:  The patient was sedated with LMA anesthesia and   administered preoperative antibiotics.  The right hip and thigh were prepped   and draped in the usual sterile fashion.  Her 20 cm open wound still had some   necrotic fascia and tissue and skin that was debrided in an excisional fashion   with a knife and rongeur and then irrigated with copious amounts of normal   saline solution, 10 cm out of the 20 cm wound were able to be closed with   nylon suture without tension, but the remainder had a wound VAC placed under   sterile conditions.  The patient tolerated the procedure well.    POSTOPERATIVE PLAN:  The patient will be admitted for IV antibiotics per   infectious disease service as well as wound VAC care for another several days   prior to definitive closure on Friday.       ____________________________________     OMEGA CHEW MD    JESSICA / NTS    DD:  06/20/2018 12:56:05  DT:  06/20/2018 13:08:08    D#:  [id number]  Job#:  [id number]

## 2018-06-20 NOTE — OR NURSING
LMA OSMAN'D. PT REPORTS SHE NEED TO SPIT. PHLEGM COUGHED UP INTO KLEENEX. TOOTH DISLODGED. PT DENIES PAIN. DENIES BLEEDING. PT REPORTS THAT TOOTH HAS BEEN LOOSE ' FOR A WHILE' TELLS NURSE OK TO THROW AWAY,    HAD PT RINSE MOUTH W/ SALINE WATER

## 2018-06-20 NOTE — PROGRESS NOTES
Vascular Access Team    Date of Insertion: 6/20/18  Arm Circumference: 28  Internal length: 39  External Length: 2  Vein Occupancy %: 45  Reason for PICC: IV antibiotics  Labs: WBC 11.2, , INR 1.11, BUN 11, Cr 0.39, GFR >60    Consents confirmed, vessel patency confirmed with ultrasound. Risks and benefits of procedure explained to patient and education regarding central line associated bloodstream infections provided. Questions answered.     PICC placed in right arm per MD order with ultrasound guidance, initial arm circumference 28cm. 4 Fr, 1 lumen PICC placed in right basilic vein after 1 attempt(s). 2 cc's of 1% lidocaine injected intradermally, 21 gauge microintroducer needle and modified Seldinger technique used. 41 cm catheter inserted with good blood return. Secured at 2 cm marker. Each lumen flushed without resistance with 10 mL 0.9% normal saline. PICC line secured with Biopatch and Tegaderm.     PICC placement is confirmed by nurse using 3CG technology. PICC line is appropriate for use at this time. Pt tolerated procedure well.  Patient condition relayed to unit RN or ordering physician via this post procedure note in the EMR.      [employer] Power PICC ref # [id number], Lot # [id number]

## 2018-06-20 NOTE — PROGRESS NOTES
Infectious Disease Progress Note    Author: Patricia Escamilla M.D. Date & Time of service: 2018  11:25 AM    Chief Complaint:  FU Right thigh necrotizing fasciitis    Interval History:  2018 MAXIMUM TEMPERATURE 98.7. WBCs 11.9 and platelets 205 and cr 0.5  2018 MAXIMUM TEMPERATURE 98.7 leg continues to hurt. WBC 12.2 platelets 205 and cr 0.38   6/15/2018 and MAXIMUM TEMPERATURE 99.1 WBC 9.3 had another surgery yesterday.  2018 MAXIMUM TEMPERATURE 100 the groin is more swollen today. WBC 9   Tmax 99.8 WBC 9.8 s/p I&D this morning, R thigh soreness  - AF, WBC 11.2, tolerating abx, R thigh/ groin pain 5.5/10- slightly improved with pain meds, anxious to discharge home to get back to work by .   AF WBC 9.4 surgery delayed today, pain stable, emphasized need for IV abx given severity of infection and wound size  Labs Reviewed, Medications Reviewed, Radiology Reviewed and Wound Reviewed.    Review of Systems:  Review of Systems   Constitutional: Positive for malaise/fatigue. Negative for chills and fever.   Respiratory: Negative for shortness of breath.    Cardiovascular: Positive for leg swelling. Negative for chest pain.        RLE swelling- improving.   Gastrointestinal: Positive for constipation. Negative for abdominal pain, diarrhea, nausea and vomiting.   Musculoskeletal: Positive for joint pain and myalgias.        R thigh/ groin   Skin: Negative for rash.   Neurological: Negative for headaches.       Hemodynamics:  Temp (24hrs), Av.8 °C (98.2 °F), Min:36.6 °C (97.9 °F), Max:36.9 °C (98.4 °F)  Temperature: 36.8 °C (98.3 °F)  Pulse  Av.5  Min: 65  Max: 106   Blood Pressure: 116/74       Physical Exam:  Physical Exam   Constitutional: She is oriented to person, place, and time. She appears well-developed and well-nourished. No distress.   Appears older than stated age.  Disheveled.   HENT:   Head: Normocephalic and atraumatic.   Eyes: Conjunctivae and EOM are normal.  Pupils are equal, round, and reactive to light.   Neck: Normal range of motion.   Cardiovascular: Normal rate, regular rhythm and normal heart sounds.    No murmur heard.  Pulmonary/Chest: Effort normal and breath sounds normal. No respiratory distress. She has no wheezes.   Abdominal: Soft. Bowel sounds are normal. She exhibits no distension. There is no tenderness.   Musculoskeletal: She exhibits edema and tenderness.   Right thigh and groin- WV in place, minimal ss drainage in canister, swelling and induration to surrounding tissue with mild erythema.   Neurological: She is alert and oriented to person, place, and time.   Skin: Skin is warm. No rash noted.   Nursing note and vitals reviewed.      Meds:    Current Facility-Administered Medications:   •  insulin glargine  •  potassium chloride SA  •  insulin lispro  •  HYDROmorphone  •  loperamide  •  ampicillin-sulbactam (UNASYN) IV  •  [DISCONTINUED] insulin regular **AND** Accu-Chek ACHS **AND** NOTIFY MD and PharmD **AND** glucose 4 g **AND** dextrose 50%  •  senna-docusate **AND** polyethylene glycol/lytes **AND** magnesium hydroxide **AND** bisacodyl  •  acetaminophen  •  Pharmacy Consult Request  •  ondansetron  •  senna-docusate  •  bisacodyl  •  fleet  •  oxyCODONE immediate-release  •  oxyCODONE immediate release    Labs:  Recent Labs      06/18/18 0450  06/19/18   0050  06/20/18   0135   WBC  9.8  11.2*  9.4   RBC  3.86*  3.80*  3.59*   HEMOGLOBIN  12.0  11.7*  11.3*   HEMATOCRIT  36.5*  36.1*  33.7*   MCV  94.6  95.0  93.9   MCH  31.1  30.8  31.5   RDW  43.9  42.8  42.3   PLATELETCT  290  327  324   MPV  9.3  9.5  9.4   NEUTSPOLYS  67.50  87.00*  55.80   LYMPHOCYTES  21.90*  10.40*  31.20   MONOCYTES  7.90  1.70  6.50   EOSINOPHILS  0.90  0.00  1.30   BASOPHILS  0.00  0.90  0.60   RBCMORPHOLO  Present  Present   --      Recent Labs      06/18/18   0450  06/19/18   0050  06/20/18   0135   SODIUM  138  135  138   POTASSIUM  3.4*  4.2  3.6   CHLORIDE   "100  101  102   CO2  28  27  28   GLUCOSE  121*  286*  287*   BUN  3*  11  10     Recent Labs      06/18/18   0450  06/19/18   0050  06/20/18   0135   ALBUMIN  2.3*  2.4*  2.3*   TBILIRUBIN  0.4  0.3  0.3   ALKPHOSPHAT  147*  120*  105*   TOTPROTEIN  6.2  5.8*  5.5*   ALTSGPT  11  9  10   ASTSGOT  10*  8*  10*   CREATININE  0.31*  0.39*  0.40*       Imaging:  No results found.    Micro:  Results     Procedure Component Value Units Date/Time    BLOOD CULTURE [[id number]] Collected:  06/11/18 2156    Order Status:  Completed Specimen:  Blood from Peripheral Updated:  06/16/18 2300     Significant Indicator NEG     Source BLD     Site PERIPHERAL     Blood Culture No growth after 5 days of incubation.    Narrative:       Per Hospital Policy: Only change Specimen Src: to \"Line\" if  specified by physician order.    BLOOD CULTURE [[id number]] Collected:  06/11/18 2156    Order Status:  Completed Specimen:  Blood from Peripheral Updated:  06/16/18 2300     Significant Indicator NEG     Source BLD     Site PERIPHERAL     Blood Culture No growth after 5 days of incubation.    Narrative:       Per Hospital Policy: Only change Specimen Src: to \"Line\" if  specified by physician order.    CULTURE WOUND W/ GRAM STAIN [[id number]]  (Abnormal) Collected:  06/11/18 1945    Order Status:  Completed Specimen:  Wound Updated:  06/15/18 1524     Significant Indicator POS (POS)     Source WND     Site Right Groin Abscess     Culture Result Wound Light growth mixed skin franko. (A)     Gram Stain Result Many WBCs.  Many mixed bacteria, no predominant organism seen.       Culture Result Wound Streptococcus agalactiae (Group B)  Moderate growth   (A)    ANAEROBIC CULTURE [[id number]]  (Abnormal) Collected:  06/11/18 1945    Order Status:  Completed Specimen:  Wound Updated:  06/15/18 1524     Significant Indicator POS (POS)     Source WND     Site Right Groin Abscess     Anaerobic Culture, Culture Res Growth noted after further incubation, see " below for  organism identification.   (A)      Prevotella (B.) bivia  Heavy growth   (A)    Urine Culture [[id number]]  (Abnormal) Collected:  06/12/18 0727    Order Status:  Completed Specimen:  Urine from Urine, Clean Catch Updated:  06/15/18 0987     Significant Indicator POS (POS)     Source UR     Site URINE, CLEAN CATCH     Urine Culture Mixed skin franko <10,000 cfu/mL (A)      Candida krusei  ,000 cfu/mL   (A)    Narrative:       Indication for culture:->Suspected Sepsis          Assessment:  Active Hospital Problems    Diagnosis   • Sepsis with acute organ failure secondary to necrotizing fasciitis of R thigh in the setting of hyperglycemia  [A41.9]   • New onset type 2 diabetes mellitus (HCC) [E11.9]   • Hypokalemia [E87.6]   • Non anion gap metabolic acidosis [E87.2]       Plan:  Right thigh necrotizing fasciitis - additional work  Afebrile  Leukocytosis resolved  S/p I&D on 6/11/2018, 6/13/2018, 6/18  OR cultures + group B strep and Prevotella  Continue IV Unasyn  Plan for 2 weeks of IV abx from date of last surgery  Plan for repeat I&D today  Wound vac/care  PICC ordered    New onset diabetes mellitus  HgA1c 12.8  Keep the blood sugars under 150 to control infection and wound healing    Asymptomatic fungiuria  Monitor    Tobacco abuse  Patient counseled    Discussed with IM.

## 2018-06-20 NOTE — PROGRESS NOTES
Internal Medicine Interval Note  Note Author: Harini Ritter M.D.     Name Emmanuelle Martin     1960   Age/Sex 58 y.o. female   MRN [id number]   Code Status Full      After 5PM or if no immediate response to page, please call for cross-coverage  Attending/Team:  / Mason See Patient List for primary contact information  Call [phone] to page    1st Call - Day Intern (R1):   Dr. Ritter 2nd Call - Day Sr. Resident (R2/R3):   Dr. Carpenter        Reason for interval visit  (Principal Problem)   Sepsis with multi organ failure secondary to necrotizing fasciitis of right thigh with Group B Strep   New onset insulin dependent uncontrolled type II DM.     Interval Problem Daily Status Update  (24 hours)  -Pt was transferred from ICU on   -No acute event overnight. Feeling better, pain controlled.  -Frequency of dilaudid reduced to q6hrs, continue to taper opioids based on pain control.   - Per Ortho: S/P I & D  on  , 6/15 , &  , plan for definitive closure of the wound on .   - Per ID: Unasyn started on - End Date on 2 weeks after the last Sx   - Plan to keep the blood sugars < 150 to control infection and wound healing  - Replete K with goal >4.       ROS     Constitutional: Negative for chills and fever.   HENT: Negative for congestion and sore throat.    Eyes: Negative for blurred vision and double vision.   Respiratory: Negative for sputum production and shortness of breath.    Cardiovascular: Negative for chest pain and palpitations.   Gastrointestinal: Negative for abdominal pain, nausea and vomiting.   Genitourinary: Negative for dysuria and urgency.   Musculoskeletal: Negative.  Negative for joint pain and myalgias.       Right groin pain       No L/E pain , swelling, redness.   Skin: Negative.    Neurological: Negative for dizziness and headaches.   Endo/Heme/Allergies: Negative.    Psychiatric/Behavioral: Negative.      Consultants/Specialty  ID   Ortho  Surgery   PCP: GARCIA NAVA    Disposition  Inpatient     Quality Measures  Quality-Core Measures   Reviewed items::  Labs reviewed and Medications reviewed  Gaston catheter::  One or Two Days Post Surgery (Day of Surgery being Day 0)  DVT prophylaxis pharmacological::  Enoxaparin (Lovenox)  Antibiotics:  Treating active infection/contamination beyond 24 hours perioperative coverage          Physical Exam       Vitals:    06/20/18 1258 06/20/18 1259 06/20/18 1300 06/20/18 1315   BP:       Pulse: 76 78 73 75   Resp: (!) 22 (!) 24 (!) 21 18   Temp: 36.5 °C (97.7 °F)      SpO2: 91% 90% 93% 97%   Weight:       Height:         Body mass index is 37.47 kg/m².    Oxygen Therapy:  Pulse Oximetry: 97 %, O2 (LPM): 0, O2 Delivery: Blow-By;Nasal Cannula    Physical Exam  Gen: Not in acute distress.    HEENT: NC/AT, moist mucous membranes.  Neck: No tracheal deviation. No stridor.   Cardiac: RRR without m/g/r. S1S2, no JVD.  Respiratory: Normal effort, no tachypnea. Decreased breath sounds over bilateral lateral lobes.   Abdomen: BS+, soft, NT/ND, no rebound/guarding.   Ext: No edema, 2+ DP pulses b/l.  Skin: Warm, dry. Dressing over right medial thigh. Showed mild edema S/P I&D x 2. Wound vac to right groin. Limited ROM of right thigh due to pain.   Neuro: AAOx4,  no focal sensory or motor deficits.   Psych: Affect, mood, judgement normal.    Lab Data Review:         6/17/2018  9:30 AM    Recent Labs      06/18/18   0450  06/19/18   0050  06/20/18   0135   SODIUM  138  135  138   POTASSIUM  3.4*  4.2  3.6   CHLORIDE  100  101  102   CO2  28  27  28   BUN  3*  11  10   CREATININE  0.31*  0.39*  0.40*   MAGNESIUM  1.9  2.0   --    CALCIUM  8.0*  8.4*  8.0*       Recent Labs      06/18/18   0450  06/19/18   0050  06/20/18   0135   ALTSGPT  11  9  10   ASTSGOT  10*  8*  10*   ALKPHOSPHAT  147*  120*  105*   TBILIRUBIN  0.4  0.3  0.3   GLUCOSE  121*  286*  287*       Recent Labs      06/18/18   0450  06/19/18   0050   06/20/18   0135   RBC  3.86*  3.80*  3.59*   HEMOGLOBIN  12.0  11.7*  11.3*   HEMATOCRIT  36.5*  36.1*  33.7*   PLATELETCT  290  327  324       Recent Labs      06/18/18   0450  06/19/18   0050  06/20/18   0135   WBC  9.8  11.2*  9.4   NEUTSPOLYS  67.50  87.00*  55.80   LYMPHOCYTES  21.90*  10.40*  31.20   MONOCYTES  7.90  1.70  6.50   EOSINOPHILS  0.90  0.00  1.30   BASOPHILS  0.00  0.90  0.60   ASTSGOT  10*  8*  10*   ALTSGPT  11  9  10   ALKPHOSPHAT  147*  120*  105*   TBILIRUBIN  0.4  0.3  0.3           Assessment/Plan     Sepsis due to necrotizing fasciitis of R thigh  S/P I&D on 6/11 and 6/15, 6/17  Hemodynamically stable  Wound cx +group B Strep now on ampicillin-sulbactam    Wound vac placed 6/11  Control BGs with Lantus and SSI   Blood cultures NGTD   - Per Ortho: S/P I & D  on 6/11 , 6/15 ,6/18 & 6/20 , plan for definitive closure of the wound on Friday 6/22.   - Per ID: Unasyn started on 6/13- End Date on 2 weeks after the last Sx   - Plan to keep the blood sugars < 150 to control infection and wound healing          Non anion gap metabolic acidosis  Resolved  Secondary to NS fluid resuscitation causing hyperchloremic acidosis and starvation ketosis     New onset type 2 diabetes mellitus (HCC)  A1c 12.8% on admission   SSI, Lantus increased to 20 units         Anemia/Thrombocytopenia  Resolved        Hypocalcemia  Resolved   Secondary to hypoalbuminemia in the setting of sepsis      Hypokalemia  Replete accordingly   Secondary to Hypomagnesemia/Poor diet  Mg >2, K >4     Elevated alkaline phosphatase level  Resolving.       Harini Ritter MD     The patient was seen by me face to face. I personally had a discussion with the patient. The case was discussed with our resident team, I examined the patient and confirmed the essential components of the history, physical examination, diagnosis and treatment plan as needed. I agree with the patient care as documented by the resident and edited as above by me. See  resident's note above for complete details of service. The overall treatment regimen will be carried out as described above.     Thank you:  Pilo Luna MD, FACP  [facility]  Pager: Use Tiger Text (use resident info under treatment team for day to day floor issues)  Office: [phone] Ext. [phone]  Fax: [phone] (non PHI only)

## 2018-06-21 LAB
ALBUMIN SERPL BCP-MCNC: 2.6 G/DL (ref 3.2–4.9)
ALBUMIN/GLOB SERPL: 0.8 G/DL
ALP SERPL-CCNC: 86 U/L (ref 30–99)
ALT SERPL-CCNC: 8 U/L (ref 2–50)
ANION GAP SERPL CALC-SCNC: 7 MMOL/L (ref 0–11.9)
AST SERPL-CCNC: 5 U/L (ref 12–45)
BASOPHILS # BLD AUTO: 0.5 % (ref 0–1.8)
BASOPHILS # BLD: 0.06 K/UL (ref 0–0.12)
BILIRUB SERPL-MCNC: 0.4 MG/DL (ref 0.1–1.5)
BUN SERPL-MCNC: 8 MG/DL (ref 8–22)
CALCIUM SERPL-MCNC: 8.4 MG/DL (ref 8.5–10.5)
CHLORIDE SERPL-SCNC: 101 MMOL/L (ref 96–112)
CO2 SERPL-SCNC: 28 MMOL/L (ref 20–33)
CREAT SERPL-MCNC: 0.34 MG/DL (ref 0.5–1.4)
EOSINOPHIL # BLD AUTO: 0.03 K/UL (ref 0–0.51)
EOSINOPHIL NFR BLD: 0.3 % (ref 0–6.9)
ERYTHROCYTE [DISTWIDTH] IN BLOOD BY AUTOMATED COUNT: 41.7 FL (ref 35.9–50)
GLOBULIN SER CALC-MCNC: 3.3 G/DL (ref 1.9–3.5)
GLUCOSE BLD-MCNC: 195 MG/DL (ref 65–99)
GLUCOSE BLD-MCNC: 205 MG/DL (ref 65–99)
GLUCOSE BLD-MCNC: 208 MG/DL (ref 65–99)
GLUCOSE SERPL-MCNC: 215 MG/DL (ref 65–99)
HCT VFR BLD AUTO: 36.7 % (ref 37–47)
HGB BLD-MCNC: 11.8 G/DL (ref 12–16)
IMM GRANULOCYTES # BLD AUTO: 0.22 K/UL (ref 0–0.11)
IMM GRANULOCYTES NFR BLD AUTO: 1.9 % (ref 0–0.9)
LYMPHOCYTES # BLD AUTO: 3.05 K/UL (ref 1–4.8)
LYMPHOCYTES NFR BLD: 26.5 % (ref 22–41)
MAGNESIUM SERPL-MCNC: 1.8 MG/DL (ref 1.5–2.5)
MCH RBC QN AUTO: 30.4 PG (ref 27–33)
MCHC RBC AUTO-ENTMCNC: 32.2 G/DL (ref 33.6–35)
MCV RBC AUTO: 94.6 FL (ref 81.4–97.8)
MONOCYTES # BLD AUTO: 0.75 K/UL (ref 0–0.85)
MONOCYTES NFR BLD AUTO: 6.5 % (ref 0–13.4)
NEUTROPHILS # BLD AUTO: 7.38 K/UL (ref 2–7.15)
NEUTROPHILS NFR BLD: 64.3 % (ref 44–72)
NRBC # BLD AUTO: 0 K/UL
NRBC BLD-RTO: 0 /100 WBC
PLATELET # BLD AUTO: 370 K/UL (ref 164–446)
PMV BLD AUTO: 9.3 FL (ref 9–12.9)
POTASSIUM SERPL-SCNC: 4 MMOL/L (ref 3.6–5.5)
PROT SERPL-MCNC: 5.9 G/DL (ref 6–8.2)
RBC # BLD AUTO: 3.88 M/UL (ref 4.2–5.4)
SODIUM SERPL-SCNC: 136 MMOL/L (ref 135–145)
WBC # BLD AUTO: 11.5 K/UL (ref 4.8–10.8)

## 2018-06-21 PROCEDURE — A9270 NON-COVERED ITEM OR SERVICE: HCPCS | Performed by: INTERNAL MEDICINE

## 2018-06-21 PROCEDURE — 80053 COMPREHEN METABOLIC PANEL: CPT

## 2018-06-21 PROCEDURE — A9270 NON-COVERED ITEM OR SERVICE: HCPCS | Performed by: ORTHOPAEDIC SURGERY

## 2018-06-21 PROCEDURE — 99233 SBSQ HOSP IP/OBS HIGH 50: CPT | Mod: GC | Performed by: HOSPITALIST

## 2018-06-21 PROCEDURE — 700111 HCHG RX REV CODE 636 W/ 250 OVERRIDE (IP): Performed by: STUDENT IN AN ORGANIZED HEALTH CARE EDUCATION/TRAINING PROGRAM

## 2018-06-21 PROCEDURE — 700111 HCHG RX REV CODE 636 W/ 250 OVERRIDE (IP): Performed by: ORTHOPAEDIC SURGERY

## 2018-06-21 PROCEDURE — 83735 ASSAY OF MAGNESIUM: CPT

## 2018-06-21 PROCEDURE — 82962 GLUCOSE BLOOD TEST: CPT

## 2018-06-21 PROCEDURE — 700102 HCHG RX REV CODE 250 W/ 637 OVERRIDE(OP): Performed by: ORTHOPAEDIC SURGERY

## 2018-06-21 PROCEDURE — 99233 SBSQ HOSP IP/OBS HIGH 50: CPT | Performed by: INTERNAL MEDICINE

## 2018-06-21 PROCEDURE — 700111 HCHG RX REV CODE 636 W/ 250 OVERRIDE (IP): Performed by: INTERNAL MEDICINE

## 2018-06-21 PROCEDURE — 85025 COMPLETE CBC W/AUTO DIFF WBC: CPT

## 2018-06-21 PROCEDURE — 700102 HCHG RX REV CODE 250 W/ 637 OVERRIDE(OP): Performed by: INTERNAL MEDICINE

## 2018-06-21 PROCEDURE — 700105 HCHG RX REV CODE 258: Performed by: INTERNAL MEDICINE

## 2018-06-21 PROCEDURE — 770006 HCHG ROOM/CARE - MED/SURG/GYN SEMI*

## 2018-06-21 RX ORDER — DEXTROSE MONOHYDRATE 25 G/50ML
25 INJECTION, SOLUTION INTRAVENOUS
Status: DISCONTINUED | OUTPATIENT
Start: 2018-06-21 | End: 2018-06-21

## 2018-06-21 RX ORDER — DEXTROSE MONOHYDRATE 25 G/50ML
25 INJECTION, SOLUTION INTRAVENOUS
Status: DISCONTINUED | OUTPATIENT
Start: 2018-06-21 | End: 2018-06-22

## 2018-06-21 RX ORDER — MAGNESIUM SULFATE HEPTAHYDRATE 40 MG/ML
2 INJECTION, SOLUTION INTRAVENOUS ONCE
Status: COMPLETED | OUTPATIENT
Start: 2018-06-21 | End: 2018-06-21

## 2018-06-21 RX ORDER — INSULIN GLARGINE 100 [IU]/ML
20 INJECTION, SOLUTION SUBCUTANEOUS EVERY EVENING
Status: DISCONTINUED | OUTPATIENT
Start: 2018-06-21 | End: 2018-06-21

## 2018-06-21 RX ORDER — INSULIN GLARGINE 100 [IU]/ML
25 INJECTION, SOLUTION SUBCUTANEOUS EVERY EVENING
Status: DISCONTINUED | OUTPATIENT
Start: 2018-06-21 | End: 2018-06-22

## 2018-06-21 RX ADMIN — MAGNESIUM SULFATE IN WATER 2 G: 40 INJECTION, SOLUTION INTRAVENOUS at 09:06

## 2018-06-21 RX ADMIN — OXYCODONE HYDROCHLORIDE 10 MG: 10 TABLET ORAL at 09:19

## 2018-06-21 RX ADMIN — OXYCODONE HYDROCHLORIDE 10 MG: 10 TABLET ORAL at 22:30

## 2018-06-21 RX ADMIN — AMPICILLIN SODIUM AND SULBACTAM SODIUM 3 G: 2; 1 INJECTION, POWDER, FOR SOLUTION INTRAMUSCULAR; INTRAVENOUS at 12:32

## 2018-06-21 RX ADMIN — AMPICILLIN SODIUM AND SULBACTAM SODIUM 3 G: 2; 1 INJECTION, POWDER, FOR SOLUTION INTRAMUSCULAR; INTRAVENOUS at 18:38

## 2018-06-21 RX ADMIN — POLYETHYLENE GLYCOL 3350 1 PACKET: 17 POWDER, FOR SOLUTION ORAL at 09:19

## 2018-06-21 RX ADMIN — SENNOSIDES AND DOCUSATE SODIUM 2 TABLET: 8.6; 5 TABLET ORAL at 09:01

## 2018-06-21 RX ADMIN — OXYCODONE HYDROCHLORIDE 10 MG: 10 TABLET ORAL at 18:45

## 2018-06-21 RX ADMIN — AMPICILLIN SODIUM AND SULBACTAM SODIUM 3 G: 2; 1 INJECTION, POWDER, FOR SOLUTION INTRAMUSCULAR; INTRAVENOUS at 23:38

## 2018-06-21 RX ADMIN — MAGNESIUM HYDROXIDE 30 ML: 400 SUSPENSION ORAL at 09:19

## 2018-06-21 RX ADMIN — ONDANSETRON 4 MG: 2 INJECTION INTRAMUSCULAR; INTRAVENOUS at 06:14

## 2018-06-21 RX ADMIN — AMPICILLIN SODIUM AND SULBACTAM SODIUM 3 G: 2; 1 INJECTION, POWDER, FOR SOLUTION INTRAMUSCULAR; INTRAVENOUS at 05:17

## 2018-06-21 ASSESSMENT — PAIN SCALES - GENERAL
PAINLEVEL_OUTOF10: 6
PAINLEVEL_OUTOF10: 5

## 2018-06-21 ASSESSMENT — ENCOUNTER SYMPTOMS
PALPITATIONS: 0
CHILLS: 0
CONSTIPATION: 1
SHORTNESS OF BREATH: 0
NAUSEA: 0
ABDOMINAL PAIN: 0
VOMITING: 0
HEADACHES: 0
MYALGIAS: 1
FEVER: 0

## 2018-06-21 NOTE — PROGRESS NOTES
Infectious Disease Progress Note    Author: IRINEO Roy Date & Time of service: 2018  2:40 PM    Chief Complaint:  FU Right thigh necrotizing fasciitis    Interval History:  2018 MAXIMUM TEMPERATURE 98.7. WBCs 11.9 and platelets 205 and cr 0.5  2018 MAXIMUM TEMPERATURE 98.7 leg continues to hurt. WBC 12.2 platelets 205 and cr 0.38   6/15/2018 and MAXIMUM TEMPERATURE 99.1 WBC 9.3 had another surgery yesterday.  2018 MAXIMUM TEMPERATURE 100 the groin is more swollen today. WBC 9   Tmax 99.8 WBC 9.8 s/p I&D this morning, R thigh soreness  - AF, WBC 11.2, tolerating abx, R thigh/ groin pain 5.5/10- slightly improved with pain meds, anxious to discharge home to get back to work by .   AF WBC 9.4 surgery delayed today, pain stable, emphasized need for IV abx given severity of infection and wound size  - AF, WBC 11.5, R groin pain 5-6/10, no issue with abx, understanding to needing prolonged IV abx.  Labs Reviewed, Medications Reviewed, Radiology Reviewed and Wound Reviewed.    Review of Systems:  Review of Systems   Constitutional: Negative for chills and fever.   Respiratory: Negative for shortness of breath.    Cardiovascular: Positive for leg swelling. Negative for chest pain and palpitations.        RLE swelling- improving.   Gastrointestinal: Positive for constipation. Negative for abdominal pain, nausea and vomiting.   Musculoskeletal: Positive for joint pain and myalgias.        R thigh/ groin   Skin: Negative for rash.   Neurological: Negative for headaches.       Hemodynamics:  Temp (24hrs), Av.6 °C (97.9 °F), Min:36.1 °C (97 °F), Max:37.1 °C (98.7 °F)  Temperature: 36.8 °C (98.3 °F)  Pulse  Av.5  Min: 59  Max: 106   Blood Pressure: 119/70       Physical Exam:  Physical Exam   Constitutional: She is oriented to person, place, and time. She appears well-developed and well-nourished. No distress.   Appears older than stated age.  Disheveled.   HENT:    Head: Normocephalic and atraumatic.   Eyes: EOM are normal. Pupils are equal, round, and reactive to light. No scleral icterus.   Neck: Normal range of motion. Neck supple.   Cardiovascular: Normal rate, normal heart sounds and intact distal pulses.    Pulmonary/Chest: Effort normal. She has no wheezes. She has no rales.   Abdominal: Soft. There is no tenderness. There is no rebound.   Musculoskeletal: She exhibits edema and tenderness.   Right thigh and groin- WV in place, scant ss drainage in canister, mild swelling and induration to surrounding tissue with mild erythema.  RUE PICC- CDI, non tender, no erythema.   Neurological: She is alert and oriented to person, place, and time.   Skin: Skin is warm. No rash noted.   Nursing note and vitals reviewed.      Meds:    Current Facility-Administered Medications:   •  HYDROmorphone  •  insulin glargine **AND** insulin lispro **AND** insulin lispro **AND** Accu-Chek ACHS **AND** NOTIFY MD and PharmD **AND** glucose 4 g **AND** dextrose 50%  •  PICC Line Insertion has been implemented **AND** May use Lidocaine 1% not to exceed 3 mls for local at insertion site **AND** NOTIFY MD **AND** Tip to dwell in the superior vena cava **AND** If radiologist reading of chest X-ray states any of the following the PICC should be used **AND** Further evaluation of the PICC placement can be retrieved from X-Ray and Imaging **AND** Blood draws through PICC line; draws by RN only **AND** FLUSHING GUIDELINES WHEN IN USE **AND** normal saline PF **AND** FLUSHING GUIDELINES WHEN NOT IN USE **AND** [START ON 2018] DRESSING MAINTENANCE **AND** Change needleless pressure ports and IV tubing every 72 hours per hospital policy **AND** TUBING **AND** If there is an MD order to remove the PICC line, any RN may remove the PICC line **AND** [] PATIENT EDUCATION MATERIALS **AND** NURSING COMMUNICATION  •  loperamide  •  ampicillin-sulbactam (UNASYN) IV  •  [DISCONTINUED] insulin regular  "**AND** Accu-Chek ACHS **AND** NOTIFY MD and PharmD **AND** glucose 4 g **AND** dextrose 50%  •  senna-docusate **AND** polyethylene glycol/lytes **AND** magnesium hydroxide **AND** bisacodyl  •  acetaminophen  •  Pharmacy Consult Request  •  ondansetron  •  senna-docusate  •  bisacodyl  •  fleet  •  oxyCODONE immediate-release  •  oxyCODONE immediate release    Labs:  Recent Labs      06/19/18   0050 06/20/18 0135 06/21/18   0335   WBC  11.2*  9.4  11.5*   RBC  3.80*  3.59*  3.88*   HEMOGLOBIN  11.7*  11.3*  11.8*   HEMATOCRIT  36.1*  33.7*  36.7*   MCV  95.0  93.9  94.6   MCH  30.8  31.5  30.4   RDW  42.8  42.3  41.7   PLATELETCT  327  324  370   MPV  9.5  9.4  9.3   NEUTSPOLYS  87.00*  55.80  64.30   LYMPHOCYTES  10.40*  31.20  26.50   MONOCYTES  1.70  6.50  6.50   EOSINOPHILS  0.00  1.30  0.30   BASOPHILS  0.90  0.60  0.50   RBCMORPHOLO  Present   --    --      Recent Labs      06/19/18   0050 06/20/18 0135 06/21/18   0335   SODIUM  135  138  136   POTASSIUM  4.2  3.6  4.0   CHLORIDE  101  102  101   CO2  27  28  28   GLUCOSE  286*  287*  215*   BUN  11  10  8     Recent Labs      06/19/18   0050 06/20/18 0135 06/21/18   0335   ALBUMIN  2.4*  2.3*  2.6*   TBILIRUBIN  0.3  0.3  0.4   ALKPHOSPHAT  120*  105*  86   TOTPROTEIN  5.8*  5.5*  5.9*   ALTSGPT  9  10  8   ASTSGOT  8*  10*  5*   CREATININE  0.39*  0.40*  0.34*       Imaging:  No results found.    Micro:  Results     Procedure Component Value Units Date/Time    BLOOD CULTURE [[id number]] Collected:  06/11/18 2156    Order Status:  Completed Specimen:  Blood from Peripheral Updated:  06/16/18 2300     Significant Indicator NEG     Source BLD     Site PERIPHERAL     Blood Culture No growth after 5 days of incubation.    Narrative:       Per Hospital Policy: Only change Specimen Src: to \"Line\" if  specified by physician order.    BLOOD CULTURE [[id number]] Collected:  06/11/18 2156    Order Status:  Completed Specimen:  Blood from Peripheral Updated: " " 06/16/18 2300     Significant Indicator NEG     Source BLD     Site PERIPHERAL     Blood Culture No growth after 5 days of incubation.    Narrative:       Per Hospital Policy: Only change Specimen Src: to \"Line\" if  specified by physician order.    CULTURE WOUND W/ GRAM STAIN [[id number]]  (Abnormal) Collected:  06/11/18 1945    Order Status:  Completed Specimen:  Wound Updated:  06/15/18 1524     Significant Indicator POS (POS)     Source WND     Site Right Groin Abscess     Culture Result Wound Light growth mixed skin franko. (A)     Gram Stain Result Many WBCs.  Many mixed bacteria, no predominant organism seen.       Culture Result Wound Streptococcus agalactiae (Group B)  Moderate growth   (A)    ANAEROBIC CULTURE [[id number]]  (Abnormal) Collected:  06/11/18 1945    Order Status:  Completed Specimen:  Wound Updated:  06/15/18 1524     Significant Indicator POS (POS)     Source WND     Site Right Groin Abscess     Anaerobic Culture, Culture Res Growth noted after further incubation, see below for  organism identification.   (A)      Prevotella (B.) bivia  Heavy growth   (A)    Urine Culture [[id number]]  (Abnormal) Collected:  06/12/18 0725    Order Status:  Completed Specimen:  Urine from Urine, Clean Catch Updated:  06/15/18 0938     Significant Indicator POS (POS)     Source UR     Site URINE, CLEAN CATCH     Urine Culture Mixed skin franko <10,000 cfu/mL (A)      Candida krusei  ,000 cfu/mL   (A)    Narrative:       Indication for culture:->Suspected Sepsis          Assessment:  Active Hospital Problems    Diagnosis   • Sepsis with acute organ failure secondary to necrotizing fasciitis of R thigh in the setting of hyperglycemia  [A41.9]   • New onset type 2 diabetes mellitus (HCC) [E11.9]   • Hypokalemia [E87.6]   • Non anion gap metabolic acidosis [E87.2]       Plan:  Right thigh necrotizing fasciitis - additional work  Afebrile  Leukocytosis- 11.5, reactive from surgery?  S/p I&D on 6/11/2018, " 6/13/2018, 6/18, 6/20  OR cultures + group B strep and Prevotella  Continue IV Unasyn  Plan for 2 weeks of IV abx from date of last surgery  Plan for repeat I&D tomorrow  Wound vac/care    New onset diabetes mellitus  HgA1c 12.8  Keep the blood sugars under 150 to control infection and wound healing    Asymptomatic fungiuria  Monitor    Tobacco abuse  Patient counseled    Discussed with ANG Breaux.  Pending insurance, difficult discharge, will likely complete treatment Inpt.

## 2018-06-21 NOTE — THERAPY
Attempted to see pt for OT tx however declined due to fatigue and pain.  Will attempt in the future. MW [phone]

## 2018-06-21 NOTE — PROGRESS NOTES
"   Orthopaedic Progress Note    Interval changes:  Vac in place with no leak  To OR tomorrow  NPO after midnight     ROS - Patient denies any new issues.  Pain well controlled.    Blood pressure 119/70, pulse (!) 59, temperature 36.8 °C (98.3 °F), resp. rate 15, height 1.499 m (4' 11.02\"), weight 84.2 kg (185 lb 10 oz), SpO2 93 %, not currently breastfeeding.      Patient seen and examined  No acute distress  Breathing non labored  RRR  Right groin vac in place with no leak, inner thigh erythema almost fully resolved, BLE DNVI, moves all toes, cap refill < 2 sec.    Recent Labs      06/19/18   0050  06/20/18   0135  06/21/18   0335   WBC  11.2*  9.4  11.5*   RBC  3.80*  3.59*  3.88*   HEMOGLOBIN  11.7*  11.3*  11.8*   HEMATOCRIT  36.1*  33.7*  36.7*   MCV  95.0  93.9  94.6   MCH  30.8  31.5  30.4   MCHC  32.4*  33.5*  32.2*   RDW  42.8  42.3  41.7   PLATELETCT  327  324  370   MPV  9.5  9.4  9.3       Active Hospital Problems    Diagnosis   • Sepsis due to necrotizing fasciitis of R thigh [A41.9]     Priority: High   • New onset type 2 diabetes mellitus (HCC) [E11.9]     Priority: Medium   • Elevated alkaline phosphatase level [R74.8]       Assessment/Plan:  Return to OR tomorrow  NPO after midnight   POD#1 S/P:  1.  Secondary closure of surgical wound, 10 cm.  2.  Irrigation and debridement of skin, subcutaneous tissue, underlying   muscle.  3.  Wound VAC placement, right thigh.  POD#3 S/P:  1.  Irrigation and debridement of skin, subcutaneous tissue and underlying   muscle, right thigh.  2.  Wound VAC placement, right thigh  POD#6 S/P:  1.  Irrigation and debridement of skin, subcutaneous tissue, and underlying   muscle.  2.  Wound VAC placement, right thigh.  POD#10 S/P:  1.  Irrigation and debridement of right thigh necrotizing fasciitis, infection   of skin, subcutaneous tissue, and underlying muscle.  2.  Wound VAC placement, right thigh.  Wt bearing status - WBAT  Wound care/Drains - vac left in place  Future " Procedures - tomorrow  Lovenox: Start 6/13, Duration-until ambulatory > 150'  Sutures/Staples out- 10-14 days post operatively  PT/OT-initiated  Antibiotics: unasyn 3g IV Q6  DVT Prophylaxis- TEDS/SCDs/Foot pumps  Gaston-in place  Case Coordination for Discharge Planning - Disposition home

## 2018-06-21 NOTE — PROGRESS NOTES
Patient is AAOx4, stand by assist to commode. Right upper arm PICC in place. Gaston and wound vac in place. Patient denies any needs or questions at this time. Call light within reach, bed locked and in lowest position.

## 2018-06-21 NOTE — PROGRESS NOTES
Internal Medicine Interval Note  Note Author: Harini Ritter M.D.     Name Emmanuelle Martin     1960   Age/Sex 58 y.o. female   MRN [id number]   Code Status Full      After 5PM or if no immediate response to page, please call for cross-coverage  Attending/Team:  / Mason See Patient List for primary contact information  Call [phone] to page    1st Call - Day Intern (R1):   Dr. Ritter 2nd Call - Day Sr. Resident (R2/R3):   Dr. Carpenter        Reason for interval visit  (Principal Problem)   Sepsis with multi organ failure secondary to necrotizing fasciitis of right thigh with Group B Strep   New onset insulin dependent uncontrolled type II DM.     Interval Problem Daily Status Update  (24 hours)  -Pt was transferred from ICU on   -No acute event overnight. Feeling better, pain controlled.  -Frequency of dilaudid reduced to q6hrs, continue to taper opioids based on pain control.   - Per Ortho: S/P I & D  on  , 6/15 , &  , plan for definitive closure of the wound on .   - Per ID: Unasyn started on - End Date on 2 weeks after the last Sx   - Plan to keep the blood sugars < 150 to control infection and wound healing: Increased Lantus to 25 at Bedtime, added 3 units TID with meals & continue ISS.  DM Educator consulted.   - NPO midnight, Hold DVT Px , I & D tomorrow   - Discussed with Case Mx for D/C planning, pt has no Insurance.       ROS     Constitutional: Negative for chills and fever.   HENT: Negative for congestion and sore throat.    Eyes: Negative for blurred vision and double vision.   Respiratory: Negative for sputum production and shortness of breath.    Cardiovascular: Negative for chest pain and palpitations.   Gastrointestinal: Negative for abdominal pain, nausea and vomiting.   Genitourinary: Negative for dysuria and urgency.   Musculoskeletal: Negative.  Negative for joint pain and myalgias.       Right groin pain       No L/E pain , swelling,  redness.   Skin: Negative.    Neurological: Negative for dizziness and headaches.   Endo/Heme/Allergies: Negative.    Psychiatric/Behavioral: Negative.      Consultants/Specialty  ID   Ortho Surgery   PCP: GARCIA NAVA    Disposition  Inpatient     Quality Measures  Quality-Core Measures   Reviewed items::  Labs reviewed and Medications reviewed  Gaston catheter::  One or Two Days Post Surgery (Day of Surgery being Day 0)  DVT prophylaxis pharmacological::  Enoxaparin (Lovenox)  Antibiotics:  Treating active infection/contamination beyond 24 hours perioperative coverage          Physical Exam       Vitals:    06/20/18 1700 06/20/18 1847 06/21/18 0215 06/21/18 0800   BP: 120/75 113/76 111/72 119/70   Pulse: 76 80 75 (!) 59   Resp: 16 16 16 15   Temp: 36.7 °C (98.1 °F) 36.3 °C (97.4 °F) 36.1 °C (97 °F) 36.8 °C (98.3 °F)   SpO2: 93% 95% 92% 93%   Weight:       Height:         Body mass index is 37.47 kg/m².    Oxygen Therapy:  Pulse Oximetry: 93 %, O2 (LPM): 0, O2 Delivery: Nasal Cannula    Physical Exam  Gen: Not in acute distress.    HEENT: NC/AT, moist mucous membranes.  Neck: No tracheal deviation. No stridor.   Cardiac: RRR without m/g/r. S1S2, no JVD.  Respiratory: Normal effort, no tachypnea. Decreased breath sounds over bilateral lateral lobes.   Abdomen: BS+, soft, NT/ND, no rebound/guarding.   Ext: No edema, 2+ DP pulses b/l.  Skin: Warm, dry. Dressing over right medial thigh. Showed mild edema S/P I&D x 2. Wound vac to right groin. Limited ROM of right thigh due to pain.   Neuro: AAOx4,  no focal sensory or motor deficits.   Psych: Affect, mood, judgement normal.    Lab Data Review:         6/17/2018  9:30 AM    Recent Labs      06/19/18   0050  06/20/18   0135  06/21/18   0335   SODIUM  135  138  136   POTASSIUM  4.2  3.6  4.0   CHLORIDE  101  102  101   CO2  27  28  28   BUN  11  10  8   CREATININE  0.39*  0.40*  0.34*   MAGNESIUM  2.0   --   1.8   CALCIUM  8.4*  8.0*  8.4*       Recent Labs       06/19/18   0050  06/20/18   0135  06/21/18   0335   ALTSGPT  9  10  8   ASTSGOT  8*  10*  5*   ALKPHOSPHAT  120*  105*  86   TBILIRUBIN  0.3  0.3  0.4   GLUCOSE  286*  287*  215*       Recent Labs      06/19/18   0050  06/20/18   0135  06/21/18   0335   RBC  3.80*  3.59*  3.88*   HEMOGLOBIN  11.7*  11.3*  11.8*   HEMATOCRIT  36.1*  33.7*  36.7*   PLATELETCT  327  324  370       Recent Labs      06/19/18   0050  06/20/18   0135  06/21/18   0335   WBC  11.2*  9.4  11.5*   NEUTSPOLYS  87.00*  55.80  64.30   LYMPHOCYTES  10.40*  31.20  26.50   MONOCYTES  1.70  6.50  6.50   EOSINOPHILS  0.00  1.30  0.30   BASOPHILS  0.90  0.60  0.50   ASTSGOT  8*  10*  5*   ALTSGPT  9  10  8   ALKPHOSPHAT  120*  105*  86   TBILIRUBIN  0.3  0.3  0.4           Assessment/Plan     Sepsis due to necrotizing fasciitis of R thigh  S/P I&D on 6/11 and 6/15, 6/17  Hemodynamically stable  Wound cx +group B Strep now on ampicillin-sulbactam    Wound vac placed 6/11  Control BGs with Lantus and SSI   Blood cultures NGTD   - Per Ortho: S/P I & D  on 6/11 , 6/15 ,6/18 & 6/20 , plan for definitive closure of the wound on Friday 6/22.   - Per ID: Unasyn started on 6/13- End Date on 2 weeks after the last Sx   - Plan to keep the blood sugars < 150 to control infection and wound healing          Non anion gap metabolic acidosis  Resolved  Secondary to NS fluid resuscitation causing hyperchloremic acidosis and starvation ketosis     New onset type 2 diabetes mellitus (HCC)  A1c 12.8% on admission   SSI, Lantus increased to 25 units    Increased Lantus to 25 at Bedtime, added 3 units TID with meals & continue ISS.  DM Educator consulted.           Anemia/Thrombocytopenia  Resolved        Hypocalcemia  Resolved   Secondary to hypoalbuminemia in the setting of sepsis      Hypokalemia  Replete accordingly   Secondary to Hypomagnesemia/Poor diet  Mg >2, K >4     Elevated alkaline phosphatase level  Resolving.       Harini Ritter MD     The patient was seen by me  face to face. I personally had a discussion with the patient. The case was discussed with our resident team, I examined the patient and confirmed the essential components of the history, physical examination, diagnosis and treatment plan as needed. I agree with the patient care as documented by the resident and edited as above by me. See resident's note above for complete details of service. The overall treatment regimen will be carried out as described above.     Thank you:  Pilo Luna MD, FACP  [facility]  Pager: Use Tiger Text (use resident info under treatment team for day to day floor issues)  Office: [phone] Ext. [phone]  Fax: [phone] (non PHI only)

## 2018-06-22 LAB
ALBUMIN SERPL BCP-MCNC: 2.4 G/DL (ref 3.2–4.9)
ALBUMIN/GLOB SERPL: 0.8 G/DL
ALP SERPL-CCNC: 90 U/L (ref 30–99)
ALT SERPL-CCNC: 9 U/L (ref 2–50)
ANION GAP SERPL CALC-SCNC: 6 MMOL/L (ref 0–11.9)
AST SERPL-CCNC: 10 U/L (ref 12–45)
BASOPHILS # BLD AUTO: 0.5 % (ref 0–1.8)
BASOPHILS # BLD: 0.05 K/UL (ref 0–0.12)
BILIRUB SERPL-MCNC: 0.3 MG/DL (ref 0.1–1.5)
BUN SERPL-MCNC: 9 MG/DL (ref 8–22)
CALCIUM SERPL-MCNC: 7.8 MG/DL (ref 8.5–10.5)
CHLORIDE SERPL-SCNC: 103 MMOL/L (ref 96–112)
CO2 SERPL-SCNC: 30 MMOL/L (ref 20–33)
CREAT SERPL-MCNC: 0.38 MG/DL (ref 0.5–1.4)
EKG IMPRESSION: NORMAL
EOSINOPHIL # BLD AUTO: 0.15 K/UL (ref 0–0.51)
EOSINOPHIL NFR BLD: 1.5 % (ref 0–6.9)
ERYTHROCYTE [DISTWIDTH] IN BLOOD BY AUTOMATED COUNT: 42.5 FL (ref 35.9–50)
GLOBULIN SER CALC-MCNC: 2.9 G/DL (ref 1.9–3.5)
GLUCOSE BLD-MCNC: 161 MG/DL (ref 65–99)
GLUCOSE BLD-MCNC: 180 MG/DL (ref 65–99)
GLUCOSE BLD-MCNC: 192 MG/DL (ref 65–99)
GLUCOSE BLD-MCNC: 272 MG/DL (ref 65–99)
GLUCOSE BLD-MCNC: 335 MG/DL (ref 65–99)
GLUCOSE SERPL-MCNC: 210 MG/DL (ref 65–99)
HCT VFR BLD AUTO: 35.7 % (ref 37–47)
HGB BLD-MCNC: 11.7 G/DL (ref 12–16)
IMM GRANULOCYTES # BLD AUTO: 0.16 K/UL (ref 0–0.11)
IMM GRANULOCYTES NFR BLD AUTO: 1.6 % (ref 0–0.9)
LYMPHOCYTES # BLD AUTO: 3.21 K/UL (ref 1–4.8)
LYMPHOCYTES NFR BLD: 31.4 % (ref 22–41)
MCH RBC QN AUTO: 31.1 PG (ref 27–33)
MCHC RBC AUTO-ENTMCNC: 32.8 G/DL (ref 33.6–35)
MCV RBC AUTO: 94.9 FL (ref 81.4–97.8)
MONOCYTES # BLD AUTO: 0.7 K/UL (ref 0–0.85)
MONOCYTES NFR BLD AUTO: 6.8 % (ref 0–13.4)
NEUTROPHILS # BLD AUTO: 5.96 K/UL (ref 2–7.15)
NEUTROPHILS NFR BLD: 58.2 % (ref 44–72)
NRBC # BLD AUTO: 0 K/UL
NRBC BLD-RTO: 0 /100 WBC
PLATELET # BLD AUTO: 341 K/UL (ref 164–446)
PMV BLD AUTO: 9.1 FL (ref 9–12.9)
POTASSIUM SERPL-SCNC: 3.8 MMOL/L (ref 3.6–5.5)
PROT SERPL-MCNC: 5.3 G/DL (ref 6–8.2)
RBC # BLD AUTO: 3.76 M/UL (ref 4.2–5.4)
SODIUM SERPL-SCNC: 139 MMOL/L (ref 135–145)
TROPONIN I SERPL-MCNC: <0.01 NG/ML (ref 0–0.04)
WBC # BLD AUTO: 10.2 K/UL (ref 4.8–10.8)

## 2018-06-22 PROCEDURE — 0KBQ0ZZ EXCISION OF RIGHT UPPER LEG MUSCLE, OPEN APPROACH: ICD-10-PCS | Performed by: ORTHOPAEDIC SURGERY

## 2018-06-22 PROCEDURE — 84484 ASSAY OF TROPONIN QUANT: CPT

## 2018-06-22 PROCEDURE — 770006 HCHG ROOM/CARE - MED/SURG/GYN SEMI*

## 2018-06-22 PROCEDURE — A9270 NON-COVERED ITEM OR SERVICE: HCPCS | Performed by: ORTHOPAEDIC SURGERY

## 2018-06-22 PROCEDURE — 85025 COMPLETE CBC W/AUTO DIFF WBC: CPT

## 2018-06-22 PROCEDURE — 160009 HCHG ANES TIME/MIN: Performed by: ORTHOPAEDIC SURGERY

## 2018-06-22 PROCEDURE — A9270 NON-COVERED ITEM OR SERVICE: HCPCS

## 2018-06-22 PROCEDURE — 93010 ELECTROCARDIOGRAM REPORT: CPT | Performed by: INTERNAL MEDICINE

## 2018-06-22 PROCEDURE — 700102 HCHG RX REV CODE 250 W/ 637 OVERRIDE(OP): Performed by: ORTHOPAEDIC SURGERY

## 2018-06-22 PROCEDURE — 700111 HCHG RX REV CODE 636 W/ 250 OVERRIDE (IP): Performed by: INTERNAL MEDICINE

## 2018-06-22 PROCEDURE — 700102 HCHG RX REV CODE 250 W/ 637 OVERRIDE(OP)

## 2018-06-22 PROCEDURE — 160002 HCHG RECOVERY MINUTES (STAT): Performed by: ORTHOPAEDIC SURGERY

## 2018-06-22 PROCEDURE — 700105 HCHG RX REV CODE 258: Performed by: INTERNAL MEDICINE

## 2018-06-22 PROCEDURE — 36415 COLL VENOUS BLD VENIPUNCTURE: CPT

## 2018-06-22 PROCEDURE — 700111 HCHG RX REV CODE 636 W/ 250 OVERRIDE (IP)

## 2018-06-22 PROCEDURE — 501838 HCHG SUTURE GENERAL: Performed by: ORTHOPAEDIC SURGERY

## 2018-06-22 PROCEDURE — 700102 HCHG RX REV CODE 250 W/ 637 OVERRIDE(OP): Performed by: STUDENT IN AN ORGANIZED HEALTH CARE EDUCATION/TRAINING PROGRAM

## 2018-06-22 PROCEDURE — 160038 HCHG SURGERY MINUTES - EA ADDL 1 MIN LEVEL 2: Performed by: ORTHOPAEDIC SURGERY

## 2018-06-22 PROCEDURE — 99233 SBSQ HOSP IP/OBS HIGH 50: CPT | Mod: GC | Performed by: HOSPITALIST

## 2018-06-22 PROCEDURE — 93005 ELECTROCARDIOGRAM TRACING: CPT | Performed by: STUDENT IN AN ORGANIZED HEALTH CARE EDUCATION/TRAINING PROGRAM

## 2018-06-22 PROCEDURE — 160027 HCHG SURGERY MINUTES - 1ST 30 MINS LEVEL 2: Performed by: ORTHOPAEDIC SURGERY

## 2018-06-22 PROCEDURE — 82962 GLUCOSE BLOOD TEST: CPT

## 2018-06-22 PROCEDURE — 160035 HCHG PACU - 1ST 60 MINS PHASE I: Performed by: ORTHOPAEDIC SURGERY

## 2018-06-22 PROCEDURE — 160048 HCHG OR STATISTICAL LEVEL 1-5: Performed by: ORTHOPAEDIC SURGERY

## 2018-06-22 PROCEDURE — 80053 COMPREHEN METABOLIC PANEL: CPT

## 2018-06-22 PROCEDURE — 502240 HCHG MISC OR SUPPLY RC 0272: Performed by: ORTHOPAEDIC SURGERY

## 2018-06-22 PROCEDURE — 99232 SBSQ HOSP IP/OBS MODERATE 35: CPT | Performed by: INTERNAL MEDICINE

## 2018-06-22 PROCEDURE — 700105 HCHG RX REV CODE 258

## 2018-06-22 PROCEDURE — A6222 GAUZE <=16 IN NO W/SAL W/O B: HCPCS | Performed by: ORTHOPAEDIC SURGERY

## 2018-06-22 PROCEDURE — 500891 HCHG PACK, ORTHO MAJOR: Performed by: ORTHOPAEDIC SURGERY

## 2018-06-22 PROCEDURE — A9270 NON-COVERED ITEM OR SERVICE: HCPCS | Performed by: STUDENT IN AN ORGANIZED HEALTH CARE EDUCATION/TRAINING PROGRAM

## 2018-06-22 RX ORDER — OXYCODONE HCL 5 MG/5 ML
SOLUTION, ORAL ORAL
Status: COMPLETED
Start: 2018-06-22 | End: 2018-06-22

## 2018-06-22 RX ORDER — SODIUM CHLORIDE 9 MG/ML
INJECTION, SOLUTION INTRAVENOUS
Status: COMPLETED
Start: 2018-06-22 | End: 2018-06-22

## 2018-06-22 RX ORDER — DEXTROSE MONOHYDRATE 25 G/50ML
25 INJECTION, SOLUTION INTRAVENOUS
Status: DISCONTINUED | OUTPATIENT
Start: 2018-06-22 | End: 2018-06-23

## 2018-06-22 RX ORDER — INSULIN GLARGINE 100 [IU]/ML
25 INJECTION, SOLUTION SUBCUTANEOUS EVERY EVENING
Status: DISCONTINUED | OUTPATIENT
Start: 2018-06-22 | End: 2018-06-23

## 2018-06-22 RX ORDER — POTASSIUM CHLORIDE 20 MEQ/1
40 TABLET, EXTENDED RELEASE ORAL ONCE
Status: COMPLETED | OUTPATIENT
Start: 2018-06-22 | End: 2018-06-22

## 2018-06-22 RX ORDER — MIDAZOLAM HYDROCHLORIDE 1 MG/ML
INJECTION INTRAMUSCULAR; INTRAVENOUS
Status: DISPENSED
Start: 2018-06-22 | End: 2018-06-22

## 2018-06-22 RX ADMIN — POTASSIUM CHLORIDE 40 MEQ: 1500 TABLET, EXTENDED RELEASE ORAL at 17:15

## 2018-06-22 RX ADMIN — OXYCODONE HYDROCHLORIDE 10 MG: 5 SOLUTION ORAL at 12:35

## 2018-06-22 RX ADMIN — AMPICILLIN SODIUM AND SULBACTAM SODIUM 3 G: 2; 1 INJECTION, POWDER, FOR SOLUTION INTRAMUSCULAR; INTRAVENOUS at 18:58

## 2018-06-22 RX ADMIN — AMPICILLIN SODIUM AND SULBACTAM SODIUM 3 G: 2; 1 INJECTION, POWDER, FOR SOLUTION INTRAMUSCULAR; INTRAVENOUS at 06:30

## 2018-06-22 RX ADMIN — OXYCODONE HYDROCHLORIDE 10 MG: 10 TABLET ORAL at 06:29

## 2018-06-22 RX ADMIN — SODIUM CHLORIDE 500 ML: 9 INJECTION, SOLUTION INTRAVENOUS at 06:32

## 2018-06-22 RX ADMIN — FENTANYL CITRATE 25 MCG: 50 INJECTION, SOLUTION INTRAMUSCULAR; INTRAVENOUS at 12:45

## 2018-06-22 RX ADMIN — HYDROMORPHONE HYDROCHLORIDE 0.5 MG: 10 INJECTION, SOLUTION INTRAMUSCULAR; INTRAVENOUS; SUBCUTANEOUS at 02:05

## 2018-06-22 RX ADMIN — AMPICILLIN SODIUM AND SULBACTAM SODIUM 3 G: 2; 1 INJECTION, POWDER, FOR SOLUTION INTRAMUSCULAR; INTRAVENOUS at 14:24

## 2018-06-22 RX ADMIN — OXYCODONE HYDROCHLORIDE 10 MG: 10 TABLET ORAL at 20:47

## 2018-06-22 RX ADMIN — FENTANYL CITRATE 25 MCG: 50 INJECTION, SOLUTION INTRAMUSCULAR; INTRAVENOUS at 12:50

## 2018-06-22 RX ADMIN — FENTANYL CITRATE 50 MCG: 50 INJECTION, SOLUTION INTRAMUSCULAR; INTRAVENOUS at 12:35

## 2018-06-22 RX ADMIN — INSULIN GLARGINE 25 UNITS: 100 INJECTION, SOLUTION SUBCUTANEOUS at 20:56

## 2018-06-22 ASSESSMENT — ENCOUNTER SYMPTOMS
CHILLS: 0
CONSTIPATION: 1
PALPITATIONS: 0
HEADACHES: 0
SHORTNESS OF BREATH: 0
VOMITING: 0
MYALGIAS: 1
ABDOMINAL PAIN: 0
FEVER: 0
NAUSEA: 0

## 2018-06-22 ASSESSMENT — PAIN SCALES - GENERAL
PAINLEVEL_OUTOF10: 4
PAINLEVEL_OUTOF10: 4
PAINLEVEL_OUTOF10: 6
PAINLEVEL_OUTOF10: 7
PAINLEVEL_OUTOF10: ASSUMED PAIN PRESENT
PAINLEVEL_OUTOF10: 5
PAINLEVEL_OUTOF10: 6
PAINLEVEL_OUTOF10: 4
PAINLEVEL_OUTOF10: ASSUMED PAIN PRESENT
PAINLEVEL_OUTOF10: 7
PAINLEVEL_OUTOF10: ASSUMED PAIN PRESENT

## 2018-06-22 NOTE — PROGRESS NOTES
Diabetes education: Met with patient and education started. Please see consult note.  Plan: Asked patient to practice giving insulin with nursing. Discharge supplies will depend on coverage. Pt is PFA pending so without coverage at this time. If assistance from  for cost of supplies she could go on Levemir vial, ( or NPH : novolin/humulin) and Regular (novolin/humulin) both would be $7.00 a vial from [facility] pharmacy, or NPH and Regular (both novolin/humulin ) she would buy at [facility] for $25.00 a vial. Could also look at what samples are available from [facility] pharmacy (with prescription listed as SAMPLE).  Pt will need syringes: 1/2 cc 6mm box of 100 no matter what as well as a meter. If going home on insulin she will need the meter at discharge. True Metrix if  covers cost, Accucheck Guide with coupon and prescription if she is covering as if she does not want to do that she can go to [facility] for $9.00 for meter and $9.00 for strips but would have a few strips (only 10) at discharge.  Marisel RN CDE will follow up tomorrow to continue education if able before surgery, otherwise education will continue on Monday. Please call [phone] if needs change.

## 2018-06-22 NOTE — WOUND TEAM
Spoke with nursing via telephone, patient returned from OR with Prevena and hemovac. Wound vac dc'd in [facility] per protocol.

## 2018-06-22 NOTE — CARE PLAN
Problem: Infection  Goal: Will remain free from infection  Outcome: PROGRESSING AS EXPECTED  Loose picc dressing changed     Problem: Bowel/Gastric:  Goal: Normal bowel function is maintained or improved  Outcome: PROGRESSING AS EXPECTED  BM x2 after bowel protocol initiated

## 2018-06-22 NOTE — CONSULTS
Diabetes education: Met with Emmanuelle this evening to discuss new onset diabetes. Pt has a hx of Gestational diabetes 18 years ago, not on insulin but did test her blood sugars. Pt was admitted with blood sugar of over 500 at [place] and 324 at [facility]. Hg A1c was 12.8%.  Discussed what diabetes was, difference between type two and Gestational diabetes, what effects blood sugars, need for insulin, briefly how it works, hypoglycemia and hyperglycemia.   Pt is currently on Lantus 25 units HS (just increased from 20 units) with Humalog 3 units ac and sliding scale coverage ac and hs. Current blood sugars are 195 (3 + 3units), and 208 (4 + 3 units).   Pt is scheduled for surgery tomorrow.  Handouts given for her to review.  Plan: Asked patient to practice giving insulin with nursing. Discharge supplies will depend on coverage. Pt is PFA pending so without coverage at this time. If assistance from  for cost of supplies she could go on Levemir vial, ( or NPH : novolin/humulin) and Regular (novolin/humulin) both would be $7.00 a vial from [facility] pharmacy, or NPH and Regular (both novolin/humulin ) [facility] at [facility] for $25.00 a vial. Could also look at what samples are available from [facility] pharmacy (with prescription listed as SAMPLE).  Pt will need syringes: 1/2 cc 6mm box of 100 no matter what as well as a meter. If going home on insulin she will need the meter at discharge. True Metrix if  covers cost, Accucheck Guide with coupon and prescription if she is covering as if she does not want to do that she can go to [facility] for $9.00 for meter and $9.00 for strips but would have a few strips (only 10) at discharge.  Marisel RN CDE will follow up tomorrow to continue education if able before surgery, otherwise education will continue on Monday. Please call [phone] if needs change.

## 2018-06-22 NOTE — PROGRESS NOTES
Internal Medicine Interval Note  Note Author: Harini Ritter M.D.     Name Emmanuelle Martin     1960   Age/Sex 58 y.o. female   MRN [id number]   Code Status Full      After 5PM or if no immediate response to page, please call for cross-coverage  Attending/Team:  / Mason See Patient List for primary contact information  Call [phone] to page    1st Call - Day Intern (R1):   Dr. Ritter 2nd Call - Day Sr. Resident (R2/R3):   Dr. Carpenter        Reason for interval visit  (Principal Problem)   Sepsis with multi organ failure secondary to necrotizing fasciitis of right thigh with Group B Strep   New onset insulin dependent uncontrolled type II DM.     Interval Problem Daily Status Update  (24 hours)  -Pt was transferred from ICU on   -No acute event overnight. Feeling better, pain controlled.  - Ordered Trop & EKG for Bradycardia , Trop < 0.01  - Frequency of dilaudid reduced to q6hrs, continue to taper opioids based on pain control.   - Per Ortho: S/P I & D  on  , 6/15 , &  , plan for definitive closure of the wound on .   - Per ID: Unasyn started on - End Date on 2 weeks after the last Sx : Estimated stop date 18  - Plan to keep the blood sugars < 150 to control infection and wound healing: Increased Lantus to 25 at Bedtime,  6 units TID with meals & continue ISS.  DM Educator consulted.   - Discussed with Case Mx for D/C planning, pt has no Insurance.   - Barrier to Discharge: No insurance , Need to go home or SNF for IV antibiotic       ROS     Constitutional: Negative for chills and fever.   HENT: Negative for congestion and sore throat.    Eyes: Negative for blurred vision and double vision.   Respiratory: Negative for sputum production and shortness of breath.    Cardiovascular: Negative for chest pain and palpitations.   Gastrointestinal: Negative for abdominal pain, nausea and vomiting.   Genitourinary: Negative for dysuria and urgency.    Musculoskeletal: Negative.  Negative for joint pain and myalgias.       Right groin pain       No L/E pain , swelling, redness.   Skin: Negative.    Neurological: Negative for dizziness and headaches.   Endo/Heme/Allergies: Negative.    Psychiatric/Behavioral: Negative.      Consultants/Specialty  ID   Ortho Surgery   PCP: GARCIA NAVA    Disposition  Inpatient     Quality Measures  Quality-Core Measures   Reviewed items::  Labs reviewed and Medications reviewed  Gaston catheter::  One or Two Days Post Surgery (Day of Surgery being Day 0)  DVT prophylaxis pharmacological::  Enoxaparin (Lovenox)  Antibiotics:  Treating active infection/contamination beyond 24 hours perioperative coverage          Physical Exam       Vitals:    06/22/18 1315 06/22/18 1435 06/22/18 1505 06/22/18 1535   BP:  119/73 113/64 122/70   Pulse: 68 79 79 69   Resp: 14 16 16 16   Temp:  36.7 °C (98.1 °F) 36.4 °C (97.5 °F) 36.3 °C (97.4 °F)   SpO2: 95% 94% 96% 97%   Weight:       Height:         Body mass index is 37.47 kg/m².    Oxygen Therapy:  Pulse Oximetry: 97 %, O2 (LPM): 2, O2 Delivery: Nasal Cannula    Physical Exam  Gen: Not in acute distress.    HEENT: NC/AT, moist mucous membranes.  Neck: No tracheal deviation. No stridor.   Cardiac: RRR without m/g/r. S1S2, no JVD.  Respiratory: Normal effort, no tachypnea. Decreased breath sounds over bilateral lateral lobes.   Abdomen: BS+, soft, NT/ND, no rebound/guarding.   Ext: No edema, 2+ DP pulses b/l.  Skin: Warm, dry. Dressing over right medial thigh. Showed mild edema S/P I&D x 2. Wound vac to right groin. Limited ROM of right thigh due to pain.   Neuro: AAOx4,  no focal sensory or motor deficits.   Psych: Affect, mood, judgement normal.    Lab Data Review:         6/17/2018  9:30 AM    Recent Labs      06/20/18   0135  06/21/18   0335  06/22/18   0155   SODIUM  138  136  139   POTASSIUM  3.6  4.0  3.8   CHLORIDE  102  101  103   CO2  28  28  30   BUN  10  8  9   CREATININE  0.40*   0.34*  0.38*   MAGNESIUM   --   1.8   --    CALCIUM  8.0*  8.4*  7.8*       Recent Labs      06/20/18 0135 06/21/18   0335  06/22/18   0155   ALTSGPT  10  8  9   ASTSGOT  10*  5*  10*   ALKPHOSPHAT  105*  86  90   TBILIRUBIN  0.3  0.4  0.3   GLUCOSE  287*  215*  210*       Recent Labs      06/20/18 0135 06/21/18 0335  06/22/18   0155   RBC  3.59*  3.88*  3.76*   HEMOGLOBIN  11.3*  11.8*  11.7*   HEMATOCRIT  33.7*  36.7*  35.7*   PLATELETCT  324  370  341       Recent Labs      06/20/18 0135 06/21/18 0335 06/22/18   0155   WBC  9.4  11.5*  10.2   NEUTSPOLYS  55.80  64.30  58.20   LYMPHOCYTES  31.20  26.50  31.40   MONOCYTES  6.50  6.50  6.80   EOSINOPHILS  1.30  0.30  1.50   BASOPHILS  0.60  0.50  0.50   ASTSGOT  10*  5*  10*   ALTSGPT  10  8  9   ALKPHOSPHAT  105*  86  90   TBILIRUBIN  0.3  0.4  0.3           Assessment/Plan     Sepsis due to necrotizing fasciitis of R thigh  S/P I&D on 6/11 and 6/15, 6/17  Hemodynamically stable  Wound cx +group B Strep now on ampicillin-sulbactam    Wound vac placed 6/11  Control BGs with Lantus and SSI   Blood cultures NGTD   - Per Ortho: S/P I & D  on 6/11 , 6/15 ,6/18 & 6/20 , plan for definitive closure of the wound on Friday 6/22.   - Per ID: Unasyn started on 6/13- End Date on 2 weeks after the last Sx   - Plan to keep the blood sugars < 150 to control infection and wound healing          Non anion gap metabolic acidosis  Resolved  Secondary to NS fluid resuscitation causing hyperchloremic acidosis and starvation ketosis     New onset type 2 diabetes mellitus (HCC)  A1c 12.8% on admission   SSI, Lantus increased to 25 units    Increased Lantus to 25 at Bedtime, added 3 units TID with meals & continue ISS.  DM Educator consulted.           Anemia/Thrombocytopenia  Resolved        Hypocalcemia  Resolved   Secondary to hypoalbuminemia in the setting of sepsis      Hypokalemia  Replete accordingly   Secondary to Hypomagnesemia/Poor diet  Mg >2, K >4     Elevated  alkaline phosphatase level  Resolving.       Harini Ritter MD     The patient was seen by me face to face. I personally had a discussion with the patient. The case was discussed with our resident team, I examined the patient and confirmed the essential components of the history, physical examination, diagnosis and treatment plan as needed. I agree with the patient care as documented by the resident and edited as above by me. See resident's note above for complete details of service. The overall treatment regimen will be carried out as described above.     Thank you:  Pilo Luna MD, FACP  [facility]  Pager: Use Tiger Text (use resident info under treatment team for day to day floor issues)  Office: [phone] Ext. [phone]  Fax: [phone] (non PHI only)

## 2018-06-22 NOTE — PROGRESS NOTES
Infectious Disease Progress Note    Author: Patricia Escamilla M.D. Date & Time of service: 2018  3:14 PM    Chief Complaint:  FU Right thigh necrotizing fasciitis    Interval History:  2018 MAXIMUM TEMPERATURE 98.7. WBCs 11.9 and platelets 205 and cr 0.5  2018 MAXIMUM TEMPERATURE 98.7 leg continues to hurt. WBC 12.2 platelets 205 and cr 0.38   6/15/2018 and MAXIMUM TEMPERATURE 99.1 WBC 9.3 had another surgery yesterday.  2018 MAXIMUM TEMPERATURE 100 the groin is more swollen today. WBC 9   Tmax 99.8 WBC 9.8 s/p I&D this morning, R thigh soreness  - AF, WBC 11.2, tolerating abx, R thigh/ groin pain 5.5/10- slightly improved with pain meds, anxious to discharge home to get back to work by .   AF WBC 9.4 surgery delayed today, pain stable, emphasized need for IV abx given severity of infection and wound size  - AF, WBC 11.5, R groin pain 5-6/10, no issue with abx, understanding to needing prolonged IV abx.   AF WBC 10.2 feeling better, just returned from repeat I&D with secondary closure earlier today  Labs Reviewed, Medications Reviewed, Radiology Reviewed and Wound Reviewed.    Review of Systems:  Review of Systems   Constitutional: Negative for chills and fever.   Respiratory: Negative for shortness of breath.    Cardiovascular: Positive for leg swelling. Negative for chest pain and palpitations.        RLE swelling- improving.   Gastrointestinal: Positive for constipation. Negative for abdominal pain, nausea and vomiting.   Musculoskeletal: Positive for joint pain and myalgias.        R thigh/ groin   Skin: Negative for rash.   Neurological: Negative for headaches.       Hemodynamics:  Temp (24hrs), Av.7 °C (98.1 °F), Min:36.4 °C (97.5 °F), Max:36.8 °C (98.3 °F)  Temperature: 36.4 °C (97.5 °F)  Pulse  Av.7  Min: 52  Max: 106Heart Rate (Monitored): 70  Blood Pressure: 113/64, NIBP: 130/64       Physical Exam:  Physical Exam   Constitutional: She is oriented to  person, place, and time. She appears well-developed and well-nourished. No distress.   Appears older than stated age.  Disheveled.   HENT:   Head: Normocephalic and atraumatic.   Eyes: EOM are normal. Pupils are equal, round, and reactive to light. No scleral icterus.   Neck: Normal range of motion. Neck supple.   Cardiovascular: Normal rate, normal heart sounds and intact distal pulses.    Pulmonary/Chest: Effort normal. She has no wheezes. She has no rales.   Abdominal: Soft. There is no tenderness. There is no rebound.   Musculoskeletal: She exhibits edema and tenderness.   Right thigh provena vac and hemovac in place.    RUE PICC- CDI, non tender, no erythema.   Neurological: She is alert and oriented to person, place, and time.   Skin: Skin is warm. No rash noted.   Nursing note and vitals reviewed.      Meds:    Current Facility-Administered Medications:   •  insulin glargine **AND** insulin lispro **AND** insulin lispro **AND** Accu-Chek ACHS **AND** NOTIFY MD and PharmD **AND** glucose 4 g **AND** dextrose 50%  •  potassium chloride SA  •  fentaNYL  •  midazolam  •  HYDROmorphone  •  PICC Line Insertion has been implemented **AND** May use Lidocaine 1% not to exceed 3 mls for local at insertion site **AND** NOTIFY MD **AND** Tip to dwell in the superior vena cava **AND** If radiologist reading of chest X-ray states any of the following the PICC should be used **AND** Further evaluation of the PICC placement can be retrieved from X-Ray and Imaging **AND** Blood draws through PICC line; draws by RN only **AND** FLUSHING GUIDELINES WHEN IN USE **AND** normal saline PF **AND** FLUSHING GUIDELINES WHEN NOT IN USE **AND** [START ON 2018] DRESSING MAINTENANCE **AND** Change needleless pressure ports and IV tubing every 72 hours per hospital policy **AND** TUBING **AND** If there is an MD order to remove the PICC line, any RN may remove the PICC line **AND** [] PATIENT EDUCATION MATERIALS **AND** NURSING  "COMMUNICATION  •  loperamide  •  ampicillin-sulbactam (UNASYN) IV  •  [DISCONTINUED] insulin regular **AND** Accu-Chek ACHS **AND** NOTIFY MD and PharmD **AND** glucose 4 g **AND** dextrose 50%  •  senna-docusate **AND** polyethylene glycol/lytes **AND** magnesium hydroxide **AND** bisacodyl  •  acetaminophen  •  Pharmacy Consult Request  •  ondansetron  •  senna-docusate  •  bisacodyl  •  fleet  •  oxyCODONE immediate-release  •  oxyCODONE immediate release    Labs:  Recent Labs      06/20/18 0135 06/21/18   0335  06/22/18   0155   WBC  9.4  11.5*  10.2   RBC  3.59*  3.88*  3.76*   HEMOGLOBIN  11.3*  11.8*  11.7*   HEMATOCRIT  33.7*  36.7*  35.7*   MCV  93.9  94.6  94.9   MCH  31.5  30.4  31.1   RDW  42.3  41.7  42.5   PLATELETCT  324  370  341   MPV  9.4  9.3  9.1   NEUTSPOLYS  55.80  64.30  58.20   LYMPHOCYTES  31.20  26.50  31.40   MONOCYTES  6.50  6.50  6.80   EOSINOPHILS  1.30  0.30  1.50   BASOPHILS  0.60  0.50  0.50     Recent Labs      06/20/18 0135 06/21/18   0335  06/22/18   0155   SODIUM  138  136  139   POTASSIUM  3.6  4.0  3.8   CHLORIDE  102  101  103   CO2  28  28  30   GLUCOSE  287*  215*  210*   BUN  10  8  9     Recent Labs      06/20/18 0135 06/21/18   0335  06/22/18   0155   ALBUMIN  2.3*  2.6*  2.4*   TBILIRUBIN  0.3  0.4  0.3   ALKPHOSPHAT  105*  86  90   TOTPROTEIN  5.5*  5.9*  5.3*   ALTSGPT  10  8  9   ASTSGOT  10*  5*  10*   CREATININE  0.40*  0.34*  0.38*       Imaging:  No results found.    Micro:  Results     Procedure Component Value Units Date/Time    BLOOD CULTURE [[id number]] Collected:  06/11/18 2156    Order Status:  Completed Specimen:  Blood from Peripheral Updated:  06/16/18 2300     Significant Indicator NEG     Source BLD     Site PERIPHERAL     Blood Culture No growth after 5 days of incubation.    Narrative:       Per Hospital Policy: Only change Specimen Src: to \"Line\" if  specified by physician order.    BLOOD CULTURE [[id number]] Collected:  06/11/18 2156    Order " "Status:  Completed Specimen:  Blood from Peripheral Updated:  06/16/18 2300     Significant Indicator NEG     Source BLD     Site PERIPHERAL     Blood Culture No growth after 5 days of incubation.    Narrative:       Per Hospital Policy: Only change Specimen Src: to \"Line\" if  specified by physician order.    CULTURE WOUND W/ GRAM STAIN [[id number]]  (Abnormal) Collected:  06/11/18 1945    Order Status:  Completed Specimen:  Wound Updated:  06/15/18 1524     Significant Indicator POS (POS)     Source WND     Site Right Groin Abscess     Culture Result Wound Light growth mixed skin franko. (A)     Gram Stain Result Many WBCs.  Many mixed bacteria, no predominant organism seen.       Culture Result Wound Streptococcus agalactiae (Group B)  Moderate growth   (A)    ANAEROBIC CULTURE [[id number]]  (Abnormal) Collected:  06/11/18 1945    Order Status:  Completed Specimen:  Wound Updated:  06/15/18 1524     Significant Indicator POS (POS)     Source WND     Site Right Groin Abscess     Anaerobic Culture, Culture Res Growth noted after further incubation, see below for  organism identification.   (A)      Prevotella (B.) bivia  Heavy growth   (A)          Assessment:  Active Hospital Problems    Diagnosis   • Sepsis with acute organ failure secondary to necrotizing fasciitis of R thigh in the setting of hyperglycemia  [A41.9]   • New onset type 2 diabetes mellitus (HCC) [E11.9]   • Hypokalemia [E87.6]   • Non anion gap metabolic acidosis [E87.2]       Plan:  Right thigh necrotizing fasciitis  Afebrile  Leukocytosis resolved  S/p I&D on 6/11/2018, 6/13/2018, 6/18, 6/20  S/p I&D with secondary closure on 6/22 - debridement down to healthy tissue  OR cultures + group B strep and Prevotella  Continue IV Unasyn  Plan for 2 weeks of IV abx from 6/22  Estimated stop date 07/06/18  Wound vac/care    New onset diabetes mellitus  HgA1c 12.8  Keep the blood sugars under 150 to control infection and wound healing    Asymptomatic " funguria  Monitor    Tobacco abuse  Patient counseled

## 2018-06-22 NOTE — OP REPORT
DATE OF SERVICE:  06/22/2018    SURGEON:  Omega Chew MD    ASSISTANT:  Gabino Medrano PA-C    PREOPERATIVE DIAGNOSIS:  Right 15 cm thigh wound.    POSTOPERATIVE DIAGNOSIS:  Right 15 cm thigh wound.    PROCEDURE:  Secondary closure 15 cm surgical wound.    INDICATIONS:  This is a 58-year-old female with necrotizing fasciitis who has   undergone multiple irrigations and debridements and wound VAC care over the   past hospital stay.  Risks and benefits of repeat irrigation, debridement and   closure were discussed, which include but not limited to bleeding, infection,   neurovascular damage, pain, stiffness, and need for further surgery.  She   understands all these risks and wished to proceed.    DESCRIPTION OF PROCEDURE:  Patient was sedated with LMA anesthesia and right   lower extremity was prepped and draped in the usual sterile fashion.  Her   wound was debrided of skin and subcutaneous tissue, underlying muscle in an   excisional fashion down to healthy tissue and was irrigated with copious   amounts of normal saline solution and was then closed with nylon sutures over   a Hemovac drain.  Patient tolerated the procedure well.    POSTOPERATIVE PLAN:  The patient to be weightbearing as tolerated and under   the care of the infectious disease service for antibiotics.       ____________________________________     OMEGA CHEW MD    JESSICA / N[facility]    DD:  06/22/2018 12:10:36  DT:  06/22/2018 12:26:30    D#:  [id number]  Job#:  [id number]

## 2018-06-22 NOTE — PROGRESS NOTES
Morning assessment done on patient, noticed she was not hooked up on CPO2, changed finger monitor as her plastic monitor was jumping her HR from 60's to 30's to 40's. This did not make any change for the better. I called KAYLA SALTER to notify her of this as patient does not have any Cardiac hx and she is putting in order for STAT order for EKG and she asked to please notify Dr Caputo as well.   Zion DESIR notified.

## 2018-06-23 LAB
ALBUMIN SERPL BCP-MCNC: 2.8 G/DL (ref 3.2–4.9)
ALBUMIN/GLOB SERPL: 0.7 G/DL
ALP SERPL-CCNC: 78 U/L (ref 30–99)
ALT SERPL-CCNC: 10 U/L (ref 2–50)
ANION GAP SERPL CALC-SCNC: 8 MMOL/L (ref 0–11.9)
AST SERPL-CCNC: 5 U/L (ref 12–45)
BASOPHILS # BLD AUTO: 0.3 % (ref 0–1.8)
BASOPHILS # BLD: 0.05 K/UL (ref 0–0.12)
BILIRUB SERPL-MCNC: 0.3 MG/DL (ref 0.1–1.5)
BUN SERPL-MCNC: 12 MG/DL (ref 8–22)
CALCIUM SERPL-MCNC: 8.5 MG/DL (ref 8.5–10.5)
CHLORIDE SERPL-SCNC: 102 MMOL/L (ref 96–112)
CO2 SERPL-SCNC: 27 MMOL/L (ref 20–33)
CREAT SERPL-MCNC: 0.47 MG/DL (ref 0.5–1.4)
EOSINOPHIL # BLD AUTO: 0 K/UL (ref 0–0.51)
EOSINOPHIL NFR BLD: 0 % (ref 0–6.9)
ERYTHROCYTE [DISTWIDTH] IN BLOOD BY AUTOMATED COUNT: 41.2 FL (ref 35.9–50)
GLOBULIN SER CALC-MCNC: 3.9 G/DL (ref 1.9–3.5)
GLUCOSE BLD-MCNC: 153 MG/DL (ref 65–99)
GLUCOSE BLD-MCNC: 203 MG/DL (ref 65–99)
GLUCOSE BLD-MCNC: 208 MG/DL (ref 65–99)
GLUCOSE BLD-MCNC: 240 MG/DL (ref 65–99)
GLUCOSE SERPL-MCNC: 236 MG/DL (ref 65–99)
HCT VFR BLD AUTO: 35.9 % (ref 37–47)
HGB BLD-MCNC: 11.6 G/DL (ref 12–16)
IMM GRANULOCYTES # BLD AUTO: 0.2 K/UL (ref 0–0.11)
IMM GRANULOCYTES NFR BLD AUTO: 1.3 % (ref 0–0.9)
LYMPHOCYTES # BLD AUTO: 2.82 K/UL (ref 1–4.8)
LYMPHOCYTES NFR BLD: 17.8 % (ref 22–41)
MAGNESIUM SERPL-MCNC: 2.1 MG/DL (ref 1.5–2.5)
MCH RBC QN AUTO: 30.4 PG (ref 27–33)
MCHC RBC AUTO-ENTMCNC: 32.3 G/DL (ref 33.6–35)
MCV RBC AUTO: 94.2 FL (ref 81.4–97.8)
MONOCYTES # BLD AUTO: 0.9 K/UL (ref 0–0.85)
MONOCYTES NFR BLD AUTO: 5.7 % (ref 0–13.4)
NEUTROPHILS # BLD AUTO: 11.83 K/UL (ref 2–7.15)
NEUTROPHILS NFR BLD: 74.9 % (ref 44–72)
NRBC # BLD AUTO: 0 K/UL
NRBC BLD-RTO: 0 /100 WBC
PLATELET # BLD AUTO: 420 K/UL (ref 164–446)
PMV BLD AUTO: 9.5 FL (ref 9–12.9)
POTASSIUM SERPL-SCNC: 4.5 MMOL/L (ref 3.6–5.5)
PROT SERPL-MCNC: 6.7 G/DL (ref 6–8.2)
RBC # BLD AUTO: 3.81 M/UL (ref 4.2–5.4)
SODIUM SERPL-SCNC: 137 MMOL/L (ref 135–145)
WBC # BLD AUTO: 15.8 K/UL (ref 4.8–10.8)

## 2018-06-23 PROCEDURE — 700102 HCHG RX REV CODE 250 W/ 637 OVERRIDE(OP): Performed by: STUDENT IN AN ORGANIZED HEALTH CARE EDUCATION/TRAINING PROGRAM

## 2018-06-23 PROCEDURE — 83735 ASSAY OF MAGNESIUM: CPT

## 2018-06-23 PROCEDURE — 99233 SBSQ HOSP IP/OBS HIGH 50: CPT | Mod: GC | Performed by: HOSPITALIST

## 2018-06-23 PROCEDURE — A9270 NON-COVERED ITEM OR SERVICE: HCPCS | Performed by: INTERNAL MEDICINE

## 2018-06-23 PROCEDURE — 85025 COMPLETE CBC W/AUTO DIFF WBC: CPT

## 2018-06-23 PROCEDURE — 93010 ELECTROCARDIOGRAM REPORT: CPT | Performed by: INTERNAL MEDICINE

## 2018-06-23 PROCEDURE — 82962 GLUCOSE BLOOD TEST: CPT | Mod: 91

## 2018-06-23 PROCEDURE — 700111 HCHG RX REV CODE 636 W/ 250 OVERRIDE (IP): Performed by: INTERNAL MEDICINE

## 2018-06-23 PROCEDURE — 700102 HCHG RX REV CODE 250 W/ 637 OVERRIDE(OP): Performed by: INTERNAL MEDICINE

## 2018-06-23 PROCEDURE — A9270 NON-COVERED ITEM OR SERVICE: HCPCS | Performed by: ORTHOPAEDIC SURGERY

## 2018-06-23 PROCEDURE — 700102 HCHG RX REV CODE 250 W/ 637 OVERRIDE(OP): Performed by: ORTHOPAEDIC SURGERY

## 2018-06-23 PROCEDURE — 700105 HCHG RX REV CODE 258: Performed by: INTERNAL MEDICINE

## 2018-06-23 PROCEDURE — 99232 SBSQ HOSP IP/OBS MODERATE 35: CPT | Performed by: INTERNAL MEDICINE

## 2018-06-23 PROCEDURE — 93005 ELECTROCARDIOGRAM TRACING: CPT | Performed by: STUDENT IN AN ORGANIZED HEALTH CARE EDUCATION/TRAINING PROGRAM

## 2018-06-23 PROCEDURE — 770006 HCHG ROOM/CARE - MED/SURG/GYN SEMI*

## 2018-06-23 PROCEDURE — 80053 COMPREHEN METABOLIC PANEL: CPT

## 2018-06-23 RX ORDER — INSULIN GLARGINE 100 [IU]/ML
30 INJECTION, SOLUTION SUBCUTANEOUS EVERY EVENING
Status: DISCONTINUED | OUTPATIENT
Start: 2018-06-23 | End: 2018-06-25

## 2018-06-23 RX ORDER — DEXTROSE MONOHYDRATE 25 G/50ML
25 INJECTION, SOLUTION INTRAVENOUS
Status: DISCONTINUED | OUTPATIENT
Start: 2018-06-23 | End: 2018-06-25

## 2018-06-23 RX ADMIN — OXYCODONE HYDROCHLORIDE 10 MG: 10 TABLET ORAL at 19:59

## 2018-06-23 RX ADMIN — OXYCODONE HYDROCHLORIDE 10 MG: 10 TABLET ORAL at 00:43

## 2018-06-23 RX ADMIN — SENNOSIDES AND DOCUSATE SODIUM 2 TABLET: 8.6; 5 TABLET ORAL at 09:31

## 2018-06-23 RX ADMIN — AMPICILLIN SODIUM AND SULBACTAM SODIUM 3 G: 2; 1 INJECTION, POWDER, FOR SOLUTION INTRAMUSCULAR; INTRAVENOUS at 17:12

## 2018-06-23 RX ADMIN — AMPICILLIN SODIUM AND SULBACTAM SODIUM 3 G: 2; 1 INJECTION, POWDER, FOR SOLUTION INTRAMUSCULAR; INTRAVENOUS at 12:11

## 2018-06-23 RX ADMIN — INSULIN GLARGINE 30 UNITS: 100 INJECTION, SOLUTION SUBCUTANEOUS at 20:06

## 2018-06-23 RX ADMIN — SENNOSIDES AND DOCUSATE SODIUM 2 TABLET: 8.6; 5 TABLET ORAL at 19:59

## 2018-06-23 RX ADMIN — OXYCODONE HYDROCHLORIDE 5 MG: 5 TABLET ORAL at 09:41

## 2018-06-23 RX ADMIN — AMPICILLIN SODIUM AND SULBACTAM SODIUM 3 G: 2; 1 INJECTION, POWDER, FOR SOLUTION INTRAMUSCULAR; INTRAVENOUS at 06:05

## 2018-06-23 RX ADMIN — OXYCODONE HYDROCHLORIDE 10 MG: 10 TABLET ORAL at 05:46

## 2018-06-23 RX ADMIN — AMPICILLIN SODIUM AND SULBACTAM SODIUM 3 G: 2; 1 INJECTION, POWDER, FOR SOLUTION INTRAMUSCULAR; INTRAVENOUS at 00:36

## 2018-06-23 ASSESSMENT — ENCOUNTER SYMPTOMS
PALPITATIONS: 0
VOMITING: 0
FEVER: 0
NAUSEA: 0
CONSTIPATION: 1
HEADACHES: 0
CHILLS: 0
ABDOMINAL PAIN: 0
SHORTNESS OF BREATH: 0
MYALGIAS: 1

## 2018-06-23 ASSESSMENT — PAIN SCALES - GENERAL
PAINLEVEL_OUTOF10: 5
PAINLEVEL_OUTOF10: ASSUMED PAIN PRESENT
PAINLEVEL_OUTOF10: 5
PAINLEVEL_OUTOF10: 4
PAINLEVEL_OUTOF10: 5
PAINLEVEL_OUTOF10: 6
PAINLEVEL_OUTOF10: ASSUMED PAIN PRESENT
PAINLEVEL_OUTOF10: ASSUMED PAIN PRESENT

## 2018-06-23 NOTE — PROGRESS NOTES
Discussed with [facility] MD's whether sadler was needed to come out by there recommendation to remove, initially there was need however with her urgency and occasional incontinence they recommended talking to surgeon first.

## 2018-06-23 NOTE — PROGRESS NOTES
Internal Medicine Interval Note  Note Author: Harini Ritter M.D.     Name Emmanuelle Martin     1960   Age/Sex 58 y.o. female   MRN [id number]   Code Status Full      After 5PM or if no immediate response to page, please call for cross-coverage  Attending/Team:  / Mason See Patient List for primary contact information  Call [phone] to page    1st Call - Day Intern (R1):   Dr. Ritter 2nd Call - Day Sr. Resident (R2/R3):   Dr. Carpenter        Reason for interval visit  (Principal Problem)   Sepsis with multi organ failure secondary to necrotizing fasciitis of right thigh with Group B Strep   New onset insulin dependent uncontrolled type II DM.     Interval Problem Daily Status Update  (24 hours)  -Pt was transferred from ICU on   -No acute event overnight. Feeling better, pain controlled. Pt is ambulating well.   - Per Ortho: S/P I & D  on  , 6/15 , ,  & .   - Per ID: Unasyn started on - End Date on 2 weeks after the last Sx : Estimated stop date 18  - Plan to keep the blood sugars < 150 to control infection and wound healing: Increased Lantus from 25 to 30 at Bedtime,  6 units TID with meals & continue ISS.  DM Educator consulted.   - Discussed with Case Mx for D/C planning, pt has no Insurance.   - Barrier to Discharge: No insurance , Need to go home or SNF for IV antibiotic , Pt lives in [place] .       ROS     Constitutional: Negative for chills and fever.   HENT: Negative for congestion and sore throat.    Eyes: Negative for blurred vision and double vision.   Respiratory: Negative for sputum production and shortness of breath.    Cardiovascular: Negative for chest pain and palpitations.   Gastrointestinal: Negative for abdominal pain, nausea and vomiting.   Genitourinary: Negative for dysuria and urgency.   Musculoskeletal: Negative.  Negative for joint pain and myalgias.       Right groin pain       No L/E pain , swelling, redness. Wound Vac placed  on right groin.   Skin: Negative.    Neurological: Negative for dizziness and headaches.   Endo/Heme/Allergies: Negative.    Psychiatric/Behavioral: Negative.      Consultants/Specialty  ID   Ortho Surgery   PCP: GARCIA NAVA    Disposition  Inpatient     Quality Measures  Quality-Core Measures   Reviewed items::  Labs reviewed and Medications reviewed  Gaston catheter::  One or Two Days Post Surgery (Day of Surgery being Day 0)  DVT prophylaxis pharmacological::  Enoxaparin (Lovenox)  Antibiotics:  Treating active infection/contamination beyond 24 hours perioperative coverage          Physical Exam       Vitals:    06/22/18 2230 06/23/18 0000 06/23/18 0400 06/23/18 0800   BP:  117/75 121/77 109/73   Pulse:  70 62 62   Resp:  16 16 16   Temp:  36.2 °C (97.2 °F) 36.8 °C (98.2 °F) 36.4 °C (97.5 °F)   SpO2: 95% 93% 93% 96%   Weight:       Height:         Body mass index is 37.47 kg/m².    Oxygen Therapy:  Pulse Oximetry: 96 %, O2 (LPM): 0.5, O2 Delivery: Nasal Cannula    Physical Exam  Gen: Not in acute distress.    HEENT: NC/AT, moist mucous membranes.  Neck: No tracheal deviation. No stridor.   Cardiac: RRR without m/g/r. S1S2, no JVD.  Respiratory: Normal effort, no tachypnea. Decreased breath sounds over bilateral lateral lobes.   Abdomen: BS+, soft, NT/ND, no rebound/guarding.   Ext: No edema, 2+ DP pulses b/l.  Skin: Warm, dry. Dressing over right medial thigh. Showed mild edema S/P I&D x 2. Wound vac to right groin. Normal ROM.   Neuro: AAOx4,  no focal sensory or motor deficits.   Psych: Affect, mood, judgement normal.    Lab Data Review:         6/17/2018  9:30 AM    Recent Labs      06/21/18   0335  06/22/18   0155  06/23/18   0045   SODIUM  136  139  137   POTASSIUM  4.0  3.8  4.5   CHLORIDE  101  103  102   CO2  28  30  27   BUN  8  9  12   CREATININE  0.34*  0.38*  0.47*   MAGNESIUM  1.8   --   2.1   CALCIUM  8.4*  7.8*  8.5       Recent Labs      06/21/18   0335  06/22/18   0155  06/23/18   0045    ALTSGPT  8  9  10   ASTSGOT  5*  10*  5*   ALKPHOSPHAT  86  90  78   TBILIRUBIN  0.4  0.3  0.3   GLUCOSE  215*  210*  236*       Recent Labs      06/21/18   0335  06/22/18   0155  06/23/18   0045   RBC  3.88*  3.76*  3.81*   HEMOGLOBIN  11.8*  11.7*  11.6*   HEMATOCRIT  36.7*  35.7*  35.9*   PLATELETCT  370  341  420       Recent Labs      06/21/18   0335  06/22/18   0155  06/23/18   0045   WBC  11.5*  10.2  15.8*   NEUTSPOLYS  64.30  58.20  74.90*   LYMPHOCYTES  26.50  31.40  17.80*   MONOCYTES  6.50  6.80  5.70   EOSINOPHILS  0.30  1.50  0.00   BASOPHILS  0.50  0.50  0.30   ASTSGOT  5*  10*  5*   ALTSGPT  8  9  10   ALKPHOSPHAT  86  90  78   TBILIRUBIN  0.4  0.3  0.3           Assessment/Plan     Sepsis due to necrotizing fasciitis of R thigh  S/P I&D on 6/11 and 6/15, 6/17  Hemodynamically stable  Wound cx +group B Strep now on ampicillin-sulbactam    Wound vac placed 6/11  Control BGs with Lantus and SSI   Blood cultures NGTD   - Per Ortho: S/P I & D  on  6/11 , 6/15 ,6/18 ,6/20 & 6/22.   - Per ID: Unasyn started on 6/13-  Estimated stop date 07/06/18  - Plan to keep the blood sugars < 150 to control infection and wound healing          Non anion gap metabolic acidosis  Resolved  Secondary to NS fluid resuscitation causing hyperchloremic acidosis and starvation ketosis     New onset type 2 diabetes mellitus (HCC)  A1c 12.8% on admission   SSI, Lantus increased to 25 units    Increased Lantus to 30 at Bedtime, continue insulin 6 units TID with meals & continue ISS.  DM Educator consulted.      Anemia  s/p multiple I & D  H/H stable   Ordered iron panel , B 12, folate , ferrictin      Hypocalcemia  Resolved   Secondary to hypoalbuminemia in the setting of sepsis      Hypokalemia/ Hypomagnesemia   -Resolved      Elevated alkaline phosphatase level  -Resolved      Angelt Tami Ritter MD     The patient was seen by me face to face. I personally had a discussion with the patient. The case was discussed with our resident  team, I examined the patient and confirmed the essential components of the history, physical examination, diagnosis and treatment plan as needed. I agree with the patient care as documented by the resident and edited as above by me. See resident's note above for complete details of service. The overall treatment regimen will be carried out as described above.     Thank you:  Pilo Luna MD, FACP  [facility]  Pager: Use [employer] Text (use resident info under treatment team for day to day floor issues)  Office: [phone] Ext. [phone]  Fax: [phone] (non PHI only)

## 2018-06-23 NOTE — CARE PLAN
Problem: Venous Thromboembolism (VTW)/Deep Vein Thrombosis (DVT) Prevention:  Goal: Patient will participate in Venous Thrombosis (VTE)/Deep Vein Thrombosis (DVT)Prevention Measures  Outcome: PROGRESSING SLOWER THAN EXPECTED  No current pharm prophylaxis    Problem: Pain Management  Goal: Pain level will decrease to patient's comfort goal  Outcome: PROGRESSING AS EXPECTED  Pt reports pain improved from prior to latest sx. Pain manageable with oxy 10 PRN.     Problem: Mobility  Goal: Risk for activity intolerance will decrease  Outcome: PROGRESSING AS EXPECTED  Ambulated 50 ft in hallway POD #0

## 2018-06-23 NOTE — PROGRESS NOTES
Infectious Disease Progress Note    Author: Patricia Escamilla M.D. Date & Time of service: 2018  1:51 PM    Chief Complaint:  FU Right thigh necrotizing fasciitis    Interval History:  2018 MAXIMUM TEMPERATURE 98.7. WBCs 11.9 and platelets 205 and cr 0.5  2018 MAXIMUM TEMPERATURE 98.7 leg continues to hurt. WBC 12.2 platelets 205 and cr 0.38   6/15/2018 and MAXIMUM TEMPERATURE 99.1 WBC 9.3 had another surgery yesterday.  2018 MAXIMUM TEMPERATURE 100 the groin is more swollen today. WBC 9   Tmax 99.8 WBC 9.8 s/p I&D this morning, R thigh soreness  - AF, WBC 11.2, tolerating abx, R thigh/ groin pain 5.10- slightly improved with pain meds, anxious to discharge home to get back to work by .   AF WBC 9.4 surgery delayed today, pain stable, emphasized need for IV abx given severity of infection and wound size  - AF, WBC 11.5, R groin pain 5-6/10, no issue with abx, understanding to needing prolonged IV abx.   AF WBC 10.2 feeling better, just returned from repeat I&D with secondary closure earlier today   AF WBC 15.8 no new clinical issues from yesterday, sadler still in place  Labs Reviewed, Medications Reviewed, Radiology Reviewed and Wound Reviewed.    Review of Systems:  Review of Systems   Constitutional: Negative for chills and fever.   Respiratory: Negative for shortness of breath.    Cardiovascular: Positive for leg swelling. Negative for chest pain and palpitations.        RLE swelling- improving.   Gastrointestinal: Positive for constipation. Negative for abdominal pain, nausea and vomiting.   Musculoskeletal: Positive for joint pain and myalgias.        R thigh/ groin   Skin: Negative for rash.   Neurological: Negative for headaches.       Hemodynamics:  Temp (24hrs), Av.6 °C (97.8 °F), Min:36.2 °C (97.2 °F), Max:36.9 °C (98.5 °F)  Temperature: 36.4 °C (97.5 °F)  Pulse  Av.3  Min: 52  Max: 106   Blood Pressure: 109/73       Physical Exam:  Physical Exam    Constitutional: She is oriented to person, place, and time. She appears well-developed and well-nourished. No distress.   Appears older than stated age.  Disheveled.   HENT:   Head: Normocephalic and atraumatic.   Eyes: EOM are normal. Pupils are equal, round, and reactive to light. No scleral icterus.   Neck: Normal range of motion. Neck supple.   Cardiovascular: Normal rate, normal heart sounds and intact distal pulses.    Pulmonary/Chest: Effort normal. She has no wheezes. She has no rales.   Abdominal: Soft. There is no tenderness. There is no rebound.   Genitourinary:   Genitourinary Comments: Gaston   Musculoskeletal: She exhibits edema and tenderness.   Right thigh provena vac and hemovac in place.    RUE PICC- CDI, non tender, no erythema.   Neurological: She is alert and oriented to person, place, and time.   Skin: Skin is warm. No rash noted.   Nursing note and vitals reviewed.      Meds:    Current Facility-Administered Medications:   •  insulin glargine **AND** insulin lispro **AND** insulin lispro **AND** Accu-Chek ACHS **AND** NOTIFY MD and PharmD **AND** glucose 4 g **AND** dextrose 50%  •  HYDROmorphone  •  PICC Line Insertion has been implemented **AND** May use Lidocaine 1% not to exceed 3 mls for local at insertion site **AND** NOTIFY MD **AND** Tip to dwell in the superior vena cava **AND** If radiologist reading of chest X-ray states any of the following the PICC should be used **AND** Further evaluation of the PICC placement can be retrieved from X-Ray and Imaging **AND** Blood draws through PICC line; draws by RN only **AND** FLUSHING GUIDELINES WHEN IN USE **AND** normal saline PF **AND** FLUSHING GUIDELINES WHEN NOT IN USE **AND** DRESSING MAINTENANCE **AND** Change needleless pressure ports and IV tubing every 72 hours per hospital policy **AND** TUBING **AND** If there is an MD order to remove the PICC line, any RN may remove the PICC line **AND** [] PATIENT EDUCATION MATERIALS  "**AND** NURSING COMMUNICATION  •  loperamide  •  ampicillin-sulbactam (UNASYN) IV  •  [DISCONTINUED] insulin regular **AND** Accu-Chek ACHS **AND** NOTIFY MD and PharmD **AND** glucose 4 g **AND** dextrose 50%  •  senna-docusate **AND** polyethylene glycol/lytes **AND** magnesium hydroxide **AND** bisacodyl  •  acetaminophen  •  Pharmacy Consult Request  •  ondansetron  •  senna-docusate  •  bisacodyl  •  fleet  •  oxyCODONE immediate-release  •  oxyCODONE immediate release    Labs:  Recent Labs      06/21/18 0335 06/22/18   0155  06/23/18   0045   WBC  11.5*  10.2  15.8*   RBC  3.88*  3.76*  3.81*   HEMOGLOBIN  11.8*  11.7*  11.6*   HEMATOCRIT  36.7*  35.7*  35.9*   MCV  94.6  94.9  94.2   MCH  30.4  31.1  30.4   RDW  41.7  42.5  41.2   PLATELETCT  370  341  420   MPV  9.3  9.1  9.5   NEUTSPOLYS  64.30  58.20  74.90*   LYMPHOCYTES  26.50  31.40  17.80*   MONOCYTES  6.50  6.80  5.70   EOSINOPHILS  0.30  1.50  0.00   BASOPHILS  0.50  0.50  0.30     Recent Labs      06/21/18 0335 06/22/18 0155  06/23/18   0045   SODIUM  136  139  137   POTASSIUM  4.0  3.8  4.5   CHLORIDE  101  103  102   CO2  28  30  27   GLUCOSE  215*  210*  236*   BUN  8  9  12     Recent Labs      06/21/18 0335 06/22/18   0155  06/23/18   0045   ALBUMIN  2.6*  2.4*  2.8*   TBILIRUBIN  0.4  0.3  0.3   ALKPHOSPHAT  86  90  78   TOTPROTEIN  5.9*  5.3*  6.7   ALTSGPT  8  9  10   ASTSGOT  5*  10*  5*   CREATININE  0.34*  0.38*  0.47*       Imaging:  No results found.    Micro:  Results     Procedure Component Value Units Date/Time    BLOOD CULTURE [[id number]] Collected:  06/11/18 2156    Order Status:  Completed Specimen:  Blood from Peripheral Updated:  06/16/18 2300     Significant Indicator NEG     Source BLD     Site PERIPHERAL     Blood Culture No growth after 5 days of incubation.    Narrative:       Per Hospital Policy: Only change Specimen Src: to \"Line\" if  specified by physician order.    BLOOD CULTURE [[id number]] Collected:  " "06/11/18 2156    Order Status:  Completed Specimen:  Blood from Peripheral Updated:  06/16/18 2300     Significant Indicator NEG     Source BLD     Site PERIPHERAL     Blood Culture No growth after 5 days of incubation.    Narrative:       Per Hospital Policy: Only change Specimen Src: to \"Line\" if  specified by physician order.          Assessment:  Active Hospital Problems    Diagnosis   • Sepsis with acute organ failure secondary to necrotizing fasciitis of R thigh in the setting of hyperglycemia  [A41.9]   • New onset type 2 diabetes mellitus (HCC) [E11.9]   • Hypokalemia [E87.6]   • Non anion gap metabolic acidosis [E87.2]       Plan:  Right thigh necrotizing fasciitis  Afebrile  Leukocytosis resolved  S/p I&D on 6/11/2018, 6/13/2018, 6/18, 6/20  S/p I&D with secondary closure on 6/22 - debridement down to healthy tissue  OR cultures + group B strep and Prevotella  Continue IV Unasyn  Plan for 2 weeks of IV abx from 6/22  Estimated stop date 07/06/18  Wound vac/care    New onset diabetes mellitus  HgA1c 12.8  Keep the blood sugars under 150 to control infection and wound healing    Asymptomatic funguria  Monitor    Tobacco abuse  Patient counseled      "

## 2018-06-24 LAB
ANION GAP SERPL CALC-SCNC: 10 MMOL/L (ref 0–11.9)
BASOPHILS # BLD AUTO: 0.7 % (ref 0–1.8)
BASOPHILS # BLD: 0.08 K/UL (ref 0–0.12)
BUN SERPL-MCNC: 15 MG/DL (ref 8–22)
CALCIUM SERPL-MCNC: 8.4 MG/DL (ref 8.5–10.5)
CHLORIDE SERPL-SCNC: 102 MMOL/L (ref 96–112)
CO2 SERPL-SCNC: 28 MMOL/L (ref 20–33)
CREAT SERPL-MCNC: 0.49 MG/DL (ref 0.5–1.4)
EKG IMPRESSION: NORMAL
EOSINOPHIL # BLD AUTO: 0.18 K/UL (ref 0–0.51)
EOSINOPHIL NFR BLD: 1.5 % (ref 0–6.9)
ERYTHROCYTE [DISTWIDTH] IN BLOOD BY AUTOMATED COUNT: 41.4 FL (ref 35.9–50)
FERRITIN SERPL-MCNC: 254.6 NG/ML (ref 10–291)
FOLATE SERPL-MCNC: 9.5 NG/ML
GLUCOSE BLD-MCNC: 147 MG/DL (ref 65–99)
GLUCOSE BLD-MCNC: 166 MG/DL (ref 65–99)
GLUCOSE BLD-MCNC: 173 MG/DL (ref 65–99)
GLUCOSE BLD-MCNC: 236 MG/DL (ref 65–99)
GLUCOSE SERPL-MCNC: 195 MG/DL (ref 65–99)
HCT VFR BLD AUTO: 36.5 % (ref 37–47)
HGB BLD-MCNC: 11.8 G/DL (ref 12–16)
IMM GRANULOCYTES # BLD AUTO: 0.11 K/UL (ref 0–0.11)
IMM GRANULOCYTES NFR BLD AUTO: 0.9 % (ref 0–0.9)
IRON SATN MFR SERPL: 9 % (ref 15–55)
IRON SERPL-MCNC: 24 UG/DL (ref 40–170)
LYMPHOCYTES # BLD AUTO: 4.33 K/UL (ref 1–4.8)
LYMPHOCYTES NFR BLD: 36.7 % (ref 22–41)
MCH RBC QN AUTO: 30.4 PG (ref 27–33)
MCHC RBC AUTO-ENTMCNC: 32.3 G/DL (ref 33.6–35)
MCV RBC AUTO: 94.1 FL (ref 81.4–97.8)
MONOCYTES # BLD AUTO: 0.8 K/UL (ref 0–0.85)
MONOCYTES NFR BLD AUTO: 6.8 % (ref 0–13.4)
NEUTROPHILS # BLD AUTO: 6.31 K/UL (ref 2–7.15)
NEUTROPHILS NFR BLD: 53.4 % (ref 44–72)
NRBC # BLD AUTO: 0 K/UL
NRBC BLD-RTO: 0 /100 WBC
PLATELET # BLD AUTO: 421 K/UL (ref 164–446)
PMV BLD AUTO: 9.5 FL (ref 9–12.9)
POTASSIUM SERPL-SCNC: 3.5 MMOL/L (ref 3.6–5.5)
RBC # BLD AUTO: 3.88 M/UL (ref 4.2–5.4)
SODIUM SERPL-SCNC: 140 MMOL/L (ref 135–145)
TIBC SERPL-MCNC: 279 UG/DL (ref 250–450)
VIT B12 SERPL-MCNC: 408 PG/ML (ref 211–911)
WBC # BLD AUTO: 11.8 K/UL (ref 4.8–10.8)

## 2018-06-24 PROCEDURE — 700102 HCHG RX REV CODE 250 W/ 637 OVERRIDE(OP): Performed by: ORTHOPAEDIC SURGERY

## 2018-06-24 PROCEDURE — 770006 HCHG ROOM/CARE - MED/SURG/GYN SEMI*

## 2018-06-24 PROCEDURE — 700111 HCHG RX REV CODE 636 W/ 250 OVERRIDE (IP): Performed by: STUDENT IN AN ORGANIZED HEALTH CARE EDUCATION/TRAINING PROGRAM

## 2018-06-24 PROCEDURE — 82728 ASSAY OF FERRITIN: CPT

## 2018-06-24 PROCEDURE — 85025 COMPLETE CBC W/AUTO DIFF WBC: CPT

## 2018-06-24 PROCEDURE — 80048 BASIC METABOLIC PNL TOTAL CA: CPT

## 2018-06-24 PROCEDURE — 700102 HCHG RX REV CODE 250 W/ 637 OVERRIDE(OP): Performed by: STUDENT IN AN ORGANIZED HEALTH CARE EDUCATION/TRAINING PROGRAM

## 2018-06-24 PROCEDURE — 700105 HCHG RX REV CODE 258: Performed by: INTERNAL MEDICINE

## 2018-06-24 PROCEDURE — 700105 HCHG RX REV CODE 258

## 2018-06-24 PROCEDURE — 99232 SBSQ HOSP IP/OBS MODERATE 35: CPT | Performed by: INTERNAL MEDICINE

## 2018-06-24 PROCEDURE — 700111 HCHG RX REV CODE 636 W/ 250 OVERRIDE (IP): Performed by: INTERNAL MEDICINE

## 2018-06-24 PROCEDURE — 82607 VITAMIN B-12: CPT

## 2018-06-24 PROCEDURE — A9270 NON-COVERED ITEM OR SERVICE: HCPCS | Performed by: STUDENT IN AN ORGANIZED HEALTH CARE EDUCATION/TRAINING PROGRAM

## 2018-06-24 PROCEDURE — A9270 NON-COVERED ITEM OR SERVICE: HCPCS | Performed by: ORTHOPAEDIC SURGERY

## 2018-06-24 PROCEDURE — 82746 ASSAY OF FOLIC ACID SERUM: CPT

## 2018-06-24 PROCEDURE — 82962 GLUCOSE BLOOD TEST: CPT | Mod: 91

## 2018-06-24 PROCEDURE — 83550 IRON BINDING TEST: CPT

## 2018-06-24 PROCEDURE — 83540 ASSAY OF IRON: CPT

## 2018-06-24 PROCEDURE — 99233 SBSQ HOSP IP/OBS HIGH 50: CPT | Mod: GC | Performed by: HOSPITALIST

## 2018-06-24 RX ORDER — POTASSIUM CHLORIDE 20 MEQ/1
40 TABLET, EXTENDED RELEASE ORAL DAILY
Status: DISCONTINUED | OUTPATIENT
Start: 2018-06-24 | End: 2018-07-01

## 2018-06-24 RX ORDER — SODIUM CHLORIDE 9 MG/ML
INJECTION, SOLUTION INTRAVENOUS
Status: COMPLETED
Start: 2018-06-24 | End: 2018-06-24

## 2018-06-24 RX ADMIN — AMPICILLIN SODIUM AND SULBACTAM SODIUM 3 G: 2; 1 INJECTION, POWDER, FOR SOLUTION INTRAMUSCULAR; INTRAVENOUS at 00:17

## 2018-06-24 RX ADMIN — AMPICILLIN SODIUM AND SULBACTAM SODIUM 3 G: 2; 1 INJECTION, POWDER, FOR SOLUTION INTRAMUSCULAR; INTRAVENOUS at 17:22

## 2018-06-24 RX ADMIN — AMPICILLIN SODIUM AND SULBACTAM SODIUM 3 G: 2; 1 INJECTION, POWDER, FOR SOLUTION INTRAMUSCULAR; INTRAVENOUS at 12:22

## 2018-06-24 RX ADMIN — AMPICILLIN SODIUM AND SULBACTAM SODIUM 3 G: 2; 1 INJECTION, POWDER, FOR SOLUTION INTRAMUSCULAR; INTRAVENOUS at 05:47

## 2018-06-24 RX ADMIN — SODIUM CHLORIDE 500 ML: 9 INJECTION, SOLUTION INTRAVENOUS at 17:26

## 2018-06-24 RX ADMIN — INSULIN GLARGINE 30 UNITS: 100 INJECTION, SOLUTION SUBCUTANEOUS at 20:33

## 2018-06-24 RX ADMIN — OXYCODONE HYDROCHLORIDE 10 MG: 10 TABLET ORAL at 00:13

## 2018-06-24 RX ADMIN — OXYCODONE HYDROCHLORIDE 10 MG: 10 TABLET ORAL at 05:47

## 2018-06-24 RX ADMIN — ENOXAPARIN SODIUM 40 MG: 100 INJECTION SUBCUTANEOUS at 08:38

## 2018-06-24 RX ADMIN — POTASSIUM CHLORIDE 40 MEQ: 1500 TABLET, EXTENDED RELEASE ORAL at 08:37

## 2018-06-24 ASSESSMENT — ENCOUNTER SYMPTOMS
SHORTNESS OF BREATH: 0
HEADACHES: 0
CHILLS: 0
ABDOMINAL PAIN: 0
MYALGIAS: 1
FEVER: 0
CONSTIPATION: 0
PALPITATIONS: 0
NAUSEA: 0
VOMITING: 0

## 2018-06-24 ASSESSMENT — PAIN SCALES - GENERAL
PAINLEVEL_OUTOF10: 7
PAINLEVEL_OUTOF10: 5
PAINLEVEL_OUTOF10: 4
PAINLEVEL_OUTOF10: ASSUMED PAIN PRESENT

## 2018-06-24 NOTE — PROGRESS NOTES
Pt pulse ox alarming for frequent drops below 40 bpm. Upon auscultation hear rhythm irregular. HR between 34- 76. Previous EKG on 6/22 sinus rhythm.  [facility] red on call DR. Seo notified. ekg ordered.

## 2018-06-24 NOTE — PROGRESS NOTES
"   Orthopaedic Progress Note    Interval changes:  Provina incisional Vac in place with no leak  HV with 60cc output over the last 24 hours  Gaston to remain in place for now due to proximity to surgical site    ROS - Patient denies any new issues.  Pain well controlled.    Blood pressure 119/69, pulse 77, temperature 36.7 °C (98 °F), resp. rate 16, height 1.499 m (4' 11.02\"), weight 84.2 kg (185 lb 10 oz), SpO2 95 %, not currently breastfeeding.      Patient seen and examined  No acute distress  Breathing non labored  RRR  Right groin provina vac in place with no leak, inner thigh erythema resolved, HV in place with 60cc output over last 24. BLE DNVI, moves all toes, cap refill < 2 sec.    Recent Labs      06/22/18   0155  06/23/18   0045  06/24/18   0020   WBC  10.2  15.8*  11.8*   RBC  3.76*  3.81*  3.88*   HEMOGLOBIN  11.7*  11.6*  11.8*   HEMATOCRIT  35.7*  35.9*  36.5*   MCV  94.9  94.2  94.1   MCH  31.1  30.4  30.4   MCHC  32.8*  32.3*  32.3*   RDW  42.5  41.2  41.4   PLATELETCT  341  420  421   MPV  9.1  9.5  9.5       Active Hospital Problems    Diagnosis   • Sepsis due to necrotizing fasciitis of R thigh [A41.9]     Priority: High   • New onset type 2 diabetes mellitus (HCC) [E11.9]     Priority: Medium   • Elevated alkaline phosphatase level [R74.8]       Assessment/Plan:  Patient doing well  POD#2 S/P Secondary closure 15 cm surgical wound  Wt bearing status - WBAT  Wound care/Drains - vac left in place, HV with 60cc output over last 24  Future Procedures - none planned   Lovenox: Start 6/13, Duration-until ambulatory > 150'  Sutures/Staples out- 14-21 days post operatively  PT/OT-initiated  Antibiotics: unasyn 3g IV Q6  DVT Prophylaxis- TEDS/SCDs/Foot pumps  Gaston-to be left in place   Case Coordination for Discharge Planning - Disposition home   "

## 2018-06-24 NOTE — PROGRESS NOTES
Infectious Disease Progress Note    Author: Patricia Escamilla M.D. Date & Time of service: 2018  11:54 AM    Chief Complaint:  FU Right thigh necrotizing fasciitis    Interval History:  2018 MAXIMUM TEMPERATURE 98.7. WBCs 11.9 and platelets 205 and cr 0.5  2018 MAXIMUM TEMPERATURE 98.7 leg continues to hurt. WBC 12.2 platelets 205 and cr 0.38   6/15/2018 and MAXIMUM TEMPERATURE 99.1 WBC 9.3 had another surgery yesterday.  2018 MAXIMUM TEMPERATURE 100 the groin is more swollen today. WBC 9   Tmax 99.8 WBC 9.8 s/p I&D this morning, R thigh soreness  - AF, WBC 11.2, tolerating abx, R thigh/ groin pain 5.10- slightly improved with pain meds, anxious to discharge home to get back to work by .   AF WBC 9.4 surgery delayed today, pain stable, emphasized need for IV abx given severity of infection and wound size  - AF, WBC 11.5, R groin pain 5-6/10, no issue with abx, understanding to needing prolonged IV abx.   AF WBC 10.2 feeling better, just returned from repeat I&D with secondary closure earlier today   AF WBC 15.8 no new clinical issues from yesterday, sadler still in place   AF WBC 11.8 resting comfortably, pain controlled, finally had BM  Labs Reviewed, Medications Reviewed, Radiology Reviewed and Wound Reviewed.    Review of Systems:  Review of Systems   Constitutional: Negative for chills and fever.   Respiratory: Negative for shortness of breath.    Cardiovascular: Positive for leg swelling. Negative for chest pain and palpitations.        RLE swelling- improving.   Gastrointestinal: Negative for abdominal pain, constipation, nausea and vomiting.   Musculoskeletal: Positive for joint pain and myalgias.        R thigh/ groin   Skin: Negative for rash.   Neurological: Negative for headaches.       Hemodynamics:  Temp (24hrs), Av.4 °C (97.6 °F), Min:36.2 °C (97.1 °F), Max:36.7 °C (98 °F)  Temperature: 36.7 °C (98 °F)  Pulse  Av.1  Min: 52  Max: 106    Blood Pressure: 117/57       Physical Exam:  Physical Exam   Constitutional: She is oriented to person, place, and time. She appears well-developed and well-nourished. No distress.   Appears older than stated age.  Disheveled.   HENT:   Head: Normocephalic and atraumatic.   Eyes: EOM are normal. Pupils are equal, round, and reactive to light. No scleral icterus.   Neck: Normal range of motion. Neck supple.   Cardiovascular: Normal rate, normal heart sounds and intact distal pulses.    Pulmonary/Chest: Effort normal. She has no wheezes. She has no rales.   Abdominal: Soft. There is no tenderness. There is no rebound.   Musculoskeletal: She exhibits edema and tenderness.   Right thigh provena vac and hemovac in place.    RUE PICC- CDI, non tender, no erythema.   Neurological: She is alert and oriented to person, place, and time.   Skin: Skin is warm. No rash noted.   Nursing note and vitals reviewed.      Meds:    Current Facility-Administered Medications:   •  enoxaparin (LOVENOX) injection  •  potassium chloride SA  •  insulin glargine **AND** insulin lispro **AND** insulin lispro **AND** Accu-Chek ACHS **AND** NOTIFY MD and PharmD **AND** glucose 4 g **AND** dextrose 50%  •  HYDROmorphone  •  PICC Line Insertion has been implemented **AND** May use Lidocaine 1% not to exceed 3 mls for local at insertion site **AND** NOTIFY MD **AND** Tip to dwell in the superior vena cava **AND** If radiologist reading of chest X-ray states any of the following the PICC should be used **AND** Further evaluation of the PICC placement can be retrieved from X-Ray and Imaging **AND** Blood draws through PICC line; draws by RN only **AND** FLUSHING GUIDELINES WHEN IN USE **AND** normal saline PF **AND** FLUSHING GUIDELINES WHEN NOT IN USE **AND** DRESSING MAINTENANCE **AND** Change needleless pressure ports and IV tubing every 72 hours per hospital policy **AND** TUBING **AND** If there is an MD order to remove the PICC line, any RN  may remove the PICC line **AND** [] PATIENT EDUCATION MATERIALS **AND** NURSING COMMUNICATION  •  loperamide  •  ampicillin-sulbactam (UNASYN) IV  •  [DISCONTINUED] insulin regular **AND** Accu-Chek ACHS **AND** NOTIFY MD and PharmD **AND** glucose 4 g **AND** dextrose 50%  •  senna-docusate **AND** polyethylene glycol/lytes **AND** magnesium hydroxide **AND** bisacodyl  •  acetaminophen  •  Pharmacy Consult Request  •  ondansetron  •  senna-docusate  •  bisacodyl  •  fleet  •  oxyCODONE immediate-release  •  oxyCODONE immediate release    Labs:  Recent Labs      18   0045  18   0020   WBC  10.2  15.8*  11.8*   RBC  3.76*  3.81*  3.88*   HEMOGLOBIN  11.7*  11.6*  11.8*   HEMATOCRIT  35.7*  35.9*  36.5*   MCV  94.9  94.2  94.1   MCH  31.1  30.4  30.4   RDW  42.5  41.2  41.4   PLATELETCT  341  420  421   MPV  9.1  9.5  9.5   NEUTSPOLYS  58.20  74.90*  53.40   LYMPHOCYTES  31.40  17.80*  36.70   MONOCYTES  6.80  5.70  6.80   EOSINOPHILS  1.50  0.00  1.50   BASOPHILS  0.50  0.30  0.70     Recent Labs      185  18   0045  18   0020   SODIUM  139  137  140   POTASSIUM  3.8  4.5  3.5*   CHLORIDE  103  102  102   CO2  30  27  28   GLUCOSE  210*  236*  195*   BUN  9  12  15     Recent Labs      18   0155  18   0045  18   0020   ALBUMIN  2.4*  2.8*   --    TBILIRUBIN  0.3  0.3   --    ALKPHOSPHAT  90  78   --    TOTPROTEIN  5.3*  6.7   --    ALTSGPT  9  10   --    ASTSGOT  10*  5*   --    CREATININE  0.38*  0.47*  0.49*       Imaging:  No results found.    Micro:  Results     ** No results found for the last 168 hours. **          Assessment:  Active Hospital Problems    Diagnosis   • Sepsis with acute organ failure secondary to necrotizing fasciitis of R thigh in the setting of hyperglycemia  [A41.9]   • New onset type 2 diabetes mellitus (HCC) [E11.9]   • Hypokalemia [E87.6]   • Non anion gap metabolic acidosis [E87.2]       Plan:  Right thigh  necrotizing fasciitis  Afebrile  Leukocytosis resolved  S/p I&D on 6/11/2018, 6/13/2018, 6/18, 6/20  S/p I&D with secondary closure on 6/22 - debridement down to healthy tissue  OR cultures + group B strep and Prevotella  Continue IV Unasyn  Plan for 2 weeks of IV abx from 6/22  Estimated stop date 07/06/18  Wound vac/care    New onset diabetes mellitus  HgA1c 12.8  Keep the blood sugars under 150 to control infection and wound healing    Asymptomatic funguria  Monitor    Tobacco abuse  Patient counseled

## 2018-06-24 NOTE — CARE PLAN
Problem: Respiratory:  Goal: Respiratory status will improve  Outcome: PROGRESSING AS EXPECTED  .5 l at night     Problem: Urinary Elimination:  Goal: Ability to reestablish a normal urinary elimination pattern will improve    Intervention: Evaluate need to continue indwelling urinary catheter  Discussed with [facility] and surgeons. Kept in to protect wound

## 2018-06-24 NOTE — PROGRESS NOTES
"   Orthopaedic Progress Note    Interval changes:  Provina incisional Vac in place with no leak  Gaston to remain in place for now due to proximity to surgical site    ROS - Patient denies any new issues.  Pain well controlled.    Blood pressure 125/66, pulse 70, temperature 36.2 °C (97.1 °F), resp. rate 16, height 1.499 m (4' 11.02\"), weight 84.2 kg (185 lb 10 oz), SpO2 94 %, not currently breastfeeding.      Patient seen and examined  No acute distress  Breathing non labored  RRR  Right groin provina vac in place with no leak, inner thigh erythema resolved, BLE DNVI, moves all toes, cap refill < 2 sec.    Recent Labs      06/21/18   0335  06/22/18   0155  06/23/18   0045   WBC  11.5*  10.2  15.8*   RBC  3.88*  3.76*  3.81*   HEMOGLOBIN  11.8*  11.7*  11.6*   HEMATOCRIT  36.7*  35.7*  35.9*   MCV  94.6  94.9  94.2   MCH  30.4  31.1  30.4   MCHC  32.2*  32.8*  32.3*   RDW  41.7  42.5  41.2   PLATELETCT  370  341  420   MPV  9.3  9.1  9.5       Active Hospital Problems    Diagnosis   • Sepsis due to necrotizing fasciitis of R thigh [A41.9]     Priority: High   • New onset type 2 diabetes mellitus (HCC) [E11.9]     Priority: Medium   • Elevated alkaline phosphatase level [R74.8]       Assessment/Plan:  POD#1 S/P Secondary closure 15 cm surgical wound  Wt bearing status - WBAT  Wound care/Drains - vac left in place  Future Procedures - none planned   Lovenox: Start 6/13, Duration-until ambulatory > 150'  Sutures/Staples out- 14-21 days post operatively  PT/OT-initiated  Antibiotics: unasyn 3g IV Q6  DVT Prophylaxis- TEDS/SCDs/Foot pumps  Gaston- in place  Case Coordination for Discharge Planning - Disposition home   "

## 2018-06-24 NOTE — PROGRESS NOTES
Internal Medicine Interval Note  Note Author: Harini Ritter M.D.     Name Emmanuelle Martin     1960   Age/Sex 58 y.o. female   MRN [id number]   Code Status Full      After 5PM or if no immediate response to page, please call for cross-coverage  Attending/Team:  / Mason See Patient List for primary contact information  Call [phone] to page    1st Call - Day Intern (R1):   Dr. Ritter 2nd Call - Day Sr. Resident (R2/R3):   Dr. Carpenter        Reason for interval visit  (Principal Problem)   Sepsis with multi organ failure secondary to necrotizing fasciitis of right thigh with Group B Strep   New onset insulin dependent uncontrolled type II DM.     Interval Problem Daily Status Update  (24 hours)  - Pt was transferred from ICU on   - No acute event overnight. Feeling better, pain controlled. Pt is ambulating well.   - RN concerned that pt's wound is draining yellow fluid, if fever spikes , will repeat blood Cx & CBC  - Per Ortho: S/P I & D  on  , 6/15 , ,  & .   - Per ID: Unasyn started on - End Date on 2 weeks after the last Sx : Estimated stop date 18  - Plan to keep the blood sugars < 150 to control infection and wound healing: Increased Lantus from 25 to 30 at Bedtime,  6 units TID with meals & continue ISS.  DM Educator consulted.   - Discussed with Case Mx for D/C planning, pt has no Insurance.   - Barrier to Discharge: No insurance , Need to go home or SNF for IV antibiotic , Pt lives in [place] .       ROS     Constitutional: Negative for chills and fever.   HENT: Negative for congestion and sore throat.    Eyes: Negative for blurred vision and double vision.   Respiratory: Negative for sputum production and shortness of breath.    Cardiovascular: Negative for chest pain and palpitations.   Gastrointestinal: Negative for abdominal pain, nausea and vomiting.   Genitourinary: Negative for dysuria and urgency.   Musculoskeletal: Negative.  Negative for  joint pain and myalgias.       Right groin pain       No L/E pain , swelling, redness. Wound Vac placed on right groin.   Skin: Negative.    Neurological: Negative for dizziness and headaches.   Endo/Heme/Allergies: Negative.    Psychiatric/Behavioral: Negative.      Consultants/Specialty  ID   Ortho Surgery   PCP: GARCIA NAVA    Disposition  Inpatient     Quality Measures  Quality-Core Measures   Reviewed items::  Labs reviewed and Medications reviewed  Gaston catheter::  One or Two Days Post Surgery (Day of Surgery being Day 0)  DVT prophylaxis pharmacological::  Enoxaparin (Lovenox)  Antibiotics:  Treating active infection/contamination beyond 24 hours perioperative coverage          Physical Exam       Vitals:    06/23/18 2000 06/24/18 0400 06/24/18 0800 06/24/18 1100   BP: 124/67 113/67 (!) 90/61 117/57   Pulse: 67 74 80 64   Resp: 16 16 16 15   Temp: 36.4 °C (97.5 °F) 36.7 °C (98 °F) 36.4 °C (97.6 °F) 36.7 °C (98 °F)   SpO2: 92% 93% 93% 96%   Weight:       Height:         Body mass index is 37.47 kg/m².    Oxygen Therapy:  Pulse Oximetry: 96 %, O2 (LPM): 0, O2 Delivery: None (Room Air)    Physical Exam  Gen: Not in acute distress.    HEENT: NC/AT, moist mucous membranes.  Neck: No tracheal deviation. No stridor.   Cardiac: RRR without m/g/r. S1S2, no JVD.  Respiratory: Normal effort, no tachypnea. Decreased breath sounds over bilateral lateral lobes.   Abdomen: BS+, soft, NT/ND, no rebound/guarding.   Ext: No edema, 2+ DP pulses b/l.  Skin: Warm, dry. Dressing over right medial thigh. Showed mild edema S/P I&D x 2. Wound vac to right groin. Normal ROM.   Neuro: AAOx4,  no focal sensory or motor deficits.   Psych: Affect, mood, judgement normal.    Lab Data Review:         6/17/2018  9:30 AM    Recent Labs      06/22/18   0155  06/23/18   0045  06/24/18   0020   SODIUM  139  137  140   POTASSIUM  3.8  4.5  3.5*   CHLORIDE  103  102  102   CO2  30  27  28   BUN  9  12  15   CREATININE  0.38*  0.47*   0.49*   MAGNESIUM   --   2.1   --    CALCIUM  7.8*  8.5  8.4*       Recent Labs      06/22/18   0155  06/23/18   0045  06/24/18   0020   ALTSGPT  9  10   --    ASTSGOT  10*  5*   --    ALKPHOSPHAT  90  78   --    TBILIRUBIN  0.3  0.3   --    GLUCOSE  210*  236*  195*       Recent Labs      06/22/18   0155  06/23/18   0045  06/24/18   0020   RBC  3.76*  3.81*  3.88*   HEMOGLOBIN  11.7*  11.6*  11.8*   HEMATOCRIT  35.7*  35.9*  36.5*   PLATELETCT  341  420  421   IRON   --    --   24*   FERRITIN   --    --   254.6   TOTIRONBC   --    --   279       Recent Labs      06/22/18   0155  06/23/18   0045  06/24/18   0020   WBC  10.2  15.8*  11.8*   NEUTSPOLYS  58.20  74.90*  53.40   LYMPHOCYTES  31.40  17.80*  36.70   MONOCYTES  6.80  5.70  6.80   EOSINOPHILS  1.50  0.00  1.50   BASOPHILS  0.50  0.30  0.70   ASTSGOT  10*  5*   --    ALTSGPT  9  10   --    ALKPHOSPHAT  90  78   --    TBILIRUBIN  0.3  0.3   --            Assessment/Plan     Sepsis due to necrotizing fasciitis of R thigh  S/P I&D on 6/11 and 6/15, 6/17  Hemodynamically stable  Wound cx +group B Strep now on ampicillin-sulbactam    Wound vac placed 6/11  Control BGs with Lantus and SSI   Blood cultures NGTD   - Per Ortho: S/P I & D  on  6/11 , 6/15 ,6/18 ,6/20 & 6/22.   - Per ID: Unasyn started on 6/13-  Estimated stop date 07/06/18  - Plan to keep the blood sugars < 150 to control infection and wound healing          Non anion gap metabolic acidosis  Resolved  Secondary to NS fluid resuscitation causing hyperchloremic acidosis and starvation ketosis     New onset type 2 diabetes mellitus (HCC)  A1c 12.8% on admission   SSI, Lantus increased to 25 units    Increased Lantus to 30 at Bedtime, continue insulin 6 units TID with meals & continue ISS.  DM Educator consulted.      Anemia  s/p sepsis from necrotizing fascitis   s/p multiple I & D  H/H stable      Hypocalcemia  Resolved   Secondary to hypoalbuminemia in the setting of sepsis      Hypokalemia/  Hypomagnesemia   -Resolved      Elevated alkaline phosphatase level  -Resolved      Harini Ritter MD     The patient was seen by me face to face. I personally had a discussion with the patient. The case was discussed with our resident team, I examined the patient and confirmed the essential components of the history, physical examination, diagnosis and treatment plan as needed. I agree with the patient care as documented by the resident and edited as above by me. See resident's note above for complete details of service. The overall treatment regimen will be carried out as described above.     Thank you:  Pilo Luna MD, FACP  [facility] Internal Medicine  Pager: Use Tiger Text (use resident info under treatment team for day to day floor issues)  Office: [phone] Ext. [phone]  Fax: [phone] (non PHI only)

## 2018-06-25 PROBLEM — E88.09 HYPOALBUMINEMIA: Status: ACTIVE | Noted: 2018-06-25

## 2018-06-25 PROBLEM — E44.0 MODERATE PROTEIN MALNUTRITION (HCC): Status: ACTIVE | Noted: 2018-06-25

## 2018-06-25 LAB
ALBUMIN SERPL BCP-MCNC: 2.8 G/DL (ref 3.2–4.9)
ALBUMIN/GLOB SERPL: 0.8 G/DL
ALP SERPL-CCNC: 75 U/L (ref 30–99)
ALT SERPL-CCNC: 12 U/L (ref 2–50)
ANION GAP SERPL CALC-SCNC: 8 MMOL/L (ref 0–11.9)
AST SERPL-CCNC: 10 U/L (ref 12–45)
BASOPHILS # BLD AUTO: 0.8 % (ref 0–1.8)
BASOPHILS # BLD: 0.08 K/UL (ref 0–0.12)
BILIRUB SERPL-MCNC: 0.4 MG/DL (ref 0.1–1.5)
BUN SERPL-MCNC: 9 MG/DL (ref 8–22)
CALCIUM SERPL-MCNC: 8.6 MG/DL (ref 8.5–10.5)
CHLORIDE SERPL-SCNC: 106 MMOL/L (ref 96–112)
CO2 SERPL-SCNC: 25 MMOL/L (ref 20–33)
CREAT SERPL-MCNC: 0.4 MG/DL (ref 0.5–1.4)
EOSINOPHIL # BLD AUTO: 0.1 K/UL (ref 0–0.51)
EOSINOPHIL NFR BLD: 1 % (ref 0–6.9)
ERYTHROCYTE [DISTWIDTH] IN BLOOD BY AUTOMATED COUNT: 42.3 FL (ref 35.9–50)
GLOBULIN SER CALC-MCNC: 3.7 G/DL (ref 1.9–3.5)
GLUCOSE BLD-MCNC: 155 MG/DL (ref 65–99)
GLUCOSE BLD-MCNC: 164 MG/DL (ref 65–99)
GLUCOSE BLD-MCNC: 182 MG/DL (ref 65–99)
GLUCOSE SERPL-MCNC: 174 MG/DL (ref 65–99)
HCT VFR BLD AUTO: 37.4 % (ref 37–47)
HGB BLD-MCNC: 12 G/DL (ref 12–16)
IMM GRANULOCYTES # BLD AUTO: 0.1 K/UL (ref 0–0.11)
IMM GRANULOCYTES NFR BLD AUTO: 1 % (ref 0–0.9)
LYMPHOCYTES # BLD AUTO: 3.88 K/UL (ref 1–4.8)
LYMPHOCYTES NFR BLD: 37.7 % (ref 22–41)
MCH RBC QN AUTO: 30.2 PG (ref 27–33)
MCHC RBC AUTO-ENTMCNC: 32.1 G/DL (ref 33.6–35)
MCV RBC AUTO: 94.2 FL (ref 81.4–97.8)
MONOCYTES # BLD AUTO: 0.68 K/UL (ref 0–0.85)
MONOCYTES NFR BLD AUTO: 6.6 % (ref 0–13.4)
NEUTROPHILS # BLD AUTO: 5.44 K/UL (ref 2–7.15)
NEUTROPHILS NFR BLD: 52.9 % (ref 44–72)
NRBC # BLD AUTO: 0 K/UL
NRBC BLD-RTO: 0 /100 WBC
PLATELET # BLD AUTO: 478 K/UL (ref 164–446)
PMV BLD AUTO: 9.4 FL (ref 9–12.9)
POTASSIUM SERPL-SCNC: 3.6 MMOL/L (ref 3.6–5.5)
PROT SERPL-MCNC: 6.5 G/DL (ref 6–8.2)
RBC # BLD AUTO: 3.97 M/UL (ref 4.2–5.4)
SODIUM SERPL-SCNC: 139 MMOL/L (ref 135–145)
WBC # BLD AUTO: 10.3 K/UL (ref 4.8–10.8)

## 2018-06-25 PROCEDURE — 700102 HCHG RX REV CODE 250 W/ 637 OVERRIDE(OP): Performed by: STUDENT IN AN ORGANIZED HEALTH CARE EDUCATION/TRAINING PROGRAM

## 2018-06-25 PROCEDURE — A9270 NON-COVERED ITEM OR SERVICE: HCPCS | Performed by: STUDENT IN AN ORGANIZED HEALTH CARE EDUCATION/TRAINING PROGRAM

## 2018-06-25 PROCEDURE — 99232 SBSQ HOSP IP/OBS MODERATE 35: CPT | Performed by: INTERNAL MEDICINE

## 2018-06-25 PROCEDURE — 700111 HCHG RX REV CODE 636 W/ 250 OVERRIDE (IP): Performed by: STUDENT IN AN ORGANIZED HEALTH CARE EDUCATION/TRAINING PROGRAM

## 2018-06-25 PROCEDURE — 700111 HCHG RX REV CODE 636 W/ 250 OVERRIDE (IP): Performed by: INTERNAL MEDICINE

## 2018-06-25 PROCEDURE — 700102 HCHG RX REV CODE 250 W/ 637 OVERRIDE(OP): Performed by: ORTHOPAEDIC SURGERY

## 2018-06-25 PROCEDURE — 85025 COMPLETE CBC W/AUTO DIFF WBC: CPT

## 2018-06-25 PROCEDURE — 99232 SBSQ HOSP IP/OBS MODERATE 35: CPT | Mod: GC | Performed by: INTERNAL MEDICINE

## 2018-06-25 PROCEDURE — A9270 NON-COVERED ITEM OR SERVICE: HCPCS | Performed by: ORTHOPAEDIC SURGERY

## 2018-06-25 PROCEDURE — 80053 COMPREHEN METABOLIC PANEL: CPT

## 2018-06-25 PROCEDURE — 700105 HCHG RX REV CODE 258: Performed by: INTERNAL MEDICINE

## 2018-06-25 PROCEDURE — 770006 HCHG ROOM/CARE - MED/SURG/GYN SEMI*

## 2018-06-25 PROCEDURE — 700102 HCHG RX REV CODE 250 W/ 637 OVERRIDE(OP): Performed by: INTERNAL MEDICINE

## 2018-06-25 PROCEDURE — 82962 GLUCOSE BLOOD TEST: CPT | Mod: 91

## 2018-06-25 RX ORDER — DEXTROSE MONOHYDRATE 25 G/50ML
25 INJECTION, SOLUTION INTRAVENOUS
Status: DISCONTINUED | OUTPATIENT
Start: 2018-06-25 | End: 2018-06-28

## 2018-06-25 RX ORDER — INSULIN GLARGINE 100 [IU]/ML
35 INJECTION, SOLUTION SUBCUTANEOUS EVERY EVENING
Status: DISCONTINUED | OUTPATIENT
Start: 2018-06-25 | End: 2018-06-28

## 2018-06-25 RX ADMIN — INSULIN GLARGINE 35 UNITS: 100 INJECTION, SOLUTION SUBCUTANEOUS at 20:46

## 2018-06-25 RX ADMIN — AMPICILLIN SODIUM AND SULBACTAM SODIUM 3 G: 2; 1 INJECTION, POWDER, FOR SOLUTION INTRAMUSCULAR; INTRAVENOUS at 17:24

## 2018-06-25 RX ADMIN — AMPICILLIN SODIUM AND SULBACTAM SODIUM 3 G: 2; 1 INJECTION, POWDER, FOR SOLUTION INTRAMUSCULAR; INTRAVENOUS at 00:19

## 2018-06-25 RX ADMIN — AMPICILLIN SODIUM AND SULBACTAM SODIUM 3 G: 2; 1 INJECTION, POWDER, FOR SOLUTION INTRAMUSCULAR; INTRAVENOUS at 05:08

## 2018-06-25 RX ADMIN — OXYCODONE HYDROCHLORIDE 5 MG: 5 TABLET ORAL at 20:32

## 2018-06-25 RX ADMIN — OXYCODONE HYDROCHLORIDE 5 MG: 5 TABLET ORAL at 09:30

## 2018-06-25 RX ADMIN — AMPICILLIN SODIUM AND SULBACTAM SODIUM 3 G: 2; 1 INJECTION, POWDER, FOR SOLUTION INTRAMUSCULAR; INTRAVENOUS at 12:31

## 2018-06-25 RX ADMIN — POTASSIUM CHLORIDE 40 MEQ: 1500 TABLET, EXTENDED RELEASE ORAL at 09:30

## 2018-06-25 RX ADMIN — ENOXAPARIN SODIUM 40 MG: 100 INJECTION SUBCUTANEOUS at 09:29

## 2018-06-25 RX ADMIN — OXYCODONE HYDROCHLORIDE 10 MG: 10 TABLET ORAL at 00:23

## 2018-06-25 ASSESSMENT — PATIENT HEALTH QUESTIONNAIRE - PHQ9
7. TROUBLE CONCENTRATING ON THINGS, SUCH AS READING THE NEWSPAPER OR WATCHING TELEVISION: NOT AT ALL
3. TROUBLE FALLING OR STAYING ASLEEP OR SLEEPING TOO MUCH: NOT AT ALL
8. MOVING OR SPEAKING SO SLOWLY THAT OTHER PEOPLE COULD HAVE NOTICED. OR THE OPPOSITE, BEING SO FIGETY OR RESTLESS THAT YOU HAVE BEEN MOVING AROUND A LOT MORE THAN USUAL: NOT AT ALL
6. FEELING BAD ABOUT YOURSELF - OR THAT YOU ARE A FAILURE OR HAVE LET YOURSELF OR YOUR FAMILY DOWN: NOT AL ALL
SUM OF ALL RESPONSES TO PHQ9 QUESTIONS 1 AND 2: 0
5. POOR APPETITE OR OVEREATING: NOT AT ALL
9. THOUGHTS THAT YOU WOULD BE BETTER OFF DEAD, OR OF HURTING YOURSELF: NOT AT ALL
1. LITTLE INTEREST OR PLEASURE IN DOING THINGS: NOT AT ALL
SUM OF ALL RESPONSES TO PHQ QUESTIONS 1-9: 0
2. FEELING DOWN, DEPRESSED, IRRITABLE, OR HOPELESS: NOT AT ALL
4. FEELING TIRED OR HAVING LITTLE ENERGY: NOT AT ALL

## 2018-06-25 ASSESSMENT — PAIN SCALES - GENERAL
PAINLEVEL_OUTOF10: 7
PAINLEVEL_OUTOF10: 2
PAINLEVEL_OUTOF10: 4
PAINLEVEL_OUTOF10: 2
PAINLEVEL_OUTOF10: 3
PAINLEVEL_OUTOF10: 5
PAINLEVEL_OUTOF10: 7

## 2018-06-25 ASSESSMENT — ENCOUNTER SYMPTOMS
DOUBLE VISION: 0
FALLS: 0
DEPRESSION: 0
NAUSEA: 1
CONSTIPATION: 0
SHORTNESS OF BREATH: 0
NAUSEA: 0
BLURRED VISION: 0
VOMITING: 0
PALPITATIONS: 0
ABDOMINAL PAIN: 0
FEVER: 0
INSOMNIA: 0
CHILLS: 0
COUGH: 0
DIZZINESS: 0
MYALGIAS: 1
HEADACHES: 0

## 2018-06-25 NOTE — DIETARY
"Nutrition Services: Consult received for \"Necrotizing Fasciitis, New Onset DM, and Low Albumin\"    -RD provided DM edu to patient on 6/21. See Education tab in chart review for further details on education.  -No nutrition interventions are indicated for necrotizing fasciitis, pt's nutrition intake is adequate.   -Regarding Low Albumin, Albumin is a poor indicator of nutrition status, instead consider assessing trends in Pre-Albumin and CRP. Also note Pt is consistently consuming % of meals.     Pt does not present with any acute nutrition related concerns at this time. Consult Rd as needed, available prn and to rescreen per department policy.   "

## 2018-06-25 NOTE — CARE PLAN
Problem: Knowledge Deficit  Goal: Knowledge of the prescribed therapeutic regimen will improve  Outcome: PROGRESSING AS EXPECTED      Problem: Pain Management  Goal: Pain level will decrease to patient's comfort goal  Outcome: PROGRESSING AS EXPECTED

## 2018-06-25 NOTE — CARE PLAN
Problem: Respiratory:  Goal: Respiratory status will improve  Outcome: PROGRESSING AS EXPECTED  Patient O2 sat on RA is 94%. Titrated O2 down to RA from 0.5L O2 at 10 am. Patient IS 1000.

## 2018-06-25 NOTE — CARE PLAN
Problem: Pain Management  Goal: Pain level will decrease to patient's comfort goal  Outcome: PROGRESSING AS EXPECTED  Paient pain management decreased from using Oxy 10 to Oxy 5. Patient feels pain adequetly mamanged with pain report of 3/10

## 2018-06-25 NOTE — PROGRESS NOTES
"Patient seen and examined  Complains of pain    Blood pressure 118/76, pulse 68, temperature 35.8 °C (96.5 °F), resp. rate 17, height 1.499 m (4' 11.02\"), weight 84.2 kg (185 lb 10 oz), SpO2 96 %, not currently breastfeeding.    Recent Labs      06/23/18   0045  06/24/18   0020  06/25/18   0510   WBC  15.8*  11.8*  10.3   RBC  3.81*  3.88*  3.97*   HEMOGLOBIN  11.6*  11.8*  12.0   HEMATOCRIT  35.9*  36.5*  37.4   MCV  94.2  94.1  94.2   MCH  30.4  30.4  30.2   MCHC  32.3*  32.3*  32.1*   RDW  41.2  41.4  42.3   PLATELETCT  420  421  478*   MPV  9.5  9.5  9.4     60 cc ss hemovac  No acute distress  Dressing clean dry and intact  Neurovascularly intact    POD# 3 I&D, closure right groin wound    Plan:  DVT Prophylaxis- TEDS/SCDs, LMWH  Weight Bearing Status- WBAT  DC drain when under 30cc/shift  preveena x 1 week  PT/OT  Antibiotics: per ID  Case Coordination            "

## 2018-06-25 NOTE — PROGRESS NOTES
Internal Medicine Interval Note  Note Author: Lakeshia Carpenter M.D.     Name Emmanuelle Martin     1960   Age/Sex 58 y.o. female   MRN [id number]   Code Status Full      After 5PM or if no immediate response to page, please call for cross-coverage  Attending/Team:  / Mason See Patient List for primary contact information  Call [phone] to page    1st Call - Day Intern (R1):   Dr. Veliz 2nd Call - Day Sr. Resident (R2/R3):   Dr. Carpenter        Reason for interval visit  (Principal Problem)   Sepsis with multi organ failure secondary to necrotizing fasciitis of right thigh with Group B Strep   New onset insulin dependent uncontrolled type II DM.     Interval Problem Daily Status Update  (24 hours)  No acute event overnight. Feeling better, pain controlled. Pt is ambulating well.   S/P I & D  on  , 6/15 , ,  & .  Unasyn started on - End Date on 2 weeks after the last Sx : Estimated stop date 18  Discussed with Ortho PA ( Gabino Medrano) Ok to kathy sadler.  Plan to keep the blood sugars < 150 to control infection and wound healing: Increased Lantus to 35 at Bedtime,  7 units TID with meals & continue ISS.  Barrier to difficult discharge due to lack of insurance.        Review of Systems   Constitutional: Negative for chills and fever.   HENT: Negative for hearing loss and tinnitus.    Eyes: Negative for blurred vision and double vision.   Respiratory: Negative for cough and shortness of breath.    Cardiovascular: Negative for chest pain and palpitations.   Gastrointestinal: Positive for nausea. Negative for constipation and vomiting.   Genitourinary: Negative for dysuria and urgency.        History of stress incontinece   Musculoskeletal: Positive for joint pain. Negative for falls.   Skin: Negative for itching and rash.   Neurological: Negative for dizziness and headaches.   Psychiatric/Behavioral: Negative for depression. The patient does not have insomnia.            Consultants/Specialty  ID   Ortho Surgery   PCP: GARCIA NAVA    Disposition  Inpatient     Quality Measures  Quality-Core Measures   Reviewed items::  Labs reviewed and Medications reviewed  Gaston catheter::  Urologic Surgery or Other Surgery on Contiguous Structures of the Genitourinary Tract or Studies  DVT prophylaxis pharmacological::  Enoxaparin (Lovenox)  Antibiotics:  Treating active infection/contamination beyond 24 hours perioperative coverage          Physical Exam       Vitals:    06/24/18 1500 06/24/18 2000 06/25/18 0400 06/25/18 0800   BP: 119/69 143/67 118/76 110/63   Pulse: 77 76 68 73   Resp: 16 16 17 20   Temp: 36.7 °C (98 °F) 36.4 °C (97.6 °F) 35.8 °C (96.5 °F) 36 °C (96.8 °F)   SpO2: 95% 98% 96% 96%   Weight:       Height:         Body mass index is 37.47 kg/m².    Oxygen Therapy:  Pulse Oximetry: 96 %, O2 (LPM): 0.5, O2 Delivery: Silicone Nasal Cannula    Physical Exam  Gen: Not in acute distress.    HEENT: NC/AT, moist mucous membranes.  Neck: No tracheal deviation. No stridor.   Cardiac: RRR without m/g/r. S1S2, no JVD.  Respiratory: Normal effort, no tachypnea. Decreased breath sounds over bilateral lateral lobes.   Abdomen: BS+, soft, NT/ND, no rebound/guarding.   Ext: No edema, 2+ DP pulses b/l.  Skin: Warm, dry. Dressing over right medial thigh. Showed mild edema S/P I&D x 2. drain in place.  Normal ROM.   Neuro: AAOx4,  no focal sensory or motor deficits.   Psych: Affect, mood, judgement normal.    Lab Data Review:         6/17/2018  9:30 AM    Recent Labs      06/23/18   0045  06/24/18   0020  06/25/18   0510   SODIUM  137  140  139   POTASSIUM  4.5  3.5*  3.6   CHLORIDE  102  102  106   CO2  27  28  25   BUN  12  15  9   CREATININE  0.47*  0.49*  0.40*   MAGNESIUM  2.1   --    --    CALCIUM  8.5  8.4*  8.6       Recent Labs      06/23/18   0045  06/24/18   0020  06/25/18   0510   ALTSGPT  10   --   12   ASTSGOT  5*   --   10*   ALKPHOSPHAT  78   --   75   TBILIRUBIN   0.3   --   0.4   GLUCOSE  236*  195*  174*       Recent Labs      06/23/18   0045  06/24/18   0020  06/25/18   0510   RBC  3.81*  3.88*  3.97*   HEMOGLOBIN  11.6*  11.8*  12.0   HEMATOCRIT  35.9*  36.5*  37.4   PLATELETCT  420  421  478*   IRON   --   24*   --    FERRITIN   --   254.6   --    TOTIRONBC   --   279   --        Recent Labs      06/23/18   0045  06/24/18   0020  06/25/18   0510   WBC  15.8*  11.8*  10.3   NEUTSPOLYS  74.90*  53.40  52.90   LYMPHOCYTES  17.80*  36.70  37.70   MONOCYTES  5.70  6.80  6.60   EOSINOPHILS  0.00  1.50  1.00   BASOPHILS  0.30  0.70  0.80   ASTSGOT  5*   --   10*   ALTSGPT  10   --   12   ALKPHOSPHAT  78   --   75   TBILIRUBIN  0.3   --   0.4           Assessment/Plan   Sepsis due to necrotizing fasciitis of R thigh  S/P I&D on 6/11 and 6/15, 6/18,6/21 and wound closure on 06/23  Wound cx +group B Strep now on ampicillin-sulbactam    ID following, recommended IV antibiotics for 2 weeks after last surgery     Non anion gap metabolic acidosis  Resolved  Secondary to NS fluid resuscitation causing hyperchloremic acidosis and starvation ketosis    New onset type 2 diabetes mellitus (HCC)  A1c 12.8% on admission   lantus, premeal and SSI  Diabetes education done     Anemia/Thrombocytopenia  Resolved  In the setting of sepsis      Hypocalcemia  Resolved   Secondary to hypoalbuminemia in the setting of sepsis     Hypokalemia  Resolved  Secondary to Hypomagnesemia/Poor diet  Mg >2, K >4    Elevated alkaline phosphatase level  resolving    Hypoalbuminemia  Nutrition consult placed.

## 2018-06-25 NOTE — PROGRESS NOTES
Infectious Disease Progress Note    Author: GARCIA Roy. Date & Time of service: 6/25/2018  1:33 PM    Chief Complaint:  FU Right thigh necrotizing fasciitis    Interval History:  6/13/2018 MAXIMUM TEMPERATURE 98.7. WBCs 11.9 and platelets 205 and cr 0.5  6/14/2018 MAXIMUM TEMPERATURE 98.7 leg continues to hurt. WBC 12.2 platelets 205 and cr 0.38   6/15/2018 and MAXIMUM TEMPERATURE 99.1 WBC 9.3 had another surgery yesterday.  6/17/2018 MAXIMUM TEMPERATURE 100 the groin is more swollen today. WBC 9  6/18 Tmax 99.8 WBC 9.8 s/p I&D this morning, R thigh soreness  6/19- AF, WBC 11.2, tolerating abx, R thigh/ groin pain 5.5/10- slightly improved with pain meds, anxious to discharge home to get back to work by 6/25.  6/20 AF WBC 9.4 surgery delayed today, pain stable, emphasized need for IV abx given severity of infection and wound size  6/21- AF, WBC 11.5, R groin pain 5-6/10, no issue with abx, understanding to needing prolonged IV abx.  6/22 AF WBC 10.2 feeling better, just returned from repeat I&D with secondary closure earlier today  6/23 AF WBC 15.8 no new clinical issues from yesterday, sadler still in place  6/24 AF WBC 11.8 resting comfortably, pain controlled, finally had BM  6/25- AF, WBC 10.3, tolerating abx without issue, 5/10 R thigh/groin pain that is constant and dull- improved with pain meds.   Labs Reviewed, Medications Reviewed, Radiology Reviewed and Wound Reviewed.    Review of Systems:  Review of Systems   Constitutional: Negative for chills and fever.   HENT: Negative for congestion.    Respiratory: Negative for cough and shortness of breath.    Cardiovascular: Positive for leg swelling. Negative for chest pain.        RLE swelling- improving.   Gastrointestinal: Negative for abdominal pain, nausea and vomiting.   Musculoskeletal: Positive for joint pain and myalgias.        R thigh/ groin   Skin: Negative for rash.   Neurological: Negative for headaches.       Hemodynamics:  Temp  (24hrs), Av.2 °C (97.2 °F), Min:35.8 °C (96.5 °F), Max:36.7 °C (98 °F)  Temperature: 36.1 °C (97 °F)  Pulse  Av.9  Min: 52  Max: 106   Blood Pressure: 113/75       Physical Exam:  Physical Exam   Constitutional: She is oriented to person, place, and time. She appears well-developed and well-nourished. No distress.   Appears older than stated age.  Disheveled.   HENT:   Head: Normocephalic and atraumatic.   Eyes: Conjunctivae and EOM are normal. Pupils are equal, round, and reactive to light.   Neck: Normal range of motion.   Cardiovascular: Normal rate, regular rhythm, normal heart sounds and intact distal pulses.    No murmur heard.  Pulmonary/Chest: Effort normal and breath sounds normal. No respiratory distress. She has no wheezes.   Abdominal: Soft. Bowel sounds are normal. She exhibits no distension. There is no tenderness.   Genitourinary:   Genitourinary Comments: Gaston, urine clear yellow.    Musculoskeletal: She exhibits edema and tenderness.   Right thigh/groin- provena vac and hemovac in place with minimal ss drainage, trace erythema to surrounding tissue.     RUE PICC- CDI, non tender, no erythema.   Neurological: She is alert and oriented to person, place, and time.   Skin: Skin is warm. No rash noted. She is not diaphoretic.   Nursing note and vitals reviewed.      Meds:    Current Facility-Administered Medications:   •  insulin glargine **AND** insulin lispro **AND** insulin lispro **AND** Accu-Chek ACHS **AND** NOTIFY MD and PharmD **AND** glucose 4 g **AND** dextrose 50%  •  enoxaparin (LOVENOX) injection  •  potassium chloride SA  •  HYDROmorphone  •  PICC Line Insertion has been implemented **AND** May use Lidocaine 1% not to exceed 3 mls for local at insertion site **AND** NOTIFY MD **AND** Tip to dwell in the superior vena cava **AND** If radiologist reading of chest X-ray states any of the following the PICC should be used **AND** Further evaluation of the PICC placement can be  retrieved from X-Ray and Imaging **AND** Blood draws through PICC line; draws by RN only **AND** FLUSHING GUIDELINES WHEN IN USE **AND** normal saline PF **AND** FLUSHING GUIDELINES WHEN NOT IN USE **AND** DRESSING MAINTENANCE **AND** Change needleless pressure ports and IV tubing every 72 hours per hospital policy **AND** TUBING **AND** If there is an MD order to remove the PICC line, any RN may remove the PICC line **AND** [] PATIENT EDUCATION MATERIALS **AND** NURSING COMMUNICATION  •  loperamide  •  ampicillin-sulbactam (UNASYN) IV  •  [DISCONTINUED] insulin regular **AND** Accu-Chek ACHS **AND** NOTIFY MD and PharmD **AND** glucose 4 g **AND** dextrose 50%  •  senna-docusate **AND** polyethylene glycol/lytes **AND** magnesium hydroxide **AND** bisacodyl  •  acetaminophen  •  Pharmacy Consult Request  •  ondansetron  •  senna-docusate  •  bisacodyl  •  fleet  •  oxyCODONE immediate-release  •  oxyCODONE immediate release    Labs:  Recent Labs      18   0045  18   0020  18   0510   WBC  15.8*  11.8*  10.3   RBC  3.81*  3.88*  3.97*   HEMOGLOBIN  11.6*  11.8*  12.0   HEMATOCRIT  35.9*  36.5*  37.4   MCV  94.2  94.1  94.2   MCH  30.4  30.4  30.2   RDW  41.2  41.4  42.3   PLATELETCT  420  421  478*   MPV  9.5  9.5  9.4   NEUTSPOLYS  74.90*  53.40  52.90   LYMPHOCYTES  17.80*  36.70  37.70   MONOCYTES  5.70  6.80  6.60   EOSINOPHILS  0.00  1.50  1.00   BASOPHILS  0.30  0.70  0.80     Recent Labs      18   0045  18   0020  18   0510   SODIUM  137  140  139   POTASSIUM  4.5  3.5*  3.6   CHLORIDE  102  102  106   CO2  27  28  25   GLUCOSE  236*  195*  174*   BUN  12  15  9     Recent Labs      18   0045  18   0020  18   0510   ALBUMIN  2.8*   --   2.8*   TBILIRUBIN  0.3   --   0.4   ALKPHOSPHAT  78   --   75   TOTPROTEIN  6.7   --   6.5   ALTSGPT  10   --   12   ASTSGOT  5*   --   10*   CREATININE  0.47*  0.49*  0.40*       Imaging:  No recent results  found.    Micro:  Results     ** No results found for the last 168 hours. **          Assessment:  Active Hospital Problems    Diagnosis   • Sepsis with acute organ failure secondary to necrotizing fasciitis of R thigh in the setting of hyperglycemia  [A41.9]   • New onset type 2 diabetes mellitus (HCC) [E11.9]   • Hypokalemia [E87.6]   • Non anion gap metabolic acidosis [E87.2]       Plan:  Right thigh necrotizing fasciitis  Afebrile  Leukocytosis resolved  S/p I&D on 6/11/2018, 6/13/2018, 6/18, 6/20  S/p I&D with secondary closure on 6/22 - debridement down to healthy tissue  OR cultures + group B strep and Prevotella  Continue IV Unasyn  Plan for 2 weeks of IV abx from 6/22  Estimated stop date 07/06/18  Wound vac/care    New onset diabetes mellitus  HgA1c 12.8% on 6/11/18  Keep the blood sugars under 150 to control infection and wound healing    Asymptomatic funguria  Monitor    Tobacco abuse  Patient counseled    Discussed with RN & ANG Mccall.  Will likely complete treatment inpt d/t pending Medicaid.

## 2018-06-25 NOTE — PROGRESS NOTES
[facility] [place]ENDING NOTE:      Date & Time note created:   6/25/2018   3:21 PM     Visit Time:   Attending/resident bedside rounds 9-11:30 AM    The patient was evaluated with the resident staff. I reviewed the resident's note and agree with the resident's findings and plan as documented in the resident's note except as documented in the attending note. Please reference resident daily note for complete information.The chart was reviewed and summarized. Available labs, imaging, O2 sats, EKGs were reviewed. Available nursing, consultant and resident notes were reviewed. I am actively involved in the patient's care.                                                                BRIEF DISCUSSION:                                                         58 y/f admitted initially to ICU with Sepsis secondary Rt LE necrotizing fascitis and new onset DM 2. She has undergone Debridement , wound vac placement and secondary wound closure. Last surgical procedure was on 6/222/2018. Ts Cx on 6/11 positive for Prevotella and Gr B strep.  ID and Orthopedic Sx on board. This am during rounds she did not complain of fever, chills, dysuria, cough, SOB. Her VS has been WNL over last 24 hours. Her CBC and CMP are stable. She has wound vac and sadler in place. Rt Thigh wound is covered with wound vac but surrounding skin looks healthy with no cellulitis. Minimal tenderness to touch and serous drainage seen in the drain. Her BS are slightly elevated and albumin is low.     Plan: Continue Abx as recommended by ID. OSMAN sadler if agreeable to Surgery team. Ambulate patient TID. Continue to slowly titrate up the insulin. Diet/Nutrition consult.     Dispo; Difficult OSMAN, SW and CM on board.     Active Hospital Problems    Diagnosis   • Sepsis due to necrotizing fasciitis of R thigh [A41.9]     Priority: High   • New onset type 2 diabetes mellitus (HCC) [E11.9]     Priority: Medium   • Hypoalbuminemia [E88.09]   • Moderate protein  malnutrition (HCC) [E44.0]   • Elevated alkaline phosphatase level [R74.8]       Vitals:    06/24/18 2000 06/25/18 0400 06/25/18 0800 06/25/18 1200   BP: 143/67 118/76 110/63 113/75   Pulse: 76 68 73 81   Resp: 16 17 20 18   Temp: 36.4 °C (97.6 °F) 35.8 °C (96.5 °F) 36 °C (96.8 °F) 36.1 °C (97 °F)   SpO2: 98% 96% 96% 96%   Weight:       Height:         Weight/BMI: Body mass index is 37.47 kg/m².  Pulse Oximetry: 96 %, O2 (LPM): 0, O2 Delivery: None (Room Air)    Intake/Output Summary (Last 24 hours) at 06/25/18 1521  Last data filed at 06/25/18 0400   Gross per 24 hour   Intake              120 ml   Output             1125 ml   Net            -1005 ml       Ana Willard MD   Academic Hospitalist       Voice recognition software was used to generate this note, hence there may be some errors with word recognition despite all efforts to minimize them.

## 2018-06-26 LAB
GLUCOSE BLD-MCNC: 145 MG/DL (ref 65–99)
GLUCOSE BLD-MCNC: 149 MG/DL (ref 65–99)
GLUCOSE BLD-MCNC: 165 MG/DL (ref 65–99)
GLUCOSE BLD-MCNC: 166 MG/DL (ref 65–99)
GLUCOSE BLD-MCNC: 172 MG/DL (ref 65–99)
LV EJECT FRACT  99904: 60
LV EJECT FRACT MOD 2C 99903: 74.23
LV EJECT FRACT MOD 4C 99902: 74.34
LV EJECT FRACT MOD BP 99901: 73.26
PREALB SERPL-MCNC: 23 MG/DL (ref 18–38)

## 2018-06-26 PROCEDURE — 700105 HCHG RX REV CODE 258: Performed by: INTERNAL MEDICINE

## 2018-06-26 PROCEDURE — 93306 TTE W/DOPPLER COMPLETE: CPT

## 2018-06-26 PROCEDURE — 82962 GLUCOSE BLOOD TEST: CPT | Mod: 91

## 2018-06-26 PROCEDURE — A9270 NON-COVERED ITEM OR SERVICE: HCPCS | Performed by: ORTHOPAEDIC SURGERY

## 2018-06-26 PROCEDURE — 97530 THERAPEUTIC ACTIVITIES: CPT

## 2018-06-26 PROCEDURE — 700102 HCHG RX REV CODE 250 W/ 637 OVERRIDE(OP): Performed by: INTERNAL MEDICINE

## 2018-06-26 PROCEDURE — A9270 NON-COVERED ITEM OR SERVICE: HCPCS | Performed by: STUDENT IN AN ORGANIZED HEALTH CARE EDUCATION/TRAINING PROGRAM

## 2018-06-26 PROCEDURE — 84134 ASSAY OF PREALBUMIN: CPT

## 2018-06-26 PROCEDURE — 93306 TTE W/DOPPLER COMPLETE: CPT | Mod: 26 | Performed by: INTERNAL MEDICINE

## 2018-06-26 PROCEDURE — 97535 SELF CARE MNGMENT TRAINING: CPT

## 2018-06-26 PROCEDURE — 99232 SBSQ HOSP IP/OBS MODERATE 35: CPT | Performed by: INTERNAL MEDICINE

## 2018-06-26 PROCEDURE — 700111 HCHG RX REV CODE 636 W/ 250 OVERRIDE (IP): Performed by: INTERNAL MEDICINE

## 2018-06-26 PROCEDURE — 770006 HCHG ROOM/CARE - MED/SURG/GYN SEMI*

## 2018-06-26 PROCEDURE — 700102 HCHG RX REV CODE 250 W/ 637 OVERRIDE(OP): Performed by: STUDENT IN AN ORGANIZED HEALTH CARE EDUCATION/TRAINING PROGRAM

## 2018-06-26 PROCEDURE — 700105 HCHG RX REV CODE 258

## 2018-06-26 PROCEDURE — 700102 HCHG RX REV CODE 250 W/ 637 OVERRIDE(OP): Performed by: ORTHOPAEDIC SURGERY

## 2018-06-26 PROCEDURE — 99232 SBSQ HOSP IP/OBS MODERATE 35: CPT | Mod: GC | Performed by: INTERNAL MEDICINE

## 2018-06-26 PROCEDURE — 700111 HCHG RX REV CODE 636 W/ 250 OVERRIDE (IP): Performed by: STUDENT IN AN ORGANIZED HEALTH CARE EDUCATION/TRAINING PROGRAM

## 2018-06-26 RX ORDER — SODIUM CHLORIDE 9 MG/ML
INJECTION, SOLUTION INTRAVENOUS
Status: COMPLETED
Start: 2018-06-26 | End: 2018-06-26

## 2018-06-26 RX ADMIN — OXYCODONE HYDROCHLORIDE 5 MG: 5 TABLET ORAL at 20:04

## 2018-06-26 RX ADMIN — AMPICILLIN SODIUM AND SULBACTAM SODIUM 3 G: 2; 1 INJECTION, POWDER, FOR SOLUTION INTRAMUSCULAR; INTRAVENOUS at 16:45

## 2018-06-26 RX ADMIN — INSULIN GLARGINE 35 UNITS: 100 INJECTION, SOLUTION SUBCUTANEOUS at 20:04

## 2018-06-26 RX ADMIN — AMPICILLIN SODIUM AND SULBACTAM SODIUM 3 G: 2; 1 INJECTION, POWDER, FOR SOLUTION INTRAMUSCULAR; INTRAVENOUS at 23:01

## 2018-06-26 RX ADMIN — AMPICILLIN SODIUM AND SULBACTAM SODIUM 3 G: 2; 1 INJECTION, POWDER, FOR SOLUTION INTRAMUSCULAR; INTRAVENOUS at 06:06

## 2018-06-26 RX ADMIN — AMPICILLIN SODIUM AND SULBACTAM SODIUM 3 G: 2; 1 INJECTION, POWDER, FOR SOLUTION INTRAMUSCULAR; INTRAVENOUS at 00:08

## 2018-06-26 RX ADMIN — OXYCODONE HYDROCHLORIDE 5 MG: 5 TABLET ORAL at 06:13

## 2018-06-26 RX ADMIN — SODIUM CHLORIDE 500 ML: 9 INJECTION, SOLUTION INTRAVENOUS at 23:04

## 2018-06-26 RX ADMIN — AMPICILLIN SODIUM AND SULBACTAM SODIUM 3 G: 2; 1 INJECTION, POWDER, FOR SOLUTION INTRAMUSCULAR; INTRAVENOUS at 10:58

## 2018-06-26 RX ADMIN — ENOXAPARIN SODIUM 40 MG: 100 INJECTION SUBCUTANEOUS at 08:44

## 2018-06-26 RX ADMIN — OXYCODONE HYDROCHLORIDE 5 MG: 5 TABLET ORAL at 16:46

## 2018-06-26 RX ADMIN — POTASSIUM CHLORIDE 40 MEQ: 1500 TABLET, EXTENDED RELEASE ORAL at 08:44

## 2018-06-26 ASSESSMENT — PAIN SCALES - GENERAL
PAINLEVEL_OUTOF10: 4
PAINLEVEL_OUTOF10: 0
PAINLEVEL_OUTOF10: 5
PAINLEVEL_OUTOF10: 2
PAINLEVEL_OUTOF10: 7
PAINLEVEL_OUTOF10: 2
PAINLEVEL_OUTOF10: 4
PAINLEVEL_OUTOF10: 6

## 2018-06-26 ASSESSMENT — ENCOUNTER SYMPTOMS
NAUSEA: 0
CONSTIPATION: 0
DOUBLE VISION: 0
DIZZINESS: 0
SHORTNESS OF BREATH: 0
FOCAL WEAKNESS: 0
COUGH: 0
STRIDOR: 0
SPEECH CHANGE: 0
VOMITING: 0
HEADACHES: 0
MYALGIAS: 1
FALLS: 0
PALPITATIONS: 0
INSOMNIA: 0
DIARRHEA: 0
FEVER: 0
SORE THROAT: 0
ABDOMINAL PAIN: 0
CHILLS: 0
PHOTOPHOBIA: 0
BLURRED VISION: 0
DEPRESSION: 0

## 2018-06-26 ASSESSMENT — COGNITIVE AND FUNCTIONAL STATUS - GENERAL
PERSONAL GROOMING: A LITTLE
DRESSING REGULAR UPPER BODY CLOTHING: A LITTLE
SUGGESTED CMS G CODE MODIFIER DAILY ACTIVITY: CK
DRESSING REGULAR LOWER BODY CLOTHING: A LITTLE
TOILETING: A LITTLE
DAILY ACTIVITIY SCORE: 19
HELP NEEDED FOR BATHING: A LITTLE

## 2018-06-26 ASSESSMENT — PATIENT HEALTH QUESTIONNAIRE - PHQ9
1. LITTLE INTEREST OR PLEASURE IN DOING THINGS: NOT AT ALL
SUM OF ALL RESPONSES TO PHQ9 QUESTIONS 1 AND 2: 0
2. FEELING DOWN, DEPRESSED, IRRITABLE, OR HOPELESS: NOT AT ALL

## 2018-06-26 NOTE — CARE PLAN
Problem: Mobility  Goal: Risk for activity intolerance will decrease  Outcome: PROGRESSING AS EXPECTED  Patient up to the bedside commode to urinate after dc catheter

## 2018-06-26 NOTE — PROGRESS NOTES
Infectious Disease Progress Note    Author: GARCIA Roy. Date & Time of service: 6/26/2018  11:18 AM    Chief Complaint:  FU Right thigh necrotizing fasciitis    Interval History:  6/13/2018 MAXIMUM TEMPERATURE 98.7. WBCs 11.9 and platelets 205 and cr 0.5  6/14/2018 MAXIMUM TEMPERATURE 98.7 leg continues to hurt. WBC 12.2 platelets 205 and cr 0.38   6/15/2018 and MAXIMUM TEMPERATURE 99.1 WBC 9.3 had another surgery yesterday.  6/17/2018 MAXIMUM TEMPERATURE 100 the groin is more swollen today. WBC 9  6/18 Tmax 99.8 WBC 9.8 s/p I&D this morning, R thigh soreness  6/19- AF, WBC 11.2, tolerating abx, R thigh/ groin pain 5.5/10- slightly improved with pain meds, anxious to discharge home to get back to work by 6/25.  6/20 AF WBC 9.4 surgery delayed today, pain stable, emphasized need for IV abx given severity of infection and wound size  6/21- AF, WBC 11.5, R groin pain 5-6/10, no issue with abx, understanding to needing prolonged IV abx.  6/22 AF WBC 10.2 feeling better, just returned from repeat I&D with secondary closure earlier today  6/23 AF WBC 15.8 no new clinical issues from yesterday, sadler still in place  6/24 AF WBC 11.8 resting comfortably, pain controlled, finally had BM  6/25- AF, WBC 10.3, tolerating abx without issue, 5/10 R thigh/groin pain that is constant and dull- improved with pain meds.   6/26- AF, no labs, no issues with abx, pain remains dull and around 5/10 to R thigh/groin, sadler removed- voiding on her own.  Labs Reviewed, Medications Reviewed, Radiology Reviewed and Wound Reviewed.    Review of Systems:  Review of Systems   Constitutional: Negative for chills, fever and malaise/fatigue.   HENT: Negative for congestion.    Respiratory: Negative for shortness of breath.    Cardiovascular: Positive for leg swelling. Negative for chest pain.        RLE swelling- improving.   Gastrointestinal: Negative for abdominal pain, constipation, diarrhea, nausea and vomiting.   Genitourinary:  Negative for dysuria.   Musculoskeletal: Positive for joint pain and myalgias.        R thigh/ groin   Skin: Negative for rash.   Neurological: Negative for headaches.       Hemodynamics:  Temp (24hrs), Av.4 °C (97.5 °F), Min:36.1 °C (97 °F), Max:36.7 °C (98.1 °F)  Temperature: 36.6 °C (97.9 °F)  Pulse  Av.7  Min: 52  Max: 106   Blood Pressure: 122/76       Physical Exam:  Physical Exam   Constitutional: She is oriented to person, place, and time. She appears well-developed and well-nourished. No distress.   Appears older than stated age.  Disheveled.   HENT:   Head: Normocephalic and atraumatic.   Eyes: EOM are normal. Pupils are equal, round, and reactive to light. No scleral icterus.   Neck: Normal range of motion. Neck supple.   Cardiovascular: Normal rate, regular rhythm and intact distal pulses.    No murmur heard.  Pulmonary/Chest: Effort normal. No respiratory distress. She has no wheezes. She has no rales.   Abdominal: Soft. She exhibits no distension. There is no tenderness. There is no rebound.   Musculoskeletal: She exhibits edema and tenderness.   Right thigh/groin- provena vac and hemovac in place with mild ss drainage, trace erythema to surrounding tissue.     RUE PICC- CDI, non tender, no erythema.   Neurological: She is alert and oriented to person, place, and time.   Skin: Skin is warm. No rash noted.   Nursing note and vitals reviewed.      Meds:    Current Facility-Administered Medications:   •  insulin glargine **AND** insulin lispro **AND** insulin lispro **AND** Accu-Chek ACHS **AND** NOTIFY MD and PharmD **AND** glucose 4 g **AND** dextrose 50%  •  enoxaparin (LOVENOX) injection  •  potassium chloride SA  •  PICC Line Insertion has been implemented **AND** May use Lidocaine 1% not to exceed 3 mls for local at insertion site **AND** NOTIFY MD **AND** Tip to dwell in the superior vena cava **AND** If radiologist reading of chest X-ray states any of the following the PICC should be used  **AND** Further evaluation of the PICC placement can be retrieved from X-Ray and Imaging **AND** Blood draws through PICC line; draws by RN only **AND** FLUSHING GUIDELINES WHEN IN USE **AND** normal saline PF **AND** FLUSHING GUIDELINES WHEN NOT IN USE **AND** DRESSING MAINTENANCE **AND** Change needleless pressure ports and IV tubing every 72 hours per hospital policy **AND** TUBING **AND** If there is an MD order to remove the PICC line, any RN may remove the PICC line **AND** [] PATIENT EDUCATION MATERIALS **AND** NURSING COMMUNICATION  •  loperamide  •  ampicillin-sulbactam (UNASYN) IV  •  [DISCONTINUED] insulin regular **AND** Accu-Chek ACHS **AND** NOTIFY MD and PharmD **AND** glucose 4 g **AND** dextrose 50%  •  senna-docusate **AND** polyethylene glycol/lytes **AND** magnesium hydroxide **AND** bisacodyl  •  acetaminophen  •  Pharmacy Consult Request  •  ondansetron  •  senna-docusate  •  bisacodyl  •  fleet  •  oxyCODONE immediate-release  •  oxyCODONE immediate release    Labs:  Recent Labs      18   0020  18   0510   WBC  11.8*  10.3   RBC  3.88*  3.97*   HEMOGLOBIN  11.8*  12.0   HEMATOCRIT  36.5*  37.4   MCV  94.1  94.2   MCH  30.4  30.2   RDW  41.4  42.3   PLATELETCT  421  478*   MPV  9.5  9.4   NEUTSPOLYS  53.40  52.90   LYMPHOCYTES  36.70  37.70   MONOCYTES  6.80  6.60   EOSINOPHILS  1.50  1.00   BASOPHILS  0.70  0.80     Recent Labs      18   0020  18   0510   SODIUM  140  139   POTASSIUM  3.5*  3.6   CHLORIDE  102  106   CO2  28  25   GLUCOSE  195*  174*   BUN  15  9     Recent Labs      18   0020  18   0510   ALBUMIN   --   2.8*   TBILIRUBIN   --   0.4   ALKPHOSPHAT   --   75   TOTPROTEIN   --   6.5   ALTSGPT   --   12   ASTSGOT   --   10*   CREATININE  0.49*  0.40*       Imaging:  No recent results found.    Micro:  Results     ** No results found for the last 168 hours. **          Assessment:  Active Hospital Problems    Diagnosis   • Sepsis with  acute organ failure secondary to necrotizing fasciitis of R thigh in the setting of hyperglycemia  [A41.9]   • New onset type 2 diabetes mellitus (HCC) [E11.9]   • Hypokalemia [E87.6]   • Non anion gap metabolic acidosis [E87.2]       Plan:  Right thigh necrotizing fasciitis  Afebrile  Leukocytosis resolved  S/p I&D on 6/11/2018, 6/13/2018, 6/18, 6/20  S/p I&D with secondary closure on 6/22 - debridement down to healthy tissue  OR cultures + group B strep and Prevotella  Continue IV Unasyn  Plan for 2 weeks of IV abx from 6/22  Estimated stop date 07/06/18  Wound vac/care    New onset diabetes mellitus  HgA1c 12.8% on 6/11/18  Keep the blood sugars under 150 to control infection and wound healing    Asymptomatic funguria  Monitor    Tobacco abuse  Patient counseled    Discussed with CHRISTY Lazo RN.

## 2018-06-26 NOTE — PROGRESS NOTES
"   Orthopaedic Progress Note    Interval changes:  Provina incisional Vac in place with no leak- DC vac friday  HV with 55cc output over the last 24 hours-HV to stay in place until output is less than 30 cc in 24 hr period  Gaston to remain in place due to proximity to surgical site    ROS - Patient denies any new issues.  Pain well controlled.    Blood pressure 118/75, pulse 62, temperature 36.7 °C (98.1 °F), resp. rate 18, height 1.499 m (4' 11.02\"), weight 84.2 kg (185 lb 10 oz), SpO2 94 %, not currently breastfeeding.      Patient seen and examined  No acute distress  Breathing non labored  RRR  Right groin provina vac in place with no leak, inner thigh erythema resolved, HV in place with 55cc output over last 24. BLE DNVI, moves all toes, cap refill < 2 sec.    Recent Labs      06/23/18   0045  06/24/18   0020  06/25/18   0510   WBC  15.8*  11.8*  10.3   RBC  3.81*  3.88*  3.97*   HEMOGLOBIN  11.6*  11.8*  12.0   HEMATOCRIT  35.9*  36.5*  37.4   MCV  94.2  94.1  94.2   MCH  30.4  30.4  30.2   MCHC  32.3*  32.3*  32.1*   RDW  41.2  41.4  42.3   PLATELETCT  420  421  478*   MPV  9.5  9.5  9.4       Active Hospital Problems    Diagnosis   • Sepsis due to necrotizing fasciitis of R thigh [A41.9]     Priority: High   • New onset type 2 diabetes mellitus (HCC) [E11.9]     Priority: Medium   • Hypoalbuminemia [E88.09]   • Moderate protein malnutrition (HCC) [E44.0]   • Elevated alkaline phosphatase level [R74.8]       Assessment/Plan:  Patient doing well  POD#3 S/P Secondary closure 15 cm surgical wound  Wt bearing status - WBAT  Wound care/Drains - vac left in place, HV with 55cc output over last 24- DC when less than 30cc in 24 hours  Future Procedures - none planned   Lovenox: Start 6/13, Duration-until ambulatory > 150'  Sutures/Staples out- 14-21 days post operatively  PT/OT-initiated  Antibiotics: unasyn 3g IV Q6  DVT Prophylaxis- TEDS/SCDs/Foot pumps  Gaston-to be left in place   Case Coordination for Discharge " Planning - Disposition home vs SNF

## 2018-06-26 NOTE — PROGRESS NOTES
Diabetes education: Met with patient, who was originally seen on 6/21/18 to continue education. Pt states she has been sticking her finger with nursing but has not yet given insulin. Per Erin RN she would not try to give her own shot. CDE will meet with patient either at dinner today or lunch tomorrow ( pt did not need insulin for lunch as finger stick was 145) to assist patient in giving her own shot.  Discharge plan not yet know so supplies are dependent on insurance coverage or if  will cover at discharge. Please see not from 6/21.  Pt will need a meter (CDE to give), strips and lancets, insulin and either syringes or pens ( if pens can be used)    [place]. Please call [phone] for questions.

## 2018-06-26 NOTE — THERAPY
Occupational Therapy Treatment completed with focus on ADLs, ADL transfers and patient education.  Functional Status:  Pt was seen for Occupational Therapy treatment today, see Therapy Kardex for details. Treatment included education in breath control with activity and at rest, self pacing techs and energy conservation for pain management. Educated pt in safety awareness techs as well. Psychosocial intervention addressed. Reviewed fall prevention techs with pt prior to activity. WBAT RLE. Pt has a wound vac and hemo vac in right thigh. Pt demo SBA/Min A for supine to sit at EOB from a flat bed. CGA for sit to stand with FWW. CGA/Min A for ambulating ADL's with FWW. CGA for toilet transfers. Pt will need a RTS with arm rests for safety and ease with toilet transfers. Informed pt about [facility] for DME.  Pt required SBA for full toilet hygiene seated base.  Pt demonstrated Min A/SBA for UB dressing, CGA for  for LB dressing seated base with use of AE and extended time. CGA standing for clothing management. Pt stood at sink for oral hygiene and grooming with direct supervision. Pt demo low general endurance and easily fatigues. Pt also demonstrated CGA/Min A  for Ambulating ADL's with FWW. Discussed BR DME. Pt stated she has a shower chair and removable shower head. Pt was left up in chair , call light in reach, hair alarm on, bedside table in front of her and nursing is aware. Monitored O2 sats and HR with activity. O2 sats on room air 99%, . RN informed OT treatment findings and recommendations.  Continue Occupational Therapy services as per plan.    Plan of Care: Will benefit from Occupational Therapy 3 times per week  Discharge Recommendations:  Equipment Bedside Commode and reacher , sockaid and LH sponge for bathing. . Post-acute therapy Discharge to home with outpatient or home health for additional skilled therapy services as pr demo increased ability to safely do ADL's, I ADL's and transfers to return  "home and PLOF. TBD.    See \"Rehab Therapy-Acute\" Patient Summary Report for complete documentation.   "

## 2018-06-26 NOTE — CARE PLAN
Problem: Safety  Goal: Will remain free from injury  Outcome: PROGRESSING AS EXPECTED  Discussed safety and fall risk. Call light in reach. Personal belongings at bedside. Bed locked in lowest position. Treaded socks in place. Clutter-free environment. Patient verbalized understanding of education and calls appropriately.    Problem: Bowel/Gastric:  Goal: Normal bowel function is maintained or improved  Outcome: PROGRESSING AS EXPECTED  Patient had two small, soft bowel movements. Held scheduled stool softeners. Patient verbalized understanding of education.     Problem: Pain Management  Goal: Pain level will decrease to patient's comfort goal  Outcome: PROGRESSING AS EXPECTED  Patient states pain in groin is 5/10. Medicated per MAR. Patient verbalized understanding of education.     Problem: Skin Integrity  Goal: Risk for impaired skin integrity will decrease  Outcome: PROGRESSING AS EXPECTED  Prevena and Hemovac in place; clean, dry, intact. Barrier cream in use. Extra pillows for support and positioning. Patient verbalized understanding of education.

## 2018-06-26 NOTE — PROGRESS NOTES
Internal Medicine Interval Note  Note Author: Kathe Veliz M.D.     Name Emmanuelle Martin     1960   Age/Sex 58 y.o. female   MRN [id number]   Code Status FULL CODE     After 5PM or if no immediate response to page, please call for cross-coverage  Attending/Team:  / [place] See Patient List for primary contact information  Call [phone] to page    1st Call - Day Intern (R1):   Dr. Veliz 2nd Call - Day Sr. Resident (R2/R3):   Dr. Carpenter        Reason for interval visit  (Principal Problem)   Sepsis with multi organ failure secondary to necrotizing fasciitis of right thigh with Group B Strep   New onset insulin dependent uncontrolled type II DM, HbA1c 12.8    Interval Problem Daily Status Update  (24 hours)    - Admitted on 18 with sepsis secondary to necrotizing fascitis and multi-organ failure, on ICU -  - had wound debridements on 6/10, 6/15, ,  and   - Tissue culture 18 +ve for Prevotella, Grp B streptococcus   - on Unasyn from 18, to continue through 2018  - newly diagnosed with DM type II, HbA1c 12.8, now on 35 lantus bedtime, 7 units TID Lispro  - ortho and ID on board  - wound vac in place    - subjectively, improving slowly  - pain under control  - tolerating oral diet  - successful voiding trial  - blood sugars under control with above regimen  - ambulating  - a/wing medicare clearance for SNF        Review of Systems   Constitutional: Negative for chills, fever and malaise/fatigue.   HENT: Negative for ear pain and sore throat.    Eyes: Negative for blurred vision, double vision and photophobia.   Respiratory: Negative for cough, shortness of breath and stridor.    Cardiovascular: Negative for chest pain, palpitations and leg swelling.   Gastrointestinal: Negative for constipation, nausea and vomiting.   Genitourinary: Negative for dysuria and urgency.        History of stress incontinece   Musculoskeletal: Positive for joint  pain. Negative for falls.   Skin: Negative for itching and rash.   Neurological: Negative for dizziness, speech change, focal weakness and headaches.   Psychiatric/Behavioral: Negative for depression and suicidal ideas. The patient does not have insomnia.           Consultants/Specialty  ID   Ortho Surgery   PCP: GARCIA NAVA    Disposition  Inpatient for IV antibiotics and Wound care  Barrier to discharge : NO insurance    Quality Measures  Quality-Core Measures   Reviewed items::  Labs reviewed and Medications reviewed  Gaston catheter::  No Gaston  DVT prophylaxis pharmacological::  Enoxaparin (Lovenox)  Antibiotics:  Treating active infection/contamination beyond 24 hours perioperative coverage          Physical Exam       Vitals:    06/26/18 0400 06/26/18 0751 06/26/18 1000 06/26/18 1126   BP: 109/64 122/76  110/69   Pulse: 67 80  80   Resp: 16 16  16   Temp: 36.1 °C (97 °F) 36.6 °C (97.9 °F)  36.8 °C (98.2 °F)   SpO2: 92% 95% 94% 94%   Weight:       Height:         Body mass index is 37.47 kg/m².    Oxygen Therapy:  Pulse Oximetry: 94 %, O2 (LPM): 0, O2 Delivery: Nasal Cannula    Physical Exam   Constitutional: She is oriented to person, place, and time and well-developed, well-nourished, and in no distress. No distress.   HENT:   Head: Normocephalic and atraumatic.   Mouth/Throat: Oropharynx is clear and moist.   Eyes: Right eye exhibits no discharge. Left eye exhibits no discharge. No scleral icterus.   Neck: Normal range of motion. No JVD present.   Cardiovascular: Normal rate, regular rhythm and normal heart sounds.  Exam reveals no gallop and no friction rub.    No murmur heard.  Pulmonary/Chest: Effort normal and breath sounds normal. No stridor. No respiratory distress. She has no wheezes. She has no rales.   Abdominal: Soft. Bowel sounds are normal. She exhibits no distension. There is no tenderness. There is no rebound and no guarding.   Musculoskeletal: Normal range of motion. She exhibits no  edema, tenderness or deformity.   Right groin - clean wound with wound vac on, minimal drainage   Lymphadenopathy:     She has no cervical adenopathy.   Neurological: She is alert and oriented to person, place, and time. GCS score is 15.   No focal neurological deficits   Skin: Skin is warm. No rash noted. She is not diaphoretic. No erythema.   Psychiatric: Mood, memory, affect and judgment normal.       Lab Data Review:         6/17/2018  9:30 AM    Recent Labs      06/24/18   0020  06/25/18   0510   SODIUM  140  139   POTASSIUM  3.5*  3.6   CHLORIDE  102  106   CO2  28  25   BUN  15  9   CREATININE  0.49*  0.40*   CALCIUM  8.4*  8.6       Recent Labs      06/24/18   0020  06/25/18   0510  06/26/18   0315   ALTSGPT   --   12   --    ASTSGOT   --   10*   --    ALKPHOSPHAT   --   75   --    TBILIRUBIN   --   0.4   --    PREALBUMIN   --    --   23.0   GLUCOSE  195*  174*   --        Recent Labs      06/24/18   0020  06/25/18   0510   RBC  3.88*  3.97*   HEMOGLOBIN  11.8*  12.0   HEMATOCRIT  36.5*  37.4   PLATELETCT  421  478*   IRON  24*   --    FERRITIN  254.6   --    TOTIRONBC  279   --        Recent Labs      06/24/18   0020  06/25/18   0510   WBC  11.8*  10.3   NEUTSPOLYS  53.40  52.90   LYMPHOCYTES  36.70  37.70   MONOCYTES  6.80  6.60   EOSINOPHILS  1.50  1.00   BASOPHILS  0.70  0.80   ASTSGOT   --   10*   ALTSGPT   --   12   ALKPHOSPHAT   --   75   TBILIRUBIN   --   0.4           Assessment/Plan     Sepsis due to necrotizing fasciitis of R thigh  - s/p I&D on 6/11, 6/15, 6/18, 6/20, 6/22  - Wound cx + for prevotella and group B Strep   - now on Unasyn started 6/13/18   - ID following, recommended IV antibiotics for 2 weeks after last surgery, hence antibiotics to continue through 7/6/18  - wound looks clean and not cellulitic around Vac dressing  - minimal drainage  - afebrile  - mobilizing  Plan  - continue unasyn per ID, till 7/6/18  - wound vac and care per ortho  - continue ambulate  - Lovenox for DVT  prophylaxis    Non anion gap metabolic acidosis  Resolved  Secondary to NS fluid resuscitation causing hyperchloremic acidosis and starvation ketosis    New onset type 2 diabetes mellitus (HCC)  - A1c 12.8% on admission   - on Lantus 35 units pm, and Lispro 7 units TID  - good control with above  - Diabetes education provided  - low carbohydrate diet    Anemia/Thrombocytopenia  Resolved  In the setting of sepsis      Hypocalcemia  Resolved   Secondary to hypoalbuminemia in the setting of sepsis     Hypokalemia  Resolved  Secondary to Hypomagnesemia/Poor diet  Mg >2, K >4    Elevated alkaline phosphatase level  - resolved    Hypoalbuminemia  - pre-albumin 23  - s/b dietary on 6/25 --> no additional needs, hence signed off

## 2018-06-26 NOTE — PROGRESS NOTES
Patient is alert and oriented. Vital signs stable on room air. Discussed plan of care, pain management, safety, and fall risk. Medicated per MAR for pain. Patient ambulated to bedside commode; voided 400 mL. Patient had two bowel movements. Effective use of incentive spirometer pulling 1200. SCDs in place. Safety and fall precautions in place. Call light in reach. Hourly rounding in place.

## 2018-06-26 NOTE — THERAPY
Occupational Therapy Contact Note:    Pt had I&D on 6/22, reviewed medical chart and discussed POC with LUONG. OK for LUONG to resume tx.     Diane Branham, OTR/L  Pager: [phone]

## 2018-06-26 NOTE — PROGRESS NOTES
Martha sadler per MD Nursing communication who consulted with Ortho PA. Martha at 1815. [facility]

## 2018-06-26 NOTE — CARE PLAN
Problem: Respiratory:  Goal: Respiratory status will improve  Outcome: PROGRESSING AS EXPECTED  Educated on importance of IS use, presents with 1200> Also educated on importance of mobility for resp improvement.

## 2018-06-27 LAB
ALBUMIN SERPL BCP-MCNC: 2.9 G/DL (ref 3.2–4.9)
ALBUMIN/GLOB SERPL: 0.8 G/DL
ALP SERPL-CCNC: 75 U/L (ref 30–99)
ALT SERPL-CCNC: 15 U/L (ref 2–50)
ANION GAP SERPL CALC-SCNC: 9 MMOL/L (ref 0–11.9)
AST SERPL-CCNC: 13 U/L (ref 12–45)
BASOPHILS # BLD AUTO: 1.3 % (ref 0–1.8)
BASOPHILS # BLD: 0.1 K/UL (ref 0–0.12)
BILIRUB SERPL-MCNC: 0.4 MG/DL (ref 0.1–1.5)
BUN SERPL-MCNC: 14 MG/DL (ref 8–22)
CALCIUM SERPL-MCNC: 8.7 MG/DL (ref 8.5–10.5)
CHLORIDE SERPL-SCNC: 108 MMOL/L (ref 96–112)
CO2 SERPL-SCNC: 25 MMOL/L (ref 20–33)
CREAT SERPL-MCNC: 0.48 MG/DL (ref 0.5–1.4)
EOSINOPHIL # BLD AUTO: 0.1 K/UL (ref 0–0.51)
EOSINOPHIL NFR BLD: 1.3 % (ref 0–6.9)
ERYTHROCYTE [DISTWIDTH] IN BLOOD BY AUTOMATED COUNT: 43 FL (ref 35.9–50)
GLOBULIN SER CALC-MCNC: 3.8 G/DL (ref 1.9–3.5)
GLUCOSE BLD-MCNC: 100 MG/DL (ref 65–99)
GLUCOSE BLD-MCNC: 121 MG/DL (ref 65–99)
GLUCOSE BLD-MCNC: 142 MG/DL (ref 65–99)
GLUCOSE BLD-MCNC: 163 MG/DL (ref 65–99)
GLUCOSE SERPL-MCNC: 100 MG/DL (ref 65–99)
HCT VFR BLD AUTO: 37 % (ref 37–47)
HGB BLD-MCNC: 12.1 G/DL (ref 12–16)
IMM GRANULOCYTES # BLD AUTO: 0.06 K/UL (ref 0–0.11)
IMM GRANULOCYTES NFR BLD AUTO: 0.8 % (ref 0–0.9)
LYMPHOCYTES # BLD AUTO: 3.91 K/UL (ref 1–4.8)
LYMPHOCYTES NFR BLD: 49.7 % (ref 22–41)
MAGNESIUM SERPL-MCNC: 1.8 MG/DL (ref 1.5–2.5)
MCH RBC QN AUTO: 30.8 PG (ref 27–33)
MCHC RBC AUTO-ENTMCNC: 32.7 G/DL (ref 33.6–35)
MCV RBC AUTO: 94.1 FL (ref 81.4–97.8)
MONOCYTES # BLD AUTO: 0.55 K/UL (ref 0–0.85)
MONOCYTES NFR BLD AUTO: 7 % (ref 0–13.4)
NEUTROPHILS # BLD AUTO: 3.15 K/UL (ref 2–7.15)
NEUTROPHILS NFR BLD: 39.9 % (ref 44–72)
NRBC # BLD AUTO: 0 K/UL
NRBC BLD-RTO: 0 /100 WBC
PHOSPHATE SERPL-MCNC: 5.2 MG/DL (ref 2.5–4.5)
PLATELET # BLD AUTO: 449 K/UL (ref 164–446)
PMV BLD AUTO: 9.2 FL (ref 9–12.9)
POTASSIUM SERPL-SCNC: 3.8 MMOL/L (ref 3.6–5.5)
PROT SERPL-MCNC: 6.7 G/DL (ref 6–8.2)
RBC # BLD AUTO: 3.93 M/UL (ref 4.2–5.4)
SODIUM SERPL-SCNC: 142 MMOL/L (ref 135–145)
WBC # BLD AUTO: 7.9 K/UL (ref 4.8–10.8)

## 2018-06-27 PROCEDURE — 99232 SBSQ HOSP IP/OBS MODERATE 35: CPT | Performed by: INTERNAL MEDICINE

## 2018-06-27 PROCEDURE — 700105 HCHG RX REV CODE 258: Performed by: STUDENT IN AN ORGANIZED HEALTH CARE EDUCATION/TRAINING PROGRAM

## 2018-06-27 PROCEDURE — 700111 HCHG RX REV CODE 636 W/ 250 OVERRIDE (IP): Performed by: INTERNAL MEDICINE

## 2018-06-27 PROCEDURE — 94762 N-INVAS EAR/PLS OXIMTRY CONT: CPT

## 2018-06-27 PROCEDURE — 700105 HCHG RX REV CODE 258: Performed by: INTERNAL MEDICINE

## 2018-06-27 PROCEDURE — 700102 HCHG RX REV CODE 250 W/ 637 OVERRIDE(OP): Performed by: STUDENT IN AN ORGANIZED HEALTH CARE EDUCATION/TRAINING PROGRAM

## 2018-06-27 PROCEDURE — 700111 HCHG RX REV CODE 636 W/ 250 OVERRIDE (IP): Performed by: STUDENT IN AN ORGANIZED HEALTH CARE EDUCATION/TRAINING PROGRAM

## 2018-06-27 PROCEDURE — 99232 SBSQ HOSP IP/OBS MODERATE 35: CPT | Mod: GC | Performed by: INTERNAL MEDICINE

## 2018-06-27 PROCEDURE — A9270 NON-COVERED ITEM OR SERVICE: HCPCS | Performed by: STUDENT IN AN ORGANIZED HEALTH CARE EDUCATION/TRAINING PROGRAM

## 2018-06-27 PROCEDURE — 700102 HCHG RX REV CODE 250 W/ 637 OVERRIDE(OP): Performed by: ORTHOPAEDIC SURGERY

## 2018-06-27 PROCEDURE — 97116 GAIT TRAINING THERAPY: CPT

## 2018-06-27 PROCEDURE — 83735 ASSAY OF MAGNESIUM: CPT

## 2018-06-27 PROCEDURE — 85025 COMPLETE CBC W/AUTO DIFF WBC: CPT

## 2018-06-27 PROCEDURE — 770006 HCHG ROOM/CARE - MED/SURG/GYN SEMI*

## 2018-06-27 PROCEDURE — 97530 THERAPEUTIC ACTIVITIES: CPT

## 2018-06-27 PROCEDURE — 80053 COMPREHEN METABOLIC PANEL: CPT

## 2018-06-27 PROCEDURE — 84100 ASSAY OF PHOSPHORUS: CPT

## 2018-06-27 PROCEDURE — 700102 HCHG RX REV CODE 250 W/ 637 OVERRIDE(OP): Performed by: INTERNAL MEDICINE

## 2018-06-27 PROCEDURE — A9270 NON-COVERED ITEM OR SERVICE: HCPCS | Performed by: ORTHOPAEDIC SURGERY

## 2018-06-27 PROCEDURE — 82962 GLUCOSE BLOOD TEST: CPT

## 2018-06-27 RX ORDER — POTASSIUM CHLORIDE 20 MEQ/1
20 TABLET, EXTENDED RELEASE ORAL ONCE
Status: ACTIVE | OUTPATIENT
Start: 2018-06-27 | End: 2018-06-28

## 2018-06-27 RX ADMIN — ENOXAPARIN SODIUM 40 MG: 100 INJECTION SUBCUTANEOUS at 09:33

## 2018-06-27 RX ADMIN — POTASSIUM CHLORIDE 40 MEQ: 1500 TABLET, EXTENDED RELEASE ORAL at 09:33

## 2018-06-27 RX ADMIN — MAGNESIUM SULFATE HEPTAHYDRATE 2 G: 500 INJECTION, SOLUTION INTRAMUSCULAR; INTRAVENOUS at 09:33

## 2018-06-27 RX ADMIN — AMPICILLIN SODIUM AND SULBACTAM SODIUM 3 G: 2; 1 INJECTION, POWDER, FOR SOLUTION INTRAMUSCULAR; INTRAVENOUS at 17:08

## 2018-06-27 RX ADMIN — INSULIN GLARGINE 35 UNITS: 100 INJECTION, SOLUTION SUBCUTANEOUS at 20:34

## 2018-06-27 RX ADMIN — AMPICILLIN SODIUM AND SULBACTAM SODIUM 3 G: 2; 1 INJECTION, POWDER, FOR SOLUTION INTRAMUSCULAR; INTRAVENOUS at 11:46

## 2018-06-27 RX ADMIN — AMPICILLIN SODIUM AND SULBACTAM SODIUM 3 G: 2; 1 INJECTION, POWDER, FOR SOLUTION INTRAMUSCULAR; INTRAVENOUS at 05:31

## 2018-06-27 RX ADMIN — OXYCODONE HYDROCHLORIDE 5 MG: 5 TABLET ORAL at 05:31

## 2018-06-27 RX ADMIN — OXYCODONE HYDROCHLORIDE 5 MG: 5 TABLET ORAL at 11:46

## 2018-06-27 RX ADMIN — AMPICILLIN SODIUM AND SULBACTAM SODIUM 3 G: 2; 1 INJECTION, POWDER, FOR SOLUTION INTRAMUSCULAR; INTRAVENOUS at 23:27

## 2018-06-27 RX ADMIN — OXYCODONE HYDROCHLORIDE 5 MG: 5 TABLET ORAL at 21:32

## 2018-06-27 ASSESSMENT — COGNITIVE AND FUNCTIONAL STATUS - GENERAL
MOBILITY SCORE: 14
WALKING IN HOSPITAL ROOM: A LITTLE
CLIMB 3 TO 5 STEPS WITH RAILING: A LITTLE
MOVING FROM LYING ON BACK TO SITTING ON SIDE OF FLAT BED: UNABLE
SUGGESTED CMS G CODE MODIFIER MOBILITY: CL
STANDING UP FROM CHAIR USING ARMS: A LITTLE
TURNING FROM BACK TO SIDE WHILE IN FLAT BAD: A LITTLE
MOVING TO AND FROM BED TO CHAIR: UNABLE

## 2018-06-27 ASSESSMENT — ENCOUNTER SYMPTOMS
SHORTNESS OF BREATH: 0
PALPITATIONS: 0
HEADACHES: 0
FOCAL WEAKNESS: 0
VOMITING: 0
SORE THROAT: 0
FALLS: 0
CONSTIPATION: 0
CHILLS: 0
ABDOMINAL PAIN: 0
SENSORY CHANGE: 0
DIARRHEA: 0
DOUBLE VISION: 0
INSOMNIA: 0
NAUSEA: 0
COUGH: 0
BLURRED VISION: 0
STRIDOR: 0
FEVER: 0
PHOTOPHOBIA: 0
DIZZINESS: 0
MYALGIAS: 1
SPEECH CHANGE: 0
DEPRESSION: 0

## 2018-06-27 ASSESSMENT — PAIN SCALES - GENERAL
PAINLEVEL_OUTOF10: 4
PAINLEVEL_OUTOF10: 5
PAINLEVEL_OUTOF10: 3
PAINLEVEL_OUTOF10: 5
PAINLEVEL_OUTOF10: 6
PAINLEVEL_OUTOF10: 3
PAINLEVEL_OUTOF10: 3

## 2018-06-27 ASSESSMENT — GAIT ASSESSMENTS
GAIT LEVEL OF ASSIST: STAND BY ASSIST
ASSISTIVE DEVICE: FRONT WHEEL WALKER
DEVIATION: ANTALGIC;STEP TO;BRADYKINETIC
DISTANCE (FEET): 50

## 2018-06-27 NOTE — CARE PLAN
Problem: Safety  Goal: Will remain free from injury  Outcome: PROGRESSING AS EXPECTED  Discussed safety and fall risk. Call light in reach. Personal belongings in reach. Clutter-free environment. Bed locked in lowest position. Treaded socks in place. Patient verbalized understanding of education and calls appropriately for assistance.     Problem: Venous Thromboembolism (VTW)/Deep Vein Thrombosis (DVT) Prevention:  Goal: Patient will participate in Venous Thrombosis (VTE)/Deep Vein Thrombosis (DVT)Prevention Measures  Outcome: PROGRESSING AS EXPECTED  SCDs in place. Patient ambulatory; 1 assist with front wheel walker.    Problem: Bowel/Gastric:  Goal: Normal bowel function is maintained or improved  Outcome: PROGRESSING AS EXPECTED  Patient has had multiple bowel movements 6/26. Refused scheduled stool softeners. Patient verbalized understanding of education.    Problem: Pain Management  Goal: Pain level will decrease to patient's comfort goal  Outcome: PROGRESSING AS EXPECTED  Patient states pain to right groin is 5/10. Medicated per MAR. Patient verbalized understanding of education.

## 2018-06-27 NOTE — PROGRESS NOTES
Infectious Disease Progress Note    Author: IRINEO Roy Date & Time of service: 6/27/2018  11:53 AM    Chief Complaint:  FU Right thigh necrotizing fasciitis    Interval History:  6/13/2018 MAXIMUM TEMPERATURE 98.7. WBCs 11.9 and platelets 205 and cr 0.5  6/14/2018 MAXIMUM TEMPERATURE 98.7 leg continues to hurt. WBC 12.2 platelets 205 and cr 0.38   6/15/2018 and MAXIMUM TEMPERATURE 99.1 WBC 9.3 had another surgery yesterday.  6/17/2018 MAXIMUM TEMPERATURE 100 the groin is more swollen today. WBC 9  6/18 Tmax 99.8 WBC 9.8 s/p I&D this morning, R thigh soreness  6/19- AF, WBC 11.2, tolerating abx, R thigh/ groin pain 5.5/10- slightly improved with pain meds, anxious to discharge home to get back to work by 6/25.  6/20 AF WBC 9.4 surgery delayed today, pain stable, emphasized need for IV abx given severity of infection and wound size  6/21- AF, WBC 11.5, R groin pain 5-6/10, no issue with abx, understanding to needing prolonged IV abx.  6/22 AF WBC 10.2 feeling better, just returned from repeat I&D with secondary closure earlier today  6/23 AF WBC 15.8 no new clinical issues from yesterday, sadler still in place  6/24 AF WBC 11.8 resting comfortably, pain controlled, finally had BM  6/25- AF, WBC 10.3, tolerating abx without issue, 5/10 R thigh/groin pain that is constant and dull- improved with pain meds.   6/26- AF, no labs, no issues with abx, pain remains dull and around 5/10 to R thigh/groin, sadler removed- voiding on her own.  6/27- AF, WBC 7.9, tolerating abx, constant dull 5/10 R thigh/groin pain, voiding without issue.   Labs Reviewed, Medications Reviewed, Radiology Reviewed and Wound Reviewed.    Review of Systems:  Review of Systems   Constitutional: Negative for chills and fever.   HENT: Negative for hearing loss.    Respiratory: Negative for cough and shortness of breath.    Cardiovascular: Positive for leg swelling. Negative for chest pain and palpitations.        RLE swelling- improving.    Gastrointestinal: Negative for abdominal pain, diarrhea, nausea and vomiting.   Genitourinary: Negative for dysuria.   Musculoskeletal: Positive for joint pain and myalgias.        R thigh/ groin   Skin: Negative for rash.   Neurological: Negative for sensory change and headaches.       Hemodynamics:  Temp (24hrs), Av.4 °C (97.6 °F), Min:36 °C (96.8 °F), Max:36.8 °C (98.2 °F)  Temperature: 36.5 °C (97.7 °F)  Pulse  Av.6  Min: 52  Max: 106   Blood Pressure: 107/67       Physical Exam:  Physical Exam   Constitutional: She is oriented to person, place, and time. She appears well-developed and well-nourished. No distress.   Appears older than stated age.   HENT:   Head: Normocephalic and atraumatic.   Eyes: Conjunctivae and EOM are normal. Pupils are equal, round, and reactive to light.   Neck: Normal range of motion.   Cardiovascular: Normal rate, regular rhythm, normal heart sounds and intact distal pulses.    Pulmonary/Chest: Effort normal and breath sounds normal. No respiratory distress. She has no wheezes.   Abdominal: Soft. Bowel sounds are normal. She exhibits no distension. There is no tenderness.   Musculoskeletal: She exhibits edema and tenderness.   Right thigh/groin- provena vac and hemovac in place with mild ss drainage, trace erythema to surrounding tissue, tenderness with palpation, no induration.     RUE PICC- CDI, non tender, no erythema.   Neurological: She is alert and oriented to person, place, and time.   Skin: Skin is warm. No rash noted.   Nursing note and vitals reviewed.      Meds:    Current Facility-Administered Medications:   •  potassium chloride SA  •  insulin glargine **AND** insulin lispro **AND** insulin lispro **AND** Accu-Chek ACHS **AND** NOTIFY MD and PharmD **AND** glucose 4 g **AND** dextrose 50%  •  enoxaparin (LOVENOX) injection  •  potassium chloride SA  •  PICC Line Insertion has been implemented **AND** May use Lidocaine 1% not to exceed 3 mls for local at  insertion site **AND** NOTIFY MD **AND** Tip to dwell in the superior vena cava **AND** If radiologist reading of chest X-ray states any of the following the PICC should be used **AND** Further evaluation of the PICC placement can be retrieved from X-Ray and Imaging **AND** Blood draws through PICC line; draws by RN only **AND** FLUSHING GUIDELINES WHEN IN USE **AND** normal saline PF **AND** FLUSHING GUIDELINES WHEN NOT IN USE **AND** DRESSING MAINTENANCE **AND** Change needleless pressure ports and IV tubing every 72 hours per hospital policy **AND** TUBING **AND** If there is an MD order to remove the PICC line, any RN may remove the PICC line **AND** [] PATIENT EDUCATION MATERIALS **AND** NURSING COMMUNICATION  •  loperamide  •  ampicillin-sulbactam (UNASYN) IV  •  [DISCONTINUED] insulin regular **AND** Accu-Chek ACHS **AND** NOTIFY MD and PharmD **AND** glucose 4 g **AND** dextrose 50%  •  senna-docusate **AND** polyethylene glycol/lytes **AND** magnesium hydroxide **AND** bisacodyl  •  acetaminophen  •  Pharmacy Consult Request  •  ondansetron  •  senna-docusate  •  bisacodyl  •  fleet  •  oxyCODONE immediate-release  •  oxyCODONE immediate release    Labs:  Recent Labs      18   0510  18   0210   WBC  10.3  7.9   RBC  3.97*  3.93*   HEMOGLOBIN  12.0  12.1   HEMATOCRIT  37.4  37.0   MCV  94.2  94.1   MCH  30.2  30.8   RDW  42.3  43.0   PLATELETCT  478*  449*   MPV  9.4  9.2   NEUTSPOLYS  52.90  39.90*   LYMPHOCYTES  37.70  49.70*   MONOCYTES  6.60  7.00   EOSINOPHILS  1.00  1.30   BASOPHILS  0.80  1.30     Recent Labs      18   0510  18   0210   SODIUM  139  142   POTASSIUM  3.6  3.8   CHLORIDE  106  108   CO2  25  25   GLUCOSE  174*  100*   BUN  9  14     Recent Labs      18   0510  18   0210   ALBUMIN  2.8*  2.9*   TBILIRUBIN  0.4  0.4   ALKPHOSPHAT  75  75   TOTPROTEIN  6.5  6.7   ALTSGPT  12  15   ASTSGOT  10*  13   CREATININE  0.40*  0.48*        Imaging:  ECHOCARDIOGRAM COMP W/O CONT   6/26/2018    CONCLUSIONS  Normal left ventricular systolic function.  Left ventricular ejection fraction is visually estimated to be 60%.  Normal right ventricular systolic function.  Aortic sclerosis without stenosis.  Moderate central aortic insufficiency.  Normal inferior vena cava size and inspiratory collapse.  Right ventricular systolic pressure is estimated to be 30  mmHg.  Small pericardial effusion without evidence of hemodynamic compromise.      Micro:  Results     ** No results found for the last 168 hours. **          Assessment:  Active Hospital Problems    Diagnosis   • Sepsis with acute organ failure secondary to necrotizing fasciitis of R thigh in the setting of hyperglycemia  [A41.9]   • New onset type 2 diabetes mellitus (HCC) [E11.9]   • Hypokalemia [E87.6]   • Non anion gap metabolic acidosis [E87.2]       Plan:  Right thigh necrotizing fasciitis  Afebrile  Leukocytosis resolved  S/p I&D on 6/11/2018, 6/13/2018, 6/18, 6/20  S/p I&D with secondary closure on 6/22 - debridement down to healthy tissue  OR cultures from 6/11 +Group B strep and Prevotella  Continue IV Unasyn  Plan for 2 weeks of IV abx from 6/22  Estimated stop date 07/06/18  Wound vac/care  Waiting for Hemovac to be dc'd when less than 30cc/24h    New onset diabetes mellitus  HgA1c 12.8% on 6/11/18  Keep the blood sugars under 150 to control infection and wound healing    Asymptomatic funguria  Monitor    Tobacco abuse  Patient counseled    Discussed with ANG Mccall.  Pending emergency Medicaid, will likely complete IV abx Inpt.

## 2018-06-27 NOTE — PROGRESS NOTES
"Patient seen and examined    Blood pressure 109/73, pulse 68, temperature 36 °C (96.8 °F), resp. rate 16, height 1.499 m (4' 11.02\"), weight 84.2 kg (185 lb 10 oz), SpO2 97 %, not currently breastfeeding.    Recent Labs      06/25/18   0510  06/27/18   0210   WBC  10.3  7.9   RBC  3.97*  3.93*   HEMOGLOBIN  12.0  12.1   HEMATOCRIT  37.4  37.0   MCV  94.2  94.1   MCH  30.2  30.8   MCHC  32.1*  32.7*   RDW  42.3  43.0   PLATELETCT  478*  449*   MPV  9.4  9.2       No acute distress  Dressing clean dry and intact  Neurovascularly intact    POD#5    Plan:  DVT Prophylaxis- TEDS/SCDs  Weight Bearing Status-WBAT  PT/OT  Antibiotics: per ID  Case Coordination          "

## 2018-06-27 NOTE — PROGRESS NOTES
"   Orthopaedic Progress Note    Interval changes:  Provina incisional Vac in place with no leak- DC vac friday  HV with 50cc output/24 hrs- in place until less than 30cc /24    ROS - Patient denies any new issues.  Pain well controlled.    Blood pressure 120/72, pulse 79, temperature 36.8 °C (98.2 °F), resp. rate 16, height 1.499 m (4' 11.02\"), weight 84.2 kg (185 lb 10 oz), SpO2 91 %, not currently breastfeeding.      Patient seen and examined  No acute distress  Breathing non labored  RRR  Right groin provina vac in place with no leak, inner thigh erythema resolved, HV in place with 50cc output over last 24. BLE DNVI, moves all toes, cap refill < 2 sec.    Recent Labs      06/24/18   0020  06/25/18   0510   WBC  11.8*  10.3   RBC  3.88*  3.97*   HEMOGLOBIN  11.8*  12.0   HEMATOCRIT  36.5*  37.4   MCV  94.1  94.2   MCH  30.4  30.2   MCHC  32.3*  32.1*   RDW  41.4  42.3   PLATELETCT  421  478*   MPV  9.5  9.4       Active Hospital Problems    Diagnosis   • Sepsis due to necrotizing fasciitis of R thigh [A41.9]     Priority: High   • New onset type 2 diabetes mellitus (HCC) [E11.9]     Priority: Medium   • Hypoalbuminemia [E88.09]   • Elevated alkaline phosphatase level [R74.8]       Assessment/Plan:  Patient doing well  POD#4 S/P Secondary closure 15 cm surgical wound  Wt bearing status - WBAT  Wound care/Drains - vac left in place, HV with 50cc output    Future Procedures - none planned   Lovenox: Start 6/13, Duration-until ambulatory > 150'  Sutures/Staples out- 14-21 days post operatively  PT/OT-initiated  Antibiotics: unasyn 3g IV Q6  DVT Prophylaxis- TEDS/SCDs/Foot pumps  Gaston-to be left in place   Case Coordination for Discharge Planning - Disposition home   "

## 2018-06-27 NOTE — CARE PLAN
Problem: Safety  Goal: Will remain free from falls  Outcome: PROGRESSING AS EXPECTED  Fall precautions inn place. Patient educated on using call light, wearing non skid socks, and using FWW.    Problem: Mobility  Goal: Risk for activity intolerance will decrease  Outcome: PROGRESSING AS EXPECTED  Patient ambulating to restroom when necessary.

## 2018-06-27 NOTE — THERAPY
"Physical Therapy Treatment completed.   Bed Mobility:  Supine to Sit: Minimal Assist  Transfers: Sit to Stand: Minimal Assist  Gait: Level Of Assist: Stand by Assist with Front-Wheel Walker       Plan of Care: Will benefit from Physical Therapy 3 times per week  Discharge Recommendations: Equipment: Will Continue to Assess for Equipment Needs. Post-acute therapy Discharge to home with outpatient or home health for additional skilled therapy services.     See \"Rehab Therapy-Acute\" Patient Summary Report for complete documentation.       "

## 2018-06-27 NOTE — PROGRESS NOTES
Internal Medicine Interval Note  Note Author: Kathe Veliz M.D.     Name Emmanuelle Martin     1960   Age/Sex 58 y.o. female   MRN [id number]   Code Status FULL CODE     After 5PM or if no immediate response to page, please call for cross-coverage  Attending/Team:  / Mason See Patient List for primary contact information  Call [phone] to page    1st Call - Day Intern (R1):   Dr. Veliz 2nd Call - Day Sr. Resident (R2/R3):   Dr. Carpenter        Reason for interval visit  (Principal Problem)   Sepsis with multi organ failure secondary to necrotizing fasciitis of right thigh with Group B Strep   New onset insulin dependent uncontrolled type II DM, HbA1c 12.8    Synopsis of care so far this admission:    - Admitted on 18 with sepsis secondary to necrotizing fascitis and multi-organ failure, on ICU -  - had wound debridements on 6/10, 6/15, ,  and   - Tissue culture 18 +ve for Prevotella, Grp B streptococcus   - on Unasyn from 18, to continue through 2018  - newly diagnosed with DM type II, HbA1c 12.8, now on 35 lantus bedtime, 7 units TID Lispro  - ortho and ID on board  - wound vac in place    Interval Problem Daily Status Update  (24 hours)    - no acute issues, continues to improve  - ambulating  - loose stool yesterday, not watery  - tolerating oral diet  - blood sugars under control   - a/wing medicare clearance for SNF      Review of Systems   Constitutional: Negative for chills, fever and malaise/fatigue.   HENT: Negative for ear pain and sore throat.    Eyes: Negative for blurred vision, double vision and photophobia.   Respiratory: Negative for cough, shortness of breath and stridor.    Cardiovascular: Negative for chest pain, palpitations and leg swelling.   Gastrointestinal: Negative for constipation, nausea and vomiting.   Genitourinary: Negative for dysuria and urgency.        History of stress incontinece   Musculoskeletal:  Positive for joint pain. Negative for falls.   Skin: Negative for itching and rash.   Neurological: Negative for dizziness, speech change, focal weakness and headaches.   Psychiatric/Behavioral: Negative for depression and suicidal ideas. The patient does not have insomnia.           Consultants/Specialty  ID   Ortho Surgery - Dr. Martinez  PCP: GARCIA NAVA    Disposition  Inpatient for IV antibiotics and Wound care  Barrier to discharge : NO insurance    Quality Measures  Quality-Core Measures   Reviewed items::  Labs reviewed and Medications reviewed  Gaston catheter::  No Gaston  DVT prophylaxis pharmacological::  Enoxaparin (Lovenox)  Antibiotics:  Treating active infection/contamination beyond 24 hours perioperative coverage          Physical Exam       Vitals:    06/26/18 1957 06/27/18 0415 06/27/18 0751 06/27/18 1140   BP: 120/74 109/73 107/67 115/86   Pulse: 68 68 83 83   Resp: 17 16 16 16   Temp: 36.4 °C (97.5 °F) 36 °C (96.8 °F) 36.5 °C (97.7 °F) 37.3 °C (99.1 °F)   SpO2: 97% 97% 96% 94%   Weight:       Height:         Body mass index is 37.47 kg/m².    Oxygen Therapy:  Pulse Oximetry: 94 %, O2 (LPM): 1, O2 Delivery: None (Room Air)    Physical Exam   Constitutional: She is oriented to person, place, and time and well-developed, well-nourished, and in no distress. No distress.   HENT:   Head: Normocephalic and atraumatic.   Mouth/Throat: Oropharynx is clear and moist.   Eyes: Right eye exhibits no discharge. Left eye exhibits no discharge. No scleral icterus.   Neck: Normal range of motion. No JVD present.   Cardiovascular: Normal rate, regular rhythm and normal heart sounds.  Exam reveals no gallop and no friction rub.    No murmur heard.  Pulmonary/Chest: Effort normal and breath sounds normal. No stridor. No respiratory distress. She has no wheezes. She has no rales.   Abdominal: Soft. Bowel sounds are normal. She exhibits no distension. There is no tenderness. There is no rebound and no  guarding.   Musculoskeletal: Normal range of motion. She exhibits no edema, tenderness or deformity.   Right groin - clean wound with wound vac on, minimal drainage   Lymphadenopathy:     She has no cervical adenopathy.   Neurological: She is alert and oriented to person, place, and time. GCS score is 15.   No focal neurological deficits   Skin: Skin is warm. No rash noted. She is not diaphoretic. No erythema.   Psychiatric: Mood, memory, affect and judgment normal.       Lab Data Review:         6/17/2018  9:30 AM    Recent Labs      06/25/18   0510  06/27/18   0210   SODIUM  139  142   POTASSIUM  3.6  3.8   CHLORIDE  106  108   CO2  25  25   BUN  9  14   CREATININE  0.40*  0.48*   MAGNESIUM   --   1.8   PHOSPHORUS   --   5.2*   CALCIUM  8.6  8.7       Recent Labs      06/25/18   0510  06/26/18   0315  06/27/18   0210   ALTSGPT  12   --   15   ASTSGOT  10*   --   13   ALKPHOSPHAT  75   --   75   TBILIRUBIN  0.4   --   0.4   PREALBUMIN   --   23.0   --    GLUCOSE  174*   --   100*       Recent Labs      06/25/18   0510  06/27/18   0210   RBC  3.97*  3.93*   HEMOGLOBIN  12.0  12.1   HEMATOCRIT  37.4  37.0   PLATELETCT  478*  449*       Recent Labs      06/25/18   0510  06/27/18   0210   WBC  10.3  7.9   NEUTSPOLYS  52.90  39.90*   LYMPHOCYTES  37.70  49.70*   MONOCYTES  6.60  7.00   EOSINOPHILS  1.00  1.30   BASOPHILS  0.80  1.30   ASTSGOT  10*  13   ALTSGPT  12  15   ALKPHOSPHAT  75  75   TBILIRUBIN  0.4  0.4           Assessment/Plan     Sepsis due to necrotizing fasciitis of R thigh  - s/p I&D on 6/11, 6/15, 6/18, 6/20, 6/22  - Wound cx + for prevotella and group B Strep   - on Unasyn started 6/13/18 , to be continued till 7/6/18 per ID recs  - wound looks clean and not cellulitic around Vac dressing  - minimal drainage, 30 mls past 24 hrs  Plan  - continue unasyn per ID, till 7/6/18  - wound vac and care per ortho  - continue ambulate  - Lovenox for DVT prophylaxis    Non anion gap metabolic  acidosis  Resolved  Secondary to NS fluid resuscitation causing hyperchloremic acidosis and starvation ketosis    New onset type 2 diabetes mellitus (HCC)  - A1c 12.8% on admission   - on Lantus 35 units pm, and Lispro 7 units TID  - good control with above  - Diabetes education provided  - low carbohydrate diet  - Echo 6/26 : good EF 60% with normal LV and RV systolic function    Anemia/Thrombocytopenia  Resolved  In the setting of sepsis      Hypocalcemia  Resolved   Secondary to hypoalbuminemia in the setting of sepsis     Hypokalemia  Resolved  Secondary to Hypomagnesemia/Poor diet  Mg >2, K >4    Elevated alkaline phosphatase level  - resolved    Hypoalbuminemia  - pre-albumin 23  - s/b dietary on 6/25 --> no additional needs, hence signed off

## 2018-06-28 PROBLEM — G47.34 NOCTURNAL HYPOXIA: Status: ACTIVE | Noted: 2018-06-28

## 2018-06-28 LAB
GLUCOSE BLD-MCNC: 132 MG/DL (ref 65–99)
GLUCOSE BLD-MCNC: 154 MG/DL (ref 65–99)
GLUCOSE BLD-MCNC: 163 MG/DL (ref 65–99)
GLUCOSE BLD-MCNC: 165 MG/DL (ref 65–99)

## 2018-06-28 PROCEDURE — 83735 ASSAY OF MAGNESIUM: CPT

## 2018-06-28 PROCEDURE — 700102 HCHG RX REV CODE 250 W/ 637 OVERRIDE(OP): Performed by: ORTHOPAEDIC SURGERY

## 2018-06-28 PROCEDURE — 82962 GLUCOSE BLOOD TEST: CPT | Mod: 91

## 2018-06-28 PROCEDURE — 700105 HCHG RX REV CODE 258: Performed by: INTERNAL MEDICINE

## 2018-06-28 PROCEDURE — 700111 HCHG RX REV CODE 636 W/ 250 OVERRIDE (IP): Performed by: STUDENT IN AN ORGANIZED HEALTH CARE EDUCATION/TRAINING PROGRAM

## 2018-06-28 PROCEDURE — 700102 HCHG RX REV CODE 250 W/ 637 OVERRIDE(OP): Performed by: STUDENT IN AN ORGANIZED HEALTH CARE EDUCATION/TRAINING PROGRAM

## 2018-06-28 PROCEDURE — A9270 NON-COVERED ITEM OR SERVICE: HCPCS | Performed by: STUDENT IN AN ORGANIZED HEALTH CARE EDUCATION/TRAINING PROGRAM

## 2018-06-28 PROCEDURE — A9270 NON-COVERED ITEM OR SERVICE: HCPCS | Performed by: ORTHOPAEDIC SURGERY

## 2018-06-28 PROCEDURE — 700111 HCHG RX REV CODE 636 W/ 250 OVERRIDE (IP): Performed by: INTERNAL MEDICINE

## 2018-06-28 PROCEDURE — 94762 N-INVAS EAR/PLS OXIMTRY CONT: CPT

## 2018-06-28 PROCEDURE — 85025 COMPLETE CBC W/AUTO DIFF WBC: CPT

## 2018-06-28 PROCEDURE — 80048 BASIC METABOLIC PNL TOTAL CA: CPT

## 2018-06-28 PROCEDURE — 99232 SBSQ HOSP IP/OBS MODERATE 35: CPT | Mod: GC | Performed by: INTERNAL MEDICINE

## 2018-06-28 PROCEDURE — 99232 SBSQ HOSP IP/OBS MODERATE 35: CPT | Performed by: INTERNAL MEDICINE

## 2018-06-28 PROCEDURE — 770006 HCHG ROOM/CARE - MED/SURG/GYN SEMI*

## 2018-06-28 RX ORDER — OXYCODONE HYDROCHLORIDE 5 MG/1
5 TABLET ORAL EVERY 6 HOURS PRN
Status: DISCONTINUED | OUTPATIENT
Start: 2018-06-28 | End: 2018-07-03 | Stop reason: HOSPADM

## 2018-06-28 RX ORDER — DEXTROSE MONOHYDRATE 25 G/50ML
25 INJECTION, SOLUTION INTRAVENOUS
Status: DISCONTINUED | OUTPATIENT
Start: 2018-06-28 | End: 2018-07-03 | Stop reason: HOSPADM

## 2018-06-28 RX ORDER — INSULIN GLARGINE 100 [IU]/ML
35 INJECTION, SOLUTION SUBCUTANEOUS EVERY EVENING
Status: DISCONTINUED | OUTPATIENT
Start: 2018-06-28 | End: 2018-07-03 | Stop reason: HOSPADM

## 2018-06-28 RX ADMIN — INSULIN GLARGINE 35 UNITS: 100 INJECTION, SOLUTION SUBCUTANEOUS at 21:33

## 2018-06-28 RX ADMIN — AMPICILLIN SODIUM AND SULBACTAM SODIUM 3 G: 2; 1 INJECTION, POWDER, FOR SOLUTION INTRAMUSCULAR; INTRAVENOUS at 18:13

## 2018-06-28 RX ADMIN — AMPICILLIN SODIUM AND SULBACTAM SODIUM 3 G: 2; 1 INJECTION, POWDER, FOR SOLUTION INTRAMUSCULAR; INTRAVENOUS at 11:58

## 2018-06-28 RX ADMIN — OXYCODONE HYDROCHLORIDE 5 MG: 5 TABLET ORAL at 20:45

## 2018-06-28 RX ADMIN — AMPICILLIN SODIUM AND SULBACTAM SODIUM 3 G: 2; 1 INJECTION, POWDER, FOR SOLUTION INTRAMUSCULAR; INTRAVENOUS at 23:47

## 2018-06-28 RX ADMIN — STANDARDIZED SENNA CONCENTRATE AND DOCUSATE SODIUM 1 TABLET: 8.6; 5 TABLET, FILM COATED ORAL at 09:11

## 2018-06-28 RX ADMIN — AMPICILLIN SODIUM AND SULBACTAM SODIUM 3 G: 2; 1 INJECTION, POWDER, FOR SOLUTION INTRAMUSCULAR; INTRAVENOUS at 06:12

## 2018-06-28 RX ADMIN — ENOXAPARIN SODIUM 40 MG: 100 INJECTION SUBCUTANEOUS at 09:10

## 2018-06-28 RX ADMIN — POTASSIUM CHLORIDE 40 MEQ: 1500 TABLET, EXTENDED RELEASE ORAL at 09:11

## 2018-06-28 ASSESSMENT — ENCOUNTER SYMPTOMS
STRIDOR: 0
DIARRHEA: 0
CHILLS: 1
NAUSEA: 0
CHILLS: 0
INSOMNIA: 0
ABDOMINAL PAIN: 0
MYALGIAS: 1
DIZZINESS: 0
COUGH: 0
BLURRED VISION: 0
FEVER: 0
VOMITING: 0
DEPRESSION: 0
DOUBLE VISION: 0
FALLS: 0
SORE THROAT: 0
PHOTOPHOBIA: 0
HEADACHES: 0
FOCAL WEAKNESS: 0
SHORTNESS OF BREATH: 0
SPEECH CHANGE: 0
PALPITATIONS: 0
CONSTIPATION: 0

## 2018-06-28 ASSESSMENT — PAIN SCALES - GENERAL
PAINLEVEL_OUTOF10: 4
PAINLEVEL_OUTOF10: 3
PAINLEVEL_OUTOF10: 2

## 2018-06-28 NOTE — PROGRESS NOTES
Dr Najjar rounded on pt and stated the RIJ can be used to give blood through the designed port and then it can be d/c'd after the blood transfusion if needed.

## 2018-06-28 NOTE — FLOWSHEET NOTE
06/28/18 0429   Events/Summary/Plan   Events/Summary/Plan Noc oximetry study complete, report printed and placed in Pt. chart   Oximetry   #Nocturnal Oximetry Study Yes   O2 Alarms Set & Reviewed Yes

## 2018-06-28 NOTE — PROGRESS NOTES
Infectious Disease Progress Note    Author: IRINEO Roy Date & Time of service: 6/28/2018  1:59 PM    Chief Complaint:  FU Right thigh necrotizing fasciitis    Interval History:  6/13/2018 MAXIMUM TEMPERATURE 98.7. WBCs 11.9 and platelets 205 and cr 0.5  6/14/2018 MAXIMUM TEMPERATURE 98.7 leg continues to hurt. WBC 12.2 platelets 205 and cr 0.38   6/15/2018 and MAXIMUM TEMPERATURE 99.1 WBC 9.3 had another surgery yesterday.  6/17/2018 MAXIMUM TEMPERATURE 100 the groin is more swollen today. WBC 9  6/18 Tmax 99.8 WBC 9.8 s/p I&D this morning, R thigh soreness  6/19- AF, WBC 11.2, tolerating abx, R thigh/ groin pain 5.5/10- slightly improved with pain meds, anxious to discharge home to get back to work by 6/25.  6/20 AF WBC 9.4 surgery delayed today, pain stable, emphasized need for IV abx given severity of infection and wound size  6/21- AF, WBC 11.5, R groin pain 5-6/10, no issue with abx, understanding to needing prolonged IV abx.  6/22 AF WBC 10.2 feeling better, just returned from repeat I&D with secondary closure earlier today  6/23 AF WBC 15.8 no new clinical issues from yesterday, sadler still in place  6/24 AF WBC 11.8 resting comfortably, pain controlled, finally had BM  6/25- AF, WBC 10.3, tolerating abx without issue, 5/10 R thigh/groin pain that is constant and dull- improved with pain meds.   6/26- AF, no labs, no issues with abx, pain remains dull and around 5/10 to R thigh/groin, sadler removed- voiding on her own.  6/27- AF, WBC 7.9, tolerating abx, constant dull 5/10 R thigh/groin pain, voiding without issue.   6/28- Tm 99.1, no labs, R thigh/groin pain 5/10 chronically dull, no issues with abx, chilled.  Labs Reviewed, Medications Reviewed, Radiology Reviewed and Wound Reviewed.    Review of Systems:  Review of Systems   Constitutional: Positive for chills and malaise/fatigue. Negative for fever.   HENT: Negative for congestion.    Respiratory: Negative for shortness of breath.     Cardiovascular: Positive for leg swelling. Negative for chest pain.        RLE swelling- improving.   Gastrointestinal: Negative for abdominal pain, constipation, diarrhea, nausea and vomiting.   Genitourinary: Negative for dysuria.   Musculoskeletal: Positive for joint pain and myalgias.        R thigh/ groin   Skin: Negative for rash.   Neurological: Negative for headaches.       Hemodynamics:  Temp (24hrs), Av.3 °C (97.3 °F), Min:36.1 °C (97 °F), Max:36.5 °C (97.7 °F)  Temperature: 36.1 °C (97 °F)  Pulse  Av.4  Min: 52  Max: 106   Blood Pressure: (!) 97/52 (manual)       Physical Exam:  Physical Exam   Constitutional: She is oriented to person, place, and time. She appears well-developed and well-nourished. No distress.   Appears older than stated age.   HENT:   Head: Normocephalic and atraumatic.   Eyes: EOM are normal. Pupils are equal, round, and reactive to light. No scleral icterus.   Neck: Normal range of motion. Neck supple.   Cardiovascular: Normal rate, regular rhythm, normal heart sounds and intact distal pulses.    No murmur heard.  Pulmonary/Chest: Effort normal. No respiratory distress. She has no wheezes. She has no rales.   Abdominal: Soft. She exhibits no distension. There is no tenderness. There is no rebound.   Musculoskeletal: She exhibits edema and tenderness.   Right thigh/groin- provena vac and hemovac in place with minimal ss drainage in containers, trace erythema to surrounding tissue, tenderness with palpation, no induration.     RUE PICC- CDI, non tender, no erythema.   Neurological: She is alert and oriented to person, place, and time.   Skin: Skin is warm. No erythema.   Nursing note and vitals reviewed.      Meds:    Current Facility-Administered Medications:   •  insulin glargine **AND** insulin lispro **AND** insulin lispro **AND** Accu-Chek ACHS **AND** NOTIFY MD and PharmD **AND** glucose 4 g **AND** dextrose 50%  •  oxyCODONE immediate-release  •  enoxaparin (LOVENOX)  injection  •  potassium chloride SA  •  PICC Line Insertion has been implemented **AND** May use Lidocaine 1% not to exceed 3 mls for local at insertion site **AND** NOTIFY MD **AND** Tip to dwell in the superior vena cava **AND** If radiologist reading of chest X-ray states any of the following the PICC should be used **AND** Further evaluation of the PICC placement can be retrieved from X-Ray and Imaging **AND** Blood draws through PICC line; draws by RN only **AND** FLUSHING GUIDELINES WHEN IN USE **AND** normal saline PF **AND** FLUSHING GUIDELINES WHEN NOT IN USE **AND** DRESSING MAINTENANCE **AND** Change needleless pressure ports and IV tubing every 72 hours per hospital policy **AND** TUBING **AND** If there is an MD order to remove the PICC line, any RN may remove the PICC line **AND** [] PATIENT EDUCATION MATERIALS **AND** NURSING COMMUNICATION  •  loperamide  •  ampicillin-sulbactam (UNASYN) IV  •  [DISCONTINUED] insulin regular **AND** Accu-Chek ACHS **AND** NOTIFY MD and PharmD **AND** glucose 4 g **AND** dextrose 50%  •  senna-docusate **AND** polyethylene glycol/lytes **AND** magnesium hydroxide **AND** bisacodyl  •  acetaminophen  •  Pharmacy Consult Request  •  ondansetron  •  senna-docusate  •  bisacodyl  •  fleet    Labs:  Recent Labs      18   WBC  7.9   RBC  3.93*   HEMOGLOBIN  12.1   HEMATOCRIT  37.0   MCV  94.1   MCH  30.8   RDW  43.0   PLATELETCT  449*   MPV  9.2   NEUTSPOLYS  39.90*   LYMPHOCYTES  49.70*   MONOCYTES  7.00   EOSINOPHILS  1.30   BASOPHILS  1.30     Recent Labs      18   SODIUM  142   POTASSIUM  3.8   CHLORIDE  108   CO2  25   GLUCOSE  100*   BUN  14     Recent Labs      18   ALBUMIN  2.9*   TBILIRUBIN  0.4   ALKPHOSPHAT  75   TOTPROTEIN  6.7   ALTSGPT  15   ASTSGOT  13   CREATININE  0.48*       Imaging:  ECHOCARDIOGRAM COMP W/O CONT   2018    CONCLUSIONS  Normal left ventricular systolic function.  Left ventricular ejection  fraction is visually estimated to be 60%.  Normal right ventricular systolic function.  Aortic sclerosis without stenosis.  Moderate central aortic insufficiency.  Normal inferior vena cava size and inspiratory collapse.  Right ventricular systolic pressure is estimated to be 30  mmHg.  Small pericardial effusion without evidence of hemodynamic compromise.      Micro:  Results     ** No results found for the last 168 hours. **          Assessment:  Active Hospital Problems    Diagnosis   • Sepsis with acute organ failure secondary to necrotizing fasciitis of R thigh in the setting of hyperglycemia  [A41.9]   • New onset type 2 diabetes mellitus (HCC) [E11.9]   • Hypokalemia [E87.6]   • Non anion gap metabolic acidosis [E87.2]       Plan:  Right thigh necrotizing fasciitis  Tm 99.1  Leukocytosis resolved on 6/27  S/p I&D on 6/11/2018, 6/13/2018, 6/18, 6/20  S/p I&D with secondary closure on 6/22 - debridement down to healthy tissue  OR cultures from 6/11 +Group B strep and Prevotella  Continue IV Unasyn  Plan for 2 weeks of IV abx from 6/22  Estimated stop date 07/06/18  Wound vac/care  Waiting for Hemovac to be dc'd when less than 30cc/24h- had 45 cc out yesterday    New onset diabetes mellitus  HgA1c 12.8% on 6/11/18  Keep the blood sugars under 150 to control infection and wound healing    Asymptomatic funguria  Monitor    Tobacco abuse  Patient counseled    Discussed with CHRISTY Lazo RN.  Pending emergency Medicaid, will likely complete IV abx Inpt.

## 2018-06-28 NOTE — PROGRESS NOTES
"   Orthopaedic Progress Note    Interval changes:  Provina incisional Vac in place with no leak- DC vac tomorrow  HV with 45cc output/24 hrs- in place until output <30 in 24 hours  Possible DC with outpatient infusion if [facility] accepted    ROS - Patient denies any new issues.  Pain well controlled.    Blood pressure 115/74, pulse 76, temperature 36.4 °C (97.5 °F), resp. rate 17, height 1.499 m (4' 11.02\"), weight 84.2 kg (185 lb 10 oz), SpO2 95 %, not currently breastfeeding.      Patient seen and examined  No acute distress  Breathing non labored  RRR  Right groin provina vac in place with no leak, inner thigh erythema resolved, HV in place with 45cc output over last 24. BLE DNVI, moves all toes, cap refill < 2 sec.    Recent Labs      06/27/18   0210   WBC  7.9   RBC  3.93*   HEMOGLOBIN  12.1   HEMATOCRIT  37.0   MCV  94.1   MCH  30.8   MCHC  32.7*   RDW  43.0   PLATELETCT  449*   MPV  9.2       Active Hospital Problems    Diagnosis   • Sepsis due to necrotizing fasciitis of R thigh [A41.9]     Priority: High   • Nocturnal hypoxia [G47.34]     Priority: Medium   • New onset type 2 diabetes mellitus (HCC) [E11.9]     Priority: Medium   • Hypoalbuminemia [E88.09]     Priority: Low   • Elevated alkaline phosphatase level [R74.8]     Priority: Low       Assessment/Plan:  Patient doing well  POD#6 S/P Secondary closure 15 cm surgical wound  Wt bearing status - WBAT  Wound care/Drains - vac left in place, HV with 45cc output    Future Procedures - none planned   Lovenox: Start 6/13, Duration-until ambulatory > 150'  Sutures/Staples out- 14-21 days post operatively  PT/OT-initiated  Antibiotics: unasyn 3g IV Q6  DVT Prophylaxis- TEDS/SCDs/Foot pumps  Gaston-to be left in place   Case Coordination for Discharge Planning - Disposition home   "

## 2018-06-28 NOTE — ASSESSMENT & PLAN NOTE
- nocturnal oximetry performed overnight  - not on oxygen  - 8 brief episodes of desaturations each lasting 1-3 mins  -c/w sleep apnea  -pt did not use O2 last two nights and there was no noticible desaturations  - patient comfortable, asymptomatic  Plan  - PCP to organize sleep study

## 2018-06-28 NOTE — CARE PLAN
Problem: Safety  Goal: Will remain free from injury  Outcome: PROGRESSING AS EXPECTED  Safety precautions in place. Safety education reviewed with patient.     Problem: Pain Management  Goal: Pain level will decrease to patient's comfort goal  Outcome: PROGRESSING AS EXPECTED  Pain managed with rest and PRN pain medications.

## 2018-06-28 NOTE — PROGRESS NOTES
Internal Medicine Interval Note  Note Author: Kathe Veliz M.D.     Name Emmanuelle Martin     1960   Age/Sex 58 y.o. female   MRN [id number]   Code Status FULL CODE     After 5PM or if no immediate response to page, please call for cross-coverage  Attending/Team:  / Mason See Patient List for primary contact information  Call [phone] to page    1st Call - Day Intern (R1):   Dr. Veliz 2nd Call - Day Sr. Resident (R2/R3):   Dr. Carpenter        Reason for interval visit  (Principal Problem)   Sepsis with multi organ failure secondary to necrotizing fasciitis of right thigh with Group B Strep   New onset insulin dependent uncontrolled type II DM, HbA1c 12.8    Synopsis of care so far this admission:    - Admitted on 18 with sepsis secondary to necrotizing fascitis and multi-organ failure, on ICU -  - had wound debridements on 6/10, 6/15, ,  and   - Tissue culture 18 +ve for Prevotella, Grp B streptococcus   - on Unasyn from 18, to continue through 2018  - newly diagnosed with DM type II, HbA1c 12.8, now on 35 lantus bedtime, 7 units TID Lispro  - ortho and ID on board  - wound vac in place    Interval Problem Daily Status Update  (24 hours)    - no changes, ambulating  - had 45 mls past 24 hrs via drain  - on Unasyn IV  - no wound issues  - tolerating diet  - had nocturnal oximetry overnight, with 8 desaturation episodes      Review of Systems   Constitutional: Negative for chills, fever and malaise/fatigue.   HENT: Negative for ear pain and sore throat.    Eyes: Negative for blurred vision, double vision and photophobia.   Respiratory: Negative for cough, shortness of breath and stridor.    Cardiovascular: Negative for chest pain, palpitations and leg swelling.   Gastrointestinal: Negative for constipation, nausea and vomiting.   Genitourinary: Negative for dysuria and urgency.        History of stress incontinece   Musculoskeletal:  Negative for falls and joint pain.   Skin: Negative for itching and rash.   Neurological: Negative for dizziness, speech change, focal weakness and headaches.   Psychiatric/Behavioral: Negative for depression and suicidal ideas. The patient does not have insomnia.           Consultants/Specialty  ID   Ortho Surgery - Dr. Martinez  PCP: GARCIA NAVA    Disposition  Inpatient for IV antibiotics and Wound care  Barrier to discharge : NO insurance    Quality Measures  Quality-Core Measures   Reviewed items::  Labs reviewed and Medications reviewed  Gaston catheter::  No Gaston  DVT prophylaxis pharmacological::  Enoxaparin (Lovenox)  Antibiotics:  Treating active infection/contamination beyond 24 hours perioperative coverage          Physical Exam       Vitals:    06/27/18 2311 06/28/18 0400 06/28/18 0800 06/28/18 0919   BP:  110/64 (!) 88/54 (!) 97/52   Pulse: 68 69 78    Resp: 16 15 16    Temp:  36.2 °C (97.2 °F) 36.1 °C (97 °F)    SpO2: 96% 92% 94%    Weight:       Height:         Body mass index is 37.47 kg/m².    Oxygen Therapy:  Pulse Oximetry: 94 %, O2 (LPM): 0, O2 Delivery: None (Room Air)    Physical Exam   Constitutional: She is oriented to person, place, and time and well-developed, well-nourished, and in no distress. No distress.   HENT:   Head: Normocephalic and atraumatic.   Mouth/Throat: Oropharynx is clear and moist.   Eyes: Right eye exhibits no discharge. Left eye exhibits no discharge. No scleral icterus.   Neck: Normal range of motion. No JVD present.   Cardiovascular: Normal rate, regular rhythm and normal heart sounds.  Exam reveals no gallop and no friction rub.    No murmur heard.  Pulmonary/Chest: Effort normal and breath sounds normal. No stridor. No respiratory distress. She has no wheezes. She has no rales.   Abdominal: Soft. Bowel sounds are normal. She exhibits no distension. There is no tenderness. There is no rebound and no guarding.   Musculoskeletal: Normal range of motion. She  exhibits no edema, tenderness or deformity.   Right groin - clean wound with wound vac on, minimal drainage   Lymphadenopathy:     She has no cervical adenopathy.   Neurological: She is alert and oriented to person, place, and time. GCS score is 15.   No focal neurological deficits   Skin: Skin is warm. No rash noted. She is not diaphoretic. No erythema.   Psychiatric: Mood, memory, affect and judgment normal.       Lab Data Review:         6/17/2018  9:30 AM    Recent Labs      06/27/18   0210   SODIUM  142   POTASSIUM  3.8   CHLORIDE  108   CO2  25   BUN  14   CREATININE  0.48*   MAGNESIUM  1.8   PHOSPHORUS  5.2*   CALCIUM  8.7       Recent Labs      06/26/18   0315  06/27/18   0210   ALTSGPT   --   15   ASTSGOT   --   13   ALKPHOSPHAT   --   75   TBILIRUBIN   --   0.4   PREALBUMIN  23.0   --    GLUCOSE   --   100*       Recent Labs      06/27/18   0210   RBC  3.93*   HEMOGLOBIN  12.1   HEMATOCRIT  37.0   PLATELETCT  449*       Recent Labs      06/27/18   0210   WBC  7.9   NEUTSPOLYS  39.90*   LYMPHOCYTES  49.70*   MONOCYTES  7.00   EOSINOPHILS  1.30   BASOPHILS  1.30   ASTSGOT  13   ALTSGPT  15   ALKPHOSPHAT  75   TBILIRUBIN  0.4           Assessment/Plan     Sepsis due to necrotizing fasciitis of R thigh  - s/p I&D on 6/11, 6/15, 6/18, 6/20, 6/22  - Wound cx + for prevotella and group B Strep   - on Unasyn started 6/13/18 , to be continued till 7/6/18 per ID recs  - wound looks clean and not cellulitic around Vac dressing  - minimal drainage, 45 mls past 24 hrs  - ortho on board  Plan  - continue unasyn per ID, till 7/6/18  - wound vac and care per ortho  - continue ambulate  - Lovenox for DVT prophylaxis    Non anion gap metabolic acidosis  Resolved  Secondary to NS fluid resuscitation causing hyperchloremic acidosis and starvation ketosis    New onset type 2 diabetes mellitus (HCC)  - A1c 12.8% on admission   - on Lantus 35 units pm, and Lispro 7 units TID  - has been using no lispro vs 10 units of lispro due  to blood glucose of 100-170s  - Diabetes education provided  - low carbohydrate diet  - Echo 6/26 : good EF 60% with normal LV and RV systolic function  Plan  - continue Lantus 35  - reduce Lispro to 5 units TID    Anemia/Thrombocytopenia  Resolved  In the setting of sepsis      Hypocalcemia  Resolved   Secondary to hypoalbuminemia in the setting of sepsis     Hypokalemia  Resolved  Secondary to Hypomagnesemia/Poor diet  Mg >2, K >4    Elevated alkaline phosphatase level  - resolved    Hypoalbuminemia  - pre-albumin 23  - s/b dietary on 6/25 --> no additional needs, hence signed off    Nocturnal hypoxia  - nocturnal oximetry performed overnight  - not on oxygen  - 8 brief episodes of desaturations each lasting 1-3 mins  - patient comfortable, asymptomatic  - ? Consider home nocturnal oxygen eval

## 2018-06-28 NOTE — PROGRESS NOTES
"   Orthopaedic Progress Note    Interval changes:  Provina incisional Vac in place with no leak- DC vac friday  HV with 30cc output/24 hrs -DC vac tomorrow if output low enough  Possible DC with outpatient infusion if [facility] accepted    ROS - Patient denies any new issues.  Pain well controlled.    Blood pressure 113/71, pulse 67, temperature 36.5 °C (97.7 °F), resp. rate 16, height 1.499 m (4' 11.02\"), weight 84.2 kg (185 lb 10 oz), SpO2 98 %, not currently breastfeeding.      Patient seen and examined  No acute distress  Breathing non labored  RRR  Right groin provina vac in place with no leak, inner thigh erythema resolved, HV in place with 30cc output over last 24. BLE DNVI, moves all toes, cap refill < 2 sec.    Recent Labs      06/25/18   0510  06/27/18   0210   WBC  10.3  7.9   RBC  3.97*  3.93*   HEMOGLOBIN  12.0  12.1   HEMATOCRIT  37.4  37.0   MCV  94.2  94.1   MCH  30.2  30.8   MCHC  32.1*  32.7*   RDW  42.3  43.0   PLATELETCT  478*  449*   MPV  9.4  9.2       Active Hospital Problems    Diagnosis   • Sepsis due to necrotizing fasciitis of R thigh [A41.9]     Priority: High   • New onset type 2 diabetes mellitus (HCC) [E11.9]     Priority: Medium   • Hypoalbuminemia [E88.09]   • Elevated alkaline phosphatase level [R74.8]       Assessment/Plan:  Patient doing well  Possible HV DC tomorrow if output low  Working with CC to see if patient can get outpatient infusion covered   POD#5 S/P Secondary closure 15 cm surgical wound  Wt bearing status - WBAT  Wound care/Drains - vac left in place, HV with 30cc output    Future Procedures - none planned   Lovenox: Start 6/13, Duration-until ambulatory > 150'  Sutures/Staples out- 14-21 days post operatively  PT/OT-initiated  Antibiotics: unasyn 3g IV Q6  DVT Prophylaxis- TEDS/SCDs/Foot pumps  Gaston-to be left in place   Case Coordination for Discharge Planning - Disposition home   "

## 2018-06-29 LAB
ANION GAP SERPL CALC-SCNC: 10 MMOL/L (ref 0–11.9)
BASOPHILS # BLD AUTO: 0.8 % (ref 0–1.8)
BASOPHILS # BLD: 0.06 K/UL (ref 0–0.12)
BUN SERPL-MCNC: 13 MG/DL (ref 8–22)
CALCIUM SERPL-MCNC: 8.6 MG/DL (ref 8.5–10.5)
CHLORIDE SERPL-SCNC: 106 MMOL/L (ref 96–112)
CO2 SERPL-SCNC: 25 MMOL/L (ref 20–33)
CREAT SERPL-MCNC: 0.48 MG/DL (ref 0.5–1.4)
EOSINOPHIL # BLD AUTO: 0.09 K/UL (ref 0–0.51)
EOSINOPHIL NFR BLD: 1.2 % (ref 0–6.9)
ERYTHROCYTE [DISTWIDTH] IN BLOOD BY AUTOMATED COUNT: 42.5 FL (ref 35.9–50)
GLUCOSE BLD-MCNC: 171 MG/DL (ref 65–99)
GLUCOSE BLD-MCNC: 185 MG/DL (ref 65–99)
GLUCOSE BLD-MCNC: 95 MG/DL (ref 65–99)
GLUCOSE BLD-MCNC: 97 MG/DL (ref 65–99)
GLUCOSE SERPL-MCNC: 135 MG/DL (ref 65–99)
HCT VFR BLD AUTO: 37.1 % (ref 37–47)
HGB BLD-MCNC: 12.4 G/DL (ref 12–16)
IMM GRANULOCYTES # BLD AUTO: 0.06 K/UL (ref 0–0.11)
IMM GRANULOCYTES NFR BLD AUTO: 0.8 % (ref 0–0.9)
LYMPHOCYTES # BLD AUTO: 3.91 K/UL (ref 1–4.8)
LYMPHOCYTES NFR BLD: 50.6 % (ref 22–41)
MAGNESIUM SERPL-MCNC: 1.9 MG/DL (ref 1.5–2.5)
MCH RBC QN AUTO: 31.1 PG (ref 27–33)
MCHC RBC AUTO-ENTMCNC: 33.4 G/DL (ref 33.6–35)
MCV RBC AUTO: 93 FL (ref 81.4–97.8)
MONOCYTES # BLD AUTO: 0.6 K/UL (ref 0–0.85)
MONOCYTES NFR BLD AUTO: 7.8 % (ref 0–13.4)
NEUTROPHILS # BLD AUTO: 3 K/UL (ref 2–7.15)
NEUTROPHILS NFR BLD: 38.8 % (ref 44–72)
NRBC # BLD AUTO: 0 K/UL
NRBC BLD-RTO: 0 /100 WBC
PLATELET # BLD AUTO: 453 K/UL (ref 164–446)
PMV BLD AUTO: 9.3 FL (ref 9–12.9)
POTASSIUM SERPL-SCNC: 3.9 MMOL/L (ref 3.6–5.5)
RBC # BLD AUTO: 3.99 M/UL (ref 4.2–5.4)
SODIUM SERPL-SCNC: 141 MMOL/L (ref 135–145)
WBC # BLD AUTO: 7.7 K/UL (ref 4.8–10.8)

## 2018-06-29 PROCEDURE — 97530 THERAPEUTIC ACTIVITIES: CPT

## 2018-06-29 PROCEDURE — 97116 GAIT TRAINING THERAPY: CPT

## 2018-06-29 PROCEDURE — A9270 NON-COVERED ITEM OR SERVICE: HCPCS | Performed by: INTERNAL MEDICINE

## 2018-06-29 PROCEDURE — 97535 SELF CARE MNGMENT TRAINING: CPT

## 2018-06-29 PROCEDURE — 700111 HCHG RX REV CODE 636 W/ 250 OVERRIDE (IP): Performed by: STUDENT IN AN ORGANIZED HEALTH CARE EDUCATION/TRAINING PROGRAM

## 2018-06-29 PROCEDURE — 700102 HCHG RX REV CODE 250 W/ 637 OVERRIDE(OP): Performed by: STUDENT IN AN ORGANIZED HEALTH CARE EDUCATION/TRAINING PROGRAM

## 2018-06-29 PROCEDURE — 99232 SBSQ HOSP IP/OBS MODERATE 35: CPT | Performed by: INTERNAL MEDICINE

## 2018-06-29 PROCEDURE — 700102 HCHG RX REV CODE 250 W/ 637 OVERRIDE(OP): Performed by: ORTHOPAEDIC SURGERY

## 2018-06-29 PROCEDURE — A9270 NON-COVERED ITEM OR SERVICE: HCPCS | Performed by: STUDENT IN AN ORGANIZED HEALTH CARE EDUCATION/TRAINING PROGRAM

## 2018-06-29 PROCEDURE — 700102 HCHG RX REV CODE 250 W/ 637 OVERRIDE(OP): Performed by: INTERNAL MEDICINE

## 2018-06-29 PROCEDURE — 700111 HCHG RX REV CODE 636 W/ 250 OVERRIDE (IP): Performed by: INTERNAL MEDICINE

## 2018-06-29 PROCEDURE — 770006 HCHG ROOM/CARE - MED/SURG/GYN SEMI*

## 2018-06-29 PROCEDURE — A9270 NON-COVERED ITEM OR SERVICE: HCPCS | Performed by: ORTHOPAEDIC SURGERY

## 2018-06-29 PROCEDURE — 700105 HCHG RX REV CODE 258: Performed by: INTERNAL MEDICINE

## 2018-06-29 PROCEDURE — 99232 SBSQ HOSP IP/OBS MODERATE 35: CPT | Mod: GC | Performed by: INTERNAL MEDICINE

## 2018-06-29 PROCEDURE — 700105 HCHG RX REV CODE 258

## 2018-06-29 PROCEDURE — 82962 GLUCOSE BLOOD TEST: CPT | Mod: 91

## 2018-06-29 RX ORDER — 0.9 % SODIUM CHLORIDE 0.9 %
10-20 VIAL (ML) INJECTION PRN
Status: CANCELLED | OUTPATIENT
Start: 2018-06-29

## 2018-06-29 RX ORDER — SODIUM CHLORIDE 9 MG/ML
INJECTION, SOLUTION INTRAVENOUS
Status: COMPLETED
Start: 2018-06-29 | End: 2018-06-29

## 2018-06-29 RX ORDER — 0.9 % SODIUM CHLORIDE 0.9 %
5 VIAL (ML) INJECTION PRN
Status: CANCELLED | OUTPATIENT
Start: 2018-06-29

## 2018-06-29 RX ADMIN — AMPICILLIN SODIUM AND SULBACTAM SODIUM 3 G: 2; 1 INJECTION, POWDER, FOR SOLUTION INTRAMUSCULAR; INTRAVENOUS at 18:10

## 2018-06-29 RX ADMIN — STANDARDIZED SENNA CONCENTRATE AND DOCUSATE SODIUM 1 TABLET: 8.6; 5 TABLET, FILM COATED ORAL at 10:10

## 2018-06-29 RX ADMIN — POTASSIUM CHLORIDE 40 MEQ: 1500 TABLET, EXTENDED RELEASE ORAL at 10:09

## 2018-06-29 RX ADMIN — SODIUM CHLORIDE 500 ML: 9 INJECTION, SOLUTION INTRAVENOUS at 20:41

## 2018-06-29 RX ADMIN — AMPICILLIN SODIUM AND SULBACTAM SODIUM 3 G: 2; 1 INJECTION, POWDER, FOR SOLUTION INTRAMUSCULAR; INTRAVENOUS at 06:04

## 2018-06-29 RX ADMIN — AMPICILLIN SODIUM AND SULBACTAM SODIUM 3 G: 2; 1 INJECTION, POWDER, FOR SOLUTION INTRAMUSCULAR; INTRAVENOUS at 11:59

## 2018-06-29 RX ADMIN — ACETAMINOPHEN 650 MG: 325 TABLET, FILM COATED ORAL at 10:09

## 2018-06-29 RX ADMIN — ENOXAPARIN SODIUM 40 MG: 100 INJECTION SUBCUTANEOUS at 10:10

## 2018-06-29 RX ADMIN — OXYCODONE HYDROCHLORIDE 5 MG: 5 TABLET ORAL at 20:41

## 2018-06-29 ASSESSMENT — ENCOUNTER SYMPTOMS
DOUBLE VISION: 0
STRIDOR: 0
CONSTIPATION: 0
FOCAL WEAKNESS: 0
VOMITING: 0
ABDOMINAL PAIN: 0
NAUSEA: 0
MYALGIAS: 1
PHOTOPHOBIA: 0
PALPITATIONS: 0
SORE THROAT: 0
HEADACHES: 0
FALLS: 0
DIARRHEA: 0
DIZZINESS: 0
FEVER: 0
INSOMNIA: 0
BLURRED VISION: 0
SHORTNESS OF BREATH: 0
COUGH: 0
DEPRESSION: 0
CHILLS: 0
SPEECH CHANGE: 0
SENSORY CHANGE: 0

## 2018-06-29 ASSESSMENT — COGNITIVE AND FUNCTIONAL STATUS - GENERAL
MOVING TO AND FROM BED TO CHAIR: UNABLE
HELP NEEDED FOR BATHING: A LITTLE
DAILY ACTIVITIY SCORE: 19
CLIMB 3 TO 5 STEPS WITH RAILING: A LITTLE
DRESSING REGULAR LOWER BODY CLOTHING: A LITTLE
SUGGESTED CMS G CODE MODIFIER DAILY ACTIVITY: CK
SUGGESTED CMS G CODE MODIFIER MOBILITY: CL
WALKING IN HOSPITAL ROOM: A LITTLE
PERSONAL GROOMING: A LITTLE
STANDING UP FROM CHAIR USING ARMS: A LITTLE
DRESSING REGULAR UPPER BODY CLOTHING: A LITTLE
MOBILITY SCORE: 14
TURNING FROM BACK TO SIDE WHILE IN FLAT BAD: A LITTLE
MOVING FROM LYING ON BACK TO SITTING ON SIDE OF FLAT BED: UNABLE
TOILETING: A LITTLE

## 2018-06-29 ASSESSMENT — PAIN SCALES - GENERAL
PAINLEVEL_OUTOF10: 4
PAINLEVEL_OUTOF10: 1
PAINLEVEL_OUTOF10: 1
PAINLEVEL_OUTOF10: 4

## 2018-06-29 ASSESSMENT — GAIT ASSESSMENTS
ASSISTIVE DEVICE: FRONT WHEEL WALKER
DEVIATION: ANTALGIC;BRADYKINETIC;STEP TO
GAIT LEVEL OF ASSIST: STAND BY ASSIST
DISTANCE (FEET): 50

## 2018-06-29 NOTE — PROGRESS NOTES
"  Blood pressure 102/69, pulse 68, temperature 36.4 °C (97.5 °F), resp. rate 15, height 1.499 m (4' 11.02\"), weight 84.2 kg (185 lb 10 oz), SpO2 95 %, not currently breastfeeding.    Recent Labs      06/27/18   0210  06/28/18   2350   WBC  7.9  7.7   RBC  3.93*  3.99*   HEMOGLOBIN  12.1  12.4   HEMATOCRIT  37.0  37.1   MCV  94.1  93.0   MCH  30.8  31.1   MCHC  32.7*  33.4*   RDW  43.0  42.5   PLATELETCT  449*  453*   MPV  9.2  9.3       No acute distress  Dressing clean dry and intact  Neurovascularly intact    POD# 7 secondary closure right groin wound    Plan:  DVT Prophylaxis- TEDS/SCDs, LMWH  Weight Bearing Status- WBAT  PT/OT  Antibiotics: per ID  Case Coordination          "

## 2018-06-29 NOTE — FACE TO FACE
Face to Face Note  -  Durable Medical Equipment    Kathe Veliz M.D. - NPI: [id number]  I certify that this patient is under my care and that they have had a durable medical equipment(DME)face to face encounter by myself that meets the physician DME face-to-face encounter requirements with this patient on:    Date of encounter:   Patient:                    MRN:                       YOB: 2017  Emmanuelle Martin  [id number]  1960     The encounter with the patient was in whole, or in part, for the following medical condition, which is the primary reason for durable medical equipment:  Other - nocturnal desaturations    I certify that, based on my findings, the following durable medical equipment is medically necessary:  Oxygen.    HOME O2 Saturation Measurements:(Values must be present for Home Oxygen orders)     To maintain sats > 92% (desaturated to 80s on nocturnal oximetry testing done 27th June night)        My Clinical findings support the need for the above equipment due to:  Other - low saturations at night recorded on nocturnal oximetry    Supporting Symptoms: Hypoxia at night    ------------------------------------------------------------------------------------------------------------------

## 2018-06-29 NOTE — CARE PLAN
Problem: Safety  Goal: Will remain free from injury  Outcome: PROGRESSING AS EXPECTED  Bed low and locked position,  socks on, hourly rounding, call light and belongings within reach.

## 2018-06-29 NOTE — PROGRESS NOTES
Diabetes education: Met with patient and taught to use insulin pens. Pt practiced with saline pen and practice device.Pt will need help with discharge meds and can use the SAMPLE pens and pen needles from [facility] on Monday. CDE will bring meter on Monday. Accucheck guide if still not medicaid other wise whatever is covered by her medicaid.  Plan: Pt could cover the cost of the strips but would need a prescription for accucheck guide test strips and fastclix lancet to test four times a day. She could take this to any pharmacy.  She could have a month's supply of insulin if MD was to order the above insulin and put sample on the prescription, and fax to  [facility] pharmacy ( if e script put the word sample on the note section). They have sample Negrita pen needles as well. CDE will follow up tomorrow to teach insulin pens ( which is her preference as well). Please call [phone] if needs change. Please do not let her be discharged without a meter.

## 2018-06-29 NOTE — CARE PLAN
Problem: Pain Management  Goal: Pain level will decrease to patient's comfort goal  Outcome: PROGRESSING AS EXPECTED  Med/mar and positioning controlling patients pain.

## 2018-06-29 NOTE — CARE PLAN
Problem: Venous Thromboembolism (VTW)/Deep Vein Thrombosis (DVT) Prevention:  Goal: Patient will participate in Venous Thrombosis (VTE)/Deep Vein Thrombosis (DVT)Prevention Measures  Outcome: PROGRESSING AS EXPECTED  SCDs in use. Early ambulation. Lovenox orders.     Problem: Pain Management  Goal: Pain level will decrease to patient's comfort goal  Outcome: PROGRESSING AS EXPECTED  Pain meds and repositioning for pain control.

## 2018-06-29 NOTE — PROGRESS NOTES
"Diabetes education: Met with Emmanuelle, who has working with nursing on injecting her own insulin as well as doing her finger sticks  ( stick only). Pt states she was told she would be leaving for home once the drain is out, but would need to return daily for the antibiotic. Spoke with DENISA who states she may be staying here until antibiotics are completed as she lives in [place].  Pt was taught to draw up from a vial. She could not be accurate as she does not have her glasses.  Pt states she has mail at home from \"social security\" so she is not sure if that was for Medicaid.  Pt is currently on Lantus 35 units HS with Humulog 4 units ac and sliding scale ac and hs. Finger sticks have been 165 ( 3 units) and 132.  Pt will be given an Accucheck Guide either tomorrow or next week depending on if she goes home vs stays as not sure if Medicaid will take effect in July ( or if she needs to have someone bring the papers so she can complete them).  Per [facility]'s pharmacy list, they have sample pens for Basaglar ( glargine and kwik pen), Lantus (solostar pen) and Humalog kwik pen, available.  Plan: Pt could cover the cost of the strips but would need a prescription for accucheck guide test strips and fastclix lancet to test four times a day. She could take this to any pharmacy.  She could have a month's supply of insulin if MD was to order the above insulin and put sample on the prescription, and fax to  [facility] pharmacy ( if e script put the word sample on the note section). They have sample Negrita pen needles as well. CDE will follow up tomorrow to teach insulin pens ( which is her preference as well). Please call [phone] if needs change.  "

## 2018-06-29 NOTE — PROGRESS NOTES
Internal Medicine Interval Note  Note Author: Kathe Veliz M.D.     Name Emamnuelle Martin     1960   Age/Sex 58 y.o. female   MRN [id number]   Code Status FULL CODE     After 5PM or if no immediate response to page, please call for cross-coverage  Attending/Team:  / Mason See Patient List for primary contact information  Call [phone] to page    1st Call - Day Intern (R1):   Dr. Veliz 2nd Call - Day Sr. Resident (R2/R3):   Dr. Carpenter        Reason for interval visit  (Principal Problem)   Sepsis with multi organ failure secondary to necrotizing fasciitis of right thigh with Group B Strep   New onset insulin dependent uncontrolled type II DM, HbA1c 12.8    Synopsis of care so far this admission:    - Admitted on 18 with sepsis secondary to necrotizing fascitis and multi-organ failure, on ICU -  - had wound debridements on 6/10, 6/15, ,  and   - Tissue culture 18 +ve for Prevotella, Grp B streptococcus   - on Unasyn from 18, to continue through 2018  - newly diagnosed with DM type II, HbA1c 12.8, now on 35 lantus bedtime, 5units TID Lispro      Interval Problem Daily Status Update  (24 hours)    - no changes, ambulating  - had 35 mls past 24 hrs via drain  - on Unasyn IV  - CM working on approval / logistic for home infusion of antibiotics --> per ID, ok for ertapenem  - if planning to discharge this weekend, will need first dose ertapenem in hospital per ID  - for 2 lts nocturnal home oxygen given her desats on nocturnal oximetry and PCP to organize OP sleep study      Review of Systems   Constitutional: Negative for chills, fever and malaise/fatigue.   HENT: Negative for ear pain and sore throat.    Eyes: Negative for blurred vision, double vision and photophobia.   Respiratory: Negative for cough, shortness of breath and stridor.    Cardiovascular: Negative for chest pain, palpitations and leg swelling.   Gastrointestinal: Negative  for constipation, nausea and vomiting.   Genitourinary: Negative for dysuria and urgency.        History of stress incontinece   Musculoskeletal: Negative for falls and joint pain.   Skin: Negative for itching and rash.   Neurological: Negative for dizziness, speech change, focal weakness and headaches.   Psychiatric/Behavioral: Negative for depression and suicidal ideas. The patient does not have insomnia.           Consultants/Specialty  ID   Ortho Surgery - Dr. Martinez    Disposition  Inpatient for IV antibiotics and Wound care  Barrier to discharge : NO insurance    Quality Measures  Quality-Core Measures   Reviewed items::  Labs reviewed and Medications reviewed  Gaston catheter::  No Gaston  DVT prophylaxis pharmacological::  Enoxaparin (Lovenox)  Antibiotics:  Treating active infection/contamination beyond 24 hours perioperative coverage          Physical Exam       Vitals:    06/28/18 1600 06/28/18 2000 06/29/18 0400 06/29/18 0800   BP: 115/74 112/79 102/69 109/71   Pulse: 76 73 68 66   Resp: 17 16 15 17   Temp: 36.4 °C (97.5 °F) 36.7 °C (98.1 °F) 36.4 °C (97.5 °F) 36.9 °C (98.4 °F)   SpO2: 95% 97% 95% 94%   Weight:       Height:         Body mass index is 37.47 kg/m².    Oxygen Therapy:  Pulse Oximetry: 94 %, O2 (LPM): 0, O2 Delivery: None (Room Air)    Physical Exam   Constitutional: She is oriented to person, place, and time and well-developed, well-nourished, and in no distress. No distress.   HENT:   Head: Normocephalic and atraumatic.   Mouth/Throat: Oropharynx is clear and moist.   Eyes: Right eye exhibits no discharge. Left eye exhibits no discharge. No scleral icterus.   Neck: Normal range of motion. No JVD present.   Cardiovascular: Normal rate, regular rhythm and normal heart sounds.  Exam reveals no gallop and no friction rub.    No murmur heard.  Pulmonary/Chest: Effort normal and breath sounds normal. No stridor. No respiratory distress. She has no wheezes. She has no rales.   Abdominal: Soft.  Bowel sounds are normal. She exhibits no distension. There is no tenderness. There is no rebound and no guarding.   Musculoskeletal: Normal range of motion. She exhibits no edema, tenderness or deformity.   Right groin - clean wound with wound vac on, minimal drainage   Lymphadenopathy:     She has no cervical adenopathy.   Neurological: She is alert and oriented to person, place, and time. GCS score is 15.   No focal neurological deficits   Skin: Skin is warm. No rash noted. She is not diaphoretic. No erythema.   Psychiatric: Mood, memory, affect and judgment normal.       Lab Data Review:         6/17/2018  9:30 AM    Recent Labs      06/27/18   0210  06/28/18   2350   SODIUM  142  141   POTASSIUM  3.8  3.9   CHLORIDE  108  106   CO2  25  25   BUN  14  13   CREATININE  0.48*  0.48*   MAGNESIUM  1.8  1.9   PHOSPHORUS  5.2*   --    CALCIUM  8.7  8.6       Recent Labs      06/27/18   0210  06/28/18   2350   ALTSGPT  15   --    ASTSGOT  13   --    ALKPHOSPHAT  75   --    TBILIRUBIN  0.4   --    GLUCOSE  100*  135*       Recent Labs      06/27/18   0210  06/28/18   2350   RBC  3.93*  3.99*   HEMOGLOBIN  12.1  12.4   HEMATOCRIT  37.0  37.1   PLATELETCT  449*  453*       Recent Labs      06/27/18 0210  06/28/18   2350   WBC  7.9  7.7   NEUTSPOLYS  39.90*  38.80*   LYMPHOCYTES  49.70*  50.60*   MONOCYTES  7.00  7.80   EOSINOPHILS  1.30  1.20   BASOPHILS  1.30  0.80   ASTSGOT  13   --    ALTSGPT  15   --    ALKPHOSPHAT  75   --    TBILIRUBIN  0.4   --            Assessment/Plan     Sepsis due to necrotizing fasciitis of R thigh  - s/p I&D on 6/11, 6/15, 6/18, 6/20, 6/22  - Wound cx + for prevotella and group B Strep   - on Unasyn started 6/13/18 , to be continued till 7/6/18 per ID recs  - wound looks clean and not cellulitic around Vac dressing  - minimal drainage, 35 mls past 24 hrs  - ortho on board, per ortho - drain to be left in today; can be discharged home once drain out (drain will be removed over weekend)   -  CM working on approval / logistic for home infusion of antibiotics --> per ID, ok for ertapenem  Plan  - continue IV antibiotics per ID, till 7/6/18  - wound vac and care per ortho --> possible drain + wound vac removal tomorrow  - continue ambulate  - Lovenox for DVT prophylaxis  - will need to give first dose of ertapenem tomorrow / on Sunday pending as in-patient pending on discharge date , per ID             Non anion gap metabolic acidosis  Resolved  Secondary to NS fluid resuscitation causing hyperchloremic acidosis and starvation ketosis    New onset type 2 diabetes mellitus (HCC)  - A1c 12.8% on admission   - on Lantus 35 units pm, and Lispro 5 units TID  - good blood sugar control  - Diabetes education provided  - low carbohydrate diet  - Echo 6/26 : good EF 60% with normal LV and RV systolic function  Plan  - continue Lantus 35 and Lispro to 5 units TID    Anemia/Thrombocytopenia  Resolved  In the setting of sepsis      Hypocalcemia  Resolved   Secondary to hypoalbuminemia in the setting of sepsis     Hypokalemia  Resolved  Secondary to Hypomagnesemia/Poor diet  Mg >2, K >4    Elevated alkaline phosphatase level  - resolved    Hypoalbuminemia  - pre-albumin 23  - s/b dietary on 6/25 --> no additional needs, hence signed off    Nocturnal hypoxia  - nocturnal oximetry performed overnight  - not on oxygen  - 8 brief episodes of desaturations each lasting 1-3 mins  - patient comfortable, asymptomatic  Plan  - for 2 lts home oxygen at night  - PCP to organize sleep study

## 2018-06-29 NOTE — THERAPY
"Occupational Therapy Treatment completed with focus on ADLs, ADL transfers and patient education.  Functional Status:  Pt was seen for Occupational Therapy treatment today, see Therapy Kardex for details. Treatment included education in breath control with activity and at rest, self pacing techs and energy conservation for pain management. Educated pt in safety awareness techs as well. Reviewed fall prevention techs with pt prior to activity. Pt demo SBA for bed mobility to EOB. SBA for sit to stand with FWW. Pt can be slightly impulsive.  Required direct supervision for ambulating ADL's with FWW. Supervised for toilet transfers using grab bar and FWW. Direct supervision for full toilet hygiene standing post BM. CGA for clothing management. Pt demonstrated SBA  for UB dressing, CGA for LB dressing seated base with use of AE. Pt stood at sink for 8 mins for oral hygiene and grooming activities with increased standing balance.  Pt also demonstrated direct supervision for Ambulating ADL's with FWW . Pt was told to remain seated on toilet until OT grabs socks from pt's closet, pt \"forgot\" and stood without waiting for supervision.Safety awareness is only fair. Pt was left up in chair , call light in reach, bedside table in reach and nursing is aware.RN updated on OT treatment findings and recommendations.  Continue Occupational Therapy services as per plan.    Plan of Care: Will benefit from Occupational Therapy 3 times per week  Discharge Recommendations:  Equipment Front-Wheel Walker and Will Continue to Assess for Equipment Needs. Post-acute therapy Discharge to home with outpatient or home health for additional skilled therapy services.    See \"Rehab Therapy-Acute\" Patient Summary Report for complete documentation.   "

## 2018-06-29 NOTE — DISCHARGE PLANNING
Anticipated Discharge Disposition: Outpt VS home IV abx    Action: Pt requiring IV infusions through 7/6, however pt is pending [place] Medicaid. Discussed with supervisor Hilary and advised to obtain quotes from both [facility] and [facility]. [facility] provided quote of $1357.65 for medication and $125 for dressing change. Called [facility] and spoke with My. Per My, they are able to accept pending Medicaid and they don't need an approved service. [facility] will bill Medicaid once it's active. Pt scheduled for IV infusion Sunday 07/01 at 1600. Met with pt at bedside to discuss. Pt agreeable to plan and stated she is able to come to [facility] to finish her IV abx course.   Per [facility], pt will be cleared for d/c once drain is pulled, anticipate tomorrow 5/30    Barriers to Discharge: None    Plan: If pt doesn't d/c on 6/30, oupatient appointment will need to be re-scheduled

## 2018-06-29 NOTE — PROGRESS NOTES
Discussed with diabetes RN. Given that patient doesn't have insurance or money to pay for insulin and strips, these have to be provided free of cost via their department. This will happen after weekend. Hence, patient will have to be discharged on Monday.

## 2018-06-29 NOTE — CARE PLAN
Problem: Pain Management  Goal: Pain level will decrease to patient's comfort goal  Outcome: PROGRESSING AS EXPECTED  Patient instruct to call for pain medication with pain is not within her comfort range.

## 2018-06-29 NOTE — PROGRESS NOTES
Infectious Disease Progress Note    Author: IRINEO Roy Date & Time of service: 6/29/2018  1:07 PM    Chief Complaint:  FU Right thigh necrotizing fasciitis    Interval History:  6/13/2018 MAXIMUM TEMPERATURE 98.7. WBCs 11.9 and platelets 205 and cr 0.5  6/14/2018 MAXIMUM TEMPERATURE 98.7 leg continues to hurt. WBC 12.2 platelets 205 and cr 0.38   6/15/2018 and MAXIMUM TEMPERATURE 99.1 WBC 9.3 had another surgery yesterday.  6/17/2018 MAXIMUM TEMPERATURE 100 the groin is more swollen today. WBC 9  6/18 Tmax 99.8 WBC 9.8 s/p I&D this morning, R thigh soreness  6/19- AF, WBC 11.2, tolerating abx, R thigh/ groin pain 5.5/10- slightly improved with pain meds, anxious to discharge home to get back to work by 6/25.  6/20 AF WBC 9.4 surgery delayed today, pain stable, emphasized need for IV abx given severity of infection and wound size  6/21- AF, WBC 11.5, R groin pain 5-6/10, no issue with abx, understanding to needing prolonged IV abx.  6/22 AF WBC 10.2 feeling better, just returned from repeat I&D with secondary closure earlier today  6/23 AF WBC 15.8 no new clinical issues from yesterday, sadler still in place  6/24 AF WBC 11.8 resting comfortably, pain controlled, finally had BM  6/25- AF, WBC 10.3, tolerating abx without issue, 5/10 R thigh/groin pain that is constant and dull- improved with pain meds.   6/26- AF, no labs, no issues with abx, pain remains dull and around 5/10 to R thigh/groin, sadler removed- voiding on her own.  6/27- AF, WBC 7.9, tolerating abx, constant dull 5/10 R thigh/groin pain, voiding without issue.   6/28- Tm 99.1, no labs, R thigh/groin pain 5/10 chronically dull, no issues with abx, chilled.  6/29- AF, WBC 7.7, chronic R thigh/ groin 5/10 pain, tolerating abx, feeling better today.   Labs Reviewed, Medications Reviewed, Radiology Reviewed and Wound Reviewed.    Review of Systems:  Review of Systems   Constitutional: Negative for chills, fever and malaise/fatigue.   HENT:  Negative for congestion and sore throat.    Respiratory: Negative for cough and shortness of breath.    Cardiovascular: Positive for leg swelling. Negative for chest pain.        RLE swelling- improving.   Gastrointestinal: Negative for abdominal pain, diarrhea, nausea and vomiting.   Genitourinary: Negative for dysuria.   Musculoskeletal: Positive for joint pain and myalgias.        R thigh/ groin   Skin: Negative for rash.   Neurological: Negative for sensory change and headaches.       Hemodynamics:  Temp (24hrs), Av.6 °C (97.9 °F), Min:36.4 °C (97.5 °F), Max:36.9 °C (98.4 °F)  Temperature: 36.9 °C (98.4 °F)  Pulse  Av.2  Min: 52  Max: 106   Blood Pressure: 109/71       Physical Exam:  Physical Exam   Constitutional: She is oriented to person, place, and time. She appears well-developed and well-nourished. No distress.   Appears older than stated age.   HENT:   Head: Normocephalic and atraumatic.   Eyes: Conjunctivae and EOM are normal. Pupils are equal, round, and reactive to light.   Neck: Normal range of motion.   Cardiovascular: Normal rate, regular rhythm, normal heart sounds and intact distal pulses.  Exam reveals no gallop and no friction rub.    Pulmonary/Chest: Effort normal and breath sounds normal. No respiratory distress. She has no wheezes.   Abdominal: Soft. Bowel sounds are normal. She exhibits no distension. There is no tenderness.   Musculoskeletal: She exhibits edema and tenderness.   Right thigh/groin- provena vac and hemovac in place with minimal ss drainage in containers, trace erythema to surrounding tissue, tenderness with palpation, no induration or fluctuance.     RUE PICC- CDI, non tender, no erythema.   Neurological: She is alert and oriented to person, place, and time.   Skin: Skin is warm. No rash noted. She is not diaphoretic. No erythema.   Nursing note and vitals reviewed.      Meds:    Current Facility-Administered Medications:   •  insulin glargine **AND** insulin lispro  **AND** insulin lispro **AND** Accu-Chek ACHS **AND** NOTIFY MD and PharmD **AND** glucose 4 g **AND** dextrose 50%  •  oxyCODONE immediate-release  •  enoxaparin (LOVENOX) injection  •  potassium chloride SA  •  PICC Line Insertion has been implemented **AND** May use Lidocaine 1% not to exceed 3 mls for local at insertion site **AND** NOTIFY MD **AND** Tip to dwell in the superior vena cava **AND** If radiologist reading of chest X-ray states any of the following the PICC should be used **AND** Further evaluation of the PICC placement can be retrieved from X-Ray and Imaging **AND** Blood draws through PICC line; draws by RN only **AND** FLUSHING GUIDELINES WHEN IN USE **AND** normal saline PF **AND** FLUSHING GUIDELINES WHEN NOT IN USE **AND** DRESSING MAINTENANCE **AND** Change needleless pressure ports and IV tubing every 72 hours per hospital policy **AND** TUBING **AND** If there is an MD order to remove the PICC line, any RN may remove the PICC line **AND** [] PATIENT EDUCATION MATERIALS **AND** NURSING COMMUNICATION  •  loperamide  •  ampicillin-sulbactam (UNASYN) IV  •  [DISCONTINUED] insulin regular **AND** Accu-Chek ACHS **AND** NOTIFY MD and PharmD **AND** glucose 4 g **AND** dextrose 50%  •  senna-docusate **AND** polyethylene glycol/lytes **AND** magnesium hydroxide **AND** bisacodyl  •  acetaminophen  •  Pharmacy Consult Request  •  ondansetron  •  senna-docusate  •  bisacodyl  •  fleet    Labs:  Recent Labs      18   0210  18   2350   WBC  7.9  7.7   RBC  3.93*  3.99*   HEMOGLOBIN  12.1  12.4   HEMATOCRIT  37.0  37.1   MCV  94.1  93.0   MCH  30.8  31.1   RDW  43.0  42.5   PLATELETCT  449*  453*   MPV  9.2  9.3   NEUTSPOLYS  39.90*  38.80*   LYMPHOCYTES  49.70*  50.60*   MONOCYTES  7.00  7.80   EOSINOPHILS  1.30  1.20   BASOPHILS  1.30  0.80     Recent Labs      18   0210  18   2350   SODIUM  142  141   POTASSIUM  3.8  3.9   CHLORIDE  108  106   CO2  25  25   GLUCOSE   100*  135*   BUN  14  13     Recent Labs      06/27/18   0210  06/28/18   2350   ALBUMIN  2.9*   --    TBILIRUBIN  0.4   --    ALKPHOSPHAT  75   --    TOTPROTEIN  6.7   --    ALTSGPT  15   --    ASTSGOT  13   --    CREATININE  0.48*  0.48*       Imaging:  ECHOCARDIOGRAM COMP W/O CONT   6/26/2018    CONCLUSIONS  Normal left ventricular systolic function.  Left ventricular ejection fraction is visually estimated to be 60%.  Normal right ventricular systolic function.  Aortic sclerosis without stenosis.  Moderate central aortic insufficiency.  Normal inferior vena cava size and inspiratory collapse.  Right ventricular systolic pressure is estimated to be 30  mmHg.  Small pericardial effusion without evidence of hemodynamic compromise.      Micro:  Results     ** No results found for the last 168 hours. **          Assessment:  Active Hospital Problems    Diagnosis   • Sepsis with acute organ failure secondary to necrotizing fasciitis of R thigh in the setting of hyperglycemia  [A41.9]   • New onset type 2 diabetes mellitus (HCC) [E11.9]   • Hypokalemia [E87.6]   • Non anion gap metabolic acidosis [E87.2]       Plan:  Right thigh necrotizing fasciitis  Afebrile  Leukocytosis resolved  S/p I&D on 6/11/2018, 6/13/2018, 6/18, 6/20  S/p I&D with secondary closure on 6/22 - debridement down to healthy tissue  OR cultures from 6/11 +Group B strep and Prevotella  Continue IV Unasyn  Plan for 2 weeks of IV abx from 6/22  Estimated stop date 07/06/18  Wound vac/care  Waiting for Hemovac to be dc'd when less than 30cc/24h  Waiting for [facility] approval- would complete abx at [facility] if approved.  [facility] Therapy plan completed for IV Ertapenem through 7/6/18.  Will need 1 test dose of Ertapenem prior to discharge.     New onset diabetes mellitus  HgA1c 12.8% on 6/11/18  Keep the blood sugars under 150 to control infection and wound healing    Asymptomatic funguria  Monitor    Tobacco abuse  Patient counseled    Discussed with ANG Padilla.   Working on getting [facility] approval for ROP[facility].  Therapy plan completed, R[facility] appt will need to be set up by [facility].

## 2018-06-30 LAB
GLUCOSE BLD-MCNC: 116 MG/DL (ref 65–99)
GLUCOSE BLD-MCNC: 124 MG/DL (ref 65–99)
GLUCOSE BLD-MCNC: 127 MG/DL (ref 65–99)
GLUCOSE BLD-MCNC: 171 MG/DL (ref 65–99)

## 2018-06-30 PROCEDURE — 700102 HCHG RX REV CODE 250 W/ 637 OVERRIDE(OP): Performed by: STUDENT IN AN ORGANIZED HEALTH CARE EDUCATION/TRAINING PROGRAM

## 2018-06-30 PROCEDURE — 82962 GLUCOSE BLOOD TEST: CPT | Mod: 91

## 2018-06-30 PROCEDURE — 99232 SBSQ HOSP IP/OBS MODERATE 35: CPT | Performed by: INTERNAL MEDICINE

## 2018-06-30 PROCEDURE — 770006 HCHG ROOM/CARE - MED/SURG/GYN SEMI*

## 2018-06-30 PROCEDURE — A9270 NON-COVERED ITEM OR SERVICE: HCPCS | Performed by: STUDENT IN AN ORGANIZED HEALTH CARE EDUCATION/TRAINING PROGRAM

## 2018-06-30 PROCEDURE — 700105 HCHG RX REV CODE 258: Performed by: INTERNAL MEDICINE

## 2018-06-30 PROCEDURE — 700111 HCHG RX REV CODE 636 W/ 250 OVERRIDE (IP): Performed by: STUDENT IN AN ORGANIZED HEALTH CARE EDUCATION/TRAINING PROGRAM

## 2018-06-30 PROCEDURE — 99232 SBSQ HOSP IP/OBS MODERATE 35: CPT | Mod: GC | Performed by: INTERNAL MEDICINE

## 2018-06-30 PROCEDURE — 700111 HCHG RX REV CODE 636 W/ 250 OVERRIDE (IP): Performed by: INTERNAL MEDICINE

## 2018-06-30 RX ADMIN — AMPICILLIN SODIUM AND SULBACTAM SODIUM 3 G: 2; 1 INJECTION, POWDER, FOR SOLUTION INTRAMUSCULAR; INTRAVENOUS at 23:51

## 2018-06-30 RX ADMIN — AMPICILLIN SODIUM AND SULBACTAM SODIUM 3 G: 2; 1 INJECTION, POWDER, FOR SOLUTION INTRAMUSCULAR; INTRAVENOUS at 12:29

## 2018-06-30 RX ADMIN — AMPICILLIN SODIUM AND SULBACTAM SODIUM 3 G: 2; 1 INJECTION, POWDER, FOR SOLUTION INTRAMUSCULAR; INTRAVENOUS at 16:54

## 2018-06-30 RX ADMIN — AMPICILLIN SODIUM AND SULBACTAM SODIUM 3 G: 2; 1 INJECTION, POWDER, FOR SOLUTION INTRAMUSCULAR; INTRAVENOUS at 06:25

## 2018-06-30 RX ADMIN — POTASSIUM CHLORIDE 40 MEQ: 1500 TABLET, EXTENDED RELEASE ORAL at 09:59

## 2018-06-30 RX ADMIN — AMPICILLIN SODIUM AND SULBACTAM SODIUM 3 G: 2; 1 INJECTION, POWDER, FOR SOLUTION INTRAMUSCULAR; INTRAVENOUS at 00:53

## 2018-06-30 RX ADMIN — OXYCODONE HYDROCHLORIDE 5 MG: 5 TABLET ORAL at 21:16

## 2018-06-30 RX ADMIN — ENOXAPARIN SODIUM 40 MG: 100 INJECTION SUBCUTANEOUS at 09:59

## 2018-06-30 RX ADMIN — INSULIN GLARGINE 35 UNITS: 100 INJECTION, SOLUTION SUBCUTANEOUS at 20:01

## 2018-06-30 ASSESSMENT — ENCOUNTER SYMPTOMS
SPEECH CHANGE: 0
ABDOMINAL PAIN: 0
COUGH: 0
MYALGIAS: 1
FOCAL WEAKNESS: 0
SORE THROAT: 0
FALLS: 0
STRIDOR: 0
NAUSEA: 0
PALPITATIONS: 0
PHOTOPHOBIA: 0
DOUBLE VISION: 0
CONSTIPATION: 0
VOMITING: 0
DIARRHEA: 0
BLURRED VISION: 0
INSOMNIA: 0
CHILLS: 0
HEADACHES: 0
SENSORY CHANGE: 0
DIZZINESS: 0
DEPRESSION: 0
SHORTNESS OF BREATH: 0
FEVER: 0

## 2018-06-30 ASSESSMENT — PAIN SCALES - GENERAL
PAINLEVEL_OUTOF10: 4
PAINLEVEL_OUTOF10: 0
PAINLEVEL_OUTOF10: 0

## 2018-06-30 NOTE — PROGRESS NOTES
Internal Medicine Interval Note  Note Author: Kathe Veliz M.D.     Name Emmanuelle Martin     1960   Age/Sex 58 y.o. female   MRN [id number]   Code Status FULL CODE     After 5PM or if no immediate response to page, please call for cross-coverage  Attending/Team:  / Mason See Patient List for primary contact information  Call [phone] to page    1st Call - Day Intern (R1):   Dr. Veliz 2nd Call - Day Sr. Resident (R2/R3):   Dr. Carpenter        Reason for interval visit  (Principal Problem)   Sepsis with multi organ failure secondary to necrotizing fasciitis of right thigh with Group B Strep   New onset insulin dependent uncontrolled type II DM, HbA1c 12.8    Synopsis of care so far this admission:    - Admitted on 18 with sepsis secondary to necrotizing fascitis and multi-organ failure, on ICU -  - had wound debridements on 6/10, 6/15, ,  and   - Tissue culture 18 +ve for Prevotella, Grp B streptococcus   - on Unasyn from 18, to continue through 2018  - newly diagnosed with DM type II, HbA1c 12.8, now on 35 lantus bedtime, 5units TID Lispro      Interval Problem Daily Status Update  (24 hours)    - continue to do well  - no new changes, ambulating  - had 40 mls past 24 hrs via drain  - on Unasyn IV --> to be converted to Ertapenem on Monday (needs first dose to be given while in hospital)  - home ertapenem daily set up by  to start on Tuesday   - Will need diabetic supplies provided (two scripts done and left in patient chart, strips and lancets script for patient to purchase, insulin script to be sent to health center after weekend), diabetes educator will provide glucometer  - for 2 lts nocturnal home oxygen given her desats on nocturnal oximetry and PCP to organize OP sleep study. Home oxygen order placed       Review of Systems   Constitutional: Negative for chills, fever and malaise/fatigue.   HENT: Negative for ear pain and  sore throat.    Eyes: Negative for blurred vision, double vision and photophobia.   Respiratory: Negative for cough, shortness of breath and stridor.    Cardiovascular: Negative for chest pain, palpitations and leg swelling.   Gastrointestinal: Negative for constipation, nausea and vomiting.   Genitourinary: Negative for dysuria and urgency.        History of stress incontinece   Musculoskeletal: Negative for falls and joint pain.   Skin: Negative for itching and rash.   Neurological: Negative for dizziness, speech change, focal weakness and headaches.   Psychiatric/Behavioral: Negative for depression and suicidal ideas. The patient does not have insomnia.           Consultants/Specialty  ID - Dr. Garza  Ortho Surgery - Dr. Martinez    Disposition  Inpatient for IV antibiotics and Wound care  Barrier to discharge : NO insurance    Quality Measures  Quality-Core Measures   Reviewed items::  Labs reviewed and Medications reviewed  Gaston catheter::  No Gaston  DVT prophylaxis pharmacological::  Enoxaparin (Lovenox)  Antibiotics:  Treating active infection/contamination beyond 24 hours perioperative coverage          Physical Exam       Vitals:    06/30/18 0000 06/30/18 0200 06/30/18 0400 06/30/18 0800   BP:   101/65 107/72   Pulse:   77 82   Resp:   18 16   Temp:   36.1 °C (97 °F) 36.8 °C (98.2 °F)   SpO2: 96% 94% 93% 95%   Weight:       Height:         Body mass index is 37.47 kg/m².    Oxygen Therapy:  Pulse Oximetry: 95 %, O2 (LPM): 0, O2 Delivery: None (Room Air)    Physical Exam   Constitutional: She is oriented to person, place, and time and well-developed, well-nourished, and in no distress. No distress.   HENT:   Head: Normocephalic and atraumatic.   Mouth/Throat: Oropharynx is clear and moist.   Eyes: Right eye exhibits no discharge. Left eye exhibits no discharge. No scleral icterus.   Neck: Normal range of motion. No JVD present.   Cardiovascular: Normal rate, regular rhythm and normal heart sounds.  Exam  reveals no gallop and no friction rub.    No murmur heard.  Pulmonary/Chest: Effort normal and breath sounds normal. No stridor. No respiratory distress. She has no wheezes. She has no rales.   Abdominal: Soft. Bowel sounds are normal. She exhibits no distension. There is no tenderness. There is no rebound and no guarding.   Musculoskeletal: Normal range of motion. She exhibits no edema, tenderness or deformity.   Right groin - clean wound with wound vac on, minimal drainage   Lymphadenopathy:     She has no cervical adenopathy.   Neurological: She is alert and oriented to person, place, and time. GCS score is 15.   No focal neurological deficits   Skin: Skin is warm. No rash noted. She is not diaphoretic. No erythema.   Psychiatric: Mood, memory, affect and judgment normal.       Lab Data Review:         6/17/2018  9:30 AM    Recent Labs      06/28/18   2350   SODIUM  141   POTASSIUM  3.9   CHLORIDE  106   CO2  25   BUN  13   CREATININE  0.48*   MAGNESIUM  1.9   CALCIUM  8.6       Recent Labs      06/28/18   2350   GLUCOSE  135*       Recent Labs      06/28/18   2350   RBC  3.99*   HEMOGLOBIN  12.4   HEMATOCRIT  37.1   PLATELETCT  453*       Recent Labs      06/28/18   2350   WBC  7.7   NEUTSPOLYS  38.80*   LYMPHOCYTES  50.60*   MONOCYTES  7.80   EOSINOPHILS  1.20   BASOPHILS  0.80           Assessment/Plan     Sepsis due to necrotizing fasciitis of R thigh  - s/p I&D on 6/11, 6/15, 6/18, 6/20, 6/22  - Wound cx + for prevotella and group B Strep   - on Unasyn started 6/13/18 , to be continued till 7/6/18 per ID recs  - wound looks clean and not cellulitic around Vac dressing  - minimal drainage, 40 mls past 24 hrs  - ortho on board, per ortho - drain to be left in today; can be discharged home once drain out (drain will be removed over weekend)   -  set up for IV daily ertapenem to start at home on Tuesday 7/3, to conitnue till 7/6/18  - currently on Unasyn --> to continue Unasyn on 7/1, to be converted to  Ertapenem on 7/2 AM , so patient receives a dose of ertapenem while in-patient  Plan  - continue IV antibiotics per ID, till 7/6/18.  - Unasyn 6/30 an d7/1, to be given ertapenem on 7/2 while in-patient, home infusion of daily ertapenem to start 7/3  - wound vac and care per ortho --> possible drain + wound vac removal today/tomorrow  - continue ambulate  - Lovenox for DVT prophylaxis            Non anion gap metabolic acidosis  Resolved  Secondary to NS fluid resuscitation causing hyperchloremic acidosis and starvation ketosis    New onset type 2 diabetes mellitus (HCC)  - A1c 12.8% on admission   - on Lantus 35 units pm, and Lispro 5 units TID  - good blood sugar control  - Diabetes education provided  - low carbohydrate diet  - Echo 6/26 : good EF 60% with normal LV and RV systolic function  Plan  - continue Lantus 35 and Lispro to 5 units TID    Anemia/Thrombocytopenia  Resolved  In the setting of sepsis      Hypocalcemia  Resolved   Secondary to hypoalbuminemia in the setting of sepsis     Hypokalemia  Resolved  Secondary to Hypomagnesemia/Poor diet  Mg >2, K >4    Elevated alkaline phosphatase level  - resolved    Hypoalbuminemia  - pre-albumin 23  - s/b dietary on 6/25 --> no additional needs, hence signed off    Nocturnal hypoxia  - nocturnal oximetry performed overnight  - not on oxygen  - 8 brief episodes of desaturations each lasting 1-3 mins  - patient comfortable, asymptomatic  Plan  - for 2 lts home oxygen at night  - PCP to organize sleep study

## 2018-06-30 NOTE — PROGRESS NOTES
Patient has been wearing 2L O2 via nasal cannula overnight per MD order. Patient is wearing . Patient sats have not dropped below 90% this evening. Sats documented in flow sheet.

## 2018-06-30 NOTE — CARE PLAN
Problem: Pain Management  Goal: Pain level will decrease to patient's comfort goal  Outcome: PROGRESSING AS EXPECTED  Rest and PRN pain meds for pain control.     Problem: Mobility  Goal: Risk for activity intolerance will decrease  Outcome: PROGRESSING AS EXPECTED  Promoting early ambulation. Patient tolerates well.

## 2018-06-30 NOTE — PROGRESS NOTES
John drain turned off and Zion DESIR rounding on floor was informed that vac has been on for 7 days and will not turn back on. He stated he wound go see the pt.

## 2018-06-30 NOTE — CARE PLAN
Problem: Infection  Goal: Will remain free from infection  Outcome: PROGRESSING AS EXPECTED  r/o c. diff

## 2018-06-30 NOTE — CARE PLAN
Problem: Safety  Goal: Will remain free from injury  Outcome: PROGRESSING AS EXPECTED  Bed low and locked position,  socks on, hourly rounding, call light and belongings with reach.

## 2018-06-30 NOTE — PROGRESS NOTES
"   Orthopaedic Progress Note    Interval changes:  Dc provina tomorrow  HV with 35cc output/24 hrs - DC tomorrow if under 30cc output/24hrs  Possible DC with outpatient infusion if [facility] accepted    ROS - Patient denies any new issues.  Pain well controlled.    Blood pressure (!) 98/66, pulse 82, temperature 36.6 °C (97.8 °F), resp. rate 16, height 1.499 m (4' 11.02\"), weight 84.2 kg (185 lb 10 oz), SpO2 97 %, not currently breastfeeding.      Patient seen and examined  No acute distress  Breathing non labored  RRR  Right groin provina vac in place with no leak, inner thigh erythema resolved, HV in place with 35cc output over last 24. BLE DNVI, moves all toes, cap refill < 2 sec.    Recent Labs      06/27/18   0210  06/28/18   2350   WBC  7.9  7.7   RBC  3.93*  3.99*   HEMOGLOBIN  12.1  12.4   HEMATOCRIT  37.0  37.1   MCV  94.1  93.0   MCH  30.8  31.1   MCHC  32.7*  33.4*   RDW  43.0  42.5   PLATELETCT  449*  453*   MPV  9.2  9.3       Active Hospital Problems    Diagnosis   • Sepsis due to necrotizing fasciitis of R thigh [A41.9]     Priority: High   • Nocturnal hypoxia [G47.34]     Priority: Medium   • New onset type 2 diabetes mellitus (HCC) [E11.9]     Priority: Medium   • Hypoalbuminemia [E88.09]     Priority: Low   • Elevated alkaline phosphatase level [R74.8]     Priority: Low       Assessment/Plan:  Patient doing well  [facility] review in process for outpatient infusion  DC provina tomorrow and hopefully HV as well if output is under 30cc/24hours  POD#7 S/P Secondary closure 15 cm surgical wound  Wt bearing status - WBAT  Wound care/Drains - vac left in place, HV with 35cc output    Future Procedures - none planned   Lovenox: Start 6/13, Duration-until ambulatory > 150'  Sutures/Staples out- 14-21 days post operatively  PT/OT-initiated  Antibiotics: unasyn 3g IV Q6  DVT Prophylaxis- TEDS/SCDs/Foot pumps  Gaston-none  Case Coordination for Discharge Planning - Disposition home   "

## 2018-06-30 NOTE — DISCHARGE PLANNING
Anticipated Discharge Disposition: [facility]    Action: Per MD and diabetes educator note, pt to discharge on Monday d/t diabetes equipment and medications. LSW called [facility] to cancel pt's appointments. Pt now scheduled Tuesday 7/3 at 1700.   MD also placed order for nocturnal O2, as pt is desaturating at night. O2 study was completed on the 27th. Pt may require approved service for O2 if still needed at discharge    Barriers to Discharge: None    Plan: Pt to d/c home with outpatient infusions

## 2018-07-01 ENCOUNTER — APPOINTMENT (OUTPATIENT)
Dept: ONCOLOGY | Facility: MEDICAL CENTER | Age: 58
End: 2018-07-01
Attending: NURSE PRACTITIONER

## 2018-07-01 PROBLEM — R74.8 ELEVATED ALKALINE PHOSPHATASE LEVEL: Status: RESOLVED | Noted: 2018-06-15 | Resolved: 2018-07-01

## 2018-07-01 LAB
ALBUMIN SERPL BCP-MCNC: 3 G/DL (ref 3.2–4.9)
ALBUMIN/GLOB SERPL: 0.8 G/DL
ALP SERPL-CCNC: 65 U/L (ref 30–99)
ALT SERPL-CCNC: 13 U/L (ref 2–50)
ANION GAP SERPL CALC-SCNC: 10 MMOL/L (ref 0–11.9)
AST SERPL-CCNC: 9 U/L (ref 12–45)
BASOPHILS # BLD AUTO: 1.1 % (ref 0–1.8)
BASOPHILS # BLD: 0.07 K/UL (ref 0–0.12)
BILIRUB SERPL-MCNC: 0.4 MG/DL (ref 0.1–1.5)
BUN SERPL-MCNC: 16 MG/DL (ref 8–22)
CALCIUM SERPL-MCNC: 8.5 MG/DL (ref 8.5–10.5)
CHLORIDE SERPL-SCNC: 106 MMOL/L (ref 96–112)
CO2 SERPL-SCNC: 25 MMOL/L (ref 20–33)
CREAT SERPL-MCNC: 0.61 MG/DL (ref 0.5–1.4)
EOSINOPHIL # BLD AUTO: 0.12 K/UL (ref 0–0.51)
EOSINOPHIL NFR BLD: 2 % (ref 0–6.9)
ERYTHROCYTE [DISTWIDTH] IN BLOOD BY AUTOMATED COUNT: 43.1 FL (ref 35.9–50)
GLOBULIN SER CALC-MCNC: 3.8 G/DL (ref 1.9–3.5)
GLUCOSE BLD-MCNC: 142 MG/DL (ref 65–99)
GLUCOSE BLD-MCNC: 146 MG/DL (ref 65–99)
GLUCOSE BLD-MCNC: 151 MG/DL (ref 65–99)
GLUCOSE BLD-MCNC: 223 MG/DL (ref 65–99)
GLUCOSE SERPL-MCNC: 167 MG/DL (ref 65–99)
HCT VFR BLD AUTO: 36.9 % (ref 37–47)
HGB BLD-MCNC: 12.2 G/DL (ref 12–16)
IMM GRANULOCYTES # BLD AUTO: 0.03 K/UL (ref 0–0.11)
IMM GRANULOCYTES NFR BLD AUTO: 0.5 % (ref 0–0.9)
LYMPHOCYTES # BLD AUTO: 3.35 K/UL (ref 1–4.8)
LYMPHOCYTES NFR BLD: 54.5 % (ref 22–41)
MAGNESIUM SERPL-MCNC: 1.9 MG/DL (ref 1.5–2.5)
MCH RBC QN AUTO: 30.8 PG (ref 27–33)
MCHC RBC AUTO-ENTMCNC: 33.1 G/DL (ref 33.6–35)
MCV RBC AUTO: 93.2 FL (ref 81.4–97.8)
MONOCYTES # BLD AUTO: 0.5 K/UL (ref 0–0.85)
MONOCYTES NFR BLD AUTO: 8.1 % (ref 0–13.4)
NEUTROPHILS # BLD AUTO: 2.08 K/UL (ref 2–7.15)
NEUTROPHILS NFR BLD: 33.8 % (ref 44–72)
NRBC # BLD AUTO: 0 K/UL
NRBC BLD-RTO: 0 /100 WBC
PLATELET # BLD AUTO: 335 K/UL (ref 164–446)
PMV BLD AUTO: 9.5 FL (ref 9–12.9)
POTASSIUM SERPL-SCNC: 3.9 MMOL/L (ref 3.6–5.5)
PROT SERPL-MCNC: 6.8 G/DL (ref 6–8.2)
RBC # BLD AUTO: 3.96 M/UL (ref 4.2–5.4)
SODIUM SERPL-SCNC: 141 MMOL/L (ref 135–145)
WBC # BLD AUTO: 6.2 K/UL (ref 4.8–10.8)

## 2018-07-01 PROCEDURE — 700111 HCHG RX REV CODE 636 W/ 250 OVERRIDE (IP): Performed by: INTERNAL MEDICINE

## 2018-07-01 PROCEDURE — 770006 HCHG ROOM/CARE - MED/SURG/GYN SEMI*

## 2018-07-01 PROCEDURE — 80053 COMPREHEN METABOLIC PANEL: CPT

## 2018-07-01 PROCEDURE — 82962 GLUCOSE BLOOD TEST: CPT | Mod: 91

## 2018-07-01 PROCEDURE — 700105 HCHG RX REV CODE 258: Performed by: INTERNAL MEDICINE

## 2018-07-01 PROCEDURE — 99233 SBSQ HOSP IP/OBS HIGH 50: CPT | Performed by: INTERNAL MEDICINE

## 2018-07-01 PROCEDURE — 83735 ASSAY OF MAGNESIUM: CPT

## 2018-07-01 PROCEDURE — 99232 SBSQ HOSP IP/OBS MODERATE 35: CPT | Mod: GC | Performed by: INTERNAL MEDICINE

## 2018-07-01 PROCEDURE — 700111 HCHG RX REV CODE 636 W/ 250 OVERRIDE (IP): Performed by: STUDENT IN AN ORGANIZED HEALTH CARE EDUCATION/TRAINING PROGRAM

## 2018-07-01 PROCEDURE — A9270 NON-COVERED ITEM OR SERVICE: HCPCS | Performed by: STUDENT IN AN ORGANIZED HEALTH CARE EDUCATION/TRAINING PROGRAM

## 2018-07-01 PROCEDURE — 700102 HCHG RX REV CODE 250 W/ 637 OVERRIDE(OP): Performed by: STUDENT IN AN ORGANIZED HEALTH CARE EDUCATION/TRAINING PROGRAM

## 2018-07-01 PROCEDURE — 85025 COMPLETE CBC W/AUTO DIFF WBC: CPT

## 2018-07-01 RX ADMIN — AMPICILLIN SODIUM AND SULBACTAM SODIUM 3 G: 2; 1 INJECTION, POWDER, FOR SOLUTION INTRAMUSCULAR; INTRAVENOUS at 12:27

## 2018-07-01 RX ADMIN — ENOXAPARIN SODIUM 40 MG: 100 INJECTION SUBCUTANEOUS at 08:50

## 2018-07-01 RX ADMIN — AMPICILLIN SODIUM AND SULBACTAM SODIUM 3 G: 2; 1 INJECTION, POWDER, FOR SOLUTION INTRAMUSCULAR; INTRAVENOUS at 05:38

## 2018-07-01 RX ADMIN — OXYCODONE HYDROCHLORIDE 5 MG: 5 TABLET ORAL at 08:50

## 2018-07-01 RX ADMIN — AMPICILLIN SODIUM AND SULBACTAM SODIUM 3 G: 2; 1 INJECTION, POWDER, FOR SOLUTION INTRAMUSCULAR; INTRAVENOUS at 17:48

## 2018-07-01 RX ADMIN — OXYCODONE HYDROCHLORIDE 5 MG: 5 TABLET ORAL at 21:24

## 2018-07-01 ASSESSMENT — ENCOUNTER SYMPTOMS
DIARRHEA: 1
DIZZINESS: 1
ABDOMINAL PAIN: 0
DIARRHEA: 0
NAUSEA: 0
HEADACHES: 0
SENSORY CHANGE: 0
MYALGIAS: 1
SORE THROAT: 0
COUGH: 0
FEVER: 0
VOMITING: 0
CHILLS: 0
PALPITATIONS: 0
SHORTNESS OF BREATH: 0

## 2018-07-01 ASSESSMENT — PATIENT HEALTH QUESTIONNAIRE - PHQ9
9. THOUGHTS THAT YOU WOULD BE BETTER OFF DEAD, OR OF HURTING YOURSELF: NOT AT ALL
SUM OF ALL RESPONSES TO PHQ9 QUESTIONS 1 AND 2: 0
7. TROUBLE CONCENTRATING ON THINGS, SUCH AS READING THE NEWSPAPER OR WATCHING TELEVISION: NOT AT ALL
4. FEELING TIRED OR HAVING LITTLE ENERGY: NOT AT ALL
2. FEELING DOWN, DEPRESSED, IRRITABLE, OR HOPELESS: NOT AT ALL
SUM OF ALL RESPONSES TO PHQ QUESTIONS 1-9: 0
1. LITTLE INTEREST OR PLEASURE IN DOING THINGS: NOT AT ALL
8. MOVING OR SPEAKING SO SLOWLY THAT OTHER PEOPLE COULD HAVE NOTICED. OR THE OPPOSITE, BEING SO FIGETY OR RESTLESS THAT YOU HAVE BEEN MOVING AROUND A LOT MORE THAN USUAL: NOT AT ALL
6. FEELING BAD ABOUT YOURSELF - OR THAT YOU ARE A FAILURE OR HAVE LET YOURSELF OR YOUR FAMILY DOWN: NOT AL ALL
5. POOR APPETITE OR OVEREATING: NOT AT ALL
3. TROUBLE FALLING OR STAYING ASLEEP OR SLEEPING TOO MUCH: NOT AT ALL

## 2018-07-01 ASSESSMENT — PAIN SCALES - GENERAL
PAINLEVEL_OUTOF10: 2
PAINLEVEL_OUTOF10: 2
PAINLEVEL_OUTOF10: 4
PAINLEVEL_OUTOF10: 7

## 2018-07-01 NOTE — CARE PLAN
rec'd report from PERRY Dillon and assumed care of this pt. Pt is awake/A&O x4 and sitting up in the bedside chair w/no ss of distress noted at this time. Pt reports pain as 4/10, will admin pain med as scheduled. Pt denies other needs/complaints. Safety measures in place, call light w/in reach.

## 2018-07-01 NOTE — PROGRESS NOTES
Infectious Disease Progress Note    Author: Noemy Garza M.D. Date & Time of service: 6/30/2018  5:52 PM    Chief Complaint:  FU Right thigh necrotizing fasciitis    Interval History:  6/13/2018 MAXIMUM TEMPERATURE 98.7. WBCs 11.9 and platelets 205 and cr 0.5  6/14/2018 MAXIMUM TEMPERATURE 98.7 leg continues to hurt. WBC 12.2 platelets 205 and cr 0.38   6/15/2018 and MAXIMUM TEMPERATURE 99.1 WBC 9.3 had another surgery yesterday.  6/17/2018 MAXIMUM TEMPERATURE 100 the groin is more swollen today. WBC 9  6/18 Tmax 99.8 WBC 9.8 s/p I&D this morning, R thigh soreness  6/19- AF, WBC 11.2, tolerating abx, R thigh/ groin pain 5.5/10- slightly improved with pain meds, anxious to discharge home to get back to work by 6/25.  6/20 AF WBC 9.4 surgery delayed today, pain stable, emphasized need for IV abx given severity of infection and wound size  6/21- AF, WBC 11.5, R groin pain 5-6/10, no issue with abx, understanding to needing prolonged IV abx.  6/22 AF WBC 10.2 feeling better, just returned from repeat I&D with secondary closure earlier today  6/23 AF WBC 15.8 no new clinical issues from yesterday, sadler still in place  6/24 AF WBC 11.8 resting comfortably, pain controlled, finally had BM  6/25- AF, WBC 10.3, tolerating abx without issue, 5/10 R thigh/groin pain that is constant and dull- improved with pain meds.   6/26- AF, no labs, no issues with abx, pain remains dull and around 5/10 to R thigh/groin, sadler removed- voiding on her own.  6/27- AF, WBC 7.9, tolerating abx, constant dull 5/10 R thigh/groin pain, voiding without issue.   6/28- Tm 99.1, no labs, R thigh/groin pain 5/10 chronically dull, no issues with abx, chilled.  6/29- AF, WBC 7.7, chronic R thigh/ groin 5/10 pain, tolerating abx, feeling better today.   6/30 AF eating-no acute events  Labs Reviewed, Medications Reviewed, Radiology Reviewed and Wound Reviewed.    Review of Systems:  Review of Systems   Constitutional: Negative for chills, fever  and malaise/fatigue.   HENT: Negative for congestion and sore throat.    Respiratory: Negative for cough and shortness of breath.    Cardiovascular: Positive for leg swelling. Negative for chest pain.        RLE swelling- improving.   Gastrointestinal: Negative for abdominal pain, diarrhea, nausea and vomiting.   Genitourinary: Negative for dysuria.   Musculoskeletal: Positive for joint pain and myalgias.        R thigh/ groin   Skin: Negative for rash.   Neurological: Negative for sensory change and headaches.       Hemodynamics:  Temp (24hrs), Av.4 °C (97.5 °F), Min:36.1 °C (97 °F), Max:36.8 °C (98.2 °F)  Temperature: 36.8 °C (98.2 °F)  Pulse  Av.2  Min: 52  Max: 106   Blood Pressure: 107/72       Physical Exam:  Physical Exam   Constitutional:   Appears older than stated age.  NAD   HENT:   Mouth/Throat: No oropharyngeal exudate.   Eyes: No scleral icterus.   Neck: Normal range of motion. Neck supple.   Cardiovascular: Normal rate.    No murmur heard.  Pulmonary/Chest: She has no rales. She exhibits no tenderness.   Abdominal: There is no rebound and no guarding.   Musculoskeletal: She exhibits edema and tenderness.   Right thigh/groin- provena vac and hemovac in place with minimal ss drainage in containers, trace erythema to surrounding tissue, tenderness with palpation, no induration or fluctuance.     RUE PICC- CDI, non tender, no erythema.   Neurological:   Alert   Skin: Skin is warm. No rash noted. No erythema.   Nursing note and vitals reviewed.      Meds:    Current Facility-Administered Medications:   •  Special Contact Isolation **AND** C Diff by PCR rflx Toxin **AND** Pharmacy  •  insulin glargine **AND** insulin lispro **AND** insulin lispro **AND** Accu-Chek ACHS **AND** NOTIFY MD and PharmD **AND** glucose 4 g **AND** dextrose 50%  •  oxyCODONE immediate-release  •  enoxaparin (LOVENOX) injection  •  potassium chloride SA  •  PICC Line Insertion has been implemented **AND** May use Lidocaine  1% not to exceed 3 mls for local at insertion site **AND** NOTIFY MD **AND** Tip to dwell in the superior vena cava **AND** If radiologist reading of chest X-ray states any of the following the PICC should be used **AND** Further evaluation of the PICC placement can be retrieved from X-Ray and Imaging **AND** Blood draws through PICC line; draws by RN only **AND** FLUSHING GUIDELINES WHEN IN USE **AND** normal saline PF **AND** FLUSHING GUIDELINES WHEN NOT IN USE **AND** DRESSING MAINTENANCE **AND** Change needleless pressure ports and IV tubing every 72 hours per hospital policy **AND** TUBING **AND** If there is an MD order to remove the PICC line, any RN may remove the PICC line **AND** [] PATIENT EDUCATION MATERIALS **AND** NURSING COMMUNICATION  •  ampicillin-sulbactam (UNASYN) IV  •  [DISCONTINUED] insulin regular **AND** Accu-Chek ACHS **AND** NOTIFY MD and PharmD **AND** glucose 4 g **AND** dextrose 50%  •  acetaminophen  •  Pharmacy Consult Request  •  ondansetron    Labs:  Recent Labs      18   2350   WBC  7.7   RBC  3.99*   HEMOGLOBIN  12.4   HEMATOCRIT  37.1   MCV  93.0   MCH  31.1   RDW  42.5   PLATELETCT  453*   MPV  9.3   NEUTSPOLYS  38.80*   LYMPHOCYTES  50.60*   MONOCYTES  7.80   EOSINOPHILS  1.20   BASOPHILS  0.80     Recent Labs      18   2350   SODIUM  141   POTASSIUM  3.9   CHLORIDE  106   CO2  25   GLUCOSE  135*   BUN  13     Recent Labs      18   2350   CREATININE  0.48*       Imaging:  ECHOCARDIOGRAM COMP W/O CONT   2018    CONCLUSIONS  Normal left ventricular systolic function.  Left ventricular ejection fraction is visually estimated to be 60%.  Normal right ventricular systolic function.  Aortic sclerosis without stenosis.  Moderate central aortic insufficiency.  Normal inferior vena cava size and inspiratory collapse.  Right ventricular systolic pressure is estimated to be 30  mmHg.  Small pericardial effusion without evidence of hemodynamic  compromise.      Micro:  Results     Procedure Component Value Units Date/Time    C Diff by PCR rflx Toxin [[id number]]     Order Status:  No result Specimen:  Stool from Stool           Assessment:  Active Hospital Problems    Diagnosis   • Sepsis with acute organ failure secondary to necrotizing fasciitis of R thigh in the setting of hyperglycemia  [A41.9]   • New onset type 2 diabetes mellitus (HCC) [E11.9]   • Hypokalemia [E87.6]   • Non anion gap metabolic acidosis [E87.2]       Plan:  Right thigh necrotizing fasciitis  Afebrile  Leukocytosis resolved  S/p I&D on 6/11/2018, 6/13/2018, 6/18, 6/20  S/p I&D with secondary closure on 6/22 - debridement down to healthy tissue  OR cultures from 6/11 +Group B strep and Prevotella  Continue IV Unasyn while in the hosp  Plan for 2 weeks of IV abx from 6/22  Estimated stop date 07/06/18  [facility] orders completed for IV Ertapenem through 7/6/18.  Will need 1 test dose of Ertapenem prior to discharge.     New onset diabetes mellitus  HgA1c 12.8% on 6/11/18  Keep the blood sugars under 150 to control infection and wound healing    Asymptomatic funguria  Monitor    Tobacco abuse  Patient counseled    Discussed with ANG Padilla.  Working on getting [facility] approval for АНДРЕЙ.  Therapy plan completed, [facility] appt will need to be set up by [facility].

## 2018-07-01 NOTE — PROGRESS NOTES
Internal Medicine Interval Note  Note Author: Maribel Miller M.D.     Name Emmanuelle Martin     1960   Age/Sex 58 y.o. female   MRN [id number]   Code Status Full     After 5PM or if no immediate response to page, please call for cross-coverage  Attending/Team: Dr. Willard/Mason See Patient List for primary contact information  Call [phone] to page    1st Call - Day Intern (R1):   Dr. Maribel Miller 2nd Call - Day Sr. Resident (R2/R3):   Dr. Akash Parson         Reason for interval visit  (Principal Problem)   Sepsis due to right thigh necrotizing fasciitis  Newly diagnosed diabetes mellitus      Interval Problem Daily Status Update  (24 hours, problem oriented, brief subjective history, new lab/imaging data pertinent to that problem)   Patient is doing well, her pain is well controlled. Her DM is also well controlled with blood sugars in 90's-170s. Her wound drain has only drained 11 cc in 24/hr. This morning her BP was low at 98/67 and she stated she was a little lightheaded but feels this way prior to eating breakfast frequently. BP was retaken and was 110/70. Pt is up and ambulating.     Review of Systems   Constitutional: Negative for chills and fever.   Cardiovascular: Negative for chest pain and palpitations.   Gastrointestinal: Negative for diarrhea, nausea and vomiting.   Musculoskeletal:        + right thigh pain   Neurological: Positive for dizziness. Negative for headaches.       Disposition/Barriers to discharge:   Will need to switch IV unasyn to oral ertapenem tomorrow, first dose here prior to discharge, then will be able to go home     Consultants/Specialty  ID - Dr. Garza  Ortho - Dr. Martinez  PCP: @PCP      Quality Measures  Quality-Core Measures   Reviewed items::  Labs reviewed and Medications reviewed  Gaston catheter::  No Gaston  DVT prophylaxis pharmacological::  Enoxaparin (Lovenox)  Antibiotics:  Treating active infection/contamination beyond 24 hours perioperative  coverage          Physical Exam       Vitals:    06/30/18 1600 06/30/18 2015 07/01/18 0434 07/01/18 0800   BP: 120/76 128/80 (!) 98/67 107/76   Pulse: 73 71 69 87   Resp: 16 16 16 16   Temp: 36.9 °C (98.5 °F) 36.6 °C (97.9 °F) 36.3 °C (97.4 °F) 36.4 °C (97.6 °F)   SpO2: 95% 97% 98% 98%   Weight:       Height:         Body mass index is 37.47 kg/m².    Oxygen Therapy:  Pulse Oximetry: 98 %, O2 (LPM): 0, O2 Delivery: None (Room Air)    Physical Exam   Constitutional: She is oriented to person, place, and time and well-developed, well-nourished, and in no distress.   HENT:   Head: Normocephalic and atraumatic.   Cardiovascular: Normal rate, regular rhythm and normal heart sounds.    Pulmonary/Chest: Effort normal and breath sounds normal.   Abdominal: Soft. Bowel sounds are normal.   Musculoskeletal:   Right thigh bandaged, drain in place   Neurological: She is alert and oriented to person, place, and time.   Skin: Skin is warm and dry.             Assessment/Plan     Sepsis due to necrotizing fasciitis of R thigh- (present on admission)   Overview    - s/p I&D on 6/11, 6/15, 6/18, 6/20, 6/22  - Wound cx + for prevotella and group B Strep   - IV Unasyn started 6/13/18 , to be continued till 7/6/18 per ID recs  - wound looks clean and not cellulitic around Vac dressing  - minimal drainage, 11 mls past 24 hrs  - ortho on board, drain will be taken out today  - CM set up for IV daily ertapenem to start at home on Tuesday 7/3, to continue till 7/6/18    Plan  - continue IV antibiotics per ID, till 7/6/18.  - Unasyn 6/30 and 7/1, to be given ertapenem on 7/2 while in-patient, home infusion of daily ertapenem to start 7/3  - wound vac and care per ortho --> possible drain + wound vac removal today  - continue to ambulate       Assessment & Plan    - s/p I&D on 6/11, 6/15, 6/18, 6/20, 6/22  - Wound cx + for prevotella and group B Strep   - IV Unasyn started 6/13/18 , to be continued till 7/6/18 per ID recs  - wound looks  clean and not cellulitic around Vac dressing  - minimal drainage, 11 mls past 24 hrs  - ortho on board, drain will be taken out today  - CM set up for IV daily ertapenem to start at home on Tuesday 7/3, to continue till 7/6/18    Plan  - continue IV antibiotics per ID, till 7/6/18.  - Unasyn 6/30 and 7/1, to be given ertapenem on 7/2 while in-patient, home infusion of daily ertapenem to start 7/3  - wound vac and care per ortho --> possible drain + wound vac removal today  - continue to ambulate          Nocturnal hypoxia   Assessment & Plan    - nocturnal oximetry performed overnight  - not on oxygen  - 8 brief episodes of desaturations each lasting 1-3 mins  - patient comfortable, asymptomatic  Plan  - for 2 lts home oxygen at night  - PCP to organize sleep study        New onset type 2 diabetes mellitus (HCC)- (present on admission)   Assessment & Plan    - A1c 12.8% on admission   - on Lantus 35 units pm, Lispro 5 units TID, and humalog sliding scale  - good blood sugar control  - Diabetes education provided  - low carbohydrate diet  - Echo 6/26 : good EF 60% with normal LV and RV systolic function  Plan  - continue current medication regimen        Hypoalbuminemia   Assessment & Plan    - pre-albumin 23  - s/b dietary on 6/25 --> no additional needs, hence signed off

## 2018-07-01 NOTE — ASSESSMENT & PLAN NOTE
- s/p I&D on 6/11, 6/15, 6/18, 6/20, 6/22  - Wound cx + for prevotella and group B Strep   - IV Unasyn started 6/13/18 , to be continued till 7/6/18 per ID recs  - wound looks clean and not cellulitic around Vac dressing  - minimal drainage, 11 mls past 24 hrs  - ortho on board, drain will be taken out today  - CM set up for IV daily ertapenem to start at home on Tuesday 7/3, to continue till 7/6/18    Plan  - continue IV antibiotics per ID, till 7/6/18.  - Unasyn 6/30 and 7/1, to be given ertapenem on 7/2 while in-patient, home infusion of daily ertapenem to start 7/3  - wound vac and care per ortho --> possible drain + wound vac removal today  - continue to ambulate

## 2018-07-01 NOTE — PROGRESS NOTES
"   Orthopaedic Progress Note    Interval changes:  Angel removed  HV with 40cc output- stay in place for now    ROS - Patient denies any new issues.  Pain well controlled.    Blood pressure 107/72, pulse 82, temperature 36.8 °C (98.2 °F), resp. rate 16, height 1.499 m (4' 11.02\"), weight 84.2 kg (185 lb 10 oz), SpO2 95 %, not currently breastfeeding.      Patient seen and examined  No acute distress  Breathing non labored  RRR  Right groin provina vac removed and simple dressing placed, HV left in place with 40cc output over last 24. BLE DNVI, moves all toes, cap refill < 2 sec.    Recent Labs      06/28/18   2350   WBC  7.7   RBC  3.99*   HEMOGLOBIN  12.4   HEMATOCRIT  37.1   MCV  93.0   MCH  31.1   MCHC  33.4*   RDW  42.5   PLATELETCT  453*   MPV  9.3       Active Hospital Problems    Diagnosis   • Sepsis due to necrotizing fasciitis of R thigh [A41.9]     Priority: High   • Nocturnal hypoxia [G47.34]     Priority: Medium   • New onset type 2 diabetes mellitus (HCC) [E11.9]     Priority: Medium   • Hypoalbuminemia [E88.09]     Priority: Low   • Elevated alkaline phosphatase level [R74.8]     Priority: Low       Assessment/Plan:  Angel removed  HV to stay in place  POD#8 S/P Secondary closure 15 cm surgical wound  Wt bearing status - WBAT  Wound care/Drains - vac left in place, HV with 35cc output    Future Procedures - none planned   Lovenox: Start 6/13, Duration-until ambulatory > 150'  Sutures/Staples out- 14-21 days post operatively  PT/OT-initiated  Antibiotics: unasyn 3g IV Q6  DVT Prophylaxis- TEDS/SCDs/Foot pumps  Gaston-none   Case Coordination for Discharge Planning - Disposition home   "

## 2018-07-01 NOTE — ASSESSMENT & PLAN NOTE
- A1c 12.8% on admission   - on Lantus 35 units pm, Lispro 5 units TID, and humalog sliding scale  - good blood sugar control  - Diabetes education provided  - low carbohydrate diet  - Echo 6/26 : good EF 60% with normal LV and RV systolic function  Plan  - continue current medication regimen

## 2018-07-01 NOTE — ASSESSMENT & PLAN NOTE
- nocturnal oximetry performed overnight  - not on oxygen  - 8 brief episodes of desaturations each lasting 1-3 mins  - patient comfortable, asymptomatic  Plan  - for 2 lts home oxygen at night  - PCP to organize sleep study

## 2018-07-02 ENCOUNTER — APPOINTMENT (OUTPATIENT)
Dept: ONCOLOGY | Facility: MEDICAL CENTER | Age: 58
End: 2018-07-02
Attending: NURSE PRACTITIONER

## 2018-07-02 LAB
GLUCOSE BLD-MCNC: 116 MG/DL (ref 65–99)
GLUCOSE BLD-MCNC: 135 MG/DL (ref 65–99)
GLUCOSE BLD-MCNC: 153 MG/DL (ref 65–99)

## 2018-07-02 PROCEDURE — A9270 NON-COVERED ITEM OR SERVICE: HCPCS | Performed by: STUDENT IN AN ORGANIZED HEALTH CARE EDUCATION/TRAINING PROGRAM

## 2018-07-02 PROCEDURE — 700111 HCHG RX REV CODE 636 W/ 250 OVERRIDE (IP): Performed by: INTERNAL MEDICINE

## 2018-07-02 PROCEDURE — 700105 HCHG RX REV CODE 258: Performed by: INTERNAL MEDICINE

## 2018-07-02 PROCEDURE — 82962 GLUCOSE BLOOD TEST: CPT | Mod: 91

## 2018-07-02 PROCEDURE — 99232 SBSQ HOSP IP/OBS MODERATE 35: CPT | Mod: GC | Performed by: INTERNAL MEDICINE

## 2018-07-02 PROCEDURE — 700102 HCHG RX REV CODE 250 W/ 637 OVERRIDE(OP): Performed by: STUDENT IN AN ORGANIZED HEALTH CARE EDUCATION/TRAINING PROGRAM

## 2018-07-02 PROCEDURE — 770006 HCHG ROOM/CARE - MED/SURG/GYN SEMI*

## 2018-07-02 PROCEDURE — 99232 SBSQ HOSP IP/OBS MODERATE 35: CPT | Performed by: INTERNAL MEDICINE

## 2018-07-02 PROCEDURE — 700111 HCHG RX REV CODE 636 W/ 250 OVERRIDE (IP): Performed by: STUDENT IN AN ORGANIZED HEALTH CARE EDUCATION/TRAINING PROGRAM

## 2018-07-02 RX ADMIN — ENOXAPARIN SODIUM 40 MG: 100 INJECTION SUBCUTANEOUS at 08:45

## 2018-07-02 RX ADMIN — SODIUM CHLORIDE 1000 MG: 900 INJECTION INTRAVENOUS at 06:59

## 2018-07-02 RX ADMIN — AMPICILLIN SODIUM AND SULBACTAM SODIUM 3 G: 2; 1 INJECTION, POWDER, FOR SOLUTION INTRAMUSCULAR; INTRAVENOUS at 00:05

## 2018-07-02 RX ADMIN — INSULIN GLARGINE 35 UNITS: 100 INJECTION, SOLUTION SUBCUTANEOUS at 23:55

## 2018-07-02 RX ADMIN — OXYCODONE HYDROCHLORIDE 5 MG: 5 TABLET ORAL at 21:07

## 2018-07-02 RX ADMIN — INSULIN GLARGINE 35 UNITS: 100 INJECTION, SOLUTION SUBCUTANEOUS at 00:02

## 2018-07-02 ASSESSMENT — ENCOUNTER SYMPTOMS
DIZZINESS: 0
FEVER: 0
NAUSEA: 0
VOMITING: 0
CHILLS: 0
CONSTIPATION: 0
MYALGIAS: 1
MYALGIAS: 0
ABDOMINAL PAIN: 0
DIARRHEA: 0
HEADACHES: 0
SHORTNESS OF BREATH: 0
PALPITATIONS: 0

## 2018-07-02 ASSESSMENT — PATIENT HEALTH QUESTIONNAIRE - PHQ9
6. FEELING BAD ABOUT YOURSELF - OR THAT YOU ARE A FAILURE OR HAVE LET YOURSELF OR YOUR FAMILY DOWN: NOT AL ALL
4. FEELING TIRED OR HAVING LITTLE ENERGY: NOT AT ALL
SUM OF ALL RESPONSES TO PHQ QUESTIONS 1-9: 0
7. TROUBLE CONCENTRATING ON THINGS, SUCH AS READING THE NEWSPAPER OR WATCHING TELEVISION: NOT AT ALL
SUM OF ALL RESPONSES TO PHQ9 QUESTIONS 1 AND 2: 0
9. THOUGHTS THAT YOU WOULD BE BETTER OFF DEAD, OR OF HURTING YOURSELF: NOT AT ALL
2. FEELING DOWN, DEPRESSED, IRRITABLE, OR HOPELESS: NOT AT ALL
8. MOVING OR SPEAKING SO SLOWLY THAT OTHER PEOPLE COULD HAVE NOTICED. OR THE OPPOSITE, BEING SO FIGETY OR RESTLESS THAT YOU HAVE BEEN MOVING AROUND A LOT MORE THAN USUAL: NOT AT ALL
3. TROUBLE FALLING OR STAYING ASLEEP OR SLEEPING TOO MUCH: NOT AT ALL
5. POOR APPETITE OR OVEREATING: NOT AT ALL
1. LITTLE INTEREST OR PLEASURE IN DOING THINGS: NOT AT ALL

## 2018-07-02 ASSESSMENT — PAIN SCALES - GENERAL
PAINLEVEL_OUTOF10: 4
PAINLEVEL_OUTOF10: 2
PAINLEVEL_OUTOF10: 0

## 2018-07-02 NOTE — DISCHARGE PLANNING
Anticipated Discharge Disposition:home, [facility],  for wound care    Action: [facility] will accept [facility] for wound care    Barriers to Discharge: Pending [facility] Approval    Plan: home

## 2018-07-02 NOTE — CARE PLAN
rec'd report from PERRY Juarez and assumed care of this pt. She is awake/A&O x 4 and sitting up on the side of the bed w/out ss of distress noted at this time. Pt reports pain as 0 and denies any other needs/complaints Safety measures in place, call light w/in reach.

## 2018-07-02 NOTE — PROGRESS NOTES
"   Orthopaedic Progress Note    Interval changes:  HV removed since output 11cc over last 24 hours    ROS - Patient denies any new issues.  Pain well controlled.    Blood pressure 110/74, pulse 85, temperature 37.2 °C (99 °F), resp. rate 17, height 1.499 m (4' 11.02\"), weight 84.2 kg (185 lb 10 oz), SpO2 97 %, not currently breastfeeding.      Patient seen and examined  No acute distress  Breathing non labored  RRR  Right groin HV removed and new dressings placed, DNVI, moves all toes, cap refill < 2 sec.    Recent Labs      06/28/18   2350  07/01/18   0220   WBC  7.7  6.2   RBC  3.99*  3.96*   HEMOGLOBIN  12.4  12.2   HEMATOCRIT  37.1  36.9*   MCV  93.0  93.2   MCH  31.1  30.8   MCHC  33.4*  33.1*   RDW  42.5  43.1   PLATELETCT  453*  335   MPV  9.3  9.5       Active Hospital Problems    Diagnosis   • Sepsis due to necrotizing fasciitis of R thigh [A41.9]     Priority: High     - s/p I&D on 6/11, 6/15, 6/18, 6/20, 6/22  - Wound cx + for prevotella and group B Strep   - IV Unasyn started 6/13/18 , to be continued till 7/6/18 per ID recs  - wound looks clean and not cellulitic around Vac dressing  - minimal drainage, 11 mls past 24 hrs  - ortho on board, drain will be taken out today  - CM set up for IV daily ertapenem to start at home on Tuesday 7/3, to continue till 7/6/18    Plan  - continue IV antibiotics per ID, till 7/6/18.  - Unasyn 6/30 and 7/1, to be given ertapenem on 7/2 while in-patient, home infusion of daily ertapenem to start 7/3  - wound vac and care per ortho --> possible drain + wound vac removal today  - continue to ambulate       • Nocturnal hypoxia [G47.34]     Priority: Medium   • New onset type 2 diabetes mellitus (HCC) [E11.9]     Priority: Medium   • Hypoalbuminemia [E88.09]     Priority: Low       Assessment/Plan:  HV removed and dressing changed  POD#9 S/P Secondary closure 15 cm surgical wound  Wt bearing status - WBAT  Wound care/Drains - simple opcite dressing with ace  Future Procedures " - none planned   Lovenox: Start 6/13, Duration-until ambulatory > 150'  Sutures/Staples out-  21 days post operatively  PT/OT-initiated  Antibiotics: unasyn 3g IV Q6  DVT Prophylaxis- TEDS/SCDs/Foot pumps/lovenox  Gaston- none  Case Coordination for Discharge Planning - Disposition home

## 2018-07-02 NOTE — PROGRESS NOTES
Internal Medicine Interval Note  Note Author: Maribel Miller M.D.     Name Emmanuelle Martin     1960   Age/Sex 58 y.o. female   MRN [id number]   Code Status Full     After 5PM or if no immediate response to page, please call for cross-coverage  Attending/Team: Dr Moore See Patient List for primary contact information  Call [phone] to page    1st Call - Day Intern (R1):   Dr Miller 2nd Call - Day Sr. Resident (R2/R3):   Dr Parson         Reason for interval visit  (Principal Problem)   Sepsis due to right thigh necrotizing fasciitis  Newly diagnosed diabetes mellitus      Interval Problem Daily Status Update  (24 hours, problem oriented, brief subjective history, new lab/imaging data pertinent to that problem)   -drain was removed yesterday  -pt is taking minimal oral pain medication  -pt is up and ambulating with no pain  -first dose of ertapenem was started this AM  -BP continues to be low but in 100's/70's today and asymptomatic  -pt has not had any diarrhea  Review of Systems   Constitutional: Negative for chills and fever.   Cardiovascular: Negative for chest pain, palpitations and leg swelling.   Gastrointestinal: Negative for abdominal pain, nausea and vomiting.   Musculoskeletal: Negative for myalgias.   Neurological: Negative for dizziness.       Disposition/Barriers to discharge:   Discharge home today if pt tolerates ertapenem     Consultants/Specialty  ID - Dr. Garza  Ortho - Dr. Martinez  PCP: @PCP      Quality Measures  Quality-Core Measures   Reviewed items::  Labs reviewed and Medications reviewed  Gaston catheter::  No Gaston  DVT prophylaxis pharmacological::  Enoxaparin (Lovenox)  Antibiotics:  Treating active infection/contamination beyond 24 hours perioperative coverage          Physical Exam       Vitals:    18 0800 18 1600 18 2000 18 0400   BP: 107/76 110/74 130/82 102/70   Pulse: 87 85 74 74   Resp: 16 17 16 16   Temp: 36.4 °C (97.6 °F) 37.2 °C  (99 °F) 36.7 °C (98.1 °F) 36.4 °C (97.6 °F)   SpO2: 98% 97% 99% 92%   Weight:       Height:         Body mass index is 37.47 kg/m².    Oxygen Therapy:  Pulse Oximetry: 92 %, O2 (LPM): 0, O2 Delivery: None (Room Air)    Physical Exam   Constitutional: She is oriented to person, place, and time and well-developed, well-nourished, and in no distress.   HENT:   Head: Normocephalic and atraumatic.   Cardiovascular: Normal rate, regular rhythm and normal heart sounds.    Pulmonary/Chest: Effort normal and breath sounds normal.   Abdominal: Soft. Bowel sounds are normal.   Musculoskeletal: She exhibits no edema or tenderness.   Neurological: She is alert and oriented to person, place, and time.   Skin: Skin is warm and dry. No erythema.             Assessment/Plan     Sepsis due to necrotizing fasciitis of R thigh- (present on admission)   Overview    - s/p I&D on 6/11, 6/15, 6/18, 6/20, 6/22  - Wound cx + for prevotella and group B Strep   - IV Unasyn started 6/13/18 , to be continued till 7/6/18 per ID recs  - wound looks clean and not cellulitic around Vac dressing  - minimal drainage, 11 mls past 24 hrs  - ortho on board, drain will be taken out today  - CM set up for IV daily ertapenem to start at home on Tuesday 7/3, to continue till 7/6/18    Plan  - continue IV antibiotics per ID, till 7/6/18.  - Unasyn 6/30 and 7/1, to be given ertapenem on 7/2 while in-patient, home infusion of daily ertapenem to start 7/3  - wound vac and care per ortho --> possible drain + wound vac removal today  - continue to ambulate       Assessment & Plan    - s/p I&D on 6/11, 6/15, 6/18, 6/20, 6/22  - Wound cx + for prevotella and group B Strep   - on Unasyn started 6/13/18 , to be continued till 7/6/18 per ID recs  - wound looks clean and not cellulitic around Vac dressing  - minimal drainage, 40 mls past 24 hrs  - ortho on board, per ortho - drain to be left in today; can be discharged home once drain out (drain will be removed over  weekend)   -  set up for IV daily ertapenem to start at home on Tuesday 7/3, to conitnue till 7/6/18  - currently on Unasyn --> to continue Unasyn on 7/1, to be converted to Ertapenem on 7/2 AM , so patient receives a dose of ertapenem while in-patient  Plan  - continue IV antibiotics per ID, till 7/6/18.  - Unasyn 6/30 an d7/1, to be given ertapenem on 7/2 while in-patient, home infusion of daily ertapenem to start 7/3  - wound vac and care per ortho --> possible drain + wound vac removal today/tomorrow  - continue ambulate  - Lovenox for DVT prophylaxis                Nocturnal hypoxia   Assessment & Plan    - nocturnal oximetry performed overnight  - not on oxygen  - 8 brief episodes of desaturations each lasting 1-3 mins  - patient comfortable, asymptomatic  Plan  - for 2 lts home oxygen at night  - PCP to organize sleep study        New onset type 2 diabetes mellitus (HCC)- (present on admission)   Assessment & Plan    - A1c 12.8% on admission   - on Lantus 35 units pm, and Lispro 5 units TID  - good blood sugar control  - Diabetes education provided  - low carbohydrate diet  - Echo 6/26 : good EF 60% with normal LV and RV systolic function  Plan  - continue Lantus 35 and Lispro to 5 units TID        Hypoalbuminemia   Assessment & Plan    - pre-albumin 23  - s/b dietary on 6/25 --> no additional needs, hence signed off

## 2018-07-02 NOTE — PROGRESS NOTES
Infectious Disease Progress Note    Author: Noemy Garza M.D. Date & Time of service: 7/1/2018  5:48 PM    Chief Complaint:  FU Right thigh necrotizing fasciitis    Interval History:  6/13/2018 MAXIMUM TEMPERATURE 98.7. WBCs 11.9 and platelets 205 and cr 0.5  6/14/2018 MAXIMUM TEMPERATURE 98.7 leg continues to hurt. WBC 12.2 platelets 205 and cr 0.38   6/15/2018 and MAXIMUM TEMPERATURE 99.1 WBC 9.3 had another surgery yesterday.  6/17/2018 MAXIMUM TEMPERATURE 100 the groin is more swollen today. WBC 9  6/18 Tmax 99.8 WBC 9.8 s/p I&D this morning, R thigh soreness  6/19- AF, WBC 11.2, tolerating abx, R thigh/ groin pain 5.5/10- slightly improved with pain meds, anxious to discharge home to get back to work by 6/25.  6/20 AF WBC 9.4 surgery delayed today, pain stable, emphasized need for IV abx given severity of infection and wound size  6/21- AF, WBC 11.5, R groin pain 5-6/10, no issue with abx, understanding to needing prolonged IV abx.  6/22 AF WBC 10.2 feeling better, just returned from repeat I&D with secondary closure earlier today  6/23 AF WBC 15.8 no new clinical issues from yesterday, sadler still in place  6/24 AF WBC 11.8 resting comfortably, pain controlled, finally had BM  6/25- AF, WBC 10.3, tolerating abx without issue, 5/10 R thigh/groin pain that is constant and dull- improved with pain meds.   6/26- AF, no labs, no issues with abx, pain remains dull and around 5/10 to R thigh/groin, sadler removed- voiding on her own.  6/27- AF, WBC 7.9, tolerating abx, constant dull 5/10 R thigh/groin pain, voiding without issue.   6/28- Tm 99.1, no labs, R thigh/groin pain 5/10 chronically dull, no issues with abx, chilled.  6/29- AF, WBC 7.7, chronic R thigh/ groin 5/10 pain, tolerating abx, feeling better today.   6/30 AF eating-no acute events  7/1 AF WBC 6.2 had an epsisode of diarrhea-none since. Feels fine otherwise  Labs Reviewed, Medications Reviewed, Radiology Reviewed and Wound Reviewed.    Review of  Systems:  Review of Systems   Constitutional: Negative for chills, fever and malaise/fatigue.   HENT: Negative for congestion and sore throat.    Respiratory: Negative for cough and shortness of breath.    Cardiovascular: Positive for leg swelling. Negative for chest pain.        RLE swelling- improving.   Gastrointestinal: Positive for diarrhea. Negative for abdominal pain, nausea and vomiting.   Genitourinary: Negative for dysuria.   Musculoskeletal: Positive for joint pain and myalgias.        R thigh/ groin   Skin: Negative for rash.   Neurological: Negative for sensory change and headaches.       Hemodynamics:  Temp (24hrs), Av.7 °C (98 °F), Min:36.3 °C (97.4 °F), Max:37.2 °C (99 °F)  Temperature: 37.2 °C (99 °F)  Pulse  Av.1  Min: 52  Max: 106   Blood Pressure: 110/74       Physical Exam:  Physical Exam   Constitutional: She is oriented to person, place, and time. She appears well-developed. No distress.   Appears older than stated age.  NAD   HENT:   Head: Atraumatic.   Mouth/Throat: No oropharyngeal exudate.   Eyes: EOM are normal. Pupils are equal, round, and reactive to light. No scleral icterus.   Neck: Normal range of motion. Neck supple.   Cardiovascular: Normal rate.    No murmur heard.  Pulmonary/Chest: Effort normal. No respiratory distress. She has no rales. She exhibits no tenderness.   Abdominal: Soft. She exhibits no distension. There is no tenderness. There is no rebound and no guarding.   Musculoskeletal: She exhibits edema and tenderness.   Right thigh/groin- provena vac and hemovac in place with minimal ss drainage in containers, trace erythema to surrounding tissue, tenderness with palpation, no induration or fluctuance.     RUE PICC- CDI, non tender, no erythema.   Neurological: She is alert and oriented to person, place, and time.   Skin: Skin is warm. No rash noted. She is not diaphoretic. No erythema.   Nursing note and vitals reviewed.      Meds:    Current  Facility-Administered Medications:   •  insulin glargine **AND** insulin lispro **AND** insulin lispro **AND** Accu-Chek ACHS **AND** NOTIFY MD and PharmD **AND** glucose 4 g **AND** dextrose 50%  •  oxyCODONE immediate-release  •  enoxaparin (LOVENOX) injection  •  PICC Line Insertion has been implemented **AND** May use Lidocaine 1% not to exceed 3 mls for local at insertion site **AND** NOTIFY MD **AND** Tip to dwell in the superior vena cava **AND** If radiologist reading of chest X-ray states any of the following the PICC should be used **AND** Further evaluation of the PICC placement can be retrieved from X-Ray and Imaging **AND** Blood draws through PICC line; draws by RN only **AND** FLUSHING GUIDELINES WHEN IN USE **AND** normal saline PF **AND** FLUSHING GUIDELINES WHEN NOT IN USE **AND** DRESSING MAINTENANCE **AND** Change needleless pressure ports and IV tubing every 72 hours per hospital policy **AND** TUBING **AND** If there is an MD order to remove the PICC line, any RN may remove the PICC line **AND** [] PATIENT EDUCATION MATERIALS **AND** NURSING COMMUNICATION  •  ampicillin-sulbactam (UNASYN) IV  •  [DISCONTINUED] insulin regular **AND** Accu-Chek ACHS **AND** NOTIFY MD and PharmD **AND** glucose 4 g **AND** dextrose 50%  •  acetaminophen  •  Pharmacy Consult Request  •  ondansetron    Labs:  Recent Labs      18   2350  18   0220   WBC  7.7  6.2   RBC  3.99*  3.96*   HEMOGLOBIN  12.4  12.2   HEMATOCRIT  37.1  36.9*   MCV  93.0  93.2   MCH  31.1  30.8   RDW  42.5  43.1   PLATELETCT  453*  335   MPV  9.3  9.5   NEUTSPOLYS  38.80*  33.80*   LYMPHOCYTES  50.60*  54.50*   MONOCYTES  7.80  8.10   EOSINOPHILS  1.20  2.00   BASOPHILS  0.80  1.10     Recent Labs      18   SODIUM  141  141   POTASSIUM  3.9  3.9   CHLORIDE  106  106   CO2  25  25   GLUCOSE  135*  167*   BUN  13  16     Recent Labs      18   ALBUMIN   --   3.0*    TBILIRUBIN   --   0.4   ALKPHOSPHAT   --   65   TOTPROTEIN   --   6.8   ALTSGPT   --   13   ASTSGOT   --   9*   CREATININE  0.48*  0.61       Imaging:  ECHOCARDIOGRAM COMP W/O CONT   6/26/2018    CONCLUSIONS  Normal left ventricular systolic function.  Left ventricular ejection fraction is visually estimated to be 60%.  Normal right ventricular systolic function.  Aortic sclerosis without stenosis.  Moderate central aortic insufficiency.  Normal inferior vena cava size and inspiratory collapse.  Right ventricular systolic pressure is estimated to be 30  mmHg.  Small pericardial effusion without evidence of hemodynamic compromise.      Micro:  Results     Procedure Component Value Units Date/Time    C Diff by PCR rflx Toxin [[id number]]     Order Status:  No result Specimen:  Stool from Stool           Assessment:  Active Hospital Problems    Diagnosis   • Sepsis with acute organ failure secondary to necrotizing fasciitis of R thigh in the setting of hyperglycemia  [A41.9]   • New onset type 2 diabetes mellitus (HCC) [E11.9]   • Hypokalemia [E87.6]   • Non anion gap metabolic acidosis [E87.2]       Plan:  Right thigh necrotizing fasciitis  Afebrile  Leukocytosis resolved  S/p I&D on 6/11/2018, 6/13/2018, 6/18, 6/20  S/p I&D with secondary closure on 6/22 - debridement down to healthy tissue  OR cultures from 6/11 +Group B strep and Prevotella  Continue IV Unasyn while in the hosp  Plan for 2 weeks of IV abx from 6/22  Estimated stop date 07/06/18  [facility] orders completed for IV Ertapenem through 7/6/18.  Will need 1 test dose of Ertapenem prior to discharge.     Diabetes mellitus  HgA1c 12.8% on 6/11/18  Keep the blood sugars under 150 to control infection and wound healing    Diarrhea, new  In iso for Cdiff-but only one episode since yesterday  DC iso  Check Cdiff if liquid stool  Monitor    Tobacco abuse  Patient counseled     Working on getting [facility] approval for [facility].  Therapy plan completed, [facility] appt will need  to be set up by [facility].

## 2018-07-02 NOTE — PROGRESS NOTES
Infectious Disease Progress Note    Author: IRINEO Roy Date & Time of service: 7/2/2018  2:15 PM    Chief Complaint:  FU Right thigh necrotizing fasciitis    Interval History:  6/13/2018 MAXIMUM TEMPERATURE 98.7. WBCs 11.9 and platelets 205 and cr 0.5  6/14/2018 MAXIMUM TEMPERATURE 98.7 leg continues to hurt. WBC 12.2 platelets 205 and cr 0.38   6/15/2018 and MAXIMUM TEMPERATURE 99.1 WBC 9.3 had another surgery yesterday.  6/17/2018 MAXIMUM TEMPERATURE 100 the groin is more swollen today. WBC 9  6/18 Tmax 99.8 WBC 9.8 s/p I&D this morning, R thigh soreness  6/19- AF, WBC 11.2, tolerating abx, R thigh/ groin pain 5.5/10- slightly improved with pain meds, anxious to discharge home to get back to work by 6/25.  6/20 AF WBC 9.4 surgery delayed today, pain stable, emphasized need for IV abx given severity of infection and wound size  6/21- AF, WBC 11.5, R groin pain 5-6/10, no issue with abx, understanding to needing prolonged IV abx.  6/22 AF WBC 10.2 feeling better, just returned from repeat I&D with secondary closure earlier today  6/23 AF WBC 15.8 no new clinical issues from yesterday, sadler still in place  6/24 AF WBC 11.8 resting comfortably, pain controlled, finally had BM  6/25- AF, WBC 10.3, tolerating abx without issue, 5/10 R thigh/groin pain that is constant and dull- improved with pain meds.   6/26- AF, no labs, no issues with abx, pain remains dull and around 5/10 to R thigh/groin, sadler removed- voiding on her own.  6/27- AF, WBC 7.9, tolerating abx, constant dull 5/10 R thigh/groin pain, voiding without issue.   6/28- Tm 99.1, no labs, R thigh/groin pain 5/10 chronically dull, no issues with abx, chilled.  6/29- AF, WBC 7.7, chronic R thigh/ groin 5/10 pain, tolerating abx, feeling better today.   6/30 AF eating-no acute events  7/1 AF WBC 6.2 had an epsisode of diarrhea-none since. Feels fine otherwise  7/2- Tm 99, no labs, in good spirits- may discharge home today, no issue with abx,  pain to R groin 5/10.  Labs Reviewed, Medications Reviewed, Radiology Reviewed and Wound Reviewed.    Review of Systems:  Review of Systems   Constitutional: Negative for chills, fever and malaise/fatigue.   HENT: Negative for hearing loss.    Respiratory: Negative for shortness of breath.    Cardiovascular: Positive for leg swelling. Negative for chest pain and palpitations.        RLE swelling- improving.   Gastrointestinal: Negative for abdominal pain, constipation, diarrhea, nausea and vomiting.        Formed stool today   Genitourinary: Negative for dysuria.   Musculoskeletal: Positive for joint pain and myalgias.        R thigh/ groin   Skin: Negative for rash.   Neurological: Negative for headaches.       Hemodynamics:  Temp (24hrs), Av.8 °C (98.2 °F), Min:36.4 °C (97.6 °F), Max:37.2 °C (99 °F)  Temperature: 36.7 °C (98 °F)  Pulse  Av.1  Min: 52  Max: 106   Blood Pressure: 107/74       Physical Exam:  Physical Exam   Constitutional: She is oriented to person, place, and time. She appears well-developed and well-nourished. No distress.   Appears older than stated age.   HENT:   Head: Normocephalic and atraumatic.   Eyes: Conjunctivae and EOM are normal. Pupils are equal, round, and reactive to light.   Neck: Normal range of motion.   Cardiovascular: Normal rate, regular rhythm and intact distal pulses.    No murmur heard.  Pulmonary/Chest: Effort normal and breath sounds normal. No respiratory distress. She has no wheezes.   Abdominal: Soft. Bowel sounds are normal. She exhibits no distension. There is no tenderness.   Musculoskeletal: She exhibits edema and tenderness.   Right thigh/groin- gauze/tegaderm dressing in place, sutures in place, unable to assess drainage, minimal induration near labia, minimal erythema to surrounding tissue, tender to palpation.     RUE PICC- CDI, non tender, no erythema.   Neurological: She is alert and oriented to person, place, and time.   Skin: Skin is warm. No rash  noted. There is erythema.   Nursing note and vitals reviewed.      Meds:    Current Facility-Administered Medications:   •  insulin glargine **AND** insulin lispro **AND** insulin lispro **AND** Accu-Chek ACHS **AND** NOTIFY MD and PharmD **AND** glucose 4 g **AND** dextrose 50%  •  oxyCODONE immediate-release  •  enoxaparin (LOVENOX) injection  •  PICC Line Insertion has been implemented **AND** May use Lidocaine 1% not to exceed 3 mls for local at insertion site **AND** NOTIFY MD **AND** Tip to dwell in the superior vena cava **AND** If radiologist reading of chest X-ray states any of the following the PICC should be used **AND** Further evaluation of the PICC placement can be retrieved from X-Ray and Imaging **AND** Blood draws through PICC line; draws by RN only **AND** FLUSHING GUIDELINES WHEN IN USE **AND** normal saline PF **AND** FLUSHING GUIDELINES WHEN NOT IN USE **AND** DRESSING MAINTENANCE **AND** Change needleless pressure ports and IV tubing every 72 hours per hospital policy **AND** TUBING **AND** If there is an MD order to remove the PICC line, any RN may remove the PICC line **AND** [] PATIENT EDUCATION MATERIALS **AND** NURSING COMMUNICATION  •  [DISCONTINUED] insulin regular **AND** Accu-Chek ACHS **AND** NOTIFY MD and PharmD **AND** glucose 4 g **AND** dextrose 50%  •  acetaminophen  •  Pharmacy Consult Request  •  ondansetron    Labs:  Recent Labs      18   WBC  6.2   RBC  3.96*   HEMOGLOBIN  12.2   HEMATOCRIT  36.9*   MCV  93.2   MCH  30.8   RDW  43.1   PLATELETCT  335   MPV  9.5   NEUTSPOLYS  33.80*   LYMPHOCYTES  54.50*   MONOCYTES  8.10   EOSINOPHILS  2.00   BASOPHILS  1.10     Recent Labs      18   SODIUM  141   POTASSIUM  3.9   CHLORIDE  106   CO2  25   GLUCOSE  167*   BUN  16     Recent Labs      18   ALBUMIN  3.0*   TBILIRUBIN  0.4   ALKPHOSPHAT  65   TOTPROTEIN  6.8   ALTSGPT  13   ASTSGOT  9*   CREATININE  0.61       Imaging:  No recent  imaging found.      Micro:  Results     Procedure Component Value Units Date/Time    C Diff by PCR rflx Toxin [[id number]]     Order Status:  Canceled Specimen:  Stool from Stool           Assessment:  Active Hospital Problems    Diagnosis   • Sepsis with acute organ failure secondary to necrotizing fasciitis of R thigh in the setting of hyperglycemia  [A41.9]   • New onset type 2 diabetes mellitus (HCC) [E11.9]   • Hypokalemia [E87.6]   • Non anion gap metabolic acidosis [E87.2]       Plan:  Right thigh necrotizing fasciitis  Tm 99  Leukocytosis resolved  S/p I&D on 6/11/2018, 6/13/2018, 6/18, 6/20  S/p I&D with secondary closure on 6/22 - debridement down to healthy tissue  OR cultures from 6/11 +Group B strep and Prevotella  Continue IV Unasyn while in the hosp  Plan for 2 weeks of IV abx from 6/22  Estimated stop date 07/06/18  [facility] orders completed for IV Ertapenem through 7/6/18, start PO Augmentin x 10 days on 7/7/18.  Tolerated test dose of Ertapenem.    Diabetes mellitus  HgA1c 12.8% on 6/11/18  Keep the blood sugars under 150 to control infection and wound healing    Diarrhea, resolving  Check Cdiff if liquid stool  Monitor    Tobacco abuse  Patient counseled      Discussed with Zion Marmolejo PA-C and CHRISTY Lazo RN.  SHIREEN approved, pt to go to [facility] to complete treatment.  Working to get Wound Care set up.  Per Zion, pt will need WC 3x a week.     OK from ID standpoint for pt to discharge as long as [facility] and Wound Care set up prior.   FU with ID in 1-2 weeks.

## 2018-07-02 NOTE — PROGRESS NOTES
Diabetes education: CDE will bring Accucheck guide meter today. Pt is currently on Lantus and Humalog. Pt can get a month's supply of Lantus solsotar pens, or Basaglar pens ( glargine) as well as Humalog kwik pens, and sandy pen needles if MD writes sample on prescription and it is faxed or e scripted ( sample placed on note) to the [facility] pharmacy. She would have to pick them up once this is accepted. Please write out sliding scale and make ac only if she is to follow this.  Plan: Pt could cover the cost of the strips but would need a prescription for accucheck guide test strips and fastclix lancet to test four times a day. She could take this to any pharmacy.  She could have a month's supply of insulin if MD was to order the above insulin and put sample on the prescription, and fax to  [facility] pharmacy ( if e script put the word sample on the note section). They have sample Sandy pen needles as well. CDE will follow up tomorrow to teach insulin pens ( which is her preference as well). Please call [phone] if needs change. Please do not let her be discharged without a meter.

## 2018-07-02 NOTE — DISCHARGE PLANNING
Anticipated Discharge Disposition: Home with [facility] and Wound care    Action:[facility] appointment scheduled for Tuesday 7/3. Notified that pt needs Wound Care and cannot be discharged until wound care arranged. Called CC Manager, Alisa for approval for [facility] for wound care. She directed me to contact [facility], they do not go to [facility]. Called Lisa, they do not go to [facility]. Called Shanell, they are checking with their admin to see if this can be done    Barriers to Discharge:financial    Plan: home with [facility] and wound care.

## 2018-07-02 NOTE — PROGRESS NOTES
"   Orthopaedic Progress Note    Interval changes:  Cleared by ortho for DC today as long as outpatient infusion and wound care is set up.    ROS - Patient denies any new issues.  Pain well controlled.    Blood pressure 107/74, pulse 77, temperature 36.7 °C (98 °F), resp. rate 16, height 1.499 m (4' 11.02\"), weight 84.2 kg (185 lb 10 oz), SpO2 96 %, not currently breastfeeding.      Patient seen and examined  No acute distress  Breathing non labored  RRR  Right groin dressing with min serous exudate at superior/porximal aspect of incision, DNVI, moves all toes, cap refill < 2 sec.    Recent Labs      07/01/18   0220   WBC  6.2   RBC  3.96*   HEMOGLOBIN  12.2   HEMATOCRIT  36.9*   MCV  93.2   MCH  30.8   MCHC  33.1*   RDW  43.1   PLATELETCT  335   MPV  9.5       Active Hospital Problems    Diagnosis   • Sepsis due to necrotizing fasciitis of R thigh [A41.9]     Priority: High     - s/p I&D on 6/11, 6/15, 6/18, 6/20, 6/22  - Wound cx + for prevotella and group B Strep   - IV Unasyn started 6/13/18 , to be continued till 7/6/18 per ID recs       Plan  - continue IV antibiotics per ID, till 7/6/18.         • Nocturnal hypoxia [G47.34]     Priority: Medium   • New onset type 2 diabetes mellitus (HCC) [E11.9]     Priority: Medium   • Hypoalbuminemia [E88.09]     Priority: Low       Assessment/Plan:  DC home with outpatient infusion and outpatient wound care  POD#10 S/P Secondary closure 15 cm surgical wound  Wt bearing status - WBAT  Wound care/Drains - simple opcite dressing with ace  Future Procedures - none planned   Lovenox: Start 6/13, Duration-until ambulatory > 150'  Sutures/Staples out-  21 days post operatively  PT/OT-initiated  Antibiotics: unasyn 3g IV Q6  DVT Prophylaxis- TEDS/SCDs/Foot pumps/lovenox  Gaston- none  Case Coordination for Discharge Planning - Disposition home   "

## 2018-07-02 NOTE — PROGRESS NOTES
Diabetes education: Pt given and instructed on finger sticks with Accucheck Guide. Discussed using coupon ( MD has written prescription for strips and lancets so she can access the coupon).  Spoke with [facility] pharmacy. Not received prescriptions. Spoke with resident, and scripts in chart. CDE faxed prescriptions for samples for insulin and pen needles to [facility] pharmacy. Called at 1600 and pharmacy needs face sheet. Face sheet printed by Annamaria AMADOR and CDE faxed to [facility] pharmacy.   Per nursing and patient, no discharge today. Informed [facility] pharmacy. Pt's SO can  insulin and pen needles if he has the hard copy of the prescription and knows her name and .  No further questions at this time. Please call [phone] if needs change.

## 2018-07-02 NOTE — PROGRESS NOTES
"Patient seen and examined  Drain removed    Blood pressure 102/70, pulse 74, temperature 36.4 °C (97.6 °F), resp. rate 16, height 1.499 m (4' 11.02\"), weight 84.2 kg (185 lb 10 oz), SpO2 92 %, not currently breastfeeding.    Recent Labs      07/01/18   0220   WBC  6.2   RBC  3.96*   HEMOGLOBIN  12.2   HEMATOCRIT  36.9*   MCV  93.2   MCH  30.8   MCHC  33.1*   RDW  43.1   PLATELETCT  335   MPV  9.5       No acute distress  Dressing clean dry and intact  Neurovascularly intact    POD#10    Plan:  DVT Prophylaxis- TEDS/SCDs  Weight Bearing Status-WBAT  PT/OT  Antibiotics: per ID  Case Coordination          "

## 2018-07-03 ENCOUNTER — OUTPATIENT INFUSION SERVICES (OUTPATIENT)
Dept: ONCOLOGY | Facility: MEDICAL CENTER | Age: 58
End: 2018-07-03
Attending: NURSE PRACTITIONER

## 2018-07-03 ENCOUNTER — APPOINTMENT (OUTPATIENT)
Dept: ONCOLOGY | Facility: MEDICAL CENTER | Age: 58
End: 2018-07-03
Attending: NURSE PRACTITIONER

## 2018-07-03 ENCOUNTER — PATIENT OUTREACH (OUTPATIENT)
Dept: HEALTH INFORMATION MANAGEMENT | Facility: OTHER | Age: 58
End: 2018-07-03

## 2018-07-03 VITALS
BODY MASS INDEX: 37.42 KG/M2 | HEART RATE: 95 BPM | OXYGEN SATURATION: 97 % | TEMPERATURE: 97.3 F | HEIGHT: 59 IN | RESPIRATION RATE: 15 BRPM | DIASTOLIC BLOOD PRESSURE: 65 MMHG | WEIGHT: 185.63 LBS | SYSTOLIC BLOOD PRESSURE: 113 MMHG

## 2018-07-03 VITALS
RESPIRATION RATE: 18 BRPM | BODY MASS INDEX: 26.93 KG/M2 | DIASTOLIC BLOOD PRESSURE: 76 MMHG | TEMPERATURE: 98.4 F | SYSTOLIC BLOOD PRESSURE: 100 MMHG | OXYGEN SATURATION: 98 % | HEIGHT: 59 IN | WEIGHT: 133.6 LBS | HEART RATE: 91 BPM

## 2018-07-03 DIAGNOSIS — A40.1 SEPSIS DUE TO GROUP B STREPTOCOCCUS (HCC): ICD-10-CM

## 2018-07-03 LAB
GLUCOSE BLD-MCNC: 101 MG/DL (ref 65–99)
GLUCOSE BLD-MCNC: 154 MG/DL (ref 65–99)
GLUCOSE BLD-MCNC: 184 MG/DL (ref 65–99)

## 2018-07-03 PROCEDURE — 96365 THER/PROPH/DIAG IV INF INIT: CPT

## 2018-07-03 PROCEDURE — 82962 GLUCOSE BLOOD TEST: CPT

## 2018-07-03 PROCEDURE — 700105 HCHG RX REV CODE 258: Performed by: NURSE PRACTITIONER

## 2018-07-03 PROCEDURE — 99239 HOSP IP/OBS DSCHRG MGMT >30: CPT | Mod: GC | Performed by: INTERNAL MEDICINE

## 2018-07-03 PROCEDURE — 700111 HCHG RX REV CODE 636 W/ 250 OVERRIDE (IP): Performed by: NURSE PRACTITIONER

## 2018-07-03 RX ORDER — ALBUTEROL SULFATE 90 UG/1
2 AEROSOL, METERED RESPIRATORY (INHALATION) EVERY 6 HOURS PRN
Qty: 8.5 G | Refills: 0 | Status: SHIPPED | OUTPATIENT
Start: 2018-07-03 | End: 2019-06-19

## 2018-07-03 RX ORDER — 0.9 % SODIUM CHLORIDE 0.9 %
5 VIAL (ML) INJECTION PRN
Status: CANCELLED | OUTPATIENT
Start: 2018-07-03

## 2018-07-03 RX ORDER — LANCETS 30 GAUGE
EACH MISCELLANEOUS
Qty: 100 EACH | Refills: 0 | Status: SHIPPED | OUTPATIENT
Start: 2018-07-03

## 2018-07-03 RX ORDER — INSULIN GLARGINE 100 [IU]/ML
35 INJECTION, SOLUTION SUBCUTANEOUS EVERY EVENING
Qty: 10 ML | Refills: 0
Start: 2018-07-03 | End: 2019-06-19

## 2018-07-03 RX ORDER — 0.9 % SODIUM CHLORIDE 0.9 %
10-20 VIAL (ML) INJECTION PRN
Status: CANCELLED | OUTPATIENT
Start: 2018-07-03

## 2018-07-03 RX ADMIN — SODIUM CHLORIDE 1000 MG: 900 INJECTION INTRAVENOUS at 17:22

## 2018-07-03 ASSESSMENT — PATIENT HEALTH QUESTIONNAIRE - PHQ9
SUM OF ALL RESPONSES TO PHQ QUESTIONS 1-9: 0
SUM OF ALL RESPONSES TO PHQ9 QUESTIONS 1 AND 2: 0
3. TROUBLE FALLING OR STAYING ASLEEP OR SLEEPING TOO MUCH: NOT AT ALL
9. THOUGHTS THAT YOU WOULD BE BETTER OFF DEAD, OR OF HURTING YOURSELF: NOT AT ALL
2. FEELING DOWN, DEPRESSED, IRRITABLE, OR HOPELESS: NOT AT ALL
5. POOR APPETITE OR OVEREATING: NOT AT ALL
6. FEELING BAD ABOUT YOURSELF - OR THAT YOU ARE A FAILURE OR HAVE LET YOURSELF OR YOUR FAMILY DOWN: NOT AL ALL
8. MOVING OR SPEAKING SO SLOWLY THAT OTHER PEOPLE COULD HAVE NOTICED. OR THE OPPOSITE, BEING SO FIGETY OR RESTLESS THAT YOU HAVE BEEN MOVING AROUND A LOT MORE THAN USUAL: NOT AT ALL
7. TROUBLE CONCENTRATING ON THINGS, SUCH AS READING THE NEWSPAPER OR WATCHING TELEVISION: NOT AT ALL
4. FEELING TIRED OR HAVING LITTLE ENERGY: NOT AT ALL
1. LITTLE INTEREST OR PLEASURE IN DOING THINGS: NOT AT ALL

## 2018-07-03 ASSESSMENT — PAIN SCALES - GENERAL
PAINLEVEL_OUTOF10: 0
PAINLEVEL_OUTOF10: 4

## 2018-07-03 ASSESSMENT — ENCOUNTER SYMPTOMS
FEVER: 0
MUSCULOSKELETAL NEGATIVE: 1
CHILLS: 0

## 2018-07-03 NOTE — PROGRESS NOTES
Kathrin with [facility] called and is asking about the anticipated pt discharge. Dr Joyce called back and said we need to speak w/Taryn the SW who handles all legal holds. Relayed this to Kathrin and she went to their office.

## 2018-07-03 NOTE — DISCHARGE PLANNING
Anticipated Discharge Disposition: Home with home health, outpatient infusion    Action: LSW spoke with  who states pt is scheduled for July 12th, however pt is established with the [facility] and the PA Denise Ames will sign pt's home health orders.     Barriers to Discharge: None    Plan: Pt projected [facility] d[facility]c today [facility]

## 2018-07-03 NOTE — PROGRESS NOTES
Spoke w/Dr Hernandez about the abx Invanz and reported it was a 1x dose. She will look into this and if necessary will place an order for another dose.

## 2018-07-03 NOTE — DISCHARGE PLANNING
Received Choice form at 1250  Agency/Facility Name: [facility]  Referral sent per Choice form at 125.  Choice obtained by IRENE Mccall.

## 2018-07-03 NOTE — PROGRESS NOTES
Internal Medicine Interval Note  Note Author: Maribel Miller M.D.     Name Emmanuelle Martin     1960   Age/Sex 58 y.o. female   MRN [id number]   Code Status Full     After 5PM or if no immediate response to page, please call for cross-coverage  Attending/Team: Dr Willard/KAYLA Cuevas See Patient List for primary contact information  Call [phone] to page    1st Call - Day Intern (R1):   Dr Miller 2nd Call - Day Sr. Resident (R2/R3):   Dr Parson         Reason for interval visit  (Principal Problem)   Sepsis due to right thigh necrotizing fasciitis  Newly diagnosed diabetes mellitus      Interval Problem Daily Status Update  (24 hours, problem oriented, brief subjective history, new lab/imaging data pertinent to that problem)   Afebrile, pain well controlled, no swelling of leg, ambulating. Wound care is being arranged and pt should be discharged today so she can get her IV infusion of ertapenem at outpt infusion.   Review of Systems   Constitutional: Negative for chills and fever.   Musculoskeletal: Negative.        Disposition/Barriers to discharge:   Discharge home today with outpt wound care at [facility] and outpt infusion of abx.     Consultants/Specialty  ID - Dr. Garza  Ortho - Dr. Martinez  PCP: @PCP      Quality Measures  Quality-Core Measures   Reviewed items::  Medications reviewed  Gaston catheter::  No Gaston  DVT prophylaxis pharmacological::  Enoxaparin (Lovenox)  Antibiotics:  Treating active infection/contamination beyond 24 hours perioperative coverage          Physical Exam       Vitals:    18 1600 18 2000 18 0400 18 0800   BP: 106/65 119/81 (!) 95/65 113/65   Pulse: 75 73 82 95   Resp: 15 16 17 15   Temp: 36.4 °C (97.6 °F) 36.9 °C (98.4 °F) 36.3 °C (97.4 °F) 36.3 °C (97.3 °F)   SpO2: 94% 97% 98% 97%   Weight:       Height:         Body mass index is 37.47 kg/m².    Oxygen Therapy:  Pulse Oximetry: 97 %, O2 (LPM): 0, O2 Delivery: None (Room Air)    Physical  Exam  Constitutional: She is oriented to person, place, and time and well-developed, well-nourished, and in no distress.   HENT:   Head: Normocephalic and atraumatic.   Cardiovascular: Normal rate, regular rhythm and normal heart sounds.    Pulmonary/Chest: Effort normal and breath sounds normal.   Abdominal: Soft. Bowel sounds are normal.   Musculoskeletal: She exhibits no edema or tenderness.   Neurological: She is alert and oriented to person, place, and time.   Skin: Skin is warm and dry. No erythema.         Assessment/Plan     Sepsis due to necrotizing fasciitis of R thigh- (present on admission)   Overview    - s/p I&D on 6/11, 6/15, 6/18, 6/20, 6/22  - Wound cx + for prevotella and group B Strep   - IV Unasyn started 6/13/18 , to be continued till 7/6/18 per ID recs       Plan  - continue IV antibiotics per ID, till 7/6/18.         Assessment & Plan    - s/p I&D on 6/11, 6/15, 6/18, 6/20, 6/22  - Wound cx + for prevotella and group B Strep   -Unasyn started 6/13/18 , last dose 7/1/18 then started ertapenem 7/2/18 to be continued till 7/6/18 per ID recs  - wound looks clean and not cellulitic   - pt will return for daily IV daily ertapenem to continue till 7/6/18    Plan  - continue IV antibiotics per ID, till 7/6/18  - wound care per ortho -- pt will return for dressing changes and wound check  - continue ambulate                  Nocturnal hypoxia   Assessment & Plan    - nocturnal oximetry performed overnight  - not on oxygen  - 8 brief episodes of desaturations each lasting 1-3 mins  -c/w sleep apnea  -pt did not use O2 last two nights and there was no noticible desaturations  - patient comfortable, asymptomatic  Plan  - PCP to organize sleep study        New onset type 2 diabetes mellitus (HCC)- (present on admission)   Assessment & Plan    - A1c 12.8% on admission   - on Lantus 35 units pm, Lispro 5 units TID and SS  - good blood sugar control  - Diabetes education provided  - low carbohydrate diet  -  Echo 6/26 : good EF 60% with normal LV and RV systolic function  Plan  - continue Lantus 35 and Lispro to 5 units TID  -pt has met with diabetes educator who is assisting pt with obtaining her diabetic supplies,  Orders faxed        Hypoalbuminemia   Assessment & Plan    - pre-albumin 23  - s/b dietary on 6/25 --> no additional needs, hence signed off

## 2018-07-03 NOTE — PROGRESS NOTES
Pt discharged to personal vehicle via WC accompanied by her SO and PERRY Llamas. Pt escorted to Infusion services for her abx and then home. Pt w/out ss of distress noted at the time of her discharge.

## 2018-07-03 NOTE — DISCHARGE INSTRUCTIONS
Discharge Instructions    Discharged to home by car with self. Discharged via wheelchair, hospital escort: Yes.  Special equipment needed: Walker    Be sure to schedule a follow-up appointment with your primary care doctor or any specialists as instructed.     Discharge Plan:   Diet Plan: Discussed  Activity Level: Discussed  Smoking Cessation Offered: Patient Refused  Confirmed Follow up Appointment: Appointment Scheduled  Confirmed Symptoms Management: Discussed  Medication Reconciliation Updated: Yes  Influenza Vaccine Indication: Patient Refuses    I understand that a diet low in cholesterol, fat, and sodium is recommended for good health. Unless I have been given specific instructions below for another diet, I accept this instruction as my diet prescription.   Other diet: Diabetic    Special Instructions: None    · Is patient discharged on Warfarin / Coumadin?   No   1200 Calorie Diabetic Diet  The 1200 calorie diabetic diet limits calories to 1200 each day. Following this diet and making healthy meal choices can help improve overall health. It controls blood glucose (sugar) levels and can also help lower blood pressure and cholesterol.   SERVING SIZES  Measuring foods and serving sizes helps to make sure you are getting the right amount of food. The list below tells how big or small some common serving sizes are.   · 1 oz.........4 stacked dice.   · 3 oz.........Deck of cards.   · 1 tsp........Tip of little finger.   · 1 tbs........Thumb.   · 2 tbs........Golf ball.   · ½ cup.......Half of a fist.   · 1 cup........A fist.   GUIDELINES FOR CHOOSING FOODS  The goal of this diet is to eat a variety of foods and limit calories to 1200 each day. This can be done by choosing foods that are low in calories and fat. The diet also suggests eating small amounts of food frequently. Doing this helps control your blood glucose levels, so they do not get too high or too low. Each meal or snack may include a protein food  source to help you feel more satisfied. Try to eat about the same amount of food around the same time each day. This includes weekend days, travel days, and days off work. Space your meals about 4 to 5 hours apart, and add a snack between them, if you wish.   For example, a daily food plan could include breakfast, a morning snack, lunch, dinner, and an evening snack. Healthy meals and snacks have different types of foods, including whole grains, vegetables, fruits, lean meats, poultry, fish, and dairy products. As you plan your meals, select a variety of foods. Choose from the bread and starch, vegetable, fruit, dairy, and meat/protein groups. Examples of foods from each group are listed below, with their suggested serving sizes. Use measuring cups and spoons to become familiar with what a healthy portion looks like.  Bread and Starch  Each serving equals 15 grams of carbohydrate.  · 1 slice bread.   · ¼ bagel.   · ¾ cup cold cereal (unsweetened).   · ½ cup hot cereal or mashed potatoes.   · 1 small potato (size of a computer mouse).   ·  cup cooked pasta or rice.   · ½ English muffin.   · 1 cup broth-based soup.   · 3 cups of popcorn.   · 4 to 6 whole-wheat crackers.   · ½ cup cooked beans, peas, or corn.   Vegetables  Each serving equals 5 grams of carbohydrate.  · ½ cup cooked vegetables.   · 1 cup raw vegetables.   · ½ cup tomato or vegetable juice.   Fruit  Each serving equals 15 grams of carbohydrate.  · 1 small apple or orange.   · 1 ¼ cup watermelon or strawberries.   · ½ cup applesauce (no sugar added).   · 2 tbs raisins.   · ½ banana.   · ½ cup canned fruit, packed in water or in its own juice.   · ½ cup unsweetened fruit juice.   Dairy  Each serving equals 12 to 15 grams of carbohydrate.  · 1 cup fat-free milk.   · 6 oz artificially sweetened yogurt or plain yogurt.   · 1 cup low-fat buttermilk.   · 1 cup soy milk.   · 1 cup almond milk.   Meat/Protein  · 1 large egg.   · 2 to 3 oz meat, poultry, or fish.    · ¼ cup low-fat cottage cheese.   · 1 tbs peanut butter.   · 1 oz low-fat cheese.   · ¼ cup tuna, packed in water.   · ½ cup tofu.   Fat  · 1 tsp oil.   · 1 tsp trans-fat-free margarine.   · 1 tsp butter.   · 1 tsp mayonnaise.   · 2 tbs avocado.   · 1 tbs salad dressing.   · 1 tbs cream cheese.   · 2 tbs sour cream.   SAMPLE 1200 CALORIE DIET PLAN  Breakfast  · 1 cup fat-free milk (1 carb serving).   · 1 small orange (1 carb serving).   · 1 scrambled egg.   · 1 slice whole-wheat toast (1 carb serving).   Lunch  · Spring   · 2 slices whole-wheat bread (2 carb servings).   · 2 oz lean meat.   · 2 tsp reduced fat mayonnaise.   · 1 lettuce leaf.   · 2 slices tomato.   · 1 cup carrot sticks.   Afternoon Snack  · 1 small apple (1 carb serving).   · 1 string cheese.   Dinner  · 2 oz meat.   · 1 small baked potato (1 carb serving).   · 1 tsp trans-fat-free margarine.   · 1 cup steamed broccoli.   · 1 cup fat-free milk (1 carb serving).   Evening Snack  · ½ small banana (1 carb serving).   · 6 vanilla wafers (1 carb serving).   MEAL PLAN  You can use this worksheet to help you make a daily meal plan based on the 1200 calorie diabetic diet suggestions. If you are using this plan to help you control your blood glucose, you may interchange carbohydrate containing foods (dairy, starches, and fruits). Select a variety of fresh foods of varying colors and flavors. The total amount of carbohydrate in your meals or snacks is more important than making sure you include all of the food groups every time you eat. You can choose from approximately this many of the following foods to build your day's meals:  · 6 Starches.   · 3 Vegetables.   · 2 Fruits.   · 2 Dairy.   · 4 to 6 oz Meat/Protein.   · Up to 3 Fats.   Your dietician can use this worksheet to help you decide how many servings and which types of foods are right for you.  BREAKFAST  Food Group and Servings / Food Choice  Starch  _________________________________________________________  Dairy __________________________________________________________  Fruit ___________________________________________________________  Meat/Protein____________________________________________________  Fat ____________________________________________________________  LUNCH  Food Group and Servings / Food Choice   Starch _________________________________________________________  Meat/Protein ___________________________________________________  Vegetables _____________________________________________________  Fruit __________________________________________________________  Dairy __________________________________________________________  Fat ____________________________________________________________  AFTERNOON SNACK  Food Group and Servings / Food Choice  Dairy __________________________________________________________  Fruit ___________________________________________________________  Starch __________________________________________________________  Meat/Protein____________________________________________________  DINNER  Food Group and Servings / Food Choice  Starch _________________________________________________________  Meat/Protein ___________________________________________________  Dairy __________________________________________________________  Vegetables _____________________________________________________  Fruit __________________________________________________________  Fat ____________________________________________________________  EVENING SNACK  Food Group and Servings / Food Choice  Fruit ___________________________________________________________  Meat/Protein ____________________________________________________  Dairy __________________________________________________________  Starch __________________________________________________________  DAILY TOTALS  Starches _________________________  Vegetables _______________________  Fruits  "____________________________  Dairy ____________________________  Meat/Protein______________________  Fats _____________________________  Document Released: 07/10/2006 Document Revised: 03/11/2013 Document Reviewed: 11/04/2010  ExitCare® Patient Information ©2013 [employer].    Diets for Diabetes, Food Labeling  Look at food labels to help you decide how much of a product you can eat. You will want to check the amount of total carbohydrate in a serving to see how the food fits into your meal plan. In the list of ingredients, the ingredient present in the largest amount by weight must be listed first, followed by the other ingredients in descending order.  STANDARD OF IDENTITY  Most products have a list of ingredients. However, foods that the Food and Drug Administration (FDA) has given a standard of identity do not need a list of ingredients. A standard of identity means that a food must contain certain ingredients if it is called a particular name. Examples are mayonnaise, peanut butter, ketchup, jelly, and cheese.  LABELING TERMS  There are many terms found on food labels. Some of these terms have specific definitions. Some terms are regulated by the FDA, and the FDA has clearly specified how they can be used. Others are not regulated or well-defined and can be misleading and confusing.  SPECIFICALLY DEFINED TERMS  Nutritive Sweetener.  · A sweetener that contains calories,such as table sugar or honey.  Nonnutritive Sweetener.  · A sweetener with few or no calories,such as saccharin, aspartame, sucralose, and cyclamate.  LABELING TERMS REGULATED BY THE FDA  Free.  · The product contains only a tiny or small amount of fat, cholesterol, sodium, sugar, or calories. For example, a \"fat-free\" product will contain less than 0.5 g of fat per serving.  Low.  · A food described as \"low\" in fat, saturated fat, cholesterol, sodium, or calories could be eaten fairly often without exceeding dietary guidelines. For example, " "\"low in fat\" means no more than 3 g of fat per serving.  Lean.  · \"Lean\" and \"extra lean\" are U.S. Department of Agriculture (USDA) terms for use on meat and poultry products. \"Lean\" means the product contains less than 10 g of fat, 4 g of saturated fat, and 95 mg of cholesterol per serving. \"Lean\" is not as low in fat as a product labeled \"low.\"  Extra Lean.  · \"Extra lean\" means the product contains less than 5 g of fat, 2 g of saturated fat, and 95 mg of cholesterol per serving. While \"extra lean\" has less fat than \"lean,\" it is still higher in fat than a product labeled \"low.\"  Reduced, Less, Fewer.  · A diet product that contains 25% less of a nutrient or calories than the regular version. For example, hot dogs might be labeled \"25% less fat than our regular hot dogs.\"  Light/Lite.  · A diet product that contains  fewer calories or ½ the fat of the original. For example, \"light in sodium\" means a product with ½ the usual sodium.  More.  · One serving contains at least 10% more of the daily value of a vitamin, mineral, or fiber than usual.  Good Source Of.  · One serving contains 10% to 19% of the daily value for a particular vitamin, mineral, or fiber.  Excellent Source Of.  · One serving contains 20% or more of the daily value for a particular nutrient. Other terms used might be \"high in\" or \"rich in.\"  Enriched or Fortified.  · The product contains added vitamins, minerals, or protein. Nutrition labeling must be used on enriched or fortified foods.  Imitation.  · The product has been altered so that it is lower in protein, vitamins, or minerals than the usual food,such as imitation peanut butter.  Total Fat.  · The number listed is the total of all fat found in a serving of the product. Under total fat, food labels must list saturated fat and trans fat, which are associated with raising bad cholesterol and an increased risk of heart blood vessel disease.  Saturated Fat.  · Mainly fats from animal-based " "sources. Some examples are red meat, cheese, cream, whole milk, and coconut oil.  Trans Fat.  · Found in some fried snack foods, packaged foods, and fried restaurant foods. It is recommended you eat as close to 0 g of trans fat as possible, since it raises bad cholesterol and lowers good cholesterol.  Polyunsaturated and Monounsaturated Fats.  · More healthful fats. These fats are from plant sources.  Total Carbohydrate.  · The number of carbohydrate grams in a serving of the product. Under total carbohydrate are listed the other carbohydrate sources, such as dietary fiber and sugars.  Dietary Fiber.  · A carbohydrate from plant sources.  Sugars.  · Sugars listed on the label contain all naturally occurring sugars as well as added sugars.  LABELING TERMS NOT REGULATED BY THE FDA  Sugarless.  · Table sugar (sucrose) has not been added. However, the  may use another form of sugar in place of sucrose to jennifer the product. For example, sugar alcohols are used to jennifer foods. Sugar alcohols are a form of sugar but are not table sugar. If a product contains sugar alcohols in place of sucrose, it can still be labeled \"sugarless.\"  Low Salt, Salt-Free, Unsalted, No Salt, No Salt Added, Without Added Salt.  · Food that is usually processed with salt has been made without salt. However, the food may contain sodium-containing additives, such as preservatives, leavening agents, or flavorings.  Natural.  · This term has no legal meaning.  Organic.  · Foods that are certified as organic have been inspected and approved by the USDA to ensure they are produced without pesticides, fertilizers containing synthetic ingredients, bioengineering, or ionizing radiation.  Document Released: 12/20/2004 Document Revised: 03/11/2013 Document Reviewed: 07/08/2010  ExitCare® Patient Information ©2014 [employer], [facility].        Peripheral Neuropathy  Peripheral neuropathy is a type of nerve damage. It affects nerves that carry signals " between the spinal cord and other parts of the body. These are called peripheral nerves. With peripheral neuropathy, one nerve or a group of nerves may be damaged.  What are the causes?  Many things can damage peripheral nerves. For some people with peripheral neuropathy, the cause is unknown. Some causes include:  · Diabetes. This is the most common cause of peripheral neuropathy.  · Injury to a nerve.  · Pressure or stress on a nerve that lasts a long time.  · Too little vitamin B. Alcoholism can lead to this.  · Infections.  · Autoimmune diseases, such as multiple sclerosis and systemic lupus erythematosus.  · Inherited nerve diseases.  · Some medicines, such as cancer drugs.  · Toxic substances, such as lead and mercury.  · Too little blood flowing to the legs.  · Kidney disease.  · Thyroid disease.  What are the signs or symptoms?  Different people have different symptoms. The symptoms you have will depend on which of your nerves is damaged. Common symptoms include:  · Loss of feeling (numbness) in the feet and hands.  · Tingling in the feet and hands.  · Pain that burns.  · Very sensitive skin.  · Weakness.  · Not being able to move a part of the body (paralysis).  · Muscle twitching.  · Clumsiness or poor coordination.  · Loss of balance.  · Not being able to control your bladder.  · Feeling dizzy.  · Sexual problems.  How is this diagnosed?  Peripheral neuropathy is a symptom, not a disease. Finding the cause of peripheral neuropathy can be hard. To figure that out, your health care provider will take a medical history and do a physical exam. A neurological exam will also be done. This involves checking things affected by your brain, spinal cord, and nerves (nervous system). For example, your health care provider will check your reflexes, how you move, and what you can feel.  Other types of tests may also be ordered, such as:  · Blood tests.  · A test of the fluid in your spinal cord.  · Imaging tests, such  as CT scans or an MRI.  · Electromyography (EMG). This test checks the nerves that control muscles.  · Nerve conduction velocity tests. These tests check how fast messages pass through your nerves.  · Nerve biopsy. A small piece of nerve is removed. It is then checked under a microscope.  How is this treated?  · Medicine is often used to treat peripheral neuropathy. Medicines may include:  ¨ Pain-relieving medicines. Prescription or over-the-counter medicine may be suggested.  ¨ Antiseizure medicine. This may be used for pain.  ¨ Antidepressants. These also may help ease pain from neuropathy.  ¨ Lidocaine. This is a numbing medicine. You might wear a patch or be given a shot.  ¨ Mexiletine. This medicine is typically used to help control irregular heart rhythms.  · Surgery. Surgery may be needed to relieve pressure on a nerve or to destroy a nerve that is causing pain.  · Physical therapy to help movement.  · Assistive devices to help movement.  Follow these instructions at home:  · Only take over-the-counter or prescription medicines as directed by your health care provider. Follow the instructions carefully for any given medicines. Do not take any other medicines without first getting approval from your health care provider.  · If you have diabetes, work closely with your health care provider to keep your blood sugar under control.  · If you have numbness in your feet:  ¨ Check every day for signs of injury or infection. Watch for redness, warmth, and swelling.  ¨ Wear padded socks and comfortable shoes. These help protect your feet.  · Do not do things that put pressure on your damaged nerve.  · Do not smoke. Smoking keeps blood from getting to damaged nerves.  · Avoid or limit alcohol. Too much alcohol can cause a lack of B vitamins. These vitamins are needed for healthy nerves.  · Develop a good support system. Coping with peripheral neuropathy can be stressful. Talk to a mental health specialist or join a  "support group if you are struggling.  · Follow up with your health care provider as directed.  Contact a health care provider if:  · You have new signs or symptoms of peripheral neuropathy.  · You are struggling emotionally from dealing with peripheral neuropathy.  · You have a fever.  Get help right away if:  · You have an injury or infection that is not healing.  · You feel very dizzy or begin vomiting.  · You have chest pain.  · You have trouble breathing.  This information is not intended to replace advice given to you by your health care provider. Make sure you discuss any questions you have with your health care provider.  Document Released: 12/08/2003 Document Revised: 05/25/2017 Document Reviewed: 08/25/2014  [employer] Interactive Patient Education © 2017 [employer]      PICC Home Guide  A peripherally inserted central catheter (PICC) is a long, thin, flexible tube that is inserted into a vein in the upper arm. It is a form of intravenous (IV) access. It is considered to be a \"central\" line because the tip of the PICC ends in a large vein in your chest. This large vein is called the superior vena cava (SVC). The PICC tip ends in the SVC because there is a lot of blood flow in the SVC. This allows medicines and IV fluids to be quickly distributed throughout the body. The PICC is inserted using a sterile technique by a specially trained nurse or physician. After the PICC is inserted, a chest X-ray exam is done to be sure it is in the correct place.   A PICC may be placed for different reasons, such as:  · To give medicines and liquid nutrition that can only be given through a central line. Examples are:  ¨ Certain antibiotic treatments.  ¨ Chemotherapy.  ¨ Total parenteral nutrition (TPN).  · To take frequent blood samples.  · To give IV fluids and blood products.  · If there is difficulty placing a peripheral intravenous (PIV) catheter.  If taken care of properly, a PICC can remain in place for several " "months. A PICC can also allow a person to go home from the hospital early. Medicine and PICC care can be managed at home by a family member or home health care team.  WHAT PROBLEMS CAN HAPPEN WHEN I HAVE A PICC?  Problems with a PICC can occasionally occur. These may include the following:  · A blood clot (thrombus) forming in or at the tip of the PICC. This can cause the PICC to become clogged. A clot-dissolving medicine called tissue plasminogen activator (tPA) can be given through the PICC to help break up the clot.  · Inflammation of the vein (phlebitis) in which the PICC is placed. Signs of inflammation may include redness, pain at the insertion site, red streaks, or being able to feel a \"cord\" in the vein where the PICC is located.  · Infection in the PICC or at the insertion site. Signs of infection may include fever, chills, redness, swelling, or pus drainage from the PICC insertion site.  · PICC movement (malposition). The PICC tip may move from its original position due to excessive physical activity, forceful coughing, sneezing, or vomiting.  · A break or cut in the PICC. It is important to not use scissors near the PICC.  · Nerve or tendon irritation or injury during PICC insertion.  WHAT SHOULD I KEEP IN MIND ABOUT ACTIVITIES WHEN I HAVE A PICC?  · You may bend your arm and move it freely. If your PICC is near or at the bend of your elbow, avoid activity with repeated motion at the elbow.  · Rest at home for the remainder of the day following PICC line insertion.  · Avoid lifting heavy objects as instructed by your health care provider.  · Avoid using a crutch with the arm on the same side as your PICC. You may need to use a walker.  WHAT SHOULD I KNOW ABOUT MY PICC DRESSING?  · Keep your PICC bandage (dressing) clean and dry to prevent infection.  ¨ Ask your health care provider when you may shower. Ask your health care provider to teach you how to wrap the PICC when you do take a shower.  · Change the " "PICC dressing as instructed by your health care provider.  · Change your PICC dressing if it becomes loose or wet.  WHAT SHOULD I KNOW ABOUT PICC CARE?  · Check the PICC insertion site daily for leakage, redness, swelling, or pain.  · Do not take a bath, swim, or use hot tubs when you have a PICC. Cover PICC line with clear plastic wrap and tape to keep it dry while showering.  · Flush the PICC as directed by your health care provider. Let your health care provider know right away if the PICC is difficult to flush or does not flush. Do not use force to flush the PICC.  · Do not use a syringe that is less than 10 mL to flush the PICC.  · Never pull or tug on the PICC.  · Avoid blood pressure checks on the arm with the PICC.  · Keep your PICC identification card with you at all times.  · Do not take the PICC out yourself. Only a trained clinical professional should remove the PICC.  SEEK IMMEDIATE MEDICAL CARE IF:  · Your PICC is accidentally pulled all the way out. If this happens, cover the insertion site with a bandage or gauze dressing. Do not throw the PICC away. Your health care provider will need to inspect it.  · Your PICC was tugged or pulled and has partially come out. Do not  push the PICC back in.  · There is any type of drainage, redness, or swelling where the PICC enters the skin.  · You cannot flush the PICC, it is difficult to flush, or the PICC leaks around the insertion site when it is flushed.  · You hear a \"flushing\" sound when the PICC is flushed.  · You have pain, discomfort, or numbness in your arm, shoulder, or jaw on the same side as the PICC.  · You feel your heart \"racing\" or skipping beats.  · You notice a hole or tear in the PICC.  · You develop chills or a fever.  MAKE SURE YOU:   · Understand these instructions.  · Will watch your condition.  · Will get help right away if you are not doing well or get worse.     This information is not intended to replace advice given to you by your " health care provider. Make sure you discuss any questions you have with your health care provider.     Document Released: 06/23/2004 Document Revised: 01/08/2016 Document Reviewed: 08/25/2014  [employer] Patient Education ©2016 [employer]        Necrotizing Fasciitis  Necrotizing fasciitis is a severe bacterial infection of the skin and the tissues underneath the skin. The bacteria that cause this infection have been called “flesh-eating bacteria” because the infection can quickly kill flesh around the infection.  Necrotizing fasciitis is very rare in people who have a normal disease-fighting (immune) system. It most often affects people who have a weak immune system. This condition is a medical emergency that must be treated quickly. Untreated necrotizing fasciitis can be life-threatening.  What are the causes?  This infection is caused by bacteria. There may be one type of bacteria or a combination of bacteria. Streptococcus and staphylococcus are common types of bacteria that can cause this infection. These bacteria are often found on the skin.  In most cases, bacteria enter the tissue under the skin through a:  · Cut.  · Scrape.  · Surgical incision.  · Insect bite.  · Needle puncture.  Once inside the body, the infection spreads along tissue that covers the muscles (fascia). The bacteria produce poisons (toxins) that kill tissues as the infection spreads. This reduces the blood supply to the surrounding tissues and causes more cell death. This makes it hard for the immune system to fight the infection.  What increases the risk?  You may have a higher risk of this condition if you:  · Have a weak immune system.  · Are of advanced age.  · Have another medical condition, such as:  ¨ Diabetes.  ¨ Cancer.  ¨ Kidney disease.  ¨ Liver disease.  ¨ A blood vessel disease.  ¨ HIV (human immunodeficiency virus) or AIDS (acquired immunodeficiency syndrome).  ¨ Lymphedema.  · Have recently had surgery.  · Are  obese.  · Abuse drugs or alcohol.  What are the signs or symptoms?  Symptoms start quickly. Severe pain is the main symptom. Other early signs and symptoms may include:  · Redness and warmth.  · Flu-like symptoms.  · Skin color changing from red to purple, and then to dark spots.  · Swelling that makes the skin feel hard (hard swelling).  · Crackling noise when pressing on the skin (crepitus).  · High fever and chills.  · Vomiting.  As the condition gets worse, these signs and symptoms may develop:  · Blisters, ulcers, or splitting of the skin.  · Drainage of pus and fluid.  · Rapid breathing.  · Sudden decrease in pain.  · Confusion.  · Loss of consciousness.  How is this diagnosed?  It is important to diagnose this condition as soon as possible. This condition may be diagnosed based on:  · Surgery to open the infected area and check for tissue death (surgical exploration). This is often the most important part of diagnosis. During surgical exploration, samples of tissue may be taken, and dead tissue may be removed.  · Your symptoms.  · Your medical history.  · A physical exam.  · Blood tests.  · Imaging tests, such as:  ¨ X-rays.  ¨ Ultrasound.  ¨ CT scan.  ¨ MRI.  · Taking fluid samples of drainage from the skin or underneath the skin to test for the type of bacteria that may be causing the infection (cultures).  How is this treated?  This condition is treated as an emergency at the hospital. Treatment may include:  · Fluids and antibiotic medicines given through a vein.  · Medicines to support blood pressure.  · Surgery to open the skin and remove dead or dying tissue. This may need to be repeated until your condition improves. Surgical incisions may be packed with gauze and left open until the infection is controlled. After the infection has been controlled, plastic or reconstructive surgery may be done to close the incisions with skin from another area of your body (graft).  Follow these instructions at  home:  · Take your antibiotic medicine as told by your health care provider. Do not stop taking the antibiotic even if you start to feel better or your condition improves.  · Do not use any tobacco products, such as cigarettes, chewing tobacco, and e-cigarettes. If you need help quitting, ask your health care provider.  · Take over-the-counter and prescription medicines only as told by your health care provider.  · Return to your normal activities as told by your health care provider. Ask your health care provider what activities are safe for you.  · Keep all follow-up visits as told by your health care provider. This is important.  How is this prevented?  · Wash your hands frequently with soap and warm water. If soap and water are not available, use hand .  · Wash broken skin with soap and water and cover it with a clean, dry bandage until healed.  · Use germ-killing cream or ointment on cuts and sores.  · Do not go in hot tubs, swimming pools, lakes, or ponds if you have an open cut.  Contact a health care provider if:  · You have a fever or chills.  · You have a wound that develops signs of infection, such as:  ¨ Redness, swelling, or pain.  ¨ Warmth.  ¨ A bad smell.  ¨ Fluid, blood, or pus.  Get help right away if:  · You have severe pain.  · Your skin changes color.  · You develop hard swelling.  · You have a wound that splits open.  This information is not intended to replace advice given to you by your health care provider. Make sure you discuss any questions you have with your health care provider.  Document Released: 02/16/2017 Document Revised: 05/30/2017 Document Reviewed: 02/16/2017  Elsevier Interactive Patient Education © 2017 [employer]vier Inc.        Depression / Suicide Risk    As you are discharged from this [facility] facility, it is important to learn how to keep safe from harming yourself.    Recognize the warning signs:  · Abrupt changes in personality, positive or negative- including  increase in energy   · Giving away possessions  · Change in eating patterns- significant weight changes-  positive or negative  · Change in sleeping patterns- unable to sleep or sleeping all the time   · Unwillingness or inability to communicate  · Depression  · Unusual sadness, discouragement and loneliness  · Talk of wanting to die  · Neglect of personal appearance   · Rebelliousness- reckless behavior  · Withdrawal from people/activities they love  · Confusion- inability to concentrate     If you or a loved one observes any of these behaviors or has concerns about self-harm, here's what you can do:  · Talk about it- your feelings and reasons for harming yourself  · Remove any means that you might use to hurt yourself (examples: pills, rope, extension cords, firearm)  · Get professional help from the community (Mental Health, Substance Abuse, psychological counseling)  · Do not be alone:Call your Safe Contact- someone whom you trust who will be there for you.  · Call your local CRISIS HOTLINE 031-9846 or 191-268-6673  · Call your local Children's Mobile Crisis Response Team [place] (413) 189-2108 or www.[employer]  · Call the toll free National Suicide Prevention Hotlines   · National Suicide Prevention Lifeline 527-626-DCIX (3539)  · National Hope Line Network 800-SUICIDE (434-8895)

## 2018-07-03 NOTE — CARE PLAN
rec'd report from PERRY Juarez and assumed care of this pt. Pt is awake/A&O x4 and sitting on the side of the bed w/out ss of distress noted. Pt denies pain/needs/complaints. Safety measures in place, call light w/in reach. Ann reported pt did NOT get her abx this AM because the ordered dose was a 1x dose. I have paged the Red team at 0711 to clarify if they want another dose given. Awaiting call back.

## 2018-07-03 NOTE — FACE TO FACE
Face to Face Supporting Documentation - Home Health    The encounter with this patient was in whole or in part the primary reason for home health admission.    Date of encounter:   Patient:                    MRN:                       YOB: 2017  Emmanuelle Martin  [id number]  1960     Home health to see patient for:  Skilled Nursing care for assessment, interventions & education and Wound Care    Skilled need for:  Surgical Aftercare status post necratizing fasciatis    Skilled nursing interventions to include:  Wound Care    Homebound status evidenced by:  Need the aid of supportive devices such as crutches, canes, wheelchairs or walkers. Leaving home requires a considerable and taxing effort. There is a normal inability to leave the home.    Community Physician to provide follow up care: IRINEO Willett     Optional Interventions? No      I certify the face to face encounter for this home health care referral meets the CMS requirements and the encounter/clinical assessment with the patient was, in whole, or in part, for the medical condition(s) listed above, which is the primary reason for home health care. Based on my clinical findings: the service(s) are medically necessary, support the need for home health care, and the homebound criteria are met.  I certify that this patient has had a face to face encounter by myself.  Juan Miguel Marmolejo P.A.-C. - NPI: [id number]

## 2018-07-03 NOTE — DISCHARGE PLANNING
Received fax from [place] requiring signature on their form for SHIREEN. Spoke to Alisa,she auth'd and Hilary signed. Faxed back to [facility].

## 2018-07-03 NOTE — LETTER
Infusion Services   [address] [place] [address]  SANDI Hines [address]  Phone: [phone]  Fax: [phone]              Dear Muriel,    Your patient, Emmanuelle Martin (: 1960), was scheduled at [facility].  Emmanuelle's encounter diagnosis is:  1. Sepsis due to group B Streptococcus (HCC)  ertapenem (INVANZ) 1,000 mg in  mL IVPB     She arrived for her appointment, and  the scheduled treatment was   given. These medications were administered to the patient: We administered (ertapenem (INVANZ) 1,000 mg in  mL IVPB)..  Emmanuelle tolerated treatment.. In addition, the following labs were drawn    Recent Results (from the past 24 hour(s))   ACCU-CHEK GLUCOSE    Collection Time: 18  9:11 PM   Result Value Ref Range    Glucose - Accu-Ck 184 (H) 65 - 99 mg/dL   ACCU-CHEK GLUCOSE    Collection Time: 18  6:43 AM   Result Value Ref Range    Glucose - Accu-Ck 101 (H) 65 - 99 mg/dL   ACCU-CHEK GLUCOSE    Collection Time: 18 11:11 AM   Result Value Ref Range    Glucose - Accu-Ck 154 (H) 65 - 99 mg/dL            Her next appointment is rescheduled for 2018.    Do you need to see patient before her last appointment on     For more information, you may review the nurse's progress notes in chart review under the notes section.       Sincerely,  Opal Stewart R.N.

## 2018-07-03 NOTE — DISCHARGE PLANNING
Anticipated Discharge Disposition: Discharge to home with [facility] and [facility]    Action:Pt has been accepted to [facility], will go to [facility] today for first appt at 5pm    Barriers to Discharge: None     Plan: discharge to home

## 2018-07-04 ENCOUNTER — OUTPATIENT INFUSION SERVICES (OUTPATIENT)
Dept: ONCOLOGY | Facility: MEDICAL CENTER | Age: 58
End: 2018-07-04
Attending: NURSE PRACTITIONER

## 2018-07-04 VITALS
OXYGEN SATURATION: 94 % | SYSTOLIC BLOOD PRESSURE: 114 MMHG | RESPIRATION RATE: 18 BRPM | HEART RATE: 98 BPM | DIASTOLIC BLOOD PRESSURE: 66 MMHG | TEMPERATURE: 97.7 F

## 2018-07-04 DIAGNOSIS — M72.6 NECROTIZING FASCIITIS (HCC): ICD-10-CM

## 2018-07-04 DIAGNOSIS — A40.1 SEPSIS DUE TO GROUP B STREPTOCOCCUS (HCC): ICD-10-CM

## 2018-07-04 PROCEDURE — 700105 HCHG RX REV CODE 258: Performed by: NURSE PRACTITIONER

## 2018-07-04 PROCEDURE — 700111 HCHG RX REV CODE 636 W/ 250 OVERRIDE (IP): Performed by: NURSE PRACTITIONER

## 2018-07-04 PROCEDURE — 96365 THER/PROPH/DIAG IV INF INIT: CPT

## 2018-07-04 RX ORDER — 0.9 % SODIUM CHLORIDE 0.9 %
5 VIAL (ML) INJECTION PRN
Status: CANCELLED | OUTPATIENT
Start: 2018-07-04

## 2018-07-04 RX ORDER — AMOXICILLIN AND CLAVULANATE POTASSIUM 875; 125 MG/1; MG/1
1 TABLET, FILM COATED ORAL 2 TIMES DAILY
Qty: 20 TAB | Refills: 0 | Status: SHIPPED | OUTPATIENT
Start: 2018-07-07 | End: 2018-07-11 | Stop reason: SDUPTHER

## 2018-07-04 RX ORDER — 0.9 % SODIUM CHLORIDE 0.9 %
10-20 VIAL (ML) INJECTION PRN
Status: CANCELLED | OUTPATIENT
Start: 2018-07-04

## 2018-07-04 RX ORDER — IBUPROFEN 200 MG
200 TABLET ORAL EVERY 6 HOURS PRN
COMMUNITY

## 2018-07-04 RX ADMIN — SODIUM CHLORIDE 1000 MG: 900 INJECTION INTRAVENOUS at 15:59

## 2018-07-04 ASSESSMENT — PAIN SCALES - GENERAL: PAINLEVEL_OUTOF10: 4

## 2018-07-04 NOTE — PROGRESS NOTES
"Pt ambulated into department for her first infusion of Invanz at [facility]. Pt stated that she has been receiving this medication in the hospital and has tolerated well. RN educated Pt on the possible side effects of Invanz, and S/S of when to follow up with her MD or the ED. Pt reported having fatigue, declined GI distress. PICC had + blood return prior, flushed briskly. Invanz infused as prescribed, Pt tolerated well. PICC flushed per [facility] policy, clave changed, line secured to arm with 4x4's and tube gauze. Future appointments confirmed with Pt prior to leaving, left department with \"boyfriend\" appearing in good spirits and NAD.  "

## 2018-07-04 NOTE — PROGRESS NOTES
Spoke with Alejandra, RN at [facility]- pt was not discharged with PO Augmentin as per our recommendations.  Script sent to pt's pharmacy x 10 days, to be started on 7/7/18.  Ok to d/c PICC on 7/6/18.  Pt to FU with ID next week.

## 2018-07-04 NOTE — PROGRESS NOTES
Patient arrived to clinic for daily Invanz.  Denies any discomfort.  PICC line flushed with good blood return noted.  Invanz infused per order, pt tolerated well.  PICC line flushed and gauze/mesh cover placed.  Confirmed tomorrow's appointment and pt ambulated out of clinic in no apparent distress.    Received call from SHERIE Card in am regarding pt plan of care.  Per Muriel, pull PICC line after last treatment on 7/6.  She called in prescription for Augmentin to patient pharmacy that she needs to  and patient needs to call her office to make follow up appointment for next week.      Late Entry:  Message sent to Muriel Pickett for clarification of last treatment day.  Per phone call this am Muirel stated 7/6, end of treatment per therapy plan is 7/10.  Will leave note to follow up tomorrow.

## 2018-07-05 ENCOUNTER — OUTPATIENT INFUSION SERVICES (OUTPATIENT)
Dept: ONCOLOGY | Facility: MEDICAL CENTER | Age: 58
End: 2018-07-05
Attending: NURSE PRACTITIONER

## 2018-07-05 VITALS
SYSTOLIC BLOOD PRESSURE: 114 MMHG | TEMPERATURE: 98.5 F | DIASTOLIC BLOOD PRESSURE: 68 MMHG | RESPIRATION RATE: 18 BRPM | OXYGEN SATURATION: 97 % | HEART RATE: 86 BPM

## 2018-07-05 DIAGNOSIS — A40.1 SEPSIS DUE TO GROUP B STREPTOCOCCUS (HCC): ICD-10-CM

## 2018-07-05 PROCEDURE — 700111 HCHG RX REV CODE 636 W/ 250 OVERRIDE (IP): Performed by: NURSE PRACTITIONER

## 2018-07-05 PROCEDURE — 700105 HCHG RX REV CODE 258: Performed by: NURSE PRACTITIONER

## 2018-07-05 PROCEDURE — 96365 THER/PROPH/DIAG IV INF INIT: CPT

## 2018-07-05 RX ORDER — 0.9 % SODIUM CHLORIDE 0.9 %
10-20 VIAL (ML) INJECTION PRN
Status: CANCELLED | OUTPATIENT
Start: 2018-07-05

## 2018-07-05 RX ORDER — 0.9 % SODIUM CHLORIDE 0.9 %
5 VIAL (ML) INJECTION PRN
Status: CANCELLED | OUTPATIENT
Start: 2018-07-05

## 2018-07-05 RX ADMIN — SODIUM CHLORIDE 1000 MG: 900 INJECTION INTRAVENOUS at 16:14

## 2018-07-05 ASSESSMENT — PAIN SCALES - GENERAL: PAINLEVEL_OUTOF10: 4

## 2018-07-05 NOTE — PROGRESS NOTES
Patient arrived to clinic for daily Invanz.  Denies any acute changes.  Clarified end of treatment date with Muriel Pickett and 7/6 is last day of treatment.  Therapy plan changed to reflect correct day.  Per Muriel, OK to pull PICC line after treatment on 7/6.  Patient also verified that she picked up PO abx prescription from pharmacy and will start on Saturday.  PICC line flushed with good blood return noted.  Invanz infused per order, pt tolerated well.  PICC line flushed, gauze/mesh cover placed.  Confirmed tomorrow's appointment and pt ambulated out of clinic in no apparent distress.

## 2018-07-06 ENCOUNTER — OUTPATIENT INFUSION SERVICES (OUTPATIENT)
Dept: ONCOLOGY | Facility: MEDICAL CENTER | Age: 58
End: 2018-07-06
Attending: NURSE PRACTITIONER

## 2018-07-06 VITALS
TEMPERATURE: 98.7 F | SYSTOLIC BLOOD PRESSURE: 120 MMHG | BODY MASS INDEX: 27.56 KG/M2 | WEIGHT: 136.69 LBS | OXYGEN SATURATION: 98 % | RESPIRATION RATE: 16 BRPM | HEART RATE: 86 BPM | HEIGHT: 59 IN | DIASTOLIC BLOOD PRESSURE: 73 MMHG

## 2018-07-06 DIAGNOSIS — A40.1 SEPSIS DUE TO GROUP B STREPTOCOCCUS (HCC): ICD-10-CM

## 2018-07-06 PROCEDURE — 306780 HCHG STAT FOR TRANSFUSION PER CASE

## 2018-07-06 PROCEDURE — 700105 HCHG RX REV CODE 258: Performed by: NURSE PRACTITIONER

## 2018-07-06 PROCEDURE — 96365 THER/PROPH/DIAG IV INF INIT: CPT

## 2018-07-06 PROCEDURE — 700105 HCHG RX REV CODE 258

## 2018-07-06 PROCEDURE — 700111 HCHG RX REV CODE 636 W/ 250 OVERRIDE (IP): Performed by: NURSE PRACTITIONER

## 2018-07-06 RX ORDER — 0.9 % SODIUM CHLORIDE 0.9 %
10-20 VIAL (ML) INJECTION PRN
Status: CANCELLED | OUTPATIENT
Start: 2018-07-06

## 2018-07-06 RX ORDER — 0.9 % SODIUM CHLORIDE 0.9 %
5 VIAL (ML) INJECTION PRN
Status: CANCELLED | OUTPATIENT
Start: 2018-07-06

## 2018-07-06 RX ADMIN — SODIUM CHLORIDE 1000 MG: 900 INJECTION INTRAVENOUS at 15:42

## 2018-07-06 ASSESSMENT — PAIN SCALES - GENERAL: PAINLEVEL_OUTOF10: 4

## 2018-07-06 NOTE — PROGRESS NOTES
Pt arrived ambulatory to [facility] for last dose of Invanz infusion.  Pt denies new complaints today.  She confirms that she has picked up Augmentin prescription and is aware of instructions.  RUE PICC line in place with positive blood return.  Invanz infused as ordered without complication.  PICC line discontinued as ordered, pt monitored for 30 minutes - no signs of bleeding to site.  Post PICC line instructions given to patient.  Site covered with gauze and pressure dressing.  Pt aware of f/u appointment with ID.  [facility] to see patient starting tomorrow.  Call received from PERRY Bonilla at [facility] to confirm patient will be home to have wound care.  Pt discharged from [facility] in NAD under self care.

## 2018-07-11 ENCOUNTER — DOCUMENTATION (OUTPATIENT)
Dept: INFECTIOUS DISEASES | Facility: MEDICAL CENTER | Age: 58
End: 2018-07-11

## 2018-07-11 ENCOUNTER — OFFICE VISIT (OUTPATIENT)
Dept: INFECTIOUS DISEASES | Facility: MEDICAL CENTER | Age: 58
End: 2018-07-11

## 2018-07-11 VITALS
WEIGHT: 137.8 LBS | SYSTOLIC BLOOD PRESSURE: 120 MMHG | BODY MASS INDEX: 27.05 KG/M2 | HEIGHT: 60 IN | HEART RATE: 107 BPM | OXYGEN SATURATION: 94 % | DIASTOLIC BLOOD PRESSURE: 60 MMHG | TEMPERATURE: 96.8 F

## 2018-07-11 DIAGNOSIS — M72.6 NECROTIZING FASCIITIS (HCC): ICD-10-CM

## 2018-07-11 PROCEDURE — 99214 OFFICE O/P EST MOD 30 MIN: CPT | Performed by: NURSE PRACTITIONER

## 2018-07-11 RX ORDER — AMOXICILLIN AND CLAVULANATE POTASSIUM 875; 125 MG/1; MG/1
1 TABLET, FILM COATED ORAL 2 TIMES DAILY
Qty: 28 TAB | Refills: 0 | Status: SHIPPED | OUTPATIENT
Start: 2018-07-11 | End: 2018-07-25

## 2018-07-11 NOTE — PROGRESS NOTES
"Subjective:     Chief Complaint   Patient presents with   • Hospital Follow-up     FU Right thigh necrotizing fasciitis     Infectious Disease clinic follow up  Emmanuelle Martin 58 y.o.female in clinic today for evaluation and management of Right thigh necrotizing fasciitis. Primary care provider: GARCIA Willett.  This is my first time meeting Ms. Martin. +tobacco abuse- 15 pack years. Denies alcohol and drug use. +DM- pt reporting BS \"120-270, today 142.\"  On Lantus. HgbA1C 12.8 6/11/18.    Interval History: pt hospitalized 6/11/18-7/3/18 due to Right thigh necroting fasciitis.  She underwent multiple I&Ds of the right thigh- 6/11, 6/15, 6/18, 6/20 by Dr Martinez.  On 6/22 I&D down to healthy tissue with secondary closure by Dr Martinez. Discharged to [facility] on   IV Ertapenem end date 7/6/18 followed PO Augmentin (start 7/7/18) x 10 days.      Hospital records reviewed    Today 7/11/18: Patient reports feeling well. Pt stating that she is receiving wound care through home health.  She is concerned stating that she was only approved for 4 visits because she does not have insurance. She feels the wound is healing well, but feels she may need more home health visits. No wound photos available. Sutures are in place. She states that she has an upcoming appt with Dr Martinez to remove sutures. Denies drainage, pungent odor, redness, pain. Denies feeling generally ill, fevers/chills, general malaise, headache, n/v/d.       Past Medical History:   Diagnosis Date   • Cystocele 3/31/2015   • Rectocele 3/31/2015   • Tobacco abuse        Allergies: Patient has no known allergies.    Current medications and problem list reviewed with patient and updated in EPIC.    ROS  As documented above in my HPI       Objective:     PE:  /60   Pulse (!) 107   Temp 36 °C (96.8 °F)   Ht 1.524 m (5')   Wt 62.5 kg (137 lb 12.8 oz)   SpO2 94%   BMI 26.91 kg/m²      Vital signs reviewed  Constitutional: patient is " oriented to person, place, and time. Appears well-developed and well-nourished. No distress  Eyes: Conjunctivae normal and EOM are normal. Pupils are equal, round, and reactive to light.   Mouth/Throat: Lips without lesions, good dentition, oropharynx is clear and moist.  Neck: Trachea midline. Normal range of motion. Neck supple. No masses  Cardiovascular: Normal rate, regular rhythm, normal heart sounds and intact distal pulses. No murmur, gallop, or friction rub. No edema.  Pulmonary/Chest: No respiratory distress. Unlabored respiratory effort, lungs clear to auscultation. No wheezes or rales.   Abdominal: Soft, non tender. BS + x 4. No masses or hepatosplenomegaly.   Musculoskeletal: Normal range of motion. No tenderness, swelling, erythema, deformity noted. Using a walker  Neurological: He is alert and oriented to person, place, and time. No cranial nerve deficit. Coordination normal.   Skin: Skin is warm and dry. Good turgor. No rashes visable.  Right thigh incision- sutures in place. Area with mild dehiscence, minimal purulent drainage.    Psychiatric: Normal mood and affect. Behavior is normal.       Assessment and Plan:   The following treatment plan was discussed with patient at length  1. Necrotizing fasciitis (HCC)  amoxicillin-clavulanate (AUGMENTIN) 875-125 MG Tab     Extend Augmentin an additional week due to delayed healing, dehiscence, drainage  FU with Dr Martinez as scheduled  Follow up: 1 week, RTC sooner if needed. FU with PCP for ongoing chronic medical conditions. To ER with worsening wound appearance, pain, erythema, drainage, swelling, streaking, feeling generally ill, fevers/chills.

## 2018-07-12 ENCOUNTER — OFFICE VISIT (OUTPATIENT)
Dept: MEDICAL GROUP | Facility: CLINIC | Age: 58
End: 2018-07-12

## 2018-07-12 VITALS
OXYGEN SATURATION: 98 % | HEART RATE: 82 BPM | SYSTOLIC BLOOD PRESSURE: 114 MMHG | RESPIRATION RATE: 14 BRPM | TEMPERATURE: 98.2 F | WEIGHT: 138 LBS | DIASTOLIC BLOOD PRESSURE: 68 MMHG | BODY MASS INDEX: 27.09 KG/M2 | HEIGHT: 60 IN

## 2018-07-12 DIAGNOSIS — Z72.0 TOBACCO ABUSE: ICD-10-CM

## 2018-07-12 DIAGNOSIS — Z00.00 ENCOUNTER FOR MEDICAL EXAMINATION TO ESTABLISH CARE: ICD-10-CM

## 2018-07-12 DIAGNOSIS — Z09 HOSPITAL DISCHARGE FOLLOW-UP: ICD-10-CM

## 2018-07-12 DIAGNOSIS — E11.8 TYPE 2 DIABETES MELLITUS WITH COMPLICATION, UNSPECIFIED WHETHER LONG TERM INSULIN USE: ICD-10-CM

## 2018-07-12 DIAGNOSIS — M72.6 NECROTIZING FASCIITIS (HCC): ICD-10-CM

## 2018-07-12 PROBLEM — R73.9 HYPERGLYCEMIA: Status: RESOLVED | Noted: 2018-06-11 | Resolved: 2018-07-12

## 2018-07-12 PROBLEM — A41.9 SEPSIS (HCC): Status: RESOLVED | Noted: 2018-06-11 | Resolved: 2018-07-12

## 2018-07-12 PROCEDURE — 99213 OFFICE O/P EST LOW 20 MIN: CPT | Performed by: PHYSICIAN ASSISTANT

## 2018-07-12 NOTE — ASSESSMENT & PLAN NOTE
This patient was recently hospitalized for approximately 1 month with necrotizing fasciitis and continues follow-up with wound care for the wound on her thigh.

## 2018-07-12 NOTE — LETTER
July 12, 2018       Patient: Emmanuelle Martin   YOB: 1960   Date of Visit: 7/12/2018         To Whom It May Concern:    It is my medical opinion that Emmanuelle Martin remain out of work until 7/23/2018 when she may return to light duty with the following restrictions that she should work a seated position and is unable to lift heavy objects beyond 5 pounds.    If you have any questions or concerns, please don't hesitate to call [phone]          Sincerely,        Denise Ames P.A.-C.  Electronically Signed

## 2018-07-12 NOTE — ASSESSMENT & PLAN NOTE
This patient continues to smoke cigarettes on a daily basis.  She currently smokes about a half a pack per day with no desire to quit.

## 2018-07-12 NOTE — PROGRESS NOTES
Chief Complaint   Patient presents with   • Establish Care       HISTORY OF PRESENT ILLNESS: Patient is a 58 y.o. female established patient who presents today for evaluation and management of:    Necrotizing fasciitis (HCC)  Continues following up with infectious disease. Recently re-prescribed Augmentin and has a new follow-up visit with infectious disease plan to the next few days.    Tobacco abuse  This patient continues to smoke cigarettes on a daily basis.  She currently smokes about a half a pack per day with no desire to quit.    New onset type 2 diabetes mellitus (HCC)  Last A1c: 12.8% June,2018  DM Medications: Lantus 35 units per evening, Humalog 5 units 3 times daily patient reports moderate medication compliance, frequently forgetting Humalog use..   HTN: Blood pressure goal <140/<90 Yes  Hyperlipidemia: Cholesterol goal LDL <100 No.   Diabetic diet: No  Exercise: none  Checks feet at home: Yes, no sores currently   Last Eye exam: Approximately 3 years ago, patient cannot afford a new one.  Kidney function: GFR greater than 60 while inpatient in June 2018.  Microalbumin screening: None recently.  Patient cannot afford this lab.  Has patient received flu vaccine: No  Has patient received Hep B series:No    A1c goal <7 No  Current barriers to control include medication management and diet as well as finances  Glucose monitoring frequency: 4 times daily  Fasting sugars: 140s, per patient. Post-prandial sugars: Patient cannot recall  Hypoglycemic episodes No  Diabetic complications: peripheral neuropathy and peripheral vascular disease    The patient is not taking ASA every day and is not taking all other medications as prescribed. Patient denies any side effects of medication.      Hospital discharge follow-up  This patient was recently hospitalized for approximately 1 month with necrotizing fasciitis and continues follow-up with wound care for the wound on her thigh.    Encounter for medical examination  to establish care  This patient wishes to establish care with a new primary care provider at this time.       Patient Active Problem List    Diagnosis Date Noted   • Necrotizing fasciitis (HCC) 06/11/2018     Priority: High   • Nocturnal hypoxia 06/28/2018     Priority: Medium   • New onset type 2 diabetes mellitus (HCC) 06/12/2018     Priority: Medium   • Hypoalbuminemia 06/25/2018     Priority: Low   • Hospital discharge follow-up 07/12/2018   • Encounter for medical examination to establish care 07/12/2018   • Cystocele with rectocele 03/31/2015   • Rectocele 03/31/2015   • Tobacco abuse        Allergies:Patient has no known allergies.    Current Outpatient Prescriptions   Medication Sig Dispense Refill   • insulin glargine (LANTUS) 100 UNIT/ML Solution Inject 35 Units as instructed every evening. 10 mL 0   • insulin lispro (HUMALOG) 100 UNIT/ML Solution Inject 5 Units as instructed 3 times a day before meals. 10 mL 0   • Blood Glucose Monitoring Suppl Device Meter: Dispense Device of Insurance Preference. Use as directed for blood sugar monitoring. 1 Device 0   • glucose blood (ACCU-CHEK SMARTVIEW) strip 1 Strip by Other route as needed. 50 Strip 0   • Lancets Misc Lancets order: Lancets for meter. Use daily and as needed in times of high or low blood sugars. 100 Each 0   • amoxicillin-clavulanate (AUGMENTIN) 875-125 MG Tab Take 1 Tab by mouth 2 times a day for 14 days. 28 Tab 0   • ibuprofen (MOTRIN) 200 MG Tab Take 200 mg by mouth every 6 hours as needed.     • albuterol 108 (90 Base) MCG/ACT Aero Soln inhalation aerosol Inhale 2 Puffs by mouth every 6 hours as needed for Shortness of Breath. 8.5 g 0     No current facility-administered medications for this visit.        Social History   Substance Use Topics   • Smoking status: Current Every Day Smoker     Packs/day: 0.50     Years: 20.00     Types: Cigarettes   • Smokeless tobacco: Never Used   • Alcohol use No       Family Status   Relation Status   • Mother  Alive   • Father    • Sister Alive   • Brother Alive   • Sister Alive   • Brother Alive   • Brother Alive   • Child Alive   • Child Alive   • Child Alive     Family History   Problem Relation Age of Onset   • Diabetes Father      toe amputation due to this   • Heart Disease Father 82   • Diabetes Sister        Review of Systems: See HPI above.   Constitutional: Negative for fever, chills, weight loss and positive for some malaise/fatigue.   HENT: Negative for ear pain, nosebleeds, congestion, sore throat and neck pain.    Eyes: Negative for blurred vision.   Respiratory: Positive for daily smoker's cough with some sputum production, shortness of breath and wheezing.    Cardiovascular: Negative for chest pain, palpitations, orthopnea and positive for right lower leg swelling.   Gastrointestinal: Negative for heartburn, nausea, vomiting and abdominal pain.   Genitourinary: Negative for dysuria, urgency and frequency.   Musculoskeletal: Negative for myalgias, back pain and joint pain.   Skin: Negative for rash and itching.   Neurological: Negative for dizziness, tingling, tremors, sensory change, focal weakness and headaches.   Endo/Heme/Allergies: Does not bruise/bleed easily.   Psychiatric/Behavioral: Negative for depression, suicidal ideas and memory loss.  The patient is not nervous/anxious and does not have insomnia.    Skin: Wound present on right upper thigh.  Patient continues frequent wound care.    Exam:  Blood pressure 114/68, pulse 82, temperature 36.8 °C (98.2 °F), resp. rate 14, height 1.524 m (5'), weight 62.6 kg (138 lb), SpO2 98 %.  Body mass index is 26.95 kg/m².  General:  Well Developed, Well Nourished female appearing elderly for her age in NAD  Head: is grossly normal.  Neck: Supple without masses. Thyroid is not visibly enlarged.  Pulmonary: Diminished breath sounds to auscultation in all lobes bilaterally.  Fine crackles to ausculation. Normal effort at rest.  No wheeze or rhonchi  auscultated.  Cardiovascular: Regular rate and rhythm without murmur. Carotid and radial pulses are intact and equal bilaterally.  Extremities: no clubbing, cyanosis.  Right lower leg edema.  Wound on right upper thigh is covered with a bandage.    Medical decision-making and discussion:  1. Necrotizing fasciitis (HCC)  Continue follow-up with infectious disease.    2. Type 2 diabetes mellitus with complication, unspecified whether long term insulin use (HCC)  Patient was requested to see a diabetic specialist in order to control her diabetes due to her lack of insurance.  - C-PEPTIDE; Future    3. Tobacco abuse  Patient was strongly advised to quit smoking as this may be impeding her healing process of her wound and definitely contributes to worsening comorbidities from diabetes.    4. Encounter for medical examination to establish care  I am happy to participate in the care of this patient.    5. Hospital discharge follow-up        Please note that this dictation was created using voice recognition software. I have made every reasonable attempt to correct obvious errors, but I expect that there are errors of grammar and possibly content that I did not discover before finalizing the note.      Return in about 5 weeks (around 8/15/2018) for dayan tenorio for diabetes.

## 2018-07-12 NOTE — ASSESSMENT & PLAN NOTE
Last A1c: 12.8% June,2018  DM Medications: Lantus 35 units per evening, Humalog 5 units 3 times daily patient reports moderate medication compliance, frequently forgetting Humalog use..   HTN: Blood pressure goal <140/<90 Yes  Hyperlipidemia: Cholesterol goal LDL <100 No.   Diabetic diet: No  Exercise: none  Checks feet at home: Yes, no sores currently   Last Eye exam: Approximately 3 years ago, patient cannot afford a new one.  Kidney function: GFR greater than 60 while inpatient in June 2018.  Microalbumin screening: None recently.  Patient cannot afford this lab.  Has patient received flu vaccine: No  Has patient received Hep B series:No    A1c goal <7 No  Current barriers to control include medication management and diet as well as finances  Glucose monitoring frequency: 4 times daily  Fasting sugars: 140s, per patient. Post-prandial sugars: Patient cannot recall  Hypoglycemic episodes No  Diabetic complications: peripheral neuropathy and peripheral vascular disease    The patient is not taking ASA every day and is not taking all other medications as prescribed. Patient denies any side effects of medication.

## 2018-07-12 NOTE — ASSESSMENT & PLAN NOTE
Continues following up with infectious disease. Recently re-prescribed Augmentin and has a new follow-up visit with infectious disease plan to the next few days.

## 2018-07-13 NOTE — TELEPHONE ENCOUNTER
Was the patient seen in the last year in this department? Yes     Does patient have an active prescription for medications requested? Yes     Received Request Via: Patient     Patient saw Molly yesterday did not realize she was out of insulin needs a refill as soon as possible

## 2018-07-25 ENCOUNTER — OFFICE VISIT (OUTPATIENT)
Dept: INFECTIOUS DISEASES | Facility: MEDICAL CENTER | Age: 58
End: 2018-07-25

## 2018-07-25 VITALS
HEIGHT: 60 IN | HEART RATE: 90 BPM | OXYGEN SATURATION: 98 % | BODY MASS INDEX: 28 KG/M2 | SYSTOLIC BLOOD PRESSURE: 100 MMHG | DIASTOLIC BLOOD PRESSURE: 58 MMHG | WEIGHT: 142.6 LBS | TEMPERATURE: 97.7 F

## 2018-07-25 DIAGNOSIS — M72.6 NECROTIZING FASCIITIS (HCC): ICD-10-CM

## 2018-07-25 PROCEDURE — 99214 OFFICE O/P EST MOD 30 MIN: CPT | Performed by: NURSE PRACTITIONER

## 2018-07-25 NOTE — PROGRESS NOTES
"Subjective:     Chief Complaint   Patient presents with   • Follow-Up     Necrotizing fasciitis (HCC)     Infectious Disease clinic follow up  Emmanuelle Martin 58 y.o.female in clinic today for evaluation and management of Right thigh necrotizing fasciitis. Primary care provider: GARCIA Willett.  This is my first time meeting Ms. Martin. +tobacco abuse- 15 pack years. Denies alcohol and drug use. +DM- pt reporting BS \"umm, Im working on it. This morning it was 180.\"  On Lantus. HgbA1C 12.8 6/11/18.      Interval History: pt hospitalized 6/11/18-7/3/18 due to Right thigh necroting fasciitis.  She underwent multiple I&Ds of the right thigh- 6/11, 6/15, 6/18, 6/20 by Dr Martinez.  On 6/22 I&D down to healthy tissue with secondary closure by Dr Martinez. [facility] to [facility] on   IV Ertapenem end date 7/6/18 followed PO Augmentin (start 7/7/18) x 10 days.      When seen in clinic 7/11/18- Augmentin was extended due to delayed healing, dehiscence, drainage.     Records reviewed    Today 7/25/18: Patient reports feeling well. Pt stating that she is no longer receiving wound care through home health (Friday was her last day)- stating that she was only approved for 4 visits. She has been changing the dressing herself at home.   No wound photos available. Sutures are in place. She is now stating that she has not yet called and scheduled an appt with Dr Martinez to remove sutures. + Drainage. Denies  pungent odor, redness, pain. Denies feeling generally ill, fevers/chills, general malaise, headache. Pt stating that she feels she is doing well. She has returned to work and states she is no longer using her walker. She has been taking the Augmentin as prescribed. She reports occasional nausea related to the antibiotics, but states it is mild. Denies vomiting, diarrhea. She states that she still has approx 7-10 days remaining of the Augmentin.     Past Medical History:   Diagnosis Date   • Cystocele 3/31/2015   • " Rectocele 3/31/2015   • Tobacco abuse        Allergies: Patient has no known allergies.    Current medications and problem list reviewed with patient and updated in EPIC.    ROS  As documented above in my HPI       Objective:     PE:  /58   Pulse 90   Temp 36.5 °C (97.7 °F)   Ht 1.524 m (5')   Wt 64.7 kg (142 lb 9.6 oz)   SpO2 98%   BMI 27.85 kg/m²      Vital signs reviewed  Constitutional: patient is oriented to person, place, and time. Appears well-developed and well-nourished. No distress  Eyes: Conjunctivae normal and EOM are normal. Pupils are equal, round, and reactive to light.   Mouth/Throat: Lips without lesions, good dentition, oropharynx is clear and moist.  Neck: Trachea midline. Normal range of motion. Neck supple. No masses  Cardiovascular: Normal rate, regular rhythm, normal heart sounds and intact distal pulses. No murmur, gallop, or friction rub. No edema.  Pulmonary/Chest: No respiratory distress. Unlabored respiratory effort, lungs clear to auscultation. No wheezes or rales.   Abdominal: Soft, non tender. BS + x 4. No masses or hepatosplenomegaly.   Musculoskeletal: Normal range of motion. No tenderness, swelling, erythema, deformity noted.  Neurological: He is alert and oriented to person, place, and time. No cranial nerve deficit. Coordination normal.   Skin: Skin is warm and dry. Good turgor. No rashes visable.  Right thigh incision- sutures in place. More lateral incision has a medial area with mild dehiscence, minimal purulent drainage, and surrounding erythema.  Non tender  Psychiatric: Normal mood and affect. Behavior is normal.       Assessment and Plan:   The following treatment plan was discussed with patient at length  1. Necrotizing fasciitis (HCC)       Continue Augmentin for now.  I would like her to be seen by Dr Martinez as soon as possible  I will have my MA call MIRTA and see if she can help facilitate this appt as the patient has had sutures in place for >1  month.  Follow up: 1 week, RTC sooner if needed. FU with PCP for ongoing chronic medical conditions. To ER with worsening wound appearance, pain, erythema, drainage, swelling, streaking, feeling generally ill, fevers/chills.     25 min spent face to face with patient, >50% of time spent counseling, coordinating care, reviewing records, discussing POC, educating patient

## 2018-08-15 ENCOUNTER — OFFICE VISIT (OUTPATIENT)
Dept: MEDICAL GROUP | Facility: CLINIC | Age: 58
End: 2018-08-15

## 2018-08-15 VITALS
TEMPERATURE: 97.4 F | WEIGHT: 141 LBS | OXYGEN SATURATION: 97 % | HEIGHT: 60 IN | RESPIRATION RATE: 16 BRPM | DIASTOLIC BLOOD PRESSURE: 62 MMHG | SYSTOLIC BLOOD PRESSURE: 124 MMHG | BODY MASS INDEX: 27.68 KG/M2 | HEART RATE: 84 BPM

## 2018-08-15 DIAGNOSIS — Z00.00 ENCOUNTER FOR MEDICAL EXAMINATION TO ESTABLISH CARE: ICD-10-CM

## 2018-08-15 DIAGNOSIS — E11.00 TYPE 2 DIABETES MELLITUS WITH HYPEROSMOLARITY WITHOUT COMA, WITHOUT LONG-TERM CURRENT USE OF INSULIN (HCC): ICD-10-CM

## 2018-08-15 PROCEDURE — 99212 OFFICE O/P EST SF 10 MIN: CPT | Performed by: PHYSICIAN ASSISTANT

## 2018-08-15 RX ORDER — METFORMIN HYDROCHLORIDE 500 MG/1
500 TABLET, EXTENDED RELEASE ORAL 2 TIMES DAILY
Qty: 60 TAB | Refills: 6 | Status: SHIPPED | OUTPATIENT
Start: 2018-08-15 | End: 2019-06-19 | Stop reason: SDUPTHER

## 2018-08-15 NOTE — PROGRESS NOTES
New Patient Consult Note  Referred by: Denise Ames P.A.-C.    Reason for consult: Diabetes Management Type 2    HPI:  Emmanuelle Martin is a 58 y.o. old patient who is seeing us today for diabetes care.  This is a pleasant patient with diabetes and I appreciate the opportunity to participate in the care of this patient.  This is a new patient with me today.    Labs of 7/1/18 GFR >60, HbA1c is 12.8, c-peptide 1.6    BG Diary:8/15/2018  In the AM:85, 112, 97, 96, 119, 116, 102  Just before Bed:123, 130, 212, 244, 196    Has been Diabetic since one month  Has a Glucagon pen at home: no    1. Type 2 diabetes mellitus with hyperosmolarity without coma, without long-term current use of insulin (HCC)  This is a new patient with me today  She is on  1.  Lantus 35units at night   2.  Humalog 5 units with each meal    She has not been doing the Humalog because of cost each meal. She does not have insurance and the cost of one Lantus pen is 120$ this is not sustainable for this patient.      I discussed with her that she has had T2 Diabetes for some time now    2. Encounter for medical examination to establish care  Is on a high risk medication Insulin and we will continue to follow no hypoglycemic events since last visit      ROS:   Constitutional: No change in weight , No fatigue, No night sweats.  HEENT: No Headache.  Eyes:  No blurred vision, No visual changes.  Cardiac: No chest pain, No palpitations.  Resp: No shortness of breath, No cough,   Gastro: No nausea or vomiting, No diarrhea.  Neuro: Denies numbness or tinging in bilateral feet or hands, and no loss of sensation.  Endo: No heat or cold intolerance.  : No polyuria, No polydipsia, No chronic UTI's.  Lower extremities: No lower leg edema bilateral.  All other systems were reviewed and were negative.    Past Medical History:  Patient Active Problem List    Diagnosis Date Noted   • Necrotizing fasciitis (HCC) 06/11/2018     Priority: High   • Nocturnal  hypoxia 06/28/2018     Priority: Medium   • New onset type 2 diabetes mellitus (HCC) 06/12/2018     Priority: Medium   • Hypoalbuminemia 06/25/2018     Priority: Low   • Hospital discharge follow-up 07/12/2018   • Encounter for medical examination to establish care 07/12/2018   • Cystocele with rectocele 03/31/2015   • Rectocele 03/31/2015   • Tobacco abuse        Past Surgical History:  Past Surgical History:   Procedure Laterality Date   • IRRIGATION & DEBRIDEMENT ORTHO Right 6/22/2018    Procedure: IRRIGATION & DEBRIDEMENT ORTHO - THIGH;  Surgeon: Omega Martinez M.D.;  Location: SURGERY [facility];  Service: Orthopedics   • IRRIGATION & DEBRIDEMENT ORTHO Right 6/20/2018    Procedure: IRRIGATION & DEBRIDEMENT ORTHO - ThIGH change wound vac sponge;  Surgeon: Omega Martinez M.D.;  Location: SURGERY [facility];  Service: Orthopedics   • IRRIGATION & DEBRIDEMENT ORTHO Right 6/18/2018    Procedure: IRRIGATION & DEBRIDEMENT ORTHO/ LEG;  Surgeon: Omega Martinez M.D.;  Location: [facility];  Service: Orthopedics   • IRRIGATION & DEBRIDEMENT ORTHO Right 6/15/2018    Procedure: IRRIGATION & DEBRIDEMENT RIGHT UPPER THIGH;  Surgeon: Omega Martinez M.D.;  Location: SURGERY [facility];  Service: Orthopedics   • IRRIGATION & DEBRIDEMENT ORTHO Right 6/11/2018    Procedure: IRRIGATION & DEBRIDEMENT ORTHO;  Surgeon: Omega Martinez M.D.;  Location: SURGERY [facility];  Service: Orthopedics       Allergies:  Patient has no known allergies.    Social History:  Social History     Social History   • Marital status: Single     Spouse name: N/A   • Number of children: N/A   • Years of education: N/A     Occupational History   • Not on file.     Social History Main Topics   • Smoking status: Current Every Day Smoker     Packs/day: 0.50     Years: 20.00     Types: Cigarettes   • Smokeless tobacco: Never Used   • Alcohol use No   • Drug use: No   • Sexual activity: Not on file     Other  Topics Concern   • Not on file     Social History Narrative   • No narrative on file       Family History:  Family History   Problem Relation Age of Onset   • Diabetes Father         toe amputation due to this   • Heart Disease Father 82   • Diabetes Sister        Medications:    Current Outpatient Prescriptions:   •  metFORMIN ER (GLUCOPHAGE XR) 500 MG TABLET SR 24 HR, Take 1 Tab by mouth 2 times a day. Please give Generic XR Metformin, Disp: 60 Tab, Rfl: 6  •  insulin glargine (LANTUS) 100 UNIT/ML Solution, Inject 35 Units as instructed every evening., Disp: 10 mL, Rfl: 0  •  Blood Glucose Monitoring Suppl Device, Meter: Dispense Device of Insurance Preference. Use as directed for blood sugar monitoring., Disp: 1 Device, Rfl: 0  •  glucose blood (ACCU-CHEK SMARTVIEW) strip, 1 Strip by Other route as needed., Disp: 50 Strip, Rfl: 0  •  Lancets Misc, Lancets order: Lancets for meter. Use daily and as needed in times of high or low blood sugars., Disp: 100 Each, Rfl: 0  •  ibuprofen (MOTRIN) 200 MG Tab, Take 200 mg by mouth every 6 hours as needed., Disp: , Rfl:   •  albuterol 108 (90 Base) MCG/ACT Aero Soln inhalation aerosol, Inhale 2 Puffs by mouth every 6 hours as needed for Shortness of Breath., Disp: 8.5 g, Rfl: 0      Physical Examination:   Vital signs: /62   Pulse 84   Temp 36.3 °C (97.4 °F)   Resp 16   Ht 1.524 m (5')   Wt 64 kg (141 lb)   SpO2 97%   BMI 27.54 kg/m²   General: No distress, cooperative, well dressed and well nourished.   Eyes: No scleral icterus or discharge, No hyposphagma  ENMT: Normal on external inspection of nose, lips, No nasal drainage   Neck: No abnormal masses on inspection  Resp: Normal effort, Bilateral clear to auscultation, No wheezing, No rales  CVS: Regular rate and rhythm, S1 S2 normal, No murmur. No gallop  Extremities: No edema bilateral extremities  Neuro: Alert and oriented  Skin: No rash, No Ulcers  Psych: Normal mood and affect    Assessment and Plan:    1.  Type 2 diabetes mellitus with hyperosmolarity without coma, without long-term current use of insulin (HCC)  She is on  1.  Lantus 30 units  2.  Humalog (Stop)    Start on Metfomrin 500mg ER one in the AM one in the PM    I want her to see me in my office in [place] and I will get her started on Ozempic samples and Metformin    2. Encounter for medical examination to establish care  Not as thirsty and not urinating as well.        Return in about 2 weeks (around 8/29/2018).    Blood glucose log: Check BG in the morning when wake up, before lunch or dinner and before bed.  So three times a day.  Always bring BG diary to the next office visit.     This patient during there office visit was started on new medication.  Side effects of new medications were discussed with the patient today in the office metformin. The patient was supplied paperwork on this new medication.    Thank you kindly for allowing me to participate in the diabetes care plan for this patient.    David Silva PA-C, BC-ADM  Board Certified - [employer] Diabetes [facility]  08/15/18    CC:   Denise Ames P.A.-C.

## 2018-08-24 NOTE — PROGRESS NOTES
Patient is currently enrolled in Invan[employer] replacement medication patient assistance program.  Patient credited for the following dates:  7/3/18  7/4/18  7/5/18  7/6/18     Ifrah Carney   Pharmacy Patient Advocate

## 2019-06-19 ENCOUNTER — OFFICE VISIT (OUTPATIENT)
Dept: MEDICAL GROUP | Facility: CLINIC | Age: 59
End: 2019-06-19

## 2019-06-19 VITALS
WEIGHT: 149 LBS | DIASTOLIC BLOOD PRESSURE: 72 MMHG | HEIGHT: 60 IN | SYSTOLIC BLOOD PRESSURE: 128 MMHG | TEMPERATURE: 97.6 F | OXYGEN SATURATION: 96 % | RESPIRATION RATE: 16 BRPM | HEART RATE: 89 BPM | BODY MASS INDEX: 29.25 KG/M2

## 2019-06-19 DIAGNOSIS — Z79.4 ENCOUNTER FOR LONG-TERM (CURRENT) USE OF INSULIN (HCC): ICD-10-CM

## 2019-06-19 DIAGNOSIS — E11.00 TYPE 2 DIABETES MELLITUS WITH HYPEROSMOLARITY WITHOUT COMA, UNSPECIFIED WHETHER LONG TERM INSULIN USE (HCC): ICD-10-CM

## 2019-06-19 PROCEDURE — 99212 OFFICE O/P EST SF 10 MIN: CPT | Performed by: PHYSICIAN ASSISTANT

## 2019-06-19 RX ORDER — METFORMIN HYDROCHLORIDE 500 MG/1
1000 TABLET, EXTENDED RELEASE ORAL 2 TIMES DAILY
Qty: 120 TAB | Refills: 12 | Status: SHIPPED | OUTPATIENT
Start: 2019-06-19 | End: 2019-06-19 | Stop reason: SDUPTHER

## 2019-06-19 RX ORDER — GLIPIZIDE 5 MG/1
5 TABLET ORAL 2 TIMES DAILY
Qty: 60 TAB | Refills: 12 | Status: SHIPPED | OUTPATIENT
Start: 2019-06-19 | End: 2019-06-19 | Stop reason: SDUPTHER

## 2019-06-19 RX ORDER — METFORMIN HYDROCHLORIDE 500 MG/1
1000 TABLET, EXTENDED RELEASE ORAL 2 TIMES DAILY
Qty: 120 TAB | Refills: 12 | Status: SHIPPED | OUTPATIENT
Start: 2019-06-19 | End: 2020-07-22 | Stop reason: SDUPTHER

## 2019-06-19 RX ORDER — GLIPIZIDE 5 MG/1
5 TABLET ORAL 2 TIMES DAILY
Qty: 60 TAB | Refills: 12 | Status: SHIPPED | OUTPATIENT
Start: 2019-06-19 | End: 2020-07-22 | Stop reason: SDUPTHER

## 2019-06-19 NOTE — PROGRESS NOTES
Return to office Patient Consult Note  Referred by: Denise Ames P.A.-C.    Reason for consult: Diabetes Management Type 2    HPI:  Emmanuelle Martin is a 59 y.o. old patient who is seeing us today for diabetes care.  This is a pleasant patient with diabetes and I appreciate the opportunity to participate in the care of this patient.    Labs of 6/19/2019 HbA1c is Refused  Labs of 7/1/18 GFR >60, HbA1c is 12.8, c-peptide 1.6     BG Diary:6/19/2019  In the AM:  States 231      1. Type 2 diabetes mellitus with hyperosmolarity without coma, unspecified whether long term insulin use (HCC)  This was a new patient on 8/15/18 and today is my second visit  She is currently on:  She is on  1.  Lantus 30 units (not on)  2.  Humalog (Stop)  3.  Metfomrin 500mg ER one in the AM one in the PM      She just wants to stay on Metformin.  She can not afford any thing else. She does not have insurance and does not want a HbA1c ordered       2. Encounter for long-term (current) use of insulin (HCC)  Is on a high risk medication Insulin and we will continue to follow       ROS:   Constitutional: No night sweats.  Eyes:  No visual changes.  Cardiac: No chest pain, No palpitations or racing heart rate.  Resp: No shortness of breath, No cough,   Gi: No Diarrhea    All other systems were reviewed and were/are negative.      Past Medical History:  Patient Active Problem List    Diagnosis Date Noted   • Necrotizing fasciitis (HCC) 06/11/2018     Priority: High   • Nocturnal hypoxia 06/28/2018     Priority: Medium   • New onset type 2 diabetes mellitus (HCC) 06/12/2018     Priority: Medium   • Hypoalbuminemia 06/25/2018     Priority: Low   • Encounter for long-term (current) use of insulin (HCC) 06/19/2019   • Hospital discharge follow-up 07/12/2018   • Encounter for medical examination to establish care 07/12/2018   • Cystocele with rectocele 03/31/2015   • Rectocele 03/31/2015   • Tobacco abuse        Past Surgical History:  Past  Surgical History:   Procedure Laterality Date   • IRRIGATION & DEBRIDEMENT ORTHO Right 6/22/2018    Procedure: IRRIGATION & DEBRIDEMENT ORTHO - THIGH;  Surgeon: Omega Martinez M.D.;  Location: SURGERY [facility];  Service: Orthopedics   • IRRIGATION & DEBRIDEMENT ORTHO Right 6/20/2018    Procedure: IRRIGATION & DEBRIDEMENT ORTHO - ThIGH change wound vac sponge;  Surgeon: Omega Martinez M.D.;  Location: SURGERY [facility];  Service: Orthopedics   • IRRIGATION & DEBRIDEMENT ORTHO Right 6/18/2018    Procedure: IRRIGATION & DEBRIDEMENT ORTHO/ LEG;  Surgeon: Omega Martinez M.D.;  Location: SURGERY [facility];  Service: Orthopedics   • IRRIGATION & DEBRIDEMENT ORTHO Right 6/15/2018    Procedure: IRRIGATION & DEBRIDEMENT RIGHT UPPER THIGH;  Surgeon: Omega Martinez M.D.;  Location: SURGERY [facility];  Service: Orthopedics   • IRRIGATION & DEBRIDEMENT ORTHO Right 6/11/2018    Procedure: IRRIGATION & DEBRIDEMENT ORTHO;  Surgeon: Omega Martinez M.D.;  Location: SURGERY [facility];  Service: Orthopedics       Allergies:  Patient has no known allergies.    Social History:  Social History     Social History   • Marital status: Single     Spouse name: N/A   • Number of children: N/A   • Years of education: N/A     Occupational History   • Not on file.     Social History Main Topics   • Smoking status: Current Every Day Smoker     Packs/day: 0.50     Years: 20.00     Types: Cigarettes   • Smokeless tobacco: Never Used   • Alcohol use No   • Drug use: No   • Sexual activity: Not on file     Other Topics Concern   • Not on file     Social History Narrative   • No narrative on file       Family History:  Family History   Problem Relation Age of Onset   • Diabetes Father         toe amputation due to this   • Heart Disease Father 82   • Diabetes Sister        Medications:    Current Outpatient Prescriptions:   •  metFORMIN ER (GLUCOPHAGE XR) 500 MG TABLET SR 24 HR, Take 2 Tabs by mouth 2  times a day. Please give Generic XR Metformin, Disp: 120 Tab, Rfl: 12  •  glipiZIDE (GLUCOTROL) 5 MG Tab, Take 1 Tab by mouth 2 times a day., Disp: 60 Tab, Rfl: 12  •  ibuprofen (MOTRIN) 200 MG Tab, Take 200 mg by mouth every 6 hours as needed., Disp: , Rfl:   •  Blood Glucose Monitoring Suppl Device, Meter: Dispense Device of Insurance Preference. Use as directed for blood sugar monitoring., Disp: 1 Device, Rfl: 0  •  glucose blood (ACCU-CHEK SMARTVIEW) strip, 1 Strip by Other route as needed., Disp: 50 Strip, Rfl: 0  •  Lancets Misc, Lancets order: Lancets for meter. Use daily and as needed in times of high or low blood sugars., Disp: 100 Each, Rfl: 0        Physical Examination:   Vital signs: /72 (BP Location: Left arm, Patient Position: Sitting, BP Cuff Size: Adult)   Pulse 89   Temp 36.4 °C (97.6 °F) (Temporal)   Resp 16   Ht 1.524 m (5')   Wt 67.6 kg (149 lb)   SpO2 96%   BMI 29.10 kg/m²   General: No distress, cooperative, well dressed and well nourished.   Eyes: No scleral icterus or discharge, No hyposphagma  ENMT: Normal on external inspection of nose, lips, No nasal drainage   Neck: No abnormal masses on inspection  Resp: Normal effort, Bilateral clear to auscultation, No wheezing, No rales  CVS: Regular rate and rhythm, S1 S2 normal, No murmur. No gallop  Extremities: No edema bilateral extremities  Neuro: Alert and oriented  Skin: No rash, No Ulcers  Psych: Normal mood and affect      Assessment and Plan:    1. Type 2 diabetes mellitus with hyperosmolarity without coma, unspecified whether long term insulin use (HCC)  She is on  1.  Lantus 30 units (not on)  2.  Humalog (Stop)  3.  Metfomrin 500mg ER one in the AM one in the PM    4.  Glipizide 5mg twice a day    She did agree to take Glipizide this is not idea however we need to work in her budget    2. Encounter for long-term (current) use of insulin (HCC)  Not on currently    Return in about 3 months (around 9/19/2019).      Thank you  kindly for allowing me to participate in the diabetes care plan for this patient.    David Silva PA-C, [facility]  Board Certified - Advanced Diabetes Management  06/19/19    CC:   Denise Ames P.A.-C.

## 2020-07-22 ENCOUNTER — OFFICE VISIT (OUTPATIENT)
Dept: MEDICAL GROUP | Facility: CLINIC | Age: 60
End: 2020-07-22

## 2020-07-22 VITALS
WEIGHT: 153 LBS | TEMPERATURE: 97.7 F | HEART RATE: 84 BPM | HEIGHT: 61 IN | SYSTOLIC BLOOD PRESSURE: 122 MMHG | RESPIRATION RATE: 16 BRPM | BODY MASS INDEX: 28.89 KG/M2 | OXYGEN SATURATION: 97 % | DIASTOLIC BLOOD PRESSURE: 68 MMHG

## 2020-07-22 DIAGNOSIS — Z53.20 WELL WOMAN HEALTH EXAMINATION DECLINED: ICD-10-CM

## 2020-07-22 DIAGNOSIS — E11.00 TYPE 2 DIABETES MELLITUS WITH HYPEROSMOLARITY WITHOUT COMA, UNSPECIFIED WHETHER LONG TERM INSULIN USE (HCC): ICD-10-CM

## 2020-07-22 DIAGNOSIS — Z72.0 TOBACCO ABUSE: ICD-10-CM

## 2020-07-22 DIAGNOSIS — Z76.89 ENCOUNTER TO ESTABLISH CARE: ICD-10-CM

## 2020-07-22 PROBLEM — M72.6 NECROTIZING FASCIITIS (HCC): Status: RESOLVED | Noted: 2018-06-11 | Resolved: 2020-07-22

## 2020-07-22 PROBLEM — Z00.00 ENCOUNTER FOR MEDICAL EXAMINATION TO ESTABLISH CARE: Status: RESOLVED | Noted: 2018-07-12 | Resolved: 2020-07-22

## 2020-07-22 PROBLEM — Z09 HOSPITAL DISCHARGE FOLLOW-UP: Status: RESOLVED | Noted: 2018-07-12 | Resolved: 2020-07-22

## 2020-07-22 PROBLEM — Z79.4 ENCOUNTER FOR LONG-TERM (CURRENT) USE OF INSULIN (HCC): Status: RESOLVED | Noted: 2019-06-19 | Resolved: 2020-07-22

## 2020-07-22 PROCEDURE — 99213 OFFICE O/P EST LOW 20 MIN: CPT | Performed by: PHYSICIAN ASSISTANT

## 2020-07-22 RX ORDER — METFORMIN HYDROCHLORIDE 500 MG/1
1000 TABLET, EXTENDED RELEASE ORAL 2 TIMES DAILY
Qty: 360 TAB | Refills: 3 | Status: SHIPPED | OUTPATIENT
Start: 2020-07-22 | End: 2021-08-13 | Stop reason: SDUPTHER

## 2020-07-22 RX ORDER — GLIPIZIDE 5 MG/1
5 TABLET ORAL 2 TIMES DAILY
Qty: 180 TAB | Refills: 3 | Status: SHIPPED | OUTPATIENT
Start: 2020-07-22 | End: 2021-08-13 | Stop reason: SDUPTHER

## 2020-07-22 ASSESSMENT — PATIENT HEALTH QUESTIONNAIRE - PHQ9: CLINICAL INTERPRETATION OF PHQ2 SCORE: 0

## 2020-07-22 NOTE — ASSESSMENT & PLAN NOTE
This is chronic for this patient.  Not interested in quitting today but will be in the near future.  We have discussed options when she is ready to quit.

## 2020-07-22 NOTE — PROGRESS NOTES
Chief Complaint   Patient presents with   • Establish Care       HISTORY OF PRESENT ILLNESS: Patient is a 60 y.o. female established patient who presents today to discuss the following issues:    Encounter to establish care  Patient is here to establish care today.  I am very happy to see this very pleasant patient.    Type 2 diabetes mellitus without complication, without long-term current use of insulin (HCC)  This is a chronic condition for this patient.  Patient is self-pay and has not had lab work done for diabetes in 2 years.  Her home sugars seem to be well controlled when she gets up in the morning but tend to be higher before she goes to bed at night.  We will get some lab work done and consider adjusting her nighttime dose.  Monofilament exam done today is within normal limits.    Tobacco abuse  This is chronic for this patient.  Not interested in quitting today but will be in the near future.  We have discussed options when she is ready to quit.    Well woman health examination declined  Patient has declined mammogram, colonoscopy, GYN exam.  Patient is self-pay and we will spread these exams out when she is able to cover the costs.  We will revisit this topic at the next visit.    Patient Active Problem List    Diagnosis Date Noted   • Nocturnal hypoxia 06/28/2018     Priority: Medium   • Type 2 diabetes mellitus without complication, without long-term current use of insulin (HCC) 06/12/2018     Priority: Medium   • Hypoalbuminemia 06/25/2018     Priority: Low   • Encounter to establish care 07/22/2020   • Well woman health examination declined 07/22/2020   • Tobacco abuse        Allergies:Patient has no known allergies.      Social History     Tobacco Use   • Smoking status: Current Every Day Smoker     Packs/day: 0.50     Years: 20.00     Pack years: 10.00     Types: Cigarettes   • Smokeless tobacco: Never Used   Substance Use Topics   • Alcohol use: No   • Drug use: No       Family Status   Relation  "Name Status   • Mo  Alive   • Fa     • Sis  Alive   • Bro  Alive   • Sis  Alive   • Bro  Alive   • Bro  Alive   • Child  Alive   • Child  Alive   • Child  Alive     Family History   Problem Relation Age of Onset   • Diabetes Father         toe amputation due to this   • Heart Disease Father 82   • Diabetes Sister        No LMP recorded. Patient is postmenopausal.    Review of Systems:   Constitutional: Negative for fever, chills, weight change, fatigue, loss of appetite.  HENT: Negative for ear pain, nosebleeds, congestion, odynophagia, sore throat or changes in taste.    Eyes: Negative for vision changes.   Ears: Negative for recent changes in hearing, pain or discharge.  Neck: Negative for pain, swelling, lumps or goiter.  Respiratory: Negative for cough, sputum production, shortness of breath and wheezing.    Cardiovascular: Negative for chest pain, palpitations, orthopnea and leg swelling.   Gastrointestinal: Negative for constipation, diarrhea, heartburn, dysphagia, nausea, vomiting or abdominal pain.   Genitourinary: Negative for dysuria, urgency and frequency.   Musculoskeletal: Negative for myalgias, joint pain, and back pain.  Skin: Negative for skin, hair or nail changes, rash, itching.   Neurological: Negative for dizziness, tingling, tremors, sensory change, gait/coordination changes, focal weakness and headaches.   Endo/Heme/Allergies: Does not bruise/bleed easily.   Psychiatric/Behavioral: Negative for depression, suicidal ideas and memory loss.  The patient is not nervous/anxious and does not have insomnia.    All other systems reviewed and are negative except as in HPI.    Exam:  /68 (BP Location: Right arm, Patient Position: Sitting, BP Cuff Size: Adult)   Pulse 84   Temp 36.5 °C (97.7 °F) (Temporal)   Resp 16   Ht 1.543 m (5' 0.75\")   Wt 69.4 kg (153 lb)   SpO2 97%  Body mass index is 29.15 kg/m².  General:  Well nourished, overweight, well developed female. No apparent " distress.  Head: Grossly normal.  Eyes: EOM intact, PERRL, conjunctiva non-injected, sclera non-icteric.  Ears: Destinee pinnae, external auditory canals, TM pearly gray with normal light reflex bilaterally.  Neck: Supple supple with no cervical lymphadenopathy, JVD, palpable thyroid nodules.  Pulmonary: Clear to ausculation bilaterally. Normal effort. No rales, ronchi, or wheezing.  Cardiovascular: Regular rate and rhythm without murmur, rub or gallop.   Extremities: Full range of motion. Warm and well perfused with no edema.  Skin: Intact with no obvious rashes or lesions.  Neuro: Grossly intact.  Psych: Alert and oriented x 3.  Appropriately dressed. Mood and affect appropriate.      Assessment/Plan:  1. Encounter to establish care  Comp Metabolic Panel (CMP)    Thyroid Stimulating Hormone (TSH)    TSH With Reflex to FT4   2. Tobacco abuse     3. Type 2 diabetes mellitus with hyperosmolarity without coma, unspecified whether long term insulin use (HCC)  Hemoglobin A1c    Microalbumin Creat Ratio (Urine)    Comp Metabolic Panel (CMP)    Lipid Profile (Lipid Panel)    Thyroid Stimulating Hormone (TSH)    TSH With Reflex to FT4    Diabetic Monofilament LE Exam    glipiZIDE (GLUCOTROL) 5 MG Tab    metFORMIN ER (GLUCOPHAGE XR) 500 MG TABLET SR 24 HR   4. Well woman health examination declined         Return in about 4 months (around 11/22/2020), or if symptoms worsen or fail to improve.    Please note that this dictation was created using voice recognition software. I have made every reasonable attempt to correct obvious errors, but I expect that there are errors of grammar and possibly content that I did not discover before finalizing the note.

## 2020-07-22 NOTE — ASSESSMENT & PLAN NOTE
Patient has declined mammogram, colonoscopy, GYN exam.  Patient is self-pay and we will spread these exams out when she is able to cover the costs.  We will revisit this topic at the next visit.

## 2020-07-22 NOTE — ASSESSMENT & PLAN NOTE
This is a chronic condition for this patient.  Patient is self-pay and has not had lab work done for diabetes in 2 years.  Her home sugars seem to be well controlled when she gets up in the morning but tend to be higher before she goes to bed at night.  She will check her sugars twice a day for the next few weeks and keep a log that she can bring them to me.  We will order lab work and consider adjusting her nighttime dose if necessary.  Monofilament exam done today is within normal limits.  Feet are warm, 2+ pulses bilaterally, without skin breaks or sores.

## 2020-07-23 ENCOUNTER — TELEPHONE (OUTPATIENT)
Dept: MEDICAL GROUP | Facility: CLINIC | Age: 60
End: 2020-07-23

## 2020-07-23 NOTE — TELEPHONE ENCOUNTER
Pt LVM asking about the rx from yesterday    I confirmed they were send to the [facility] in [place] yesterday    Called pt back LVM stating they can be p/u

## 2021-03-03 NOTE — CARE PLAN
Problem: Safety  Goal: Will remain free from injury  Outcome: PROGRESSING AS EXPECTED         Transposition Flap Text: The defect edges were debeveled with a #15 scalpel blade.  Given the location of the defect and the proximity to free margins a transposition flap was deemed most appropriate.  Using a sterile surgical marker, an appropriate transposition flap was drawn incorporating the defect.    The area thus outlined was incised deep to adipose tissue with a #15 scalpel blade.  The skin margins were undermined to an appropriate distance in all directions utilizing iris scissors.

## 2021-08-11 ENCOUNTER — TELEPHONE (OUTPATIENT)
Dept: MEDICAL GROUP | Facility: CLINIC | Age: 61
End: 2021-08-11

## 2021-08-11 NOTE — TELEPHONE ENCOUNTER
PT called stating she is in [place] trying to get her labs done for her upcoming appointment. Would like to get them done as she is in [place]. Please advise

## 2021-08-12 ENCOUNTER — HOSPITAL ENCOUNTER (OUTPATIENT)
Dept: LAB | Facility: MEDICAL CENTER | Age: 61
End: 2021-08-12
Attending: PHYSICIAN ASSISTANT

## 2021-08-12 DIAGNOSIS — E78.5 DYSLIPIDEMIA: ICD-10-CM

## 2021-08-12 DIAGNOSIS — E55.9 VITAMIN D DEFICIENCY: ICD-10-CM

## 2021-08-12 DIAGNOSIS — E11.9 TYPE 2 DIABETES MELLITUS WITHOUT COMPLICATION, WITHOUT LONG-TERM CURRENT USE OF INSULIN (HCC): ICD-10-CM

## 2021-08-12 DIAGNOSIS — E88.09 HYPOALBUMINEMIA: ICD-10-CM

## 2021-08-12 DIAGNOSIS — G47.34 NOCTURNAL HYPOXIA: ICD-10-CM

## 2021-08-12 LAB
25(OH)D3 SERPL-MCNC: 19 NG/ML (ref 30–100)
ALBUMIN SERPL BCP-MCNC: 4.5 G/DL (ref 3.2–4.9)
ALBUMIN/GLOB SERPL: 1.6 G/DL
ALP SERPL-CCNC: 72 U/L (ref 30–99)
ALT SERPL-CCNC: 24 U/L (ref 2–50)
ANION GAP SERPL CALC-SCNC: 15 MMOL/L (ref 7–16)
AST SERPL-CCNC: 14 U/L (ref 12–45)
BASOPHILS # BLD AUTO: 1 % (ref 0–1.8)
BASOPHILS # BLD: 0.09 K/UL (ref 0–0.12)
BILIRUB SERPL-MCNC: 0.4 MG/DL (ref 0.1–1.5)
BUN SERPL-MCNC: 13 MG/DL (ref 8–22)
CALCIUM SERPL-MCNC: 10 MG/DL (ref 8.5–10.5)
CHLORIDE SERPL-SCNC: 103 MMOL/L (ref 96–112)
CHOLEST SERPL-MCNC: 174 MG/DL (ref 100–199)
CO2 SERPL-SCNC: 23 MMOL/L (ref 20–33)
CREAT SERPL-MCNC: 0.65 MG/DL (ref 0.5–1.4)
CREAT UR-MCNC: 131.51 MG/DL
EOSINOPHIL # BLD AUTO: 0.23 K/UL (ref 0–0.51)
EOSINOPHIL NFR BLD: 2.6 % (ref 0–6.9)
ERYTHROCYTE [DISTWIDTH] IN BLOOD BY AUTOMATED COUNT: 41.9 FL (ref 35.9–50)
EST. AVERAGE GLUCOSE BLD GHB EST-MCNC: 169 MG/DL
FASTING STATUS PATIENT QL REPORTED: NORMAL
GLOBULIN SER CALC-MCNC: 2.9 G/DL (ref 1.9–3.5)
GLUCOSE SERPL-MCNC: 193 MG/DL (ref 65–99)
HBA1C MFR BLD: 7.5 % (ref 4–5.6)
HCT VFR BLD AUTO: 46.2 % (ref 37–47)
HDLC SERPL-MCNC: 31 MG/DL
HGB BLD-MCNC: 16 G/DL (ref 12–16)
IMM GRANULOCYTES # BLD AUTO: 0.04 K/UL (ref 0–0.11)
IMM GRANULOCYTES NFR BLD AUTO: 0.4 % (ref 0–0.9)
LDLC SERPL CALC-MCNC: 85 MG/DL
LYMPHOCYTES # BLD AUTO: 3.53 K/UL (ref 1–4.8)
LYMPHOCYTES NFR BLD: 39.2 % (ref 22–41)
MCH RBC QN AUTO: 32.4 PG (ref 27–33)
MCHC RBC AUTO-ENTMCNC: 34.6 G/DL (ref 33.6–35)
MCV RBC AUTO: 93.5 FL (ref 81.4–97.8)
MICROALBUMIN UR-MCNC: 5.8 MG/DL
MICROALBUMIN/CREAT UR: 44 MG/G (ref 0–30)
MONOCYTES # BLD AUTO: 0.54 K/UL (ref 0–0.85)
MONOCYTES NFR BLD AUTO: 6 % (ref 0–13.4)
NEUTROPHILS # BLD AUTO: 4.57 K/UL (ref 2–7.15)
NEUTROPHILS NFR BLD: 50.8 % (ref 44–72)
NRBC # BLD AUTO: 0 K/UL
NRBC BLD-RTO: 0 /100 WBC
PLATELET # BLD AUTO: 216 K/UL (ref 164–446)
PMV BLD AUTO: 10.5 FL (ref 9–12.9)
POTASSIUM SERPL-SCNC: 4.4 MMOL/L (ref 3.6–5.5)
PROT SERPL-MCNC: 7.4 G/DL (ref 6–8.2)
RBC # BLD AUTO: 4.94 M/UL (ref 4.2–5.4)
SODIUM SERPL-SCNC: 141 MMOL/L (ref 135–145)
TRIGL SERPL-MCNC: 291 MG/DL (ref 0–149)
WBC # BLD AUTO: 9 K/UL (ref 4.8–10.8)

## 2021-08-12 PROCEDURE — 82570 ASSAY OF URINE CREATININE: CPT

## 2021-08-12 PROCEDURE — 83036 HEMOGLOBIN GLYCOSYLATED A1C: CPT

## 2021-08-12 PROCEDURE — 36415 COLL VENOUS BLD VENIPUNCTURE: CPT

## 2021-08-12 PROCEDURE — 82043 UR ALBUMIN QUANTITATIVE: CPT

## 2021-08-12 PROCEDURE — 80053 COMPREHEN METABOLIC PANEL: CPT

## 2021-08-12 PROCEDURE — 80061 LIPID PANEL: CPT

## 2021-08-12 PROCEDURE — 82306 VITAMIN D 25 HYDROXY: CPT

## 2021-08-12 PROCEDURE — 85025 COMPLETE CBC W/AUTO DIFF WBC: CPT

## 2021-08-13 ENCOUNTER — OFFICE VISIT (OUTPATIENT)
Dept: MEDICAL GROUP | Facility: CLINIC | Age: 61
End: 2021-08-13

## 2021-08-13 VITALS
WEIGHT: 158 LBS | DIASTOLIC BLOOD PRESSURE: 78 MMHG | OXYGEN SATURATION: 96 % | TEMPERATURE: 96.7 F | RESPIRATION RATE: 16 BRPM | HEART RATE: 85 BPM | HEIGHT: 60 IN | BODY MASS INDEX: 31.02 KG/M2 | SYSTOLIC BLOOD PRESSURE: 126 MMHG

## 2021-08-13 DIAGNOSIS — E78.1 PURE HYPERTRIGLYCERIDEMIA: ICD-10-CM

## 2021-08-13 DIAGNOSIS — E55.9 VITAMIN D DEFICIENCY: ICD-10-CM

## 2021-08-13 DIAGNOSIS — E11.9 TYPE 2 DIABETES MELLITUS WITHOUT COMPLICATION, WITHOUT LONG-TERM CURRENT USE OF INSULIN (HCC): ICD-10-CM

## 2021-08-13 PROCEDURE — 99214 OFFICE O/P EST MOD 30 MIN: CPT | Performed by: PHYSICIAN ASSISTANT

## 2021-08-13 RX ORDER — METFORMIN HYDROCHLORIDE 500 MG/1
1000 TABLET, EXTENDED RELEASE ORAL 2 TIMES DAILY
Qty: 360 TABLET | Refills: 3 | Status: SHIPPED | OUTPATIENT
Start: 2021-08-13 | End: 2022-07-20 | Stop reason: SDUPTHER

## 2021-08-13 RX ORDER — GLIPIZIDE 5 MG/1
5 TABLET ORAL 2 TIMES DAILY
Qty: 180 TABLET | Refills: 3 | Status: SHIPPED | OUTPATIENT
Start: 2021-08-13 | End: 2022-07-20 | Stop reason: SDUPTHER

## 2021-08-13 ASSESSMENT — FIBROSIS 4 INDEX: FIB4 SCORE: 0.79

## 2021-08-13 ASSESSMENT — ENCOUNTER SYMPTOMS
CONSTITUTIONAL NEGATIVE: 1
EYES NEGATIVE: 1
GASTROINTESTINAL NEGATIVE: 1
MUSCULOSKELETAL NEGATIVE: 1
RESPIRATORY NEGATIVE: 1
CARDIOVASCULAR NEGATIVE: 1
NEUROLOGICAL NEGATIVE: 1
PSYCHIATRIC NEGATIVE: 1

## 2021-08-13 ASSESSMENT — PATIENT HEALTH QUESTIONNAIRE - PHQ9: CLINICAL INTERPRETATION OF PHQ2 SCORE: 0

## 2021-08-13 ASSESSMENT — VISUAL ACUITY: OU: 1

## 2021-08-13 NOTE — PATIENT INSTRUCTIONS
Start taking Vitamin D3 5000 units daily  Start taking a calcium supplement with vitamin D    Look into the [employer] web site/tiera to get discount coupons for medications

## 2021-08-13 NOTE — ASSESSMENT & PLAN NOTE
Lipid profile is improved but still high.  Total cholesterol 174, triglycerides 291, HDL 31, LDL 85.  We have discussed the importance of severely cutting back carbohydrates in her diet to both control her triglyceride levels as well as her diabetes.  We have also discussed the need to increase her physical activity daily to help increase her HDL levels.  We will recheck labs in 1 year.

## 2021-08-13 NOTE — ASSESSMENT & PLAN NOTE
Most recent vitamin D level done 8/12/2021 was at 19.  We will have her start on vitamin D3 5000 units daily along with a calcium supplement and we will recheck labs in 1 year.

## 2021-08-13 NOTE — ASSESSMENT & PLAN NOTE
Patient's most recent A1c is at 7.5%.  This is much improved from her last A1c which was done 6/11/2018 that was at 12.8%.  She is doing well on Metformin 1000 mg twice daily and glipizide 5 mg twice daily.  She will continue taking her medications as prescribed.

## 2021-08-13 NOTE — PROGRESS NOTES
Subjective     Nano Martin is a 61 y.o. female who presents with Diabetes    1. Type 2 diabetes mellitus without complication, without long-term current use of insulin ([facility])  Patient's most recent A1c is at 7.5%.  This is much improved from her last A1c which was done 6/11/2018 that was at 12.8%.  She is doing well on Metformin 1000 mg twice daily and glipizide 5 mg twice daily.  She will continue taking her medications as prescribed.  - metFORMIN ER (GLUCOPHAGE XR) 500 MG TABLET SR 24 HR; Take 2 Tablets by mouth 2 times a day. Please give Generic XR Metformin  Dispense: 360 Tablet; Refill: 3  - glipiZIDE (GLUCOTROL) 5 MG Tab; Take 1 Tablet by mouth 2 times a day.  Dispense: 180 Tablet; Refill: 3    2. Vitamin D deficiency  Patient is currently being treated with supplemental vitamin D for measured deficiency. Taking extra vitamin D in addition to trying to include more in diet. No current side effects or issues.  Current vitamin D level is 19.  Patient takes vitamin D3 2000 units daily.  We will recheck labs in 1 year.    3. Pure hypertriglyceridemia  Lipid profile is improved but still high.  Total cholesterol 174, triglycerides 291, HDL 31, LDL 85.  We have discussed the importance of severely cutting back carbohydrates in her diet to both control her triglyceride levels as well as her diabetes.  We have also discussed the need to increase her physical activity daily to help increase her HDL levels.  We will recheck labs in 1 year.    Past Medical History:  3/31/2015: Cystocele  3/31/2015: Rectocele  No date: Tobacco abuse  Past Surgical History:  6/22/2018: IRRIGATION & DEBRIDEMENT ORTHO; Right      Comment:  Procedure: IRRIGATION & DEBRIDEMENT ORTHO - THIGH;                 Surgeon: Omega Martinez M.D.;  Location: SURGERY                [facility];  Service: Orthopedics  6/20/2018: IRRIGATION & DEBRIDEMENT ORTHO; Right      Comment:  Procedure: IRRIGATION & DEBRIDEMENT ORTHO - ThIGH change                wound vac sponge;  Surgeon: Omega Martinez M.D.;                 Location: SURGERY [facility];  Service: Orthopedics  6/18/2018: IRRIGATION & DEBRIDEMENT ORTHO; Right      Comment:  Procedure: IRRIGATION & DEBRIDEMENT ORTHO/ LEG;                 Surgeon: Omega Martinez M.D.;  Location: SURGERY                [facility];  Service: Orthopedics  6/15/2018: IRRIGATION & DEBRIDEMENT ORTHO; Right      Comment:  Procedure: IRRIGATION & DEBRIDEMENT RIGHT UPPER THIGH;                 Surgeon: Omega Martinez M.D.;  Location: SURGERY                [facility];  Service: Orthopedics  6/11/2018: IRRIGATION & DEBRIDEMENT ORTHO; Right      Comment:  Procedure: IRRIGATION & DEBRIDEMENT ORTHO;  Surgeon:                Omega Martinez M.D.;  Location: SURGERY [facility];  Service: Orthopedics  Social History    Tobacco Use      Smoking status: Current Every Day Smoker        Packs/day: 0.50        Years: 20.00        Pack years: 10        Types: Cigarettes        Start date: 8/13/1979      Smokeless tobacco: Never Used    Vaping Use      Vaping Use: Never used    Alcohol use: No    Drug use: No    Review of patient's family history indicates:  Problem: Diabetes      Relation: Father          Age of Onset: (Not Specified)          Comment: toe amputation due to this  Problem: Heart Disease      Relation: Father          Age of Onset: 82  Problem: Diabetes      Relation: Sister          Age of Onset: (Not Specified)      Current Outpatient Medications: •  metFORMIN ER (GLUCOPHAGE XR) 500 MG TABLET SR 24 HR, Take 2 Tablets by mouth 2 times a day. Please give Generic XR Metformin, Disp: 360 Tablet, Rfl: 3•  glipiZIDE (GLUCOTROL) 5 MG Tab, Take 1 Tablet by mouth 2 times a day., Disp: 180 Tablet, Rfl: 3•  ibuprofen (MOTRIN) 200 MG Tab, Take 200 mg by mouth every 6 hours as needed., Disp: , Rfl: •  Blood Glucose Monitoring Suppl Device, Meter: Dispense Device of Insurance Preference.  Use as directed for blood sugar monitoring., Disp: 1 Device, Rfl: 0•  glucose blood (ACCU-CHEK SMARTVIEW) strip, 1 Strip by Other route as needed., Disp: 50 Strip, Rfl: 0•  Lancets Misc, Lancets order: Lancets for meter. Use daily and as needed in times of high or low blood sugars., Disp: 100 Each, Rfl: 0    Patient was instructed on the use of medications, either prescriptions or OTC and informed on when the appropriate follow up time period should be. In addition, patient was also instructed that should any acute worsening occur that they should notify this clinic asap or call 911.    Review of Systems   Constitutional: Negative.    HENT: Negative.    Eyes: Negative.    Respiratory: Negative.    Cardiovascular: Negative.    Gastrointestinal: Negative.    Genitourinary: Negative.    Musculoskeletal: Negative.    Skin: Negative.    Neurological: Negative.    Endo/Heme/Allergies: Negative.    Psychiatric/Behavioral: Negative.      Objective     /78 (BP Location: Left arm, Patient Position: Sitting, BP Cuff Size: Adult)   Pulse 85   Temp 35.9 °C (96.7 °F) (Temporal)   Resp 16   Ht 1.524 m (5') Comment: pt reported  Wt 71.7 kg (158 lb) Comment: with sandals on  SpO2 96%   BMI 30.86 kg/m²      Physical Exam  Vitals and nursing note reviewed.   Constitutional:       Appearance: Normal appearance. She is well-developed and well-groomed.   HENT:      Head: Normocephalic and atraumatic.      Nose: Nose normal.      Mouth/Throat:      Lips: Pink. No lesions.      Mouth: Mucous membranes are moist.   Eyes:      General: Lids are normal. Vision grossly intact. Gaze aligned appropriately.      Extraocular Movements: Extraocular movements intact.      Conjunctiva/sclera: Conjunctivae normal.      Pupils: Pupils are equal, round, and reactive to light.   Neck:      Thyroid: No thyromegaly.      Vascular: No carotid bruit or JVD.      Trachea: Trachea and phonation normal.   Cardiovascular:      Rate and Rhythm:  Normal rate and regular rhythm.      Heart sounds: Normal heart sounds. No murmur heard.   No friction rub. No gallop.    Pulmonary:      Effort: Pulmonary effort is normal.      Breath sounds: Normal breath sounds. No wheezing, rhonchi or rales.   Musculoskeletal:         General: Normal range of motion.      Cervical back: Normal range of motion and neck supple.      Right lower leg: No edema.      Left lower leg: No edema.   Lymphadenopathy:      Cervical: No cervical adenopathy.   Skin:     General: Skin is warm and dry.      Capillary Refill: Capillary refill takes less than 2 seconds.      Findings: No lesion or rash.   Neurological:      Mental Status: She is alert and oriented to person, place, and time.      Cranial Nerves: Cranial nerves are intact.   Psychiatric:         Attention and Perception: Attention and perception normal.         Mood and Affect: Mood and affect normal.         Speech: Speech normal.         Behavior: Behavior normal. Behavior is cooperative.         Thought Content: Thought content normal.         Judgment: Judgment normal.       Assessment & Plan      1. Type 2 diabetes mellitus without complication, without long-term current use of insulin (HCC)    - metFORMIN ER (GLUCOPHAGE XR) 500 MG TABLET SR 24 HR; Take 2 Tablets by mouth 2 times a day. Please give Generic XR Metformin  Dispense: 360 Tablet; Refill: 3  - glipiZIDE (GLUCOTROL) 5 MG Tab; Take 1 Tablet by mouth 2 times a day.  Dispense: 180 Tablet; Refill: 3

## 2022-07-20 ENCOUNTER — OFFICE VISIT (OUTPATIENT)
Dept: MEDICAL GROUP | Facility: CLINIC | Age: 62
End: 2022-07-20

## 2022-07-20 VITALS
RESPIRATION RATE: 14 BRPM | BODY MASS INDEX: 28.32 KG/M2 | TEMPERATURE: 97.6 F | DIASTOLIC BLOOD PRESSURE: 70 MMHG | WEIGHT: 150 LBS | OXYGEN SATURATION: 95 % | HEIGHT: 61 IN | SYSTOLIC BLOOD PRESSURE: 118 MMHG | HEART RATE: 86 BPM

## 2022-07-20 DIAGNOSIS — E11.9 TYPE 2 DIABETES MELLITUS WITHOUT COMPLICATION, WITHOUT LONG-TERM CURRENT USE OF INSULIN (HCC): ICD-10-CM

## 2022-07-20 PROBLEM — G47.34 NOCTURNAL HYPOXIA: Status: RESOLVED | Noted: 2018-06-28 | Resolved: 2022-07-20

## 2022-07-20 PROBLEM — E44.0 MODERATE PROTEIN MALNUTRITION (HCC): Status: RESOLVED | Noted: 2018-06-25 | Resolved: 2022-07-20

## 2022-07-20 LAB
HBA1C MFR BLD: 9 % (ref 0–5.6)
INT CON NEG: ABNORMAL
INT CON POS: ABNORMAL

## 2022-07-20 PROCEDURE — 99214 OFFICE O/P EST MOD 30 MIN: CPT | Performed by: PHYSICIAN ASSISTANT

## 2022-07-20 PROCEDURE — 92250 FUNDUS PHOTOGRAPHY W/I&R: CPT | Mod: TC | Performed by: PHYSICIAN ASSISTANT

## 2022-07-20 PROCEDURE — 83036 HEMOGLOBIN GLYCOSYLATED A1C: CPT | Performed by: PHYSICIAN ASSISTANT

## 2022-07-20 RX ORDER — PIOGLITAZONEHYDROCHLORIDE 15 MG/1
15 TABLET ORAL DAILY
Qty: 90 TABLET | Refills: 1 | Status: SHIPPED | OUTPATIENT
Start: 2022-07-20 | End: 2024-02-06 | Stop reason: SDUPTHER

## 2022-07-20 RX ORDER — METFORMIN HYDROCHLORIDE 500 MG/1
1000 TABLET, EXTENDED RELEASE ORAL 2 TIMES DAILY
Qty: 360 TABLET | Refills: 3 | Status: SHIPPED | OUTPATIENT
Start: 2022-07-20 | End: 2023-08-09

## 2022-07-20 RX ORDER — ROSUVASTATIN CALCIUM 10 MG/1
10 TABLET, COATED ORAL EVERY EVENING
Qty: 90 TABLET | Refills: 3 | Status: SHIPPED | OUTPATIENT
Start: 2022-07-20

## 2022-07-20 RX ORDER — GLIPIZIDE 5 MG/1
5 TABLET ORAL 2 TIMES DAILY
Qty: 180 TABLET | Refills: 3 | Status: SHIPPED | OUTPATIENT
Start: 2022-07-20 | End: 2023-08-09

## 2022-07-20 ASSESSMENT — PATIENT HEALTH QUESTIONNAIRE - PHQ9
CLINICAL INTERPRETATION OF PHQ2 SCORE: 2
5. POOR APPETITE OR OVEREATING: 1 - SEVERAL DAYS
SUM OF ALL RESPONSES TO PHQ QUESTIONS 1-9: 7

## 2022-07-20 ASSESSMENT — FIBROSIS 4 INDEX: FIB4 SCORE: 0.81

## 2022-07-20 NOTE — ASSESSMENT & PLAN NOTE
Currently treated for DM, taking meds and checking blood sugars at home, trying to do DM diet. Current A1c is 9.0% up from 7.5% last year. She admits diet has not been the best due to stress eating. We will add pioglitazone 15 mg daily to her medications. She will continue to take the metformin and the glipizide as prescribed. We will recheck her A1c in 3 mos and make adjustments if necessary.  Uncontrolled

## 2022-07-20 NOTE — PROGRESS NOTES
Chief Complaint   Patient presents with   • Medication Refill     Metformin 500mg  Glipizide 5mg       HISTORY OF PRESENT ILLNESS: Patient is a 62 y.o. female established patient who presents today to discuss the following issues:    Type 2 diabetes mellitus without complication, without long-term current use of insulin (HCC)  Currently treated for DM, taking meds and checking blood sugars at home, trying to do DM diet. Current A1c is 9.0% up from 7.5% last year. She admits diet has not been the best due to stress eating. We will add pioglitazone 15 mg daily to her medications. She will continue to take the metformin and the glipizide as prescribed. We will recheck her A1c in 3 mos and make adjustments if necessary.  Uncontrolled    Monofilament testing with a 10 gram force: sensation intact: intact bilaterally  Visual Inspection: Feet without maceration, ulcers, fissures.  Pedal pulses: intact bilaterally      Patient Active Problem List    Diagnosis Date Noted   • Vitamin D deficiency 08/13/2021   • Pure hypertriglyceridemia 08/13/2021   • Encounter to establish care 07/22/2020   • Well woman health examination declined 07/22/2020   • Hypoalbuminemia 06/25/2018   • Type 2 diabetes mellitus without complication, without long-term current use of insulin ([facility]) 06/12/2018   • Tobacco abuse        Allergies:Patient has no known allergies.    Current Outpatient Medications   Medication Sig Dispense Refill   • pioglitazone (ACTOS) 15 MG Tab Take 1 Tablet by mouth every day. 90 Tablet 1   • metFORMIN ER (GLUCOPHAGE XR) 500 MG TABLET SR 24 HR Take 2 Tablets by mouth 2 times a day. Please give Generic XR Metformin 360 Tablet 3   • glipiZIDE (GLUCOTROL) 5 MG Tab Take 1 Tablet by mouth 2 times a day. 180 Tablet 3   • rosuvastatin (CRESTOR) 10 MG Tab Take 1 Tablet by mouth every evening. 90 Tablet 3   • ibuprofen (MOTRIN) 200 MG Tab Take 200 mg by mouth every 6 hours as needed.     • Blood Glucose Monitoring Suppl Device  Meter: Dispense Device of Insurance Preference. Use as directed for blood sugar monitoring. 1 Device 0   • glucose blood (ACCU-CHEK SMARTVIEW) strip 1 Strip by Other route as needed. 50 Strip 0   • Lancets Misc Lancets order: Lancets for meter. Use daily and as needed in times of high or low blood sugars. 100 Each 0     No current facility-administered medications for this visit.       Social History     Tobacco Use   • Smoking status: Current Every Day Smoker     Packs/day: 0.50     Years: 43.00     Pack years: 21.50     Types: Cigarettes     Start date: 1979   • Smokeless tobacco: Never Used   Vaping Use   • Vaping Use: Never used   Substance Use Topics   • Alcohol use: No   • Drug use: No       Family Status   Relation Name Status   • Mo  Alive   • Fa     • Sis  Alive   • Bro  Alive   • Sis  Alive   • Bro  Alive   • Bro  Alive   • Child  Alive   • Child  Alive   • Child  Alive     Family History   Problem Relation Age of Onset   • Diabetes Father         toe amputation due to this   • Heart Disease Father 82   • Diabetes Sister        Review of Systems:   Constitutional: Negative for fever, chills, weight loss and malaise/fatigue.   HENT: Negative for ear pain, nosebleeds, congestion, sore throat and neck pain.    Eyes: Negative for blurred vision.   Respiratory: Negative for cough, sputum production, shortness of breath and wheezing.    Cardiovascular: Negative for chest pain, palpitations, orthopnea and leg swelling.   Gastrointestinal: Negative for heartburn, nausea, vomiting and abdominal pain.   Genitourinary: Negative for dysuria, urgency and frequency.   Musculoskeletal: Negative for myalgias, back pain and joint pain.   Skin: Negative for rash and itching.   Neurological: Negative for dizziness, tingling, tremors, sensory change, focal weakness and headaches.   Endo/Heme/Allergies: Does not bruise/bleed easily.   Psychiatric/Behavioral: Negative for depression, suicidal ideas and memory loss.  " The patient is not nervous/anxious and does not have insomnia.    All other systems reviewed and are negative except as in HPI.    Wt Readings from Last 3 Encounters:   07/20/22 68 kg (150 lb)   08/13/21 71.7 kg (158 lb)   07/22/20 69.4 kg (153 lb)   ]  No LMP recorded (lmp unknown). Patient is postmenopausal.    Exam:  /70 (BP Location: Right arm, Patient Position: Sitting, BP Cuff Size: Adult)   Pulse 86   Temp 36.4 °C (97.6 °F) (Temporal)   Resp 14   Ht 1.537 m (5' 0.5\")   Wt 68 kg (150 lb)   SpO2 95%  Body mass index is 28.81 kg/m².   General:  Well nourished, well developed female. No apparent distress. Not ill appearing.  Eyes: EOM intact, PERRL, conjunctiva non-injected, sclera non-icteric.  Neck: Supple with no cervical lymphadenopathy, JVD, palpable thyroid nodules or carotid bruits.  Pulmonary: Clear to ausculation bilaterally. Normal effort. No rales, ronchi, or wheezing.  Cardiovascular: Regular rate and rhythm without murmur, rub or gallop.   Extremities: Full range of motion. Warm and well perfused with no edema.  Skin: Intact with no obvious rashes or lesions.  Neuro: Cranial nerves I-XII grossly intact.  Psych: Alert and oriented x 3.  Appropriately dressed. Mood and affect appropriate.    Assessment/Plan:  1. Type 2 diabetes mellitus without complication, without long-term current use of insulin (HCC)  POCT A1C    POCT Retinal Eye Exam    Diabetic Monofilament Lower Extremity Exam    pioglitazone (ACTOS) 15 MG Tab    metFORMIN ER (GLUCOPHAGE XR) 500 MG TABLET SR 24 HR    glipiZIDE (GLUCOTROL) 5 MG Tab    rosuvastatin (CRESTOR) 10 MG Tab    HEMOGLOBIN A1C    MICROALBUMIN CREAT RATIO URINE    Comp Metabolic Panel    Lipid Profile       Reviewed risks and benefits of treatment plan. Patient verbally agrees to plan of care.     Return in about 6 months (around 1/20/2023) for f/u labs.    Please note that this dictation was created using voice recognition software. I have made every " reasonable attempt to correct obvious errors, but I expect that there are errors of grammar and possibly content that I did not discover before finalizing the note.

## 2022-07-20 NOTE — PATIENT INSTRUCTIONS
Please return to the office as a walk in sometime in the month of October for A1c test. Tell staff it is just for a finger stick and not an appointment. Must be on a Tues-Fri between 8:00 and 4:00

## 2022-07-29 LAB — RETINAL SCREEN: NEGATIVE

## 2023-01-10 ENCOUNTER — APPOINTMENT (OUTPATIENT)
Dept: MEDICAL GROUP | Facility: CLINIC | Age: 63
End: 2023-01-10

## 2023-09-18 ENCOUNTER — TELEPHONE (OUTPATIENT)
Dept: HEALTH INFORMATION MANAGEMENT | Facility: OTHER | Age: 63
End: 2023-09-18

## 2024-01-30 ENCOUNTER — DOCUMENTATION (OUTPATIENT)
Dept: HEALTH INFORMATION MANAGEMENT | Facility: OTHER | Age: 64
End: 2024-01-30

## 2024-01-30 NOTE — PROGRESS NOTES
Internal Medicine Interval Note  Note Author: Harini Ritter M.D.     Name Emmanuelle Martin     1960   Age/Sex 58 y.o. female   MRN [id number]   Code Status FULL      After 5PM or if no immediate response to page, please call for cross-coverage  Attending/Team: Dr Luna / Mason See Patient List for primary contact information  Call [phone] to page    1st Call - Day Intern (R1):   Dr Ritter  2nd Call - Day Sr. Resident (R2/R3):   Dr Carpenter          Reason for interval visit  (Principal Problem)   #Sepsis with multi organ failure secondary to necrotizing fasciitis of right thigh with Group B Strep s/p I & D on  & 6/15  #New onset insulin dependent uncontrolled type II DM    Interval Problem Daily Status Update  (24 hours)   - Pt was transferred from ICU on   - Per Ortho: Repeat I&D on  ( I & D on on  & 6/15 ), plan to repeat I&D on   - Per ID: Unasyn started on - End Date on 2 weeks after the last Sx   - Plan to keep the blood sugars < 150 to control infection and wound healing    ROS   Constitutional: Negative for chills and fever.   HENT: Negative for congestion and sore throat.    Eyes: Negative for blurred vision and double vision.   Respiratory: Negative for sputum production and shortness of breath.    Cardiovascular: Negative for chest pain and palpitations.   Gastrointestinal: Negative for abdominal pain, nausea and vomiting.   Genitourinary: Negative for dysuria and urgency.   Musculoskeletal: Negative.  Negative for joint pain and myalgias.       Right groin pain       No L/E pain , swelling, redness.   Skin: Negative.    Neurological: Negative for dizziness and headaches.   Endo/Heme/Allergies: Negative.    Psychiatric/Behavioral: Negative.      Consultants/Specialty  ID   Ortho Surgery   PCP: IRINEO NAVA    Disposition  Inpatient     Quality-Core Measures  Reviewed items::  Labs reviewed and Medications reviewed  Gaston catheter::  No Gaston  DVT  Goal Outcome Evaluation:  Plan of Care Reviewed With: patient  Patient Agreement with Plan of Care: agrees     Progress: improving  Outcome Evaluation: Pt denies anxiety, depression, SI/HI/AVH. Pt reports eating and sleeping well.                                prophylaxis pharmacological::  Enoxaparin (Lovenox): Restart tonight after Sx  Antibiotics:  Treating active infection/contamination beyond 24 hours perioperative coverage      Physical Exam       Vitals:    06/18/18 0930 06/18/18 0945 06/18/18 1000 06/18/18 1015   BP:       Pulse: 84 79 81 78   Resp: 19 20 18 16   Temp:    36.6 °C (97.9 °F)   SpO2: 94% 93% 95% 94%   Weight:       Height:         Body mass index is 37.47 kg/m².    Oxygen Therapy:  Pulse Oximetry: 94 %, O2 (LPM): 3, O2 Delivery: Nasal Cannula    Physical Exam  Gen: Not in acute distress.    HEENT: NC/AT, moist mucous membranes.  Neck: No tracheal deviation. No stridor.   Cardiac: RRR without m/g/r. S1S2, no JVD.  Respiratory: Normal effort, no tachypnea. Decreased breath sounds over bilateral lateral lobes.   Abdomen: BS+, soft, NT/ND, no rebound/guarding.   Ext: No edema, 2+ DP pulses b/l.  Skin: Warm, dry. Dressing over right medial thigh. Showed mild edema S/P I&D x 2. Wound vac to right groin. Limited ROM of right thigh due to pain.   Neuro: AAOx4, CN II-XII grossly normal, no focal sensory or motor deficits.   Psych: Affect, mood, judgement normal.    Lab Data Review:         6/18/2018  5:46 AM    Recent Labs      06/16/18   0540  06/17/18   0530  06/18/18   0450   SODIUM  141  137  138   POTASSIUM  3.3*  3.2*  3.4*   CHLORIDE  107  103  100   CO2  22  25  28   BUN  4*  <3*  3*   CREATININE  0.32*  0.27*  0.31*   MAGNESIUM  1.7  1.6  1.9   CALCIUM  7.9*  7.4*  8.0*       Recent Labs      06/16/18   0540  06/17/18   0530  06/18/18   0450   ALTSGPT  17  11  11   ASTSGOT  8*  7*  10*   ALKPHOSPHAT  228*  150*  147*   TBILIRUBIN  0.4  0.4  0.4   GLUCOSE  158*  169*  121*       Recent Labs      06/16/18   0540  06/17/18   0530  06/18/18   0450   RBC  3.93*  3.59*  3.86*   HEMOGLOBIN  12.2  11.3*  12.0   HEMATOCRIT  37.9  33.4*  36.5*   PLATELETCT  305  289  290       Recent Labs      06/16/18   0540  06/17/18   0530  06/18/18   0450   WBC  9.5  8.8   9.8   NEUTSPOLYS  69.90  68.20  67.50   LYMPHOCYTES  18.60*  24.30  21.90*   MONOCYTES  7.90  6.60  7.90   EOSINOPHILS  0.90  0.00  0.90   BASOPHILS  0.00  0.00  0.00   ASTSGOT  8*  7*  10*   ALTSGPT  17  11  11   ALKPHOSPHAT  228*  150*  147*   TBILIRUBIN  0.4  0.4  0.4           Assessment/Plan     Sepsis due to necrotizing fasciitis of R thigh- (present on admission)   Assessment & Plan    S/P I&D on 6/11 and 6/15  Hemodynamically stable  Wound vac placed 6/11  Control BGs with Lantus and SSI   OR cultures + group B strep and Prevotella  Infectious Disease following the pt   Blood cultures NGTD   Repeat I & D on 6/18   Pain control by Sx  On Unasyn :  Unasyn started on 6/13- End Date on 2 weeks after the last Sx   plan to repeat I&D on 6/20            New onset type 2 diabetes mellitus (HCC)- (present on admission)   Assessment & Plan    A1c 12.8% on admission   SSI, Lantus 10 Units at Bedtime   Diabetes education consulted  On DM diet   - Plan to keep the blood sugars < 150 to control infection and wound healing            Elevated alkaline phosphatase level- (present on admission)   Assessment & Plan    On Antibiotics since 6/11  Pt denied RUQ pain  Trending Down   Will CTM   Will consider US abdomen if worsening             Hypokalemia   - K of 3.4 this AM   - Replete accordingly     Harini Ritter MD    The patient was seen by me face to face. I personally had a discussion with the patient. The case was discussed with our resident team, I examined the patient and confirmed the essential components of the history, physical examination, diagnosis and treatment plan as needed. I agree with the patient care as documented by the resident and edited as above by me. See resident's note above for complete details of service. The overall treatment regimen will be carried out as described above.     Thank you:  Pilo Luna MD, FACP  [facility] Medicine  Pager: Use [employer] Text (use resident info under treatment team  for day to day floor issues)  Office: [phone] Ext. [phone]  Fax: [phone] (non PHI only)

## 2024-02-06 ENCOUNTER — OFFICE VISIT (OUTPATIENT)
Dept: MEDICAL GROUP | Facility: CLINIC | Age: 64
End: 2024-02-06

## 2024-02-06 ENCOUNTER — TELEPHONE (OUTPATIENT)
Dept: MEDICAL GROUP | Facility: CLINIC | Age: 64
End: 2024-02-06

## 2024-02-06 ENCOUNTER — HOSPITAL ENCOUNTER (OUTPATIENT)
Facility: MEDICAL CENTER | Age: 64
End: 2024-02-06
Attending: PHYSICIAN ASSISTANT

## 2024-02-06 VITALS
WEIGHT: 138.45 LBS | SYSTOLIC BLOOD PRESSURE: 122 MMHG | OXYGEN SATURATION: 97 % | BODY MASS INDEX: 27.18 KG/M2 | HEIGHT: 60 IN | RESPIRATION RATE: 14 BRPM | DIASTOLIC BLOOD PRESSURE: 80 MMHG | HEART RATE: 82 BPM | TEMPERATURE: 97.3 F

## 2024-02-06 DIAGNOSIS — E78.1 PURE HYPERTRIGLYCERIDEMIA: ICD-10-CM

## 2024-02-06 DIAGNOSIS — E11.9 TYPE 2 DIABETES MELLITUS WITHOUT COMPLICATION, WITHOUT LONG-TERM CURRENT USE OF INSULIN (HCC): ICD-10-CM

## 2024-02-06 LAB
AMBIGUOUS DTTM AMBI4: NORMAL
CREAT UR-MCNC: 131.13 MG/DL
HBA1C MFR BLD: 11.1 % (ref ?–5.8)
MICROALBUMIN UR-MCNC: 19.8 MG/DL
MICROALBUMIN/CREAT UR: 151 MG/G (ref 0–30)
POCT INT CON NEG: NEGATIVE
POCT INT CON POS: POSITIVE

## 2024-02-06 PROCEDURE — 3079F DIAST BP 80-89 MM HG: CPT | Performed by: PHYSICIAN ASSISTANT

## 2024-02-06 PROCEDURE — 99213 OFFICE O/P EST LOW 20 MIN: CPT | Performed by: PHYSICIAN ASSISTANT

## 2024-02-06 PROCEDURE — 83036 HEMOGLOBIN GLYCOSYLATED A1C: CPT | Performed by: PHYSICIAN ASSISTANT

## 2024-02-06 PROCEDURE — 3074F SYST BP LT 130 MM HG: CPT | Performed by: PHYSICIAN ASSISTANT

## 2024-02-06 PROCEDURE — 82570 ASSAY OF URINE CREATININE: CPT

## 2024-02-06 PROCEDURE — 82043 UR ALBUMIN QUANTITATIVE: CPT

## 2024-02-06 RX ORDER — PIOGLITAZONEHYDROCHLORIDE 15 MG/1
15 TABLET ORAL DAILY
Qty: 90 TABLET | Refills: 2 | Status: SHIPPED | OUTPATIENT
Start: 2024-02-06

## 2024-02-06 RX ORDER — GLIPIZIDE 5 MG/1
5 TABLET ORAL 2 TIMES DAILY
Qty: 180 TABLET | Refills: 2 | Status: SHIPPED | OUTPATIENT
Start: 2024-02-06

## 2024-02-06 RX ORDER — CLOSTRIDIUM TETANI TOXOID ANTIGEN (FORMALDEHYDE INACTIVATED), CORYNEBACTERIUM DIPHTHERIAE TOXOID ANTIGEN (FORMALDEHYDE INACTIVATED), BORDETELLA PERTUSSIS TOXOID ANTIGEN (GLUTARALDEHYDE INACTIVATED), BORDETELLA PERTUSSIS FILAMENTOUS HEMAGGLUTININ ANTIGEN (FORMALDEHYDE INACTIVATED), BORDETELLA PERTUSSIS PERTACTIN ANTIGEN, AND BORDETELLA PERTUSSIS FIMBRIAE 2/3 ANTIGEN 5; 2; 2.5; 5; 3; 5 [LF]/.5ML; [LF]/.5ML; UG/.5ML; UG/.5ML; UG/.5ML; UG/.5ML
0.5 INJECTION, SUSPENSION INTRAMUSCULAR ONCE
Qty: 0.5 ML | Refills: 0 | Status: CANCELLED
Start: 2024-02-06 | End: 2024-02-06

## 2024-02-06 RX ORDER — GLIPIZIDE 5 MG/1
5 TABLET ORAL 2 TIMES DAILY
Qty: 60 TABLET | Refills: 0 | Status: SHIPPED | OUTPATIENT
Start: 2024-02-06 | End: 2024-02-06 | Stop reason: SDUPTHER

## 2024-02-06 RX ORDER — METFORMIN HYDROCHLORIDE 500 MG/1
1000 TABLET, EXTENDED RELEASE ORAL 2 TIMES DAILY
Qty: 360 TABLET | Refills: 2 | Status: SHIPPED | OUTPATIENT
Start: 2024-02-06

## 2024-02-06 RX ORDER — METFORMIN HYDROCHLORIDE 500 MG/1
1000 TABLET, EXTENDED RELEASE ORAL 2 TIMES DAILY
Qty: 120 TABLET | Refills: 0 | Status: SHIPPED | OUTPATIENT
Start: 2024-02-06 | End: 2024-02-06 | Stop reason: SDUPTHER

## 2024-02-06 RX ORDER — PIOGLITAZONEHYDROCHLORIDE 15 MG/1
15 TABLET ORAL DAILY
Qty: 90 TABLET | Refills: 1 | Status: SHIPPED | OUTPATIENT
Start: 2024-02-06 | End: 2024-02-06 | Stop reason: SDUPTHER

## 2024-02-06 ASSESSMENT — PATIENT HEALTH QUESTIONNAIRE - PHQ9: CLINICAL INTERPRETATION OF PHQ2 SCORE: 0

## 2024-02-06 NOTE — PROGRESS NOTES
cc: Diabetes    Subjective:     Emmanuelle Martin is a 63 y.o. female presenting for diabetes    Patient presents to the office for diabetes.  Patient indicates that she is needing a medication refill.   She is needing a refill of metformin, glipizide, and pioglitazone.  She does have hypertriglyceridemia.  She is cash pay and is not wanting anything further at this time.  She does indicate that she is requesting a 90-day supply of her diabetic medication.    Review of systems:  See above.   Denies any symptoms unless previously indicated.        Current Outpatient Medications:     glipiZIDE (GLUCOTROL) 5 MG Tab, Take 1 Tablet by mouth 2 times a day., Disp: 180 Tablet, Rfl: 2    pioglitazone (ACTOS) 15 MG Tab, Take 1 Tablet by mouth every day., Disp: 90 Tablet, Rfl: 2    metFORMIN ER (GLUCOPHAGE XR) 500 MG TABLET SR 24 HR, Take 2 Tablets by mouth 2 times a day., Disp: 360 Tablet, Rfl: 2    rosuvastatin (CRESTOR) 10 MG Tab, Take 1 Tablet by mouth every evening., Disp: 90 Tablet, Rfl: 3    ibuprofen (MOTRIN) 200 MG Tab, Take 200 mg by mouth every 6 hours as needed., Disp: , Rfl:     Blood Glucose Monitoring Suppl Device, Meter: Dispense Device of Insurance Preference. Use as directed for blood sugar monitoring., Disp: 1 Device, Rfl: 0    glucose blood (ACCU-CHEK SMARTVIEW) strip, 1 Strip by Other route as needed., Disp: 50 Strip, Rfl: 0    Lancets Misc, Lancets order: Lancets for meter. Use daily and as needed in times of high or low blood sugars., Disp: 100 Each, Rfl: 0    Allergies, past medical history, past surgical history, family history, social history reviewed and updated    Objective:     Vitals: /80 (BP Location: Left arm, Patient Position: Sitting, BP Cuff Size: Adult)   Pulse 82   Temp 36.3 °C (97.3 °F) (Temporal)   Resp 14   Ht 1.524 m (5')   Wt 62.8 kg (138 lb 7.2 oz)   LMP  (LMP Unknown)   SpO2 97%   BMI 27.04 kg/m²   General: Alert, pleasant, NAD  EYES:   PERRL, EOMI, no icterus or  pallor.  Conjunctivae and lids normal.   HENT:  Normocephalic.  External ears normal.  Neck supple.     Respiratory: Normal respiratory effort.    Abdomen: Not obese  Skin: Warm, dry, no rashes.  Musculoskeletal: Gait is normal.  Moves all extremities well.    Extremities: normal range of motion all extremities.   Neurological: No tremors, sensation grossly intact,  gait is normal, CN2-12 intact.  Psych:  Affect/mood is normal, judgement is good, memory is intact, grooming is appropriate.    Assessment/Plan:     Emmanuelle was seen today for medication refill and diabetes.    Diagnoses and all orders for this visit:    Type 2 diabetes mellitus without complication, without long-term current use of insulin (formerly [facility])  -     POCT A1C  -     Lipid Profile; Future  -     Comp Metabolic Panel; Future  -     Discontinue: glipiZIDE (GLUCOTROL) 5 MG Tab; Take 1 Tablet by mouth 2 times a day.  -     Discontinue: pioglitazone (ACTOS) 15 MG Tab; Take 1 Tablet by mouth every day.  -     Discontinue: metFORMIN ER (GLUCOPHAGE XR) 500 MG TABLET SR 24 HR; Take 2 Tablets by mouth 2 times a day.  -     glipiZIDE (GLUCOTROL) 5 MG Tab; Take 1 Tablet by mouth 2 times a day.  -     pioglitazone (ACTOS) 15 MG Tab; Take 1 Tablet by mouth every day.  -     metFORMIN ER (GLUCOPHAGE XR) 500 MG TABLET SR 24 HR; Take 2 Tablets by mouth 2 times a day.    Pure hypertriglyceridemia  -     Lipid Profile; Future  -     Comp Metabolic Panel; Future      Medications been refilled for 90-day supply at this time.  I have ordered basic labs and advise patient of [facility] in hopes of helping her save some money with lab orders.  Recommend to follow-up at least in 6 months.      No follow-ups on file.    Please note that this dictation was created using voice recognition software. I have made every reasonable attempt to correct obvious errors, but expect that there are errors of grammar and possible content that I did not discover before finalizing note.

## 2024-02-06 NOTE — TELEPHONE ENCOUNTER
Please call patient.  Let her know her A1c was 11.1.  Can we find out if she had been without medications and if so for how long?  Otherwise we will need to adjust her medications.   No, Declined

## 2024-08-18 NOTE — CARE PLAN
Problem: Safety  Goal: Will remain free from injury    Intervention: Provide assistance with mobility  All safety precautions in place. Patient will remain free from injury. Patient encouraged to call for assistance.          The patient's goals for the shift include      The clinical goals for the shift include pt will remain free from falls during shift

## 2024-11-11 DIAGNOSIS — E11.9 TYPE 2 DIABETES MELLITUS WITHOUT COMPLICATION, WITHOUT LONG-TERM CURRENT USE OF INSULIN (HCC): ICD-10-CM

## (undated) DEVICE — GLOVE BIOGEL PI INDICATOR SZ 7.0 SURGICAL PF LF - (50/BX 4BX/CA)

## (undated) DEVICE — SENSOR SPO2 NEO LNCS ADHESIVE (20/BX) SEE USER NOTES

## (undated) DEVICE — SYSTEM PREVENA INCISION MNGM - (1/EA)

## (undated) DEVICE — ELECTRODE DUAL RETURN W/ CORD - (50/PK)

## (undated) DEVICE — GLOVE BIOGEL PI INDICATOR SZ 8.0 SURGICAL PF LF -(50/BX 4BX/CA)

## (undated) DEVICE — TIP INTPLS HFLO ML ORFC BTRY - (12/CS)  FOR SURGILAV

## (undated) DEVICE — KIT ROOM DECONTAMINATION

## (undated) DEVICE — GLOVE BIOGEL PI INDICATOR SZ 6.5 SURGICAL PF LF - (50/BX 4BX/CA)

## (undated) DEVICE — BLADE SURGICAL #15 - (50/BX 3BX/CA)

## (undated) DEVICE — GOWN WARMING STANDARD FLEX - (30/CA)

## (undated) DEVICE — SET EXTENSION WITH 2 PORTS (48EA/CA) ***PART #2C8610 IS A SUBSTITUTE*****

## (undated) DEVICE — SET LEADWIRE 5 LEAD BEDSIDE DISPOSABLE ECG (1SET OF 5/EA)

## (undated) DEVICE — WATER IRRIG. STER. 1500 ML - (9/CA)

## (undated) DEVICE — PACK MAJOR ORTHO - (2EA/CA)

## (undated) DEVICE — DRESSING XEROFORM 1X8 - (50/BX 4BX/CA)

## (undated) DEVICE — LACTATED RINGERS INJ 1000 ML - (14EA/CA 60CA/PF)

## (undated) DEVICE — PAD LAP STERILE 18 X 18 - (5/PK 40PK/CA)

## (undated) DEVICE — GLOVE BIOGEL SZ 7.5 SURGICAL PF LTX - (50PR/BX 4BX/CA)

## (undated) DEVICE — SUTURE ETHILON 2-0 FSLX 30 (36PK/BX)"

## (undated) DEVICE — GLOVE SZ 6.5 BIOGEL PI MICRO - PF LF (50PR/BX)

## (undated) DEVICE — GLOVE SZ 7.5 BIOGEL PI MICRO - PF LF (50PR/BX)

## (undated) DEVICE — HEAD HOLDER JUNIOR/ADULT

## (undated) DEVICE — SUCTION INSTRUMENT YANKAUER BULBOUS TIP W/O VENT (50EA/CA)

## (undated) DEVICE — PADDING CAST 4 IN STERILE - 4 X 4 YDS (24/CA)

## (undated) DEVICE — HANDPIECE 10FT INTPLS SCT PLS IRRIGATION HAND CONTROL SET (6/PK)

## (undated) DEVICE — GLOVE BIOGEL INDICATOR SZ 8 SURGICAL PF LTX - (50/BX 4BX/CA)

## (undated) DEVICE — MASK, LARYNGEAL AIRWAY #4

## (undated) DEVICE — MASK ANESTHESIA ADULT  - (100/CA)

## (undated) DEVICE — PROTECTOR ULNA NERVE - (36PR/CA)

## (undated) DEVICE — CHLORAPREP 26 ML APPLICATOR - ORANGE TINT(25/CA)

## (undated) DEVICE — SUTURE 2-0 MONOCRYL CT-1

## (undated) DEVICE — DRAPE SURGICAL U 77X120 - (10/CA)

## (undated) DEVICE — DRAPE LOWER EXTREMETY - (6/CA)

## (undated) DEVICE — BANDAGE ELASTIC 6 HONEYCOMB - 6X5YD LF (20/CA)"

## (undated) DEVICE — STAPLER SKIN DISP - (6/BX 10BX/CA) VISISTAT

## (undated) DEVICE — GLOVE BIOGEL SZ 8 SURGICAL PF LTX - (50PR/BX 4BX/CA)

## (undated) DEVICE — TRAY SKIN SCRUB PVP WET (20EA/CA) PART #DYND70356 DISCONTINUED

## (undated) DEVICE — SODIUM CHL IRRIGATION 0.9% 1000ML (12EA/CA)

## (undated) DEVICE — DRESSING KIT V.A.C. SENSA T.R.A.C. LARGE (10EA/CA)

## (undated) DEVICE — TUBING CLEARLINK DUO-VENT - C-FLO (48EA/CA)

## (undated) DEVICE — SLEEVE, VASO, THIGH, MED

## (undated) DEVICE — GLOVE BIOGEL INDICATOR SZ 7.5 SURGICAL PF LTX - (50PR/BX 4BX/CA)

## (undated) DEVICE — ELECTRODE 850 FOAM ADHESIVE - HYDROGEL RADIOTRNSPRNT (50/PK)

## (undated) DEVICE — NEPTUNE 4 PORT MANIFOLD - (20/PK)

## (undated) DEVICE — GOWN SURGEONS X-LARGE - DISP. (30/CA)

## (undated) DEVICE — CANISTER SUCTION 3000ML MECHANICAL FILTER AUTO SHUTOFF MEDI-VAC NONSTERILE LF DISP  (40EA/CA)

## (undated) DEVICE — SODIUM CHL. IRRIGATION 0.9% 3000ML (4EA/CA 65CA/PF)

## (undated) DEVICE — SUTURE 2-0 VICRYL PLUS CT-1 - 8 X 18 INCH(12/BX)

## (undated) DEVICE — SUTURE GENERAL

## (undated) DEVICE — SET IRRIGATION CYSTOSCOPY TUBE L80 IN (20EA/CA)

## (undated) DEVICE — VAC CANISTER W/GEL 500ML - FITS NEW MACHINES (10EA/CA)

## (undated) DEVICE — KIT ANESTHESIA W/CIRCUIT & 3/LT BAG W/FILTER (20EA/CA)

## (undated) DEVICE — DRESSING, WOUND VAC MED.

## (undated) DEVICE — SUTURE 0 VICRYL PLUS CT-1 - 8 X 18 INCH (12/BX)